# Patient Record
Sex: FEMALE | Race: BLACK OR AFRICAN AMERICAN | NOT HISPANIC OR LATINO | Employment: OTHER | RURAL
[De-identification: names, ages, dates, MRNs, and addresses within clinical notes are randomized per-mention and may not be internally consistent; named-entity substitution may affect disease eponyms.]

---

## 2016-11-28 LAB — HCV AB SER QL: NORMAL

## 2019-08-19 ENCOUNTER — HISTORICAL (OUTPATIENT)
Dept: ADMINISTRATIVE | Facility: HOSPITAL | Age: 69
End: 2019-08-19

## 2019-08-19 LAB — CRC RECOMMENDATION EXT: NORMAL

## 2019-08-20 LAB
LAB AP CLINICAL INFORMATION: NORMAL
LAB AP DIAGNOSIS - HISTORICAL: NORMAL
LAB AP GROSS PATHOLOGY - HISTORICAL: NORMAL
LAB AP SPECIMEN SUBMITTED - HISTORICAL: NORMAL

## 2021-04-12 ENCOUNTER — OFFICE VISIT (OUTPATIENT)
Dept: INTERNAL MEDICINE | Facility: CLINIC | Age: 71
End: 2021-04-12
Payer: MEDICARE

## 2021-04-12 VITALS
TEMPERATURE: 98 F | HEIGHT: 63 IN | SYSTOLIC BLOOD PRESSURE: 138 MMHG | OXYGEN SATURATION: 98 % | HEART RATE: 85 BPM | RESPIRATION RATE: 16 BRPM | DIASTOLIC BLOOD PRESSURE: 64 MMHG | WEIGHT: 215 LBS | BODY MASS INDEX: 38.09 KG/M2

## 2021-04-12 DIAGNOSIS — I10 ESSENTIAL HYPERTENSION: Primary | ICD-10-CM

## 2021-04-12 PROCEDURE — 99999 PR PBB SHADOW E&M-EST. PATIENT-LVL V: CPT | Mod: PBBFAC,,, | Performed by: INTERNAL MEDICINE

## 2021-04-12 PROCEDURE — 99214 PR OFFICE/OUTPT VISIT, EST, LEVL IV, 30-39 MIN: ICD-10-PCS | Mod: S$PBB,,, | Performed by: INTERNAL MEDICINE

## 2021-04-12 PROCEDURE — 99214 OFFICE O/P EST MOD 30 MIN: CPT | Mod: S$PBB,,, | Performed by: INTERNAL MEDICINE

## 2021-04-12 PROCEDURE — 99215 OFFICE O/P EST HI 40 MIN: CPT | Mod: PBBFAC | Performed by: INTERNAL MEDICINE

## 2021-04-12 PROCEDURE — 99999 PR PBB SHADOW E&M-EST. PATIENT-LVL V: ICD-10-PCS | Mod: PBBFAC,,, | Performed by: INTERNAL MEDICINE

## 2021-04-12 RX ORDER — CYCLOBENZAPRINE HCL 10 MG
10 TABLET ORAL 3 TIMES DAILY PRN
COMMUNITY
End: 2022-04-22 | Stop reason: SDUPTHER

## 2021-04-12 RX ORDER — LOSARTAN POTASSIUM 50 MG/1
50 TABLET ORAL DAILY
COMMUNITY
End: 2021-11-15 | Stop reason: SDUPTHER

## 2021-04-12 RX ORDER — PREDNISONE 5 MG/1
5 TABLET ORAL DAILY PRN
COMMUNITY
End: 2022-04-22

## 2021-04-12 RX ORDER — FOLIC ACID 1 MG/1
1 TABLET ORAL DAILY
Status: ON HOLD | COMMUNITY
End: 2021-12-21 | Stop reason: HOSPADM

## 2021-04-12 RX ORDER — LEVOTHYROXINE SODIUM 125 UG/1
125 TABLET ORAL
COMMUNITY
End: 2021-05-21

## 2021-04-12 RX ORDER — HYDROCHLOROTHIAZIDE 12.5 MG/1
12.5 CAPSULE ORAL DAILY
COMMUNITY
End: 2021-10-11

## 2021-04-12 RX ORDER — METFORMIN HYDROCHLORIDE 500 MG/1
500 TABLET ORAL 2 TIMES DAILY
COMMUNITY
End: 2021-08-24

## 2021-05-10 RX ORDER — AZITHROMYCIN 250 MG/1
TABLET, FILM COATED ORAL
Qty: 6 TABLET | Refills: 1 | Status: SHIPPED | OUTPATIENT
Start: 2021-05-10 | End: 2021-08-10 | Stop reason: SDUPTHER

## 2021-05-10 RX ORDER — AZITHROMYCIN 250 MG/1
250 TABLET, FILM COATED ORAL DAILY
COMMUNITY
End: 2021-05-10 | Stop reason: SDUPTHER

## 2021-07-09 ENCOUNTER — OFFICE VISIT (OUTPATIENT)
Dept: INTERNAL MEDICINE | Facility: CLINIC | Age: 71
End: 2021-07-09
Payer: MEDICARE

## 2021-07-09 VITALS
RESPIRATION RATE: 16 BRPM | BODY MASS INDEX: 37.39 KG/M2 | HEART RATE: 90 BPM | TEMPERATURE: 98 F | SYSTOLIC BLOOD PRESSURE: 134 MMHG | DIASTOLIC BLOOD PRESSURE: 58 MMHG | WEIGHT: 211 LBS | HEIGHT: 63 IN | OXYGEN SATURATION: 96 %

## 2021-07-09 DIAGNOSIS — K21.9 GERD WITHOUT ESOPHAGITIS: ICD-10-CM

## 2021-07-09 DIAGNOSIS — E78.5 HYPERLIPIDEMIA LDL GOAL <100: ICD-10-CM

## 2021-07-09 DIAGNOSIS — M06.9 RHEUMATOID ARTHRITIS, INVOLVING UNSPECIFIED SITE, UNSPECIFIED WHETHER RHEUMATOID FACTOR PRESENT: ICD-10-CM

## 2021-07-09 DIAGNOSIS — E11.9 CONTROLLED TYPE 2 DIABETES MELLITUS WITHOUT COMPLICATION, WITHOUT LONG-TERM CURRENT USE OF INSULIN: ICD-10-CM

## 2021-07-09 DIAGNOSIS — M15.9 OSTEOARTHRITIS OF MULTIPLE JOINTS, UNSPECIFIED OSTEOARTHRITIS TYPE: ICD-10-CM

## 2021-07-09 DIAGNOSIS — I10 ESSENTIAL HYPERTENSION: Primary | ICD-10-CM

## 2021-07-09 PROCEDURE — 99214 OFFICE O/P EST MOD 30 MIN: CPT | Mod: S$PBB,,, | Performed by: INTERNAL MEDICINE

## 2021-07-09 PROCEDURE — 99215 OFFICE O/P EST HI 40 MIN: CPT | Mod: PBBFAC | Performed by: INTERNAL MEDICINE

## 2021-07-09 PROCEDURE — 99214 PR OFFICE/OUTPT VISIT, EST, LEVL IV, 30-39 MIN: ICD-10-PCS | Mod: S$PBB,,, | Performed by: INTERNAL MEDICINE

## 2021-07-09 RX ORDER — POTASSIUM CHLORIDE 600 MG/1
8 CAPSULE, EXTENDED RELEASE ORAL ONCE
Qty: 90 CAPSULE | Refills: 3 | Status: SHIPPED | OUTPATIENT
Start: 2021-07-09 | End: 2021-07-09

## 2021-08-02 ENCOUNTER — TELEPHONE (OUTPATIENT)
Dept: INTERNAL MEDICINE | Facility: CLINIC | Age: 71
End: 2021-08-02

## 2021-08-02 DIAGNOSIS — E11.9 CONTROLLED TYPE 2 DIABETES MELLITUS WITHOUT COMPLICATION, WITHOUT LONG-TERM CURRENT USE OF INSULIN: ICD-10-CM

## 2021-08-02 DIAGNOSIS — E78.5 HYPERLIPIDEMIA LDL GOAL <100: ICD-10-CM

## 2021-08-02 DIAGNOSIS — J06.9 UPPER RESPIRATORY TRACT INFECTION, UNSPECIFIED TYPE: ICD-10-CM

## 2021-08-02 DIAGNOSIS — I10 ESSENTIAL HYPERTENSION: Primary | ICD-10-CM

## 2021-08-02 RX ORDER — AZITHROMYCIN 250 MG/1
TABLET, FILM COATED ORAL
Qty: 6 TABLET | Refills: 1 | Status: SHIPPED | OUTPATIENT
Start: 2021-08-02 | End: 2021-10-11

## 2021-08-04 ENCOUNTER — HOSPITAL ENCOUNTER (OUTPATIENT)
Dept: RADIOLOGY | Facility: HOSPITAL | Age: 71
Discharge: HOME | End: 2021-08-04
Attending: ORTHOPAEDIC SURGERY
Payer: MEDICARE

## 2021-08-04 DIAGNOSIS — M25.512 LEFT SHOULDER PAIN, UNSPECIFIED CHRONICITY: ICD-10-CM

## 2021-08-04 PROBLEM — M75.42 IMPINGEMENT SYNDROME OF LEFT SHOULDER: Status: ACTIVE | Noted: 2021-08-04

## 2021-08-04 PROCEDURE — 73030 X-RAY EXAM OF SHOULDER: CPT | Mod: TC,LT

## 2021-08-10 RX ORDER — AZITHROMYCIN 250 MG/1
TABLET, FILM COATED ORAL
Qty: 6 TABLET | Refills: 1 | Status: SHIPPED | OUTPATIENT
Start: 2021-08-10 | End: 2021-10-11

## 2021-08-25 DIAGNOSIS — U07.1 COVID-19: Primary | ICD-10-CM

## 2021-08-26 ENCOUNTER — INFUSION (OUTPATIENT)
Dept: INFECTIOUS DISEASES | Facility: HOSPITAL | Age: 71
End: 2021-08-26
Attending: INTERNAL MEDICINE
Payer: MEDICARE

## 2021-08-26 VITALS
HEART RATE: 83 BPM | OXYGEN SATURATION: 96 % | TEMPERATURE: 98 F | DIASTOLIC BLOOD PRESSURE: 64 MMHG | SYSTOLIC BLOOD PRESSURE: 130 MMHG

## 2021-08-26 DIAGNOSIS — U07.1 COVID-19: ICD-10-CM

## 2021-08-26 PROCEDURE — 25000003 PHARM REV CODE 250: Performed by: INTERNAL MEDICINE

## 2021-08-26 PROCEDURE — M0243 CASIRIVI AND IMDEVI INFUSION: HCPCS | Performed by: INTERNAL MEDICINE

## 2021-08-26 PROCEDURE — 63600175 PHARM REV CODE 636 W HCPCS: Performed by: INTERNAL MEDICINE

## 2021-08-26 RX ORDER — DIPHENHYDRAMINE HYDROCHLORIDE 50 MG/ML
25 INJECTION INTRAMUSCULAR; INTRAVENOUS ONCE AS NEEDED
Status: ACTIVE | OUTPATIENT
Start: 2021-08-26 | End: 2033-01-22

## 2021-08-26 RX ORDER — SODIUM CHLORIDE 0.9 % (FLUSH) 0.9 %
10 SYRINGE (ML) INJECTION
Status: ACTIVE | OUTPATIENT
Start: 2021-08-26

## 2021-08-26 RX ORDER — ACETAMINOPHEN 325 MG/1
650 TABLET ORAL ONCE AS NEEDED
Status: COMPLETED | OUTPATIENT
Start: 2021-08-26 | End: 2022-09-14

## 2021-08-26 RX ORDER — EPINEPHRINE 0.3 MG/.3ML
0.3 INJECTION SUBCUTANEOUS
Status: ACTIVE | OUTPATIENT
Start: 2021-08-26

## 2021-08-26 RX ORDER — ALBUTEROL SULFATE 90 UG/1
2 AEROSOL, METERED RESPIRATORY (INHALATION)
Status: DISCONTINUED | OUTPATIENT
Start: 2021-08-26 | End: 2021-12-21 | Stop reason: ALTCHOICE

## 2021-08-26 RX ORDER — ONDANSETRON 4 MG/1
4 TABLET, ORALLY DISINTEGRATING ORAL ONCE AS NEEDED
Status: ACTIVE | OUTPATIENT
Start: 2021-08-26 | End: 2033-01-22

## 2021-08-26 RX ADMIN — CASIRIVIMAB AND IMDEVIMAB 600 MG: 600; 600 INJECTION, SOLUTION, CONCENTRATE INTRAVENOUS at 08:08

## 2021-10-07 ENCOUNTER — INFUSION (OUTPATIENT)
Dept: INFUSION THERAPY | Facility: HOSPITAL | Age: 71
End: 2021-10-07
Attending: INTERNAL MEDICINE
Payer: MEDICARE

## 2021-10-07 VITALS
OXYGEN SATURATION: 100 % | SYSTOLIC BLOOD PRESSURE: 143 MMHG | TEMPERATURE: 98 F | DIASTOLIC BLOOD PRESSURE: 75 MMHG | HEART RATE: 69 BPM

## 2021-10-07 DIAGNOSIS — M05.79 RHEUMATOID ARTHRITIS INVOLVING MULTIPLE SITES WITH POSITIVE RHEUMATOID FACTOR: Primary | ICD-10-CM

## 2021-10-07 PROCEDURE — 25000003 PHARM REV CODE 250: Performed by: NURSE PRACTITIONER

## 2021-10-07 PROCEDURE — 96365 THER/PROPH/DIAG IV INF INIT: CPT

## 2021-10-07 PROCEDURE — 63600175 PHARM REV CODE 636 W HCPCS: Performed by: NURSE PRACTITIONER

## 2021-10-07 RX ORDER — METHYLPREDNISOLONE SOD SUCC 125 MG
62.5 VIAL (EA) INJECTION ONCE AS NEEDED
Status: COMPLETED | OUTPATIENT
Start: 2021-10-07 | End: 2021-10-07

## 2021-10-07 RX ORDER — SODIUM CHLORIDE 0.9 % (FLUSH) 0.9 %
10 SYRINGE (ML) INJECTION
Status: CANCELLED | OUTPATIENT
Start: 2021-10-07

## 2021-10-07 RX ORDER — DIPHENHYDRAMINE HCL 12.5MG/5ML
25 ELIXIR ORAL ONCE AS NEEDED
Status: CANCELLED
Start: 2021-10-07

## 2021-10-07 RX ORDER — ACETAMINOPHEN 500 MG
1000 TABLET ORAL ONCE AS NEEDED
Status: COMPLETED | OUTPATIENT
Start: 2021-10-07 | End: 2021-10-07

## 2021-10-07 RX ORDER — DIPHENHYDRAMINE HCL 12.5MG/5ML
25 ELIXIR ORAL ONCE AS NEEDED
Status: COMPLETED | OUTPATIENT
Start: 2021-10-07 | End: 2021-10-07

## 2021-10-07 RX ORDER — ACETAMINOPHEN 500 MG
1000 TABLET ORAL ONCE AS NEEDED
Status: CANCELLED
Start: 2021-10-07

## 2021-10-07 RX ADMIN — DIPHENHYDRAMINE HYDROCHLORIDE 25 MG: 12.5 SOLUTION ORAL at 11:10

## 2021-10-07 RX ADMIN — METHYLPREDNISOLONE SODIUM SUCCINATE 62.5 MG: 125 INJECTION, POWDER, FOR SOLUTION INTRAMUSCULAR; INTRAVENOUS at 11:10

## 2021-10-07 RX ADMIN — ACETAMINOPHEN 1000 MG: 500 TABLET ORAL at 11:10

## 2021-10-07 RX ADMIN — SODIUM CHLORIDE 750 MG: 9 INJECTION, SOLUTION INTRAVENOUS at 12:10

## 2021-10-08 RX ORDER — HYDROCHLOROTHIAZIDE 12.5 MG/1
12.5 TABLET ORAL DAILY
COMMUNITY
Start: 2021-08-02 | End: 2021-11-22

## 2021-10-08 RX ORDER — POTASSIUM CHLORIDE 600 MG/1
8 CAPSULE, EXTENDED RELEASE ORAL ONCE
COMMUNITY
Start: 2021-07-09 | End: 2021-11-15 | Stop reason: SDUPTHER

## 2021-10-11 ENCOUNTER — OFFICE VISIT (OUTPATIENT)
Dept: INTERNAL MEDICINE | Facility: CLINIC | Age: 71
End: 2021-10-11
Payer: MEDICARE

## 2021-10-11 VITALS
SYSTOLIC BLOOD PRESSURE: 130 MMHG | HEART RATE: 85 BPM | OXYGEN SATURATION: 98 % | HEIGHT: 63 IN | RESPIRATION RATE: 16 BRPM | WEIGHT: 217 LBS | BODY MASS INDEX: 38.45 KG/M2 | DIASTOLIC BLOOD PRESSURE: 70 MMHG | TEMPERATURE: 98 F

## 2021-10-11 DIAGNOSIS — E11.9 CONTROLLED TYPE 2 DIABETES MELLITUS WITHOUT COMPLICATION, WITHOUT LONG-TERM CURRENT USE OF INSULIN: ICD-10-CM

## 2021-10-11 DIAGNOSIS — E78.5 HYPERLIPIDEMIA LDL GOAL <100: ICD-10-CM

## 2021-10-11 DIAGNOSIS — I10 ESSENTIAL HYPERTENSION: Primary | ICD-10-CM

## 2021-10-11 DIAGNOSIS — M15.9 OSTEOARTHRITIS OF MULTIPLE JOINTS, UNSPECIFIED OSTEOARTHRITIS TYPE: ICD-10-CM

## 2021-10-11 PROCEDURE — 99214 PR OFFICE/OUTPT VISIT, EST, LEVL IV, 30-39 MIN: ICD-10-PCS | Mod: S$PBB,,, | Performed by: INTERNAL MEDICINE

## 2021-10-11 PROCEDURE — 99214 OFFICE O/P EST MOD 30 MIN: CPT | Mod: S$PBB,,, | Performed by: INTERNAL MEDICINE

## 2021-10-11 PROCEDURE — 99215 OFFICE O/P EST HI 40 MIN: CPT | Mod: PBBFAC | Performed by: INTERNAL MEDICINE

## 2021-11-04 ENCOUNTER — INFUSION (OUTPATIENT)
Dept: INFUSION THERAPY | Facility: HOSPITAL | Age: 71
End: 2021-11-04
Attending: NURSE PRACTITIONER
Payer: MEDICARE

## 2021-11-04 DIAGNOSIS — M05.79 RHEUMATOID ARTHRITIS INVOLVING MULTIPLE SITES WITH POSITIVE RHEUMATOID FACTOR: Primary | ICD-10-CM

## 2021-11-04 PROCEDURE — 63600175 PHARM REV CODE 636 W HCPCS: Mod: JA | Performed by: NURSE PRACTITIONER

## 2021-11-04 PROCEDURE — 25000003 PHARM REV CODE 250: Performed by: NURSE PRACTITIONER

## 2021-11-04 PROCEDURE — 96365 THER/PROPH/DIAG IV INF INIT: CPT

## 2021-11-04 RX ORDER — ACETAMINOPHEN 500 MG
1000 TABLET ORAL ONCE AS NEEDED
Status: COMPLETED | OUTPATIENT
Start: 2021-11-04 | End: 2021-11-04

## 2021-11-04 RX ORDER — ACETAMINOPHEN 500 MG
1000 TABLET ORAL ONCE AS NEEDED
Status: CANCELLED
Start: 2021-11-04

## 2021-11-04 RX ORDER — DIPHENHYDRAMINE HCL 12.5MG/5ML
25 ELIXIR ORAL ONCE AS NEEDED
Status: CANCELLED
Start: 2021-11-04

## 2021-11-04 RX ORDER — SODIUM CHLORIDE 0.9 % (FLUSH) 0.9 %
10 SYRINGE (ML) INJECTION
Status: CANCELLED | OUTPATIENT
Start: 2021-11-04

## 2021-11-04 RX ORDER — DIPHENHYDRAMINE HCL 12.5MG/5ML
25 ELIXIR ORAL ONCE AS NEEDED
Status: COMPLETED | OUTPATIENT
Start: 2021-11-04 | End: 2021-11-04

## 2021-11-04 RX ADMIN — SODIUM CHLORIDE 750 MG: 9 INJECTION, SOLUTION INTRAVENOUS at 09:11

## 2021-11-04 RX ADMIN — ACETAMINOPHEN 1000 MG: 500 TABLET ORAL at 09:11

## 2021-11-04 RX ADMIN — DIPHENHYDRAMINE HYDROCHLORIDE 25 MG: 12.5 SOLUTION ORAL at 09:11

## 2021-11-04 RX ADMIN — METHYLPREDNISOLONE SODIUM SUCCINATE 62.5 MG: 125 INJECTION, POWDER, FOR SOLUTION INTRAMUSCULAR; INTRAVENOUS at 09:11

## 2021-11-15 RX ORDER — POTASSIUM CHLORIDE 600 MG/1
8 CAPSULE, EXTENDED RELEASE ORAL DAILY
Qty: 90 CAPSULE | Refills: 3 | Status: ON HOLD | OUTPATIENT
Start: 2021-11-15 | End: 2021-12-21 | Stop reason: ALTCHOICE

## 2021-11-15 RX ORDER — LOSARTAN POTASSIUM 50 MG/1
50 TABLET ORAL DAILY
Qty: 90 TABLET | Refills: 3 | Status: ON HOLD | OUTPATIENT
Start: 2021-11-15 | End: 2021-12-21 | Stop reason: ALTCHOICE

## 2021-12-09 ENCOUNTER — INFUSION (OUTPATIENT)
Dept: INFUSION THERAPY | Facility: HOSPITAL | Age: 71
End: 2021-12-09
Attending: INTERNAL MEDICINE
Payer: MEDICARE

## 2021-12-09 VITALS
OXYGEN SATURATION: 98 % | HEART RATE: 61 BPM | DIASTOLIC BLOOD PRESSURE: 79 MMHG | TEMPERATURE: 98 F | SYSTOLIC BLOOD PRESSURE: 174 MMHG

## 2021-12-09 DIAGNOSIS — M05.79 RHEUMATOID ARTHRITIS INVOLVING MULTIPLE SITES WITH POSITIVE RHEUMATOID FACTOR: Primary | ICD-10-CM

## 2021-12-09 PROCEDURE — 96365 THER/PROPH/DIAG IV INF INIT: CPT

## 2021-12-09 PROCEDURE — 63600175 PHARM REV CODE 636 W HCPCS: Mod: JA | Performed by: NURSE PRACTITIONER

## 2021-12-09 PROCEDURE — 63600175 PHARM REV CODE 636 W HCPCS: Performed by: INTERNAL MEDICINE

## 2021-12-09 PROCEDURE — 25000003 PHARM REV CODE 250: Performed by: NURSE PRACTITIONER

## 2021-12-09 RX ORDER — SODIUM CHLORIDE 0.9 % (FLUSH) 0.9 %
10 SYRINGE (ML) INJECTION
Status: CANCELLED | OUTPATIENT
Start: 2021-12-09

## 2021-12-09 RX ORDER — ACETAMINOPHEN 500 MG
1000 TABLET ORAL ONCE AS NEEDED
Status: CANCELLED
Start: 2021-12-09

## 2021-12-09 RX ORDER — DIPHENHYDRAMINE HCL 12.5MG/5ML
25 ELIXIR ORAL ONCE AS NEEDED
Status: CANCELLED
Start: 2021-12-09

## 2021-12-09 RX ORDER — ACETAMINOPHEN 500 MG
1000 TABLET ORAL ONCE AS NEEDED
Status: COMPLETED | OUTPATIENT
Start: 2021-12-09 | End: 2021-12-09

## 2021-12-09 RX ORDER — SODIUM CHLORIDE 0.9 % (FLUSH) 0.9 %
10 SYRINGE (ML) INJECTION
Status: DISCONTINUED | OUTPATIENT
Start: 2021-12-09 | End: 2021-12-09 | Stop reason: HOSPADM

## 2021-12-09 RX ORDER — DIPHENHYDRAMINE HCL 12.5MG/5ML
25 ELIXIR ORAL ONCE AS NEEDED
Status: COMPLETED | OUTPATIENT
Start: 2021-12-09 | End: 2021-12-09

## 2021-12-09 RX ADMIN — ACETAMINOPHEN 1000 MG: 500 TABLET ORAL at 10:12

## 2021-12-09 RX ADMIN — METHYLPREDNISOLONE SODIUM SUCCINATE 40 MG: 40 INJECTION, POWDER, LYOPHILIZED, FOR SOLUTION INTRAMUSCULAR; INTRAVENOUS at 10:12

## 2021-12-09 RX ADMIN — DIPHENHYDRAMINE HYDROCHLORIDE 25 MG: 12.5 SOLUTION ORAL at 10:12

## 2021-12-09 RX ADMIN — SODIUM CHLORIDE 750 MG: 9 INJECTION, SOLUTION INTRAVENOUS at 10:12

## 2021-12-13 ENCOUNTER — CLINICAL SUPPORT (OUTPATIENT)
Dept: CARDIOLOGY | Facility: CLINIC | Age: 71
End: 2021-12-13
Payer: MEDICARE

## 2021-12-13 DIAGNOSIS — Z01.810 PRE-OPERATIVE CARDIOVASCULAR EXAMINATION: ICD-10-CM

## 2021-12-13 PROCEDURE — 99211 OFF/OP EST MAY X REQ PHY/QHP: CPT | Mod: PBBFAC

## 2021-12-13 PROCEDURE — 93010 EKG 12-LEAD: ICD-10-PCS | Mod: S$PBB,,, | Performed by: STUDENT IN AN ORGANIZED HEALTH CARE EDUCATION/TRAINING PROGRAM

## 2021-12-13 PROCEDURE — 93005 ELECTROCARDIOGRAM TRACING: CPT | Mod: PBBFAC | Performed by: STUDENT IN AN ORGANIZED HEALTH CARE EDUCATION/TRAINING PROGRAM

## 2021-12-13 PROCEDURE — 93010 ELECTROCARDIOGRAM REPORT: CPT | Mod: S$PBB,,, | Performed by: STUDENT IN AN ORGANIZED HEALTH CARE EDUCATION/TRAINING PROGRAM

## 2021-12-20 DIAGNOSIS — B37.31 YEAST INFECTION OF THE VAGINA: Primary | ICD-10-CM

## 2021-12-20 PROBLEM — M75.122 NONTRAUMATIC COMPLETE TEAR OF LEFT ROTATOR CUFF: Status: ACTIVE | Noted: 2021-12-20

## 2021-12-20 RX ORDER — LOSARTAN POTASSIUM AND HYDROCHLOROTHIAZIDE 12.5; 5 MG/1; MG/1
TABLET ORAL
COMMUNITY
End: 2023-02-03

## 2021-12-20 RX ORDER — CEFDINIR 300 MG/1
CAPSULE ORAL
Status: ON HOLD | COMMUNITY
Start: 2021-11-29 | End: 2021-12-21 | Stop reason: ALTCHOICE

## 2021-12-20 RX ORDER — IBUPROFEN 800 MG/1
TABLET ORAL
COMMUNITY

## 2021-12-20 RX ORDER — AZELASTINE 1 MG/ML
SPRAY, METERED NASAL
Status: ON HOLD | COMMUNITY
End: 2021-12-21 | Stop reason: ALTCHOICE

## 2021-12-20 RX ORDER — POTASSIUM CHLORIDE 600 MG/1
CAPSULE, EXTENDED RELEASE ORAL
COMMUNITY
End: 2022-04-22

## 2021-12-20 RX ORDER — FLUCONAZOLE 100 MG/1
100 TABLET ORAL DAILY
Qty: 30 TABLET | Refills: 0 | Status: SHIPPED | OUTPATIENT
Start: 2021-12-20 | End: 2022-01-19

## 2021-12-20 RX ORDER — OMEPRAZOLE 20 MG/1
CAPSULE, DELAYED RELEASE ORAL
COMMUNITY
End: 2022-03-04 | Stop reason: ALTCHOICE

## 2021-12-20 RX ORDER — LEVOTHYROXINE SODIUM 125 UG/1
TABLET ORAL
COMMUNITY
End: 2022-07-27

## 2021-12-20 RX ORDER — FLUTICASONE PROPIONATE 50 MCG
SPRAY, SUSPENSION (ML) NASAL
COMMUNITY
End: 2022-11-02 | Stop reason: SDUPTHER

## 2021-12-20 RX ORDER — LORATADINE 10 MG/1
TABLET ORAL
COMMUNITY

## 2021-12-21 ENCOUNTER — HOSPITAL ENCOUNTER (OUTPATIENT)
Facility: HOSPITAL | Age: 71
Discharge: HOME | End: 2021-12-21
Attending: ORTHOPAEDIC SURGERY | Admitting: ORTHOPAEDIC SURGERY
Payer: MEDICARE

## 2021-12-21 ENCOUNTER — ANESTHESIA (OUTPATIENT)
Dept: SURGERY | Facility: HOSPITAL | Age: 71
End: 2021-12-21
Payer: MEDICARE

## 2021-12-21 ENCOUNTER — ANESTHESIA EVENT (OUTPATIENT)
Dept: SURGERY | Facility: HOSPITAL | Age: 71
End: 2021-12-21
Payer: MEDICARE

## 2021-12-21 VITALS
RESPIRATION RATE: 27 BRPM | DIASTOLIC BLOOD PRESSURE: 80 MMHG | OXYGEN SATURATION: 93 % | TEMPERATURE: 98 F | HEIGHT: 63 IN | WEIGHT: 210 LBS | HEART RATE: 84 BPM | SYSTOLIC BLOOD PRESSURE: 140 MMHG | BODY MASS INDEX: 37.21 KG/M2

## 2021-12-21 DIAGNOSIS — M75.122 NONTRAUMATIC COMPLETE TEAR OF LEFT ROTATOR CUFF: ICD-10-CM

## 2021-12-21 LAB
GLUCOSE SERPL-MCNC: 135 MG/DL (ref 70–105)
GLUCOSE SERPL-MCNC: 177 MG/DL (ref 70–105)

## 2021-12-21 PROCEDURE — 27100168 OPTIME MED/SURG SUP & DEVICES NON-STERILE SUPPLY: Performed by: ORTHOPAEDIC SURGERY

## 2021-12-21 PROCEDURE — 37000008 HC ANESTHESIA 1ST 15 MINUTES: Performed by: ORTHOPAEDIC SURGERY

## 2021-12-21 PROCEDURE — 71000016 HC POSTOP RECOV ADDL HR: Performed by: ORTHOPAEDIC SURGERY

## 2021-12-21 PROCEDURE — 27201423 OPTIME MED/SURG SUP & DEVICES STERILE SUPPLY: Performed by: ORTHOPAEDIC SURGERY

## 2021-12-21 PROCEDURE — 25000003 PHARM REV CODE 250: Performed by: ORTHOPAEDIC SURGERY

## 2021-12-21 PROCEDURE — 63600175 PHARM REV CODE 636 W HCPCS: Performed by: ANESTHESIOLOGY

## 2021-12-21 PROCEDURE — 36000711: Performed by: ORTHOPAEDIC SURGERY

## 2021-12-21 PROCEDURE — 37000009 HC ANESTHESIA EA ADD 15 MINS: Performed by: ORTHOPAEDIC SURGERY

## 2021-12-21 PROCEDURE — 25000003 PHARM REV CODE 250: Performed by: ANESTHESIOLOGY

## 2021-12-21 PROCEDURE — D9220A PRA ANESTHESIA: ICD-10-PCS | Mod: ANES,ICN,, | Performed by: ANESTHESIOLOGY

## 2021-12-21 PROCEDURE — D9220A PRA ANESTHESIA: Mod: CRNA,,, | Performed by: NURSE ANESTHETIST, CERTIFIED REGISTERED

## 2021-12-21 PROCEDURE — 64415 NJX AA&/STRD BRCH PLXS IMG: CPT | Mod: XU,LT,, | Performed by: ANESTHESIOLOGY

## 2021-12-21 PROCEDURE — 63600175 PHARM REV CODE 636 W HCPCS: Performed by: NURSE ANESTHETIST, CERTIFIED REGISTERED

## 2021-12-21 PROCEDURE — 63600175 PHARM REV CODE 636 W HCPCS: Performed by: ORTHOPAEDIC SURGERY

## 2021-12-21 PROCEDURE — D9220A PRA ANESTHESIA: ICD-10-PCS | Mod: CRNA,,, | Performed by: NURSE ANESTHETIST, CERTIFIED REGISTERED

## 2021-12-21 PROCEDURE — C1713 ANCHOR/SCREW BN/BN,TIS/BN: HCPCS | Performed by: ORTHOPAEDIC SURGERY

## 2021-12-21 PROCEDURE — 64415 PERIPHERAL BLOCK: ICD-10-PCS | Mod: XU,LT,, | Performed by: ANESTHESIOLOGY

## 2021-12-21 PROCEDURE — 36000710: Performed by: ORTHOPAEDIC SURGERY

## 2021-12-21 PROCEDURE — 25000003 PHARM REV CODE 250: Performed by: NURSE ANESTHETIST, CERTIFIED REGISTERED

## 2021-12-21 PROCEDURE — 82962 GLUCOSE BLOOD TEST: CPT

## 2021-12-21 PROCEDURE — 97161 PT EVAL LOW COMPLEX 20 MIN: CPT

## 2021-12-21 PROCEDURE — 71000015 HC POSTOP RECOV 1ST HR: Performed by: ORTHOPAEDIC SURGERY

## 2021-12-21 PROCEDURE — D9220A PRA ANESTHESIA: Mod: ANES,ICN,, | Performed by: ANESTHESIOLOGY

## 2021-12-21 PROCEDURE — 71000033 HC RECOVERY, INTIAL HOUR: Performed by: ORTHOPAEDIC SURGERY

## 2021-12-21 DEVICE — IMPLANTABLE DEVICE: Type: IMPLANTABLE DEVICE | Site: SHOULDER | Status: FUNCTIONAL

## 2021-12-21 DEVICE — IMP SUTURE ANCHOR SWVLKC CLD 4.75X19.1MM: Type: IMPLANTABLE DEVICE | Site: SHOULDER | Status: FUNCTIONAL

## 2021-12-21 RX ORDER — ACETAMINOPHEN 500 MG
1000 TABLET ORAL EVERY 6 HOURS PRN
Status: DISCONTINUED | OUTPATIENT
Start: 2021-12-21 | End: 2021-12-21 | Stop reason: HOSPADM

## 2021-12-21 RX ORDER — LIDOCAINE HYDROCHLORIDE 20 MG/ML
INJECTION, SOLUTION EPIDURAL; INFILTRATION; INTRACAUDAL; PERINEURAL
Status: DISCONTINUED | OUTPATIENT
Start: 2021-12-21 | End: 2021-12-21

## 2021-12-21 RX ORDER — ONDANSETRON 2 MG/ML
INJECTION INTRAMUSCULAR; INTRAVENOUS
Status: DISCONTINUED | OUTPATIENT
Start: 2021-12-21 | End: 2021-12-21

## 2021-12-21 RX ORDER — EPINEPHRINE 1 MG/ML
INJECTION, SOLUTION INTRACARDIAC; INTRAMUSCULAR; INTRAVENOUS; SUBCUTANEOUS
Status: DISCONTINUED | OUTPATIENT
Start: 2021-12-21 | End: 2021-12-21 | Stop reason: HOSPADM

## 2021-12-21 RX ORDER — ONDANSETRON 4 MG/1
4 TABLET, ORALLY DISINTEGRATING ORAL EVERY 6 HOURS PRN
Qty: 20 TABLET | Refills: 0 | Status: SHIPPED | OUTPATIENT
Start: 2021-12-21

## 2021-12-21 RX ORDER — ONDANSETRON 4 MG/1
8 TABLET, ORALLY DISINTEGRATING ORAL EVERY 8 HOURS PRN
Status: DISCONTINUED | OUTPATIENT
Start: 2021-12-21 | End: 2021-12-21 | Stop reason: HOSPADM

## 2021-12-21 RX ORDER — DIAZEPAM 5 MG/1
10 TABLET ORAL
Status: COMPLETED | OUTPATIENT
Start: 2021-12-21 | End: 2021-12-21

## 2021-12-21 RX ORDER — MEPERIDINE HYDROCHLORIDE 25 MG/ML
25 INJECTION INTRAMUSCULAR; INTRAVENOUS; SUBCUTANEOUS EVERY 10 MIN PRN
Status: DISCONTINUED | OUTPATIENT
Start: 2021-12-21 | End: 2021-12-21 | Stop reason: HOSPADM

## 2021-12-21 RX ORDER — HYDROCODONE BITARTRATE AND ACETAMINOPHEN 5; 325 MG/1; MG/1
1 TABLET ORAL EVERY 4 HOURS PRN
Status: DISCONTINUED | OUTPATIENT
Start: 2021-12-21 | End: 2021-12-21 | Stop reason: HOSPADM

## 2021-12-21 RX ORDER — PHENYLEPHRINE HYDROCHLORIDE 10 MG/ML
INJECTION INTRAVENOUS
Status: DISCONTINUED | OUTPATIENT
Start: 2021-12-21 | End: 2021-12-21

## 2021-12-21 RX ORDER — HYDROCODONE BITARTRATE AND ACETAMINOPHEN 10; 325 MG/1; MG/1
1 TABLET ORAL EVERY 4 HOURS PRN
Status: DISCONTINUED | OUTPATIENT
Start: 2021-12-21 | End: 2021-12-21 | Stop reason: HOSPADM

## 2021-12-21 RX ORDER — FENTANYL CITRATE 50 UG/ML
INJECTION, SOLUTION INTRAMUSCULAR; INTRAVENOUS
Status: DISCONTINUED | OUTPATIENT
Start: 2021-12-21 | End: 2021-12-21

## 2021-12-21 RX ORDER — SODIUM CHLORIDE 9 MG/ML
INJECTION, SOLUTION INTRAVENOUS CONTINUOUS
Status: DISCONTINUED | OUTPATIENT
Start: 2021-12-21 | End: 2021-12-21 | Stop reason: HOSPADM

## 2021-12-21 RX ORDER — CEFAZOLIN SODIUM 1 G/3ML
INJECTION, POWDER, FOR SOLUTION INTRAMUSCULAR; INTRAVENOUS
Status: DISCONTINUED | OUTPATIENT
Start: 2021-12-21 | End: 2021-12-21

## 2021-12-21 RX ORDER — PROMETHAZINE HYDROCHLORIDE 25 MG/1
25 TABLET ORAL EVERY 6 HOURS PRN
Status: DISCONTINUED | OUTPATIENT
Start: 2021-12-21 | End: 2021-12-21 | Stop reason: HOSPADM

## 2021-12-21 RX ORDER — CEFAZOLIN SODIUM 2 G/50ML
2 SOLUTION INTRAVENOUS
Status: DISCONTINUED | OUTPATIENT
Start: 2021-12-21 | End: 2021-12-21 | Stop reason: HOSPADM

## 2021-12-21 RX ORDER — EPHEDRINE SULFATE 50 MG/ML
INJECTION, SOLUTION INTRAVENOUS
Status: DISCONTINUED | OUTPATIENT
Start: 2021-12-21 | End: 2021-12-21

## 2021-12-21 RX ORDER — DEXAMETHASONE SODIUM PHOSPHATE 4 MG/ML
INJECTION, SOLUTION INTRA-ARTICULAR; INTRALESIONAL; INTRAMUSCULAR; INTRAVENOUS; SOFT TISSUE
Status: DISCONTINUED | OUTPATIENT
Start: 2021-12-21 | End: 2021-12-21

## 2021-12-21 RX ORDER — LIDOCAINE HYDROCHLORIDE 40 MG/ML
SOLUTION TOPICAL
Status: DISCONTINUED | OUTPATIENT
Start: 2021-12-21 | End: 2021-12-21

## 2021-12-21 RX ORDER — MORPHINE SULFATE 10 MG/ML
4 INJECTION INTRAMUSCULAR; INTRAVENOUS; SUBCUTANEOUS EVERY 5 MIN PRN
Status: DISCONTINUED | OUTPATIENT
Start: 2021-12-21 | End: 2021-12-21 | Stop reason: HOSPADM

## 2021-12-21 RX ORDER — ROCURONIUM BROMIDE 10 MG/ML
INJECTION, SOLUTION INTRAVENOUS
Status: DISCONTINUED | OUTPATIENT
Start: 2021-12-21 | End: 2021-12-21

## 2021-12-21 RX ORDER — ONDANSETRON 2 MG/ML
4 INJECTION INTRAMUSCULAR; INTRAVENOUS DAILY PRN
Status: DISCONTINUED | OUTPATIENT
Start: 2021-12-21 | End: 2021-12-21 | Stop reason: HOSPADM

## 2021-12-21 RX ORDER — BUPIVACAINE HYDROCHLORIDE 2.5 MG/ML
INJECTION, SOLUTION EPIDURAL; INFILTRATION; INTRACAUDAL
Status: DISCONTINUED | OUTPATIENT
Start: 2021-12-21 | End: 2021-12-21 | Stop reason: HOSPADM

## 2021-12-21 RX ORDER — DIPHENHYDRAMINE HYDROCHLORIDE 50 MG/ML
25 INJECTION INTRAMUSCULAR; INTRAVENOUS EVERY 6 HOURS PRN
Status: DISCONTINUED | OUTPATIENT
Start: 2021-12-21 | End: 2021-12-21 | Stop reason: HOSPADM

## 2021-12-21 RX ORDER — HYDROCODONE BITARTRATE AND ACETAMINOPHEN 10; 325 MG/1; MG/1
1 TABLET ORAL EVERY 6 HOURS PRN
Qty: 28 TABLET | Refills: 0 | Status: SHIPPED | OUTPATIENT
Start: 2021-12-21 | End: 2022-07-27

## 2021-12-21 RX ORDER — HYDROMORPHONE HYDROCHLORIDE 2 MG/ML
0.5 INJECTION, SOLUTION INTRAMUSCULAR; INTRAVENOUS; SUBCUTANEOUS EVERY 5 MIN PRN
Status: DISCONTINUED | OUTPATIENT
Start: 2021-12-21 | End: 2021-12-21 | Stop reason: HOSPADM

## 2021-12-21 RX ORDER — PROPOFOL 10 MG/ML
VIAL (ML) INTRAVENOUS
Status: DISCONTINUED | OUTPATIENT
Start: 2021-12-21 | End: 2021-12-21

## 2021-12-21 RX ORDER — MIDAZOLAM HYDROCHLORIDE 1 MG/ML
INJECTION INTRAMUSCULAR; INTRAVENOUS
Status: DISCONTINUED | OUTPATIENT
Start: 2021-12-21 | End: 2021-12-21

## 2021-12-21 RX ADMIN — EPHEDRINE SULFATE 15 MG: 50 INJECTION INTRAVENOUS at 08:12

## 2021-12-21 RX ADMIN — LIDOCAINE HYDROCHLORIDE 80 MG: 20 INJECTION, SOLUTION INTRAVENOUS at 07:12

## 2021-12-21 RX ADMIN — PHENYLEPHRINE HYDROCHLORIDE 200 MCG: 10 INJECTION INTRAVENOUS at 08:12

## 2021-12-21 RX ADMIN — SODIUM CHLORIDE: 9 INJECTION, SOLUTION INTRAVENOUS at 07:12

## 2021-12-21 RX ADMIN — CEFAZOLIN 2 G: 1 INJECTION, POWDER, FOR SOLUTION INTRAMUSCULAR; INTRAVENOUS; PARENTERAL at 07:12

## 2021-12-21 RX ADMIN — ROPIVACAINE HYDROCHLORIDE 30 ML: 7.5 INJECTION, SOLUTION EPIDURAL; PERINEURAL at 07:12

## 2021-12-21 RX ADMIN — PROPOFOL 180 MG: 10 INJECTION, EMULSION INTRAVENOUS at 07:12

## 2021-12-21 RX ADMIN — SUGAMMADEX 200 MG: 100 INJECTION, SOLUTION INTRAVENOUS at 09:12

## 2021-12-21 RX ADMIN — ROCURONIUM BROMIDE 40 MG: 10 INJECTION, SOLUTION INTRAVENOUS at 07:12

## 2021-12-21 RX ADMIN — EPHEDRINE SULFATE 20 MG: 50 INJECTION INTRAVENOUS at 08:12

## 2021-12-21 RX ADMIN — LIDOCAINE HYDROCHLORIDE 120 MG: 40 SOLUTION TOPICAL at 07:12

## 2021-12-21 RX ADMIN — DEXAMETHASONE SODIUM PHOSPHATE 4 MG: 4 INJECTION, SOLUTION INTRA-ARTICULAR; INTRALESIONAL; INTRAMUSCULAR; INTRAVENOUS; SOFT TISSUE at 07:12

## 2021-12-21 RX ADMIN — DIAZEPAM 10 MG: 5 TABLET ORAL at 07:12

## 2021-12-21 RX ADMIN — MIDAZOLAM 2 MG: 1 INJECTION INTRAMUSCULAR; INTRAVENOUS at 07:12

## 2021-12-21 RX ADMIN — FENTANYL CITRATE 100 MCG: 50 INJECTION INTRAMUSCULAR; INTRAVENOUS at 07:12

## 2021-12-21 RX ADMIN — ONDANSETRON 4 MG: 2 INJECTION INTRAMUSCULAR; INTRAVENOUS at 07:12

## 2021-12-29 RX ORDER — ROPIVACAINE HYDROCHLORIDE 7.5 MG/ML
INJECTION, SOLUTION EPIDURAL; PERINEURAL
Status: DISCONTINUED | OUTPATIENT
Start: 2021-12-21 | End: 2021-12-29

## 2022-01-05 PROBLEM — Z98.890 STATUS POST LEFT ROTATOR CUFF REPAIR: Status: ACTIVE | Noted: 2022-01-05

## 2022-01-06 ENCOUNTER — INFUSION (OUTPATIENT)
Dept: INFUSION THERAPY | Facility: HOSPITAL | Age: 72
End: 2022-01-06
Attending: NURSE PRACTITIONER
Payer: MEDICARE

## 2022-01-06 VITALS
SYSTOLIC BLOOD PRESSURE: 142 MMHG | RESPIRATION RATE: 18 BRPM | OXYGEN SATURATION: 99 % | DIASTOLIC BLOOD PRESSURE: 71 MMHG | TEMPERATURE: 98 F | HEART RATE: 62 BPM

## 2022-01-06 DIAGNOSIS — M05.79 RHEUMATOID ARTHRITIS INVOLVING MULTIPLE SITES WITH POSITIVE RHEUMATOID FACTOR: Primary | ICD-10-CM

## 2022-01-06 PROCEDURE — 96375 TX/PRO/DX INJ NEW DRUG ADDON: CPT

## 2022-01-06 PROCEDURE — 96365 THER/PROPH/DIAG IV INF INIT: CPT

## 2022-01-06 PROCEDURE — 63600175 PHARM REV CODE 636 W HCPCS: Mod: JA | Performed by: NURSE PRACTITIONER

## 2022-01-06 PROCEDURE — 96374 THER/PROPH/DIAG INJ IV PUSH: CPT

## 2022-01-06 PROCEDURE — 25000003 PHARM REV CODE 250: Performed by: NURSE PRACTITIONER

## 2022-01-06 RX ORDER — DIPHENHYDRAMINE HCL 12.5MG/5ML
25 ELIXIR ORAL ONCE AS NEEDED
Status: COMPLETED | OUTPATIENT
Start: 2022-01-06 | End: 2022-01-06

## 2022-01-06 RX ORDER — DIPHENHYDRAMINE HCL 12.5MG/5ML
25 ELIXIR ORAL ONCE AS NEEDED
Status: CANCELLED
Start: 2022-01-06

## 2022-01-06 RX ORDER — ACETAMINOPHEN 500 MG
1000 TABLET ORAL ONCE AS NEEDED
Status: DISCONTINUED | OUTPATIENT
Start: 2022-01-06 | End: 2022-01-06 | Stop reason: HOSPADM

## 2022-01-06 RX ORDER — METHYLPREDNISOLONE SOD SUCC 125 MG
VIAL (EA) INJECTION
Status: DISCONTINUED
Start: 2022-01-06 | End: 2022-01-06 | Stop reason: HOSPADM

## 2022-01-06 RX ORDER — SODIUM CHLORIDE 0.9 % (FLUSH) 0.9 %
10 SYRINGE (ML) INJECTION
Status: CANCELLED | OUTPATIENT
Start: 2022-01-06

## 2022-01-06 RX ORDER — ACETAMINOPHEN 500 MG
1000 TABLET ORAL ONCE AS NEEDED
Status: CANCELLED
Start: 2022-01-06

## 2022-01-06 RX ORDER — METHYLPREDNISOLONE SOD SUCC 125 MG
62.5 VIAL (EA) INJECTION ONCE AS NEEDED
Status: COMPLETED | OUTPATIENT
Start: 2022-01-06 | End: 2022-01-06

## 2022-01-06 RX ADMIN — SODIUM CHLORIDE 750 MG: 9 INJECTION, SOLUTION INTRAVENOUS at 10:01

## 2022-01-06 RX ADMIN — METHYLPREDNISOLONE SODIUM SUCCINATE 62.5 MG: 125 INJECTION, POWDER, FOR SOLUTION INTRAMUSCULAR; INTRAVENOUS at 10:01

## 2022-01-06 RX ADMIN — DIPHENHYDRAMINE HYDROCHLORIDE 25 MG: 12.5 SOLUTION ORAL at 10:01

## 2022-01-06 NOTE — PROGRESS NOTES
1000: Pt here for Orencia infusion. VSS. TP realeased,  1050: ORencia infusing to R AC without diff.  1130: Infusion complete. NO SXS of reaction at present.  1145: DC'd#22g IV cath from LAC pressure bandage applied,  1200: NO sxs of adverse reaction, next appt given for Feb 9, 2022 at 1000am.

## 2022-01-12 ENCOUNTER — CLINICAL SUPPORT (OUTPATIENT)
Dept: REHABILITATION | Facility: HOSPITAL | Age: 72
End: 2022-01-12
Attending: ORTHOPAEDIC SURGERY
Payer: MEDICARE

## 2022-01-12 DIAGNOSIS — Z98.890 STATUS POST LEFT ROTATOR CUFF REPAIR: Primary | ICD-10-CM

## 2022-01-12 DIAGNOSIS — M25.512 ACUTE POSTOPERATIVE PAIN OF LEFT SHOULDER: ICD-10-CM

## 2022-01-12 DIAGNOSIS — M75.42 IMPINGEMENT SYNDROME OF LEFT SHOULDER: ICD-10-CM

## 2022-01-12 DIAGNOSIS — M25.612 DECREASED ROM OF LEFT SHOULDER: ICD-10-CM

## 2022-01-12 DIAGNOSIS — G89.18 ACUTE POSTOPERATIVE PAIN OF LEFT SHOULDER: ICD-10-CM

## 2022-01-12 PROCEDURE — 97140 MANUAL THERAPY 1/> REGIONS: CPT

## 2022-01-12 PROCEDURE — 97161 PT EVAL LOW COMPLEX 20 MIN: CPT

## 2022-01-12 NOTE — PLAN OF CARE
[facility]   Physical Therapy Initial Evaluation    Name: Meghna Dalal  Clinic Number: [id number]    Therapy Diagnosis: No diagnosis found.  Physician: Jose Antonio Pablo MD    Physician Orders: PT Eval and Treat   Medical Diagnosis from Referral: s/p left rotator cuff repair, SAD, DCE  Evaluation Date: 1/12/2022  Authorization Period Expiration: 12/31/2022  Plan of Care Expiration: 2/25/2022  Progress Note Due: 2/12/2022  Visit # / Visits authorized: 1    Time In: 1019 am  Time Out: 1107 am  Total Appointment Time (timed & untimed codes): 48 minutes    Precautions: Standard    Subjective   Date of onset: 12/21/2021 - date of surgery  History of current condition - Meghna reports: she had left shoulder surgery on 12/21/21. She says she has had a total knee replacement before but this was much worse. She is not sleeping well - has to sleep on the couch. She is using her ice several times a day. Her  helps her with dressing and most of her activiites of daily living. She is not back to driving yet.      Medical History:   Past Medical History:   Diagnosis Date    Arthritis     Diabetes mellitus, type 2     GERD (gastroesophageal reflux disease)     Hypertension     Iron deficiency anemia     Obesity     Thyroid disease        Surgical History:   Meghna Dalal  has a past surgical history that includes Thyroid surgery; Tonsillectomy and adenoidectomy; left total knee replacement; Partial hysterectomy; Shoulder arthroscopy (Left, 12/21/2021); Arthroscopic debridement of shoulder (Left, 12/21/2021); Arthroscopic removal of loose body from joint (Left, 12/21/2021); Decompression of subacromial space (Left, 12/21/2021); Distal clavicle excision (Left, 12/21/2021); and Rotator cuff repair (Left, 12/21/2021).    Medications:   Meghna has a current medication list which includes the following prescription(s): abatacept, cyclobenzaprine, fluconazole, fluticasone propionate,  hydrochlorothiazide, hydrocodone-acetaminophen, hydrocodone-acetaminophen, hydrocodone-acetaminophen, ibuprofen, levothyroxine, levothyroxine, loratadine, losartan-hydrochlorothiazide 50-12.5 mg, metformin, omeprazole, omeprazole, ondansetron, potassium chloride, and prednisone, and the following Facility-Administered Medications: acetaminophen, diphenhydramine, epinephrine, ondansetron, sodium chloride 0.9% 500 mL flush bag, and sodium chloride 0.9%.    Allergies:   Review of patient's allergies indicates:   Allergen Reactions    Codeine     Codeine phosphate      Other reaction(s): facial swelling        Imaging, MRI studies, xrays: left shoulder    Prior Therapy: none  Social History:  lives with their spouse  Occupation: retired  Prior Level of Function: independent   Current Level of Function: requires assist with activiites of daily living     Pain:  Current 8/10, worst 10/10, best 8/10   Location: left shoulder   Description: Aching, Dull and Throbbing  Aggravating Factors: changing clothes, pulling up undergarments/pants  Easing Factors: ice    Pts goals: be able to use left arm fully without pain    Objective     Incisions: healed    Comments:     Posture: rounded shoulders yes, forward head yes, increased kyphotic curve yes, decreased lordotic curve no  Clear cervical spine: Spurling's test negative    Range of motion  Motion Right  Left    Shoulder flexion WITHIN FUNCTIONAL LIMITS 10 degrees active/80 degrees passive   Shoulder extension WITHIN FUNCTIONAL LIMITS NT   Shoulder abduction WITHIN FUNCTIONAL LIMITS NT   Shoulder internal rotation WITHIN FUNCTIONAL LIMITS Ischial tuberosity/30 degrees in supine   Shoulder external rotation WITHIN FUNCTIONAL LIMITS 15 degrees in adduction and supine   Elbow flexion WITHIN FUNCTIONAL LIMITS WITHIN FUNCTIONAL LIMITS   Elbow extension WITHIN FUNCTIONAL LIMITS WITHIN FUNCTIONAL LIMITS        MANUAL MUSCLE TEST  Muscle Right  Left    Shoulder flexion MMT strength:  5/5 MMT strength: 1/5   Shoulder extension MMT strength: 5/5 MMT strength: 1/5   Shoulder abduction MMT strength: 5/5 MMT strength: 1/5   Shoulder internal rotation MMT strength: 5/5 MMT strength: 1/5   Shoulder external rotation MMT strength: 5/5 MMT strength: 1/5   Elbow flexion MMT strength: 5/5 MMT strength: 3+/5   Elbow extension MMT strength: 5/5 MMT strength: 3+/5     Functional ability:  Dressing: AMB PT LEVEL OF ASSISTANCE: min A  Driving: AMB PT LEVEL OF ASSISTANCE: not back driving yet  Overhead activity: AMB PT LEVEL OF ASSISTANCE: max A  Work/hobbies: AMB PT LEVEL OF ASSISTANCE: max A      Clinical test:  -not performed secondary to patient is postop left rotator cuff repair     Palpation:     Limitation/Restriction for FOTO Shoulder Survey    Therapist reviewed FOTO scores for Meghna Dalal on 1/12/2022.   FOTO documents entered into [employer] - see Media section.    Intake Score: 32         TREATMENT     Total Treatment time (time-based codes) separate from Evaluation: 14 minutes    Meghna received the treatments listed below:  MANUAL THERAPY TECHNIQUES including passive stretching were applied to left shoulder for 8 minutes.  THERAPEUTIC EXERCISES including pendulum hangs, counter flexion slides, and scapular retraction for 6 minutes.    Home Exercises and Patient Education Provided    Education provided:   - evaluation results, plan of care and home exercise program     Written Home Exercises Provided: yes.  Exercises were reviewed and Meghna was able to demonstrate them prior to the end of the session.  Meghna demonstrated good understanding of the education provided.     See EMR under Patient Instructions for exercises provided 1/12/2022.    Assessment   Meghna is a 71 y.o. female referred to outpatient Physical Therapy with a medical diagnosis of s/p left rotator cuff repair, subacromial decompression, and DCE. Pt presents with typical postop symptoms of decreased range of motion,  decreased strength, pain and decreased functional use of right upper extremity. She is not sleeping in her bed yet and requires assistance with her activiites of daily living. She has not resumed driving yet. Her  helps her with most everything, even donning and doffing her sling.     Pt prognosis is Good.   Pt will benefit from skilled outpatient Physical Therapy to address the deficits stated above and in the chart below, provide pt/family education, and to maximize pt's level of independence.     Plan of care discussed with patient: Yes  Pt's spiritual, cultural and educational needs considered and patient is agreeable to the plan of care and goals as stated below:     Anticipated Barriers for therapy: none    Medical Necessity is demonstrated by the following  History  Co-morbidities and personal factors that may impact the plan of care Co-morbidities:   see PMH above    Personal Factors:   no deficits     moderate   Examination  Body Structures and Functions, activity limitations and participation restrictions that may impact the plan of care Body Regions:   upper extremities    Body Systems:    gross symmetry  ROM  strength  gross coordinated movement    Participation Restrictions:   none    Activity limitations:   Learning and applying knowledge  no deficits    General Tasks and Commands  no deficits    Communication  no deficits    Mobility  lifting and carrying objects  driving (bike, car, motorcycle)    Self care  washing oneself (bathing, drying, washing hands)  toileting  dressing    Domestic Life  shopping  cooking  doing house work (cleaning house, washing dishes, laundry)    Interactions/Relationships  no deficits    Life Areas  no deficits    Community and Social Life  no deficits         moderate   Clinical Presentation stable and uncomplicated low   Decision Making/ Complexity Score:      Goals:  Short Term Goals: 3 weeks  1. Patient will be independent with home exercise  program.  2. Patient will have passive range of motion as follows - 120 degrees flexion, 30 degrees external rotation and internal rotation.  3. Patient will be independent with dressing and driving.  4. Patient will be able to sleep all night in bed.    Long Term Goals: 6 weeks  1. Patient will have active range of motion as follow - 130 degrees flexion, 60 degrees external rotation and functional internal rotation to T12 for reaching overhead, behind head and behind back for dressing, grooming and hygiene.  2. Patient will have 4+/5 strength left shoulder/upper extremity to perform household chores and work related tasks.  3. Patient will place 2# dumbbell on head height shelf without hiking or pain left shoulder.  4. Patient will return to performing all household chores independently.      Plan   Plan of care Certification: 1/12/2022 to 2/25/2022.    Outpatient Physical Therapy 3 times weekly for 3 weeks, then 2 times weekly for 3 weeks to include the following interventions: Manual Therapy, Moist Heat/ Ice, Neuromuscular Re-ed, Patient Education, Therapeutic Activities and Therapeutic Exercise.     AYE ALEXANDER, PT

## 2022-01-12 NOTE — PROGRESS NOTES
[facility]   Physical Therapy Initial Evaluation    Name: Meghna Dalal  Clinic Number: [id number]    Therapy Diagnosis: No diagnosis found.  Physician: Jose Antonio Pablo MD    Physician Orders: PT Eval and Treat   Medical Diagnosis from Referral: s/p left rotator cuff repair, SAD, DCE  Evaluation Date: 1/12/2022  Authorization Period Expiration: 12/31/2022  Plan of Care Expiration: ***  Visit # / Visits authorized: 1    Time In: ***  Time Out: ***  Total Appointment Time (timed & untimed codes): *** minutes    Precautions: Standard    Subjective   Date of onset: 12/21/2021 - date of surgery  History of current condition - Meghna reports: ***     Medical History:   Past Medical History:   Diagnosis Date    Arthritis     Diabetes mellitus, type 2     GERD (gastroesophageal reflux disease)     Hypertension     Iron deficiency anemia     Obesity     Thyroid disease        Surgical History:   Meghna Dalal  has a past surgical history that includes Thyroid surgery; Tonsillectomy and adenoidectomy; left total knee replacement; Partial hysterectomy; Shoulder arthroscopy (Left, 12/21/2021); Arthroscopic debridement of shoulder (Left, 12/21/2021); Arthroscopic removal of loose body from joint (Left, 12/21/2021); Decompression of subacromial space (Left, 12/21/2021); Distal clavicle excision (Left, 12/21/2021); and Rotator cuff repair (Left, 12/21/2021).    Medications:   Meghna has a current medication list which includes the following prescription(s): abatacept, cyclobenzaprine, fluconazole, fluticasone propionate, hydrochlorothiazide, hydrocodone-acetaminophen, hydrocodone-acetaminophen, hydrocodone-acetaminophen, ibuprofen, levothyroxine, levothyroxine, loratadine, losartan-hydrochlorothiazide 50-12.5 mg, metformin, omeprazole, omeprazole, ondansetron, potassium chloride, and prednisone, and the following Facility-Administered Medications: acetaminophen, diphenhydramine,  "epinephrine, ondansetron, sodium chloride 0.9% 500 mL flush bag, and sodium chloride 0.9%.    Allergies:   Review of patient's allergies indicates:   Allergen Reactions    Codeine     Codeine phosphate      Other reaction(s): facial swelling        Imaging, MRI studies, xrays: left shoulder    Prior Therapy: none  Social History:  {LIVES WITH:95305}  Occupation: ***  Prior Level of Function: ***  Current Level of Function: ***    Pain:  Current {0-10:20507::"0"}/10, worst {0-10:20507::"0"}/10, best {0-10:20507::"0"}/10   Location: left shoulder   Description: {Pain Description:83989}  Aggravating Factors: {Causes; Pain:70688}  Easing Factors: {Pain (activities that relieve):76089}    Pts goals: ***    Objective     Incisions:  Girth measurements: right *** left ***  Comments:     Posture: rounded shoulders {YES/NO:20267}, forward head {YES/NO:20267}, increased kyphotic curve {YES/NO:20267}, decreased lordotic curve {YES/NO:20267}  Clear cervical spine: Spurling's test {Exam:20264::"negative"}    Range of motion  Motion Right  Left    Shoulder flexion {wfl range of motion:69581} {wfl range of motion:30874}   Shoulder extension {wfl range of motion:44080} {wfl range of motion:77685}   Shoulder abduction {wfl range of motion:15796} {wfl range of motion:75563}   Shoulder internal rotation {wfl range of motion:66579} {wfl range of motion:39569}   Shoulder external rotation {wfl range of motion:85508} {wfl range of motion:40031}   Elbow flexion {wfl range of motion:69896} {wfl range of motion:28206}   Elbow extension {wfl range of motion:69221} {wfl range of motion:20665}   Wrist flexion {wfl range of motion:69674} {wfl range of motion:73842}   Wrist extension {wfl range of motion:18943} {wfl range of motion:38919}   Ulnar deviation {wfl range of motion:13884} {wfl range of motion:12444}   Radial deviation {wfl range of motion:75658} {wfl range of motion:39341}     Passive range of motion: ***    MANUAL MUSCLE " TEST  Muscle Right  Left    Shoulder flexion {MMT strength:09045:::1} {MMT strength:54766:::1}   Shoulder extension {MMT strength:57668:::1} {MMT strength:25023:::1}   Shoulder abduction {MMT strength:21536:::1} {MMT strength:00148:::1}   Shoulder internal rotation {MMT strength:21348:::1} {MMT strength:24879:::1}   Shoulder external rotation {MMT strength:08470:::1} {MMT strength:43255:::1}   Elbow flexion {MMT strength:39465:::1} {MMT strength:41388:::1}   Elbow extension {MMT strength:99443:::1} {MMT strength:69834:::1}   Wrist flexion {MMT strength:48009:::1} {MMT strength:49583:::1}   Wrist extension {MMT strength:24213:::1} {MMT strength:99725:::1}   Ulnar deviation {MMT strength:44611:::1} {MMT strength:44899:::1}   Radial deviation {MMT strength:31758:::1} {MMT strength:58271:::1}    { strength:00220} { strength:31741}     Functional ability:  Dressing: {AMB PT LEVEL OF ASSISTANCE:95299:::1}  Driving: {AMB PT LEVEL OF ASSISTANCE:23456:::1}  Overhead activity: {AMB PT LEVEL OF ASSISTANCE:41152:::1}  Work/hobbies: {AMB PT LEVEL OF ASSISTANCE:03907:::1}      Clinical test:  -not performed secondary to patient is postop left rotator cuff repair     Palpation:     Limitation/Restriction for FOTO Shoulder Survey    Therapist reviewed FOTO scores for Meghna Dalal on 1/12/2022.   FOTO documents entered into [facility] - see Media section.    Limitation Score: ***%         TREATMENT     Total Treatment time (time-based codes) separate from Evaluation: *** minutes    Meghna received the treatments listed below:  {OTW Treatment:76443}    Home Exercises and Patient Education Provided    Education provided:   - evaluation results, plan of care and home exercise program     Written Home Exercises Provided: yes.  Exercises were reviewed and Meghna was able to demonstrate them prior to the end of the session.  Meghna demonstrated good  understanding of the education provided.     See EMR under Patient  Instructions for exercises provided 1/12/2022.    Assessment   Meghna is a 71 y.o. female referred to outpatient Physical Therapy with a medical diagnosis of s/p left rotator cuff repair, subacromial decompression, and DCE. Pt presents with ***    Pt prognosis is {REHAB PROGNOSIS OHS:79178}.   Pt will benefit from skilled outpatient Physical Therapy to address the deficits stated above and in the chart below, provide pt/family education, and to maximize pt's level of independence.     Plan of care discussed with patient: Yes  Pt's spiritual, cultural and educational needs considered and patient is agreeable to the plan of care and goals as stated below:     Anticipated Barriers for therapy: none    Medical Necessity is demonstrated by the following  History  Co-morbidities and personal factors that may impact the plan of care Co-morbidities:   see PMH above    Personal Factors:   {Personal Factors:99860}     {Desc; low/moderate/high:234447}   Examination  Body Structures and Functions, activity limitations and participation restrictions that may impact the plan of care Body Regions:   {Body Regions:22328}    Body Systems:    {Body Systems:06341}    Participation Restrictions:   ***    Activity limitations:   Learning and applying knowledge  {Learning and applying knowledge:06154}    General Tasks and Commands  {Gen tasks and commands:42887}    Communication  {Communication:99565}    Mobility  {Mobility:48974}    Self care  {Self Care:47013}    Domestic Life  {Domestic Life:10891}    Interactions/Relationships  {Interactions/Relationships:71094}    Life Areas  {Life Areas:68883}    Community and Social Life  {Community/Social Life:08351}         {Desc; low/moderate/high:707471}   Clinical Presentation {Clinical Presentation :77680} {Desc; low/moderate/high:121005}   Decision Making/ Complexity Score: {Desc; low/moderate/high:076070}     Goals:  Short Term Goals: *** weeks  1. Patient will be independent with home  "exercise program.  2. Patient will have passive range of motion as follows - *** degrees flexion, *** degrees external rotation and internal rotation.  3. Patient will be independent with dressing and driving.  4. Patient will be able to sleep all night in bed.    Long Term Goals: *** weeks  1. Patient will have active range of motion as follow - *** degrees flexion, *** degrees external rotation and functional internal rotation to *** for reaching overhead, behind head and behind back for dressing, grooming and hygiene.  2. Patient will have *** strength *** shoulder/upper extremity to perform household chores and work related tasks.  3. Patient will place *** dumbbell on head height shelf without hiking or pain left shoulder.  4. Patient will return to work full duty performing ***.      Plan   Plan of care Certification: 1/12/2022 to ***.    Outpatient Physical Therapy {NUMBERS 1-5:97749} times weekly for {0-10:24090::"0"} weeks to include the following interventions: {TX PLAN:26975}.     AYE ALEXANDER PT  "

## 2022-01-17 ENCOUNTER — CLINICAL SUPPORT (OUTPATIENT)
Dept: REHABILITATION | Facility: HOSPITAL | Age: 72
End: 2022-01-17
Attending: ORTHOPAEDIC SURGERY
Payer: MEDICARE

## 2022-01-17 DIAGNOSIS — G89.18 ACUTE POSTOPERATIVE PAIN OF LEFT SHOULDER: ICD-10-CM

## 2022-01-17 DIAGNOSIS — Z98.890 STATUS POST LEFT ROTATOR CUFF REPAIR: ICD-10-CM

## 2022-01-17 DIAGNOSIS — M25.512 ACUTE POSTOPERATIVE PAIN OF LEFT SHOULDER: ICD-10-CM

## 2022-01-17 DIAGNOSIS — M25.612 DECREASED ROM OF LEFT SHOULDER: ICD-10-CM

## 2022-01-17 PROCEDURE — 97112 NEUROMUSCULAR REEDUCATION: CPT

## 2022-01-17 PROCEDURE — 97110 THERAPEUTIC EXERCISES: CPT

## 2022-01-17 PROCEDURE — 97140 MANUAL THERAPY 1/> REGIONS: CPT

## 2022-01-17 NOTE — PROGRESS NOTES
[facility]   Physical Therapy Treatment Note     [id number]    Therapy Diagnosis:   Encounter Diagnoses   Name Primary?    Acute postoperative pain of left shoulder     Decreased ROM of left shoulder     Status post left rotator cuff repair      Physician: Jose Antonio Pablo MD    Visit Date: 1/17/2022    Physician Orders: PT Eval and Treat   Medical Diagnosis from Referral: s/p left rotator cuff repair, SAD, DCE  Evaluation Date: 1/12/2022  Authorization Period Expiration: 12/31/2022  Plan of Care Expiration: 2/25/2022  Progress Note Due: 2/12/2022  Visit # / Visits authorized: 2      PTA Visit #: 0/5     Time In: 150 pm  Time Out: 229 pm  Total Billable Time: 38 minutes    SUBJECTIVE     Pt reports: her shoulder is doing good.  She was compliant with home exercise program.  Response to previous treatment: no complaints after evaluation   Functional change: none yet    Pain: 2/10  Location: left shoulder      OBJECTIVE     Objective Measures updated at progress report unless specified.     Treatment     Meghna received the treatments listed below:      therapeutic exercises to develop strength, ROM and flexibility for 16 minutes including:  Pulleys 5 minutes  Pendulums 3 minutes  Seated scapular retraction 3 second hold x 20  Counter flexion slides x 30    manual therapy techniques: Joint mobilizations, Soft tissue Mobilization and passive stretching were applied to the: left shoulder for 10 minutes, including:  Passive stretching into flexion, external rotation and internal rotation in supine    neuromuscular re-education activities to improve: Proprioception, Posture and scapular stabiity for 12 minutes. The following activities were included:  Scapular peripheral neuromuscular facilitation x 30  Sidelying external rotation (passive range of motion) 3 x 10  Sidelying scaption 3 x 10    therapeutic activities to improve functional  performance for 0  minutes, including:      direct contact modalities after being cleared for contraindications:    supervised modalities after being cleared for contradictions:     hot pack for 0 minutes to     cold pack for 0 minutes to         Patient Education and Home Exercises     Home Exercises Provided and Patient Education Provided     Education provided:   -    Written Home Exercises Provided: Patient instructed to cont prior HEP. Exercises were reviewed and Meghna was able to demonstrate them prior to the end of the session.  Meghna demonstrated good  understanding of the education provided. See EMR under Patient Instructions for exercises provided during therapy sessions    ASSESSMENT     Meghna tolerated first full treatment session without complaints. She needs constant cueing for proper scapular retraction. She was also guarded with manual therapy, especially external rotation and internal rotation. Will progress as she is able to tolerate.    Meghna Is progressing well towards her goals.   Pt prognosis is Good.     Pt will continue to benefit from skilled outpatient physical therapy to address the deficits listed in the problem list box on initial evaluation, provide pt/family education and to maximize pt's level of independence in the home and community environment.     Pt's spiritual, cultural and educational needs considered and pt agreeable to plan of care and goals.     Anticipated barriers to physical therapy: none    Goals:   Goals:  Short Term Goals: 3 weeks  1. Patient will be independent with home exercise program.  2. Patient will have passive range of motion as follows - 120 degrees flexion, 30 degrees external rotation and internal rotation.  3. Patient will be independent with dressing and driving.  4. Patient will be able to sleep all night in bed.     Long Term Goals: 6 weeks  1. Patient will have active range of motion as follow - 130 degrees flexion, 60 degrees external rotation  and functional internal rotation to T12 for reaching overhead, behind head and behind back for dressing, grooming and hygiene.  2. Patient will have 4+/5 strength left shoulder/upper extremity to perform household chores and work related tasks.  3. Patient will place 2# dumbbell on head height shelf without hiking or pain left shoulder.  4. Patient will return to performing all household chores independently.      PLAN     Plan of care Certification: 1/12/2022 to 2/25/2022.     Outpatient Physical Therapy 3 times weekly for 3 weeks, then 2 times weekly for 3 weeks to include the following interventions: Manual Therapy, Moist Heat/ Ice, Neuromuscular Re-ed, Patient Education, Therapeutic Activities and Therapeutic Exercise.       AYE ALEXANDER, PT

## 2022-01-18 ENCOUNTER — OFFICE VISIT (OUTPATIENT)
Dept: INTERNAL MEDICINE | Facility: CLINIC | Age: 72
End: 2022-01-18
Payer: MEDICARE

## 2022-01-18 ENCOUNTER — CLINICAL SUPPORT (OUTPATIENT)
Dept: REHABILITATION | Facility: HOSPITAL | Age: 72
End: 2022-01-18
Attending: ORTHOPAEDIC SURGERY
Payer: MEDICARE

## 2022-01-18 VITALS
HEART RATE: 70 BPM | DIASTOLIC BLOOD PRESSURE: 70 MMHG | SYSTOLIC BLOOD PRESSURE: 138 MMHG | TEMPERATURE: 97 F | WEIGHT: 215 LBS | HEIGHT: 63 IN | BODY MASS INDEX: 38.09 KG/M2

## 2022-01-18 DIAGNOSIS — G89.18 ACUTE POSTOPERATIVE PAIN OF LEFT SHOULDER: ICD-10-CM

## 2022-01-18 DIAGNOSIS — E78.5 HYPERLIPIDEMIA LDL GOAL <100: ICD-10-CM

## 2022-01-18 DIAGNOSIS — M25.512 ACUTE POSTOPERATIVE PAIN OF LEFT SHOULDER: ICD-10-CM

## 2022-01-18 DIAGNOSIS — M15.9 OSTEOARTHRITIS OF MULTIPLE JOINTS, UNSPECIFIED OSTEOARTHRITIS TYPE: ICD-10-CM

## 2022-01-18 DIAGNOSIS — Z98.890 STATUS POST LEFT ROTATOR CUFF REPAIR: ICD-10-CM

## 2022-01-18 DIAGNOSIS — E11.9 CONTROLLED TYPE 2 DIABETES MELLITUS WITHOUT COMPLICATION, WITHOUT LONG-TERM CURRENT USE OF INSULIN: ICD-10-CM

## 2022-01-18 DIAGNOSIS — M25.612 DECREASED ROM OF LEFT SHOULDER: ICD-10-CM

## 2022-01-18 DIAGNOSIS — I10 ESSENTIAL HYPERTENSION: Primary | ICD-10-CM

## 2022-01-18 PROCEDURE — 97112 NEUROMUSCULAR REEDUCATION: CPT | Mod: CQ

## 2022-01-18 PROCEDURE — 99214 PR OFFICE/OUTPT VISIT, EST, LEVL IV, 30-39 MIN: ICD-10-PCS | Mod: S$PBB,,, | Performed by: INTERNAL MEDICINE

## 2022-01-18 PROCEDURE — 99214 OFFICE O/P EST MOD 30 MIN: CPT | Mod: S$PBB,,, | Performed by: INTERNAL MEDICINE

## 2022-01-18 PROCEDURE — 97140 MANUAL THERAPY 1/> REGIONS: CPT | Mod: CQ

## 2022-01-18 PROCEDURE — 97110 THERAPEUTIC EXERCISES: CPT | Mod: CQ

## 2022-01-18 PROCEDURE — 99215 OFFICE O/P EST HI 40 MIN: CPT | Mod: PBBFAC | Performed by: INTERNAL MEDICINE

## 2022-01-18 NOTE — PROGRESS NOTES
Subjective:       Patient ID: Meghna Dalal is a 71 y.o. female.    Chief Complaint: Follow-up    Doing well after shoulder surg  [id number]    Follow-up  Pertinent negatives include no abdominal pain, chest pain, fatigue, rash or weakness.     Review of Systems   Constitutional: Negative for fatigue and unexpected weight change.   HENT: Negative for ear pain and goiter.    Respiratory: Negative for chest tightness and shortness of breath.    Cardiovascular: Negative for chest pain, palpitations and leg swelling.   Gastrointestinal: Negative for abdominal pain and reflux.   Integumentary:  Negative for rash and breast tenderness.   Neurological: Negative for dizziness and weakness.   Hematological: Negative for adenopathy.   Psychiatric/Behavioral: Negative for behavioral problems.   Breast: Negative for tenderness        Objective:      Physical Exam  Constitutional:       Appearance: Normal appearance.   HENT:      Head: Normocephalic and atraumatic.      Right Ear: External ear normal.      Left Ear: External ear normal.      Mouth/Throat:      Pharynx: Oropharynx is clear.   Eyes:      Extraocular Movements: Extraocular movements intact.      Pupils: Pupils are equal, round, and reactive to light.   Cardiovascular:      Rate and Rhythm: Normal rate and regular rhythm.      Pulses: Normal pulses.      Heart sounds: Normal heart sounds.   Pulmonary:      Effort: Pulmonary effort is normal.      Breath sounds: Normal breath sounds.   Abdominal:      General: Abdomen is flat. Bowel sounds are normal.      Palpations: Abdomen is soft.   Musculoskeletal:         General: Normal range of motion.      Cervical back: Normal range of motion and neck supple.   Skin:     General: Skin is warm.      Capillary Refill: Capillary refill takes less than 2 seconds.   Neurological:      General: No focal deficit present.      Mental Status: She is alert.   Psychiatric:         Mood and Affect: Mood normal.         Thought  Content: Thought content normal.         Judgment: Judgment normal.         Assessment:       1. Essential hypertension    2. Hyperlipidemia LDL goal <100    3. Controlled type 2 diabetes mellitus without complication, without long-term current use of insulin    4. Osteoarthritis of multiple joints, unspecified osteoarthritis type        Plan:         Patient Instructions   Continue current meds and f/u 3 months        Problem List Items Addressed This Visit    None     Visit Diagnoses     Essential hypertension    -  Primary    Hyperlipidemia LDL goal <100        Controlled type 2 diabetes mellitus without complication, without long-term current use of insulin        Osteoarthritis of multiple joints, unspecified osteoarthritis type

## 2022-01-18 NOTE — PROGRESS NOTES
[facility]   Physical Therapy Treatment Note     [id number]    Therapy Diagnosis:   Encounter Diagnoses   Name Primary?    Acute postoperative pain of left shoulder     Decreased ROM of left shoulder     Status post left rotator cuff repair      Physician: Jose Antonio Pablo MD    Visit Date: 1/18/2022    Physician Orders: PT Eval and Treat   Medical Diagnosis from Referral: s/p left rotator cuff repair, SAD, DCE  Evaluation Date: 1/12/2022  Authorization Period Expiration: 12/31/2022  Plan of Care Expiration: 2/25/2022  Progress Note Due: 2/12/2022  Visit # / Visits authorized: 3      PTA Visit #: 1/5     Time In: 8:30 am  Time Out: 9:09 am  Total Billable Time: 38 minutes    SUBJECTIVE     Pt reports: She had some stiffness last night after her workout yesterday. She was hurting after therapy so she did not repeat her exercises at home.  She was compliant with home exercise program.  Response to previous treatment: no complaints after evaluation   Functional change: none yet    Pain: 2/10  Location: left shoulder      OBJECTIVE     Objective Measures updated at progress report unless specified.     Treatment     Case conference with Carlee Macdonald PT, for initial PTA visit.     Meghna received the treatments listed below:      therapeutic exercises to develop strength, ROM and flexibility for 16 minutes including:  Pulleys 5 minutes  Pendulums 5 minutes  Seated scapular retraction 3 second hold x 30  Counter flexion slides x 30 (pain with these today)    manual therapy techniques: Joint mobilizations, Soft tissue Mobilization and passive stretching were applied to the: left shoulder for 11 minutes, including:  Passive stretching into flexion, external rotation and internal rotation in supine    neuromuscular re-education activities to improve: Proprioception, Posture and scapular stabiity for 12 minutes. The following activities were  included:  Scapular peripheral neuromuscular facilitation x 30  Sidelying external rotation (passive range of motion) 3 x 10  Sidelying scaption 3 x 10      Patient Education and Home Exercises     Home Exercises Provided and Patient Education Provided     Education provided:   -    Written Home Exercises Provided: Patient instructed to cont prior HEP. Exercises were reviewed and Meghna was able to demonstrate them prior to the end of the session.  Meghna demonstrated good  understanding of the education provided. See EMR under Patient Instructions for exercises provided during therapy sessions    ASSESSMENT     Meghna remains apprehensive about manual stretching. She was able to reach 80 degrees passively in flexion and about 20 in external rotation.  She had pain with counter slides even with cues to decrease upper trap involvement and reduce hike, so stopped and did 2 more minutes of pendulums instead. Will progress as she is able to tolerate.    Meghna Is progressing well towards her goals.   Pt prognosis is Good.     Pt will continue to benefit from skilled outpatient physical therapy to address the deficits listed in the problem list box on initial evaluation, provide pt/family education and to maximize pt's level of independence in the home and community environment.     Pt's spiritual, cultural and educational needs considered and pt agreeable to plan of care and goals.     Anticipated barriers to physical therapy: none    Goals:   Goals:  Short Term Goals: 3 weeks  1. Patient will be independent with home exercise program.  2. Patient will have passive range of motion as follows - 120 degrees flexion, 30 degrees external rotation and internal rotation.  3. Patient will be independent with dressing and driving.  4. Patient will be able to sleep all night in bed.     Long Term Goals: 6 weeks  1. Patient will have active range of motion as follow - 130 degrees flexion, 60 degrees external rotation and  functional internal rotation to T12 for reaching overhead, behind head and behind back for dressing, grooming and hygiene.  2. Patient will have 4+/5 strength left shoulder/upper extremity to perform household chores and work related tasks.  3. Patient will place 2# dumbbell on head height shelf without hiking or pain left shoulder.  4. Patient will return to performing all household chores independently.      PLAN     Plan of care Certification: 1/12/2022 to 2/25/2022.     Outpatient Physical Therapy 3 times weekly for 3 weeks, then 2 times weekly for 3 weeks to include the following interventions: Manual Therapy, Moist Heat/ Ice, Neuromuscular Re-ed, Patient Education, Therapeutic Activities and Therapeutic Exercise.       Sara Orona, PTA

## 2022-01-20 ENCOUNTER — CLINICAL SUPPORT (OUTPATIENT)
Dept: REHABILITATION | Facility: HOSPITAL | Age: 72
End: 2022-01-20
Attending: ORTHOPAEDIC SURGERY
Payer: MEDICARE

## 2022-01-20 DIAGNOSIS — G89.18 ACUTE POSTOPERATIVE PAIN OF LEFT SHOULDER: Primary | ICD-10-CM

## 2022-01-20 DIAGNOSIS — M25.612 DECREASED ROM OF LEFT SHOULDER: ICD-10-CM

## 2022-01-20 DIAGNOSIS — M25.512 ACUTE POSTOPERATIVE PAIN OF LEFT SHOULDER: Primary | ICD-10-CM

## 2022-01-20 DIAGNOSIS — Z98.890 STATUS POST LEFT ROTATOR CUFF REPAIR: ICD-10-CM

## 2022-01-20 PROCEDURE — 97140 MANUAL THERAPY 1/> REGIONS: CPT | Mod: CQ

## 2022-01-20 PROCEDURE — 97110 THERAPEUTIC EXERCISES: CPT | Mod: CQ

## 2022-01-20 PROCEDURE — 97112 NEUROMUSCULAR REEDUCATION: CPT | Mod: CQ

## 2022-01-20 NOTE — PROGRESS NOTES
[facility]   Physical Therapy Treatment Note     Name: Meghna CormierAlesia  [facility] Number: [id number]    Therapy Diagnosis:   Encounter Diagnoses   Name Primary?    Acute postoperative pain of left shoulder Yes    Decreased ROM of left shoulder     Status post left rotator cuff repair      Physician: Jose Antonio Pablo MD    Visit Date: 1/20/2022    Physician Orders: PT Eval and Treat   Medical Diagnosis from Referral: s/p left rotator cuff repair, SAD, DCE  Evaluation Date: 1/12/2022  Authorization Period Expiration: 12/31/2022  Plan of Care Expiration: 2/25/2022  Progress Note Due: 2/12/2022  Visit # / Visits authorized: 3      PTA Visit #: 2/ 5     Time In: 1350  Time Out: 1435  Total Billable Time: 45 minutes    SUBJECTIVE     Pt reports:Did HEP some. Can't sleep in bed yet. Arthritis pain is really bad today  She was compliant with home exercise program.  Response to previous treatment: no complaints after evaluation   Functional change: driving herself    Pain: 5/10  Location: left shoulder      OBJECTIVE     Objective Measures updated at progress report unless specified.     Treatment     Case conference with Carlee Macdonald PT, for initial PTA visit.     Meghna received the treatments listed below:      therapeutic exercises to develop strength, ROM and flexibility for 16 minutes including:  UBE reverse, passive for LUE x 5 min  Pulleys 5 minutes  Pendulums 5 minutes NOT THIS VISIT   Seated scapular retraction 3 second hold with lat depression isometrics x 20  Counter flexion slides x 30 NOT THIS VISIT  scap retract with extension isometrics x 20 with 3 sec hold  scap retract with horizontal ABDUCTION isometric x 10 with 3 sec hold      manual therapy techniques: Joint mobilizations, Soft tissue Mobilization and passive stretching were applied to the: left shoulder for 11 minutes, including:  Passive stretching into flexion, external rotation and internal rotation in  supine    neuromuscular re-education activities to improve: Proprioception, Posture and scapular stabiity for 12 minutes. The following activities were included:  Scapular peripheral neuromuscular facilitation x 30  Sidelying external rotation (passive range of motion) 3 x 10  Sidelying scaption 3 x 10      Patient Education and Home Exercises     Home Exercises Provided and Patient Education Provided     Education provided:   -    Written Home Exercises Provided: Patient instructed to cont prior HEP. Exercises were reviewed and Meghna was able to demonstrate them prior to the end of the session.  Meghna demonstrated good  understanding of the education provided. See EMR under Patient Instructions for exercises provided during therapy sessions    ASSESSMENT     Meghna needs mod to max vc and tc and demonstration for maintaining scap positioning with flexion ROM.     Meghna Is progressing well towards her goals.   Pt prognosis is Good.     Pt will continue to benefit from skilled outpatient physical therapy to address the deficits listed in the problem list box on initial evaluation, provide pt/family education and to maximize pt's level of independence in the home and community environment.     Pt's spiritual, cultural and educational needs considered and pt agreeable to plan of care and goals.     Anticipated barriers to physical therapy: none    Goals:   Goals:  Short Term Goals: 3 weeks  1. Patient will be independent with home exercise program.  2. Patient will have passive range of motion as follows - 120 degrees flexion, 30 degrees external rotation and internal rotation.  3. Patient will be independent with dressing and driving.  4. Patient will be able to sleep all night in bed.     Long Term Goals: 6 weeks  1. Patient will have active range of motion as follow - 130 degrees flexion, 60 degrees external rotation and functional internal rotation to T12 for reaching overhead, behind head and behind back  for dressing, grooming and hygiene.  2. Patient will have 4+/5 strength left shoulder/upper extremity to perform household chores and work related tasks.  3. Patient will place 2# dumbbell on head height shelf without hiking or pain left shoulder.  4. Patient will return to performing all household chores independently.      PLAN     Plan of care Certification: 1/12/2022 to 2/25/2022.     Outpatient Physical Therapy 3 times weekly for 3 weeks, then 2 times weekly for 3 weeks to include the following interventions: Manual Therapy, Moist Heat/ Ice, Neuromuscular Re-ed, Patient Education, Therapeutic Activities and Therapeutic Exercise.       Mavis Caballero, PTA

## 2022-01-24 ENCOUNTER — CLINICAL SUPPORT (OUTPATIENT)
Dept: REHABILITATION | Facility: HOSPITAL | Age: 72
End: 2022-01-24
Attending: ORTHOPAEDIC SURGERY
Payer: MEDICARE

## 2022-01-24 DIAGNOSIS — M25.512 ACUTE POSTOPERATIVE PAIN OF LEFT SHOULDER: Primary | ICD-10-CM

## 2022-01-24 DIAGNOSIS — M25.612 DECREASED ROM OF LEFT SHOULDER: ICD-10-CM

## 2022-01-24 DIAGNOSIS — G89.18 ACUTE POSTOPERATIVE PAIN OF LEFT SHOULDER: Primary | ICD-10-CM

## 2022-01-24 DIAGNOSIS — Z98.890 STATUS POST LEFT ROTATOR CUFF REPAIR: ICD-10-CM

## 2022-01-24 PROCEDURE — 97112 NEUROMUSCULAR REEDUCATION: CPT | Mod: CQ

## 2022-01-24 PROCEDURE — 97140 MANUAL THERAPY 1/> REGIONS: CPT | Mod: CQ

## 2022-01-24 PROCEDURE — 97110 THERAPEUTIC EXERCISES: CPT | Mod: CQ

## 2022-01-24 NOTE — PROGRESS NOTES
[facility]   Physical Therapy Treatment Note     Name: Meghna CormierAlesia  [facility] Number: [id number]    Therapy Diagnosis:   Encounter Diagnoses   Name Primary?    Acute postoperative pain of left shoulder Yes    Decreased ROM of left shoulder     Status post left rotator cuff repair      Physician: Jose Antonio Pablo MD    Visit Date: 1/24/2022    Physician Orders: PT Eval and Treat   Medical Diagnosis from Referral: s/p left rotator cuff repair, SAD, DCE  Evaluation Date: 1/12/2022  Authorization Period Expiration: 12/31/2022  Plan of Care Expiration: 2/25/2022  Progress Note Due: 2/12/2022  Visit # / Visits authorized: 3      PTA Visit #: 3/ 5     Time In: 1305  Time Out: 1345  Total Billable Time: 40 minutes    SUBJECTIVE     Pt reports:Did HEP some. Felt so good after last visit. Stiff all over today.  She was compliant with home exercise program.  Response to previous treatment: no complaints after evaluation   Functional change: driving herself    Pain: 5/10  Location: left shoulder      OBJECTIVE     Objective Measures updated at progress report unless specified.     Treatment          Meghna received the treatments listed below:      therapeutic exercises to develop strength, ROM and flexibility for 16 minutes including:  UBE reverse, passive for LUE x 5 min  Pulleys 5 minutes  Pendulums 5 minutes NOT THIS VISIT   Seated scapular retraction 3 second hold with lat depression isometrics x 20  Counter flexion slides x 10   scap retract with extension isometrics x 20 with 3 sec hold  scap retract with horizontal ABDUCTION isometric x 10 with 3 sec hold      manual therapy techniques: Joint mobilizations, Soft tissue Mobilization and passive stretching were applied to the: left shoulder for 11 minutes, including:  Passive stretching into flexion, external rotation and internal rotation in supine    neuromuscular re-education activities to improve: Proprioception, Posture and  scapular stabiity for 12 minutes. The following activities were included:  Scapular peripheral neuromuscular facilitation x 30  Sidelying external rotation (passive range of motion) 3 x 10  Sidelying scaption 3 x 10      Patient Education and Home Exercises     Home Exercises Provided and Patient Education Provided     Education provided:   -    Written Home Exercises Provided: Patient instructed to cont prior HEP. Exercises were reviewed and Meghna was able to demonstrate them prior to the end of the session.  Meghna demonstrated good  understanding of the education provided. See EMR under Patient Instructions for exercises provided during therapy sessions    ASSESSMENT     Meghna needs mod to max vc and tc and demonstration for maintaining scap positioning with flexion ROM.     Meghna Is progressing well towards her goals.   Pt prognosis is Good.     Pt will continue to benefit from skilled outpatient physical therapy to address the deficits listed in the problem list box on initial evaluation, provide pt/family education and to maximize pt's level of independence in the home and community environment.     Pt's spiritual, cultural and educational needs considered and pt agreeable to plan of care and goals.     Anticipated barriers to physical therapy: none    Goals:  Short Term Goals: 3 weeks  1. Patient will be independent with home exercise program.  2. Patient will have passive range of motion as follows - 120 degrees flexion, 30 degrees external rotation and internal rotation.  3. Patient will be independent with dressing and driving.  4. Patient will be able to sleep all night in bed.     Long Term Goals: 6 weeks  1. Patient will have active range of motion as follow - 130 degrees flexion, 60 degrees external rotation and functional internal rotation to T12 for reaching overhead, behind head and behind back for dressing, grooming and hygiene.  2. Patient will have 4+/5 strength left shoulder/upper  extremity to perform household chores and work related tasks.  3. Patient will place 2# dumbbell on head height shelf without hiking or pain left shoulder.  4. Patient will return to performing all household chores independently.      PLAN     Plan of care Certification: 1/12/2022 to 2/25/2022.     Outpatient Physical Therapy 3 times weekly for 3 weeks, then 2 times weekly for 3 weeks to include the following interventions: Manual Therapy, Moist Heat/ Ice, Neuromuscular Re-ed, Patient Education, Therapeutic Activities and Therapeutic Exercise.       Mavis Caballero, PTA

## 2022-01-27 ENCOUNTER — CLINICAL SUPPORT (OUTPATIENT)
Dept: REHABILITATION | Facility: HOSPITAL | Age: 72
End: 2022-01-27
Attending: ORTHOPAEDIC SURGERY
Payer: MEDICARE

## 2022-01-27 DIAGNOSIS — G89.18 ACUTE POSTOPERATIVE PAIN OF LEFT SHOULDER: ICD-10-CM

## 2022-01-27 DIAGNOSIS — M25.612 DECREASED ROM OF LEFT SHOULDER: ICD-10-CM

## 2022-01-27 DIAGNOSIS — Z98.890 STATUS POST LEFT ROTATOR CUFF REPAIR: ICD-10-CM

## 2022-01-27 DIAGNOSIS — M25.512 ACUTE POSTOPERATIVE PAIN OF LEFT SHOULDER: ICD-10-CM

## 2022-01-27 PROCEDURE — 97112 NEUROMUSCULAR REEDUCATION: CPT | Mod: CQ

## 2022-01-27 PROCEDURE — 97110 THERAPEUTIC EXERCISES: CPT | Mod: CQ

## 2022-01-27 PROCEDURE — 97140 MANUAL THERAPY 1/> REGIONS: CPT | Mod: CQ

## 2022-01-27 NOTE — PROGRESS NOTES
[facility]   Physical Therapy Treatment Note     Name: Meghna Dalal  [facility] Number: [id number]    Therapy Diagnosis:   Encounter Diagnoses   Name Primary?    Acute postoperative pain of left shoulder     Decreased ROM of left shoulder     Status post left rotator cuff repair      Physician: Jose Antonio Pablo MD    Visit Date: 1/27/2022    Physician Orders: PT Eval and Treat   Medical Diagnosis from Referral: s/p left rotator cuff repair, SAD, DCE  Evaluation Date: 1/12/2022  Authorization Period Expiration: 12/31/2022  Plan of Care Expiration: 2/25/2022  Progress Note Due: 2/12/2022  Visit # / Visits authorized: 6      PTA Visit #: 4/ 5     Time In: 915  Time Out: 953  Total Billable Time: 38 minutes    SUBJECTIVE     Pt reports: no new complaints  She was compliant with home exercise program.  Response to previous treatment: no complaints after evaluation   Functional change: driving herself    Pain: 5/10  Location: left shoulder      OBJECTIVE     Objective Measures updated at progress report unless specified.     Treatment       Meghna received the treatments listed below:      therapeutic exercises to develop strength, ROM and flexibility for 15 minutes including:  UBE reverse, passive for LUE x 5 min  Pulleys 5 minutes  Seated scapular retraction 3 second hold with lat depression isometrics x 30  Counter flexion slides x 10   scap retract with extension isometrics x 20 with 3 sec hold  scap retract with horizontal ABDUCTION isometric x 10 with 3 sec hold (not performed today)       manual therapy techniques: Joint mobilizations, Soft tissue Mobilization and passive stretching were applied to the: left shoulder for 11 minutes, including:  Passive stretching into flexion, external rotation and internal rotation in supine    neuromuscular re-education activities to improve: Proprioception, Posture and scapular stabiity for 12 minutes. The following activities were  included:  Scapular peripheral neuromuscular facilitation x 30  Sidelying external rotation (passive range of motion) 3 x 10  Sidelying scaption 3 x 10      Patient Education and Home Exercises     Home Exercises Provided and Patient Education Provided     Education provided:   -    Written Home Exercises Provided: Patient instructed to cont prior HEP. Exercises were reviewed and Meghna was able to demonstrate them prior to the end of the session.  Meghna demonstrated good  understanding of the education provided. See EMR under Patient Instructions for exercises provided during therapy sessions    ASSESSMENT     Meghna continues to need mod to max vc and tc and demonstration for maintaining scap positioning with flexion ROM. She participated in treatment but asked multiple times if she was finished yet today.    Meghna Is progressing well towards her goals.   Pt prognosis is Good.     Pt will continue to benefit from skilled outpatient physical therapy to address the deficits listed in the problem list box on initial evaluation, provide pt/family education and to maximize pt's level of independence in the home and community environment.     Pt's spiritual, cultural and educational needs considered and pt agreeable to plan of care and goals.     Anticipated barriers to physical therapy: none    Goals:  Short Term Goals: 3 weeks  1. Patient will be independent with home exercise program.  2. Patient will have passive range of motion as follows - 120 degrees flexion, 30 degrees external rotation and internal rotation.  3. Patient will be independent with dressing and driving.  4. Patient will be able to sleep all night in bed.     Long Term Goals: 6 weeks  1. Patient will have active range of motion as follow - 130 degrees flexion, 60 degrees external rotation and functional internal rotation to T12 for reaching overhead, behind head and behind back for dressing, grooming and hygiene.  2. Patient will have 4+/5  strength left shoulder/upper extremity to perform household chores and work related tasks.  3. Patient will place 2# dumbbell on head height shelf without hiking or pain left shoulder.  4. Patient will return to performing all household chores independently.      PLAN     Plan of care Certification: 1/12/2022 to 2/25/2022.     Outpatient Physical Therapy 3 times weekly for 3 weeks, then 2 times weekly for 3 weeks to include the following interventions: Manual Therapy, Moist Heat/ Ice, Neuromuscular Re-ed, Patient Education, Therapeutic Activities and Therapeutic Exercise.       Sara Orona, PTA

## 2022-01-31 ENCOUNTER — CLINICAL SUPPORT (OUTPATIENT)
Dept: REHABILITATION | Facility: HOSPITAL | Age: 72
End: 2022-01-31
Attending: ORTHOPAEDIC SURGERY
Payer: MEDICARE

## 2022-01-31 DIAGNOSIS — G89.18 ACUTE POSTOPERATIVE PAIN OF LEFT SHOULDER: ICD-10-CM

## 2022-01-31 DIAGNOSIS — Z98.890 STATUS POST LEFT ROTATOR CUFF REPAIR: ICD-10-CM

## 2022-01-31 DIAGNOSIS — M25.512 ACUTE POSTOPERATIVE PAIN OF LEFT SHOULDER: ICD-10-CM

## 2022-01-31 DIAGNOSIS — M25.612 DECREASED ROM OF LEFT SHOULDER: ICD-10-CM

## 2022-01-31 PROCEDURE — 97110 THERAPEUTIC EXERCISES: CPT

## 2022-01-31 PROCEDURE — 97112 NEUROMUSCULAR REEDUCATION: CPT

## 2022-01-31 PROCEDURE — 97140 MANUAL THERAPY 1/> REGIONS: CPT

## 2022-01-31 NOTE — PROGRESS NOTES
[facility]   Physical Therapy Treatment Note     Name: Meghna CormierAlesia  [facility] Number: [id number]    Therapy Diagnosis:   Encounter Diagnoses   Name Primary?    Acute postoperative pain of left shoulder     Decreased ROM of left shoulder     Status post left rotator cuff repair      Physician: Jose Antonio Pablo MD    Visit Date: 1/31/2022    Physician Orders: PT Eval and Treat   Medical Diagnosis from Referral: s/p left rotator cuff repair, SAD, DCE  Evaluation Date: 1/12/2022  Authorization Period Expiration: 12/31/2022  Plan of Care Expiration: 2/25/2022  Progress Note Due: 2/12/2022  Visit # / Visits authorized: 7      PTA Visit #: n/a     Time In: 908 am  Time Out: 1009 am  Total Billable Time: 46 minutes    SUBJECTIVE     Pt reports: she is doing good - no pain in shoulder this morning  She was compliant with home exercise program.  Response to previous treatment: no complaints after evaluation   Functional change: driving herself    Pain: 0/10  Location: left shoulder      OBJECTIVE     AROM flexion 45 degrees, external rotation in adduction 35 degrees, internal rotation behind back to ischial tuberosity    Treatment       Meghna received the treatments listed below:      therapeutic exercises to develop strength, ROM and flexibility for 18 minutes including:  UBE reverse, passive for LUE x 5 min  Pulleys 5 minutes  Seated scapular retraction --  Cane flexion at rail 4 x 10   scap retract with extension isometrics --  scap retract with horizontal ABDUCTION isometric--  cybex rows 1 plate x 45      manual therapy techniques: Joint mobilizations, Soft tissue Mobilization and passive stretching were applied to the: left shoulder for 16 minutes, including:  Passive stretching into flexion, external rotation and internal rotation in supine; contract-relax stretching     neuromuscular re-education activities to improve: Proprioception, Posture and scapular stabiity for 12 minutes.  The following activities were included:  Scapular peripheral neuromuscular facilitation x 30  Sidelying external rotation (passive range of motion) 3 x 10  Sidelying scaption 3 x 10  Scapular retraction/extension red 3 x 10      Patient Education and Home Exercises     Home Exercises Provided and Patient Education Provided     Education provided:   -    Written Home Exercises Provided: Patient instructed to cont prior HEP. Exercises were reviewed and Meghna was able to demonstrate them prior to the end of the session.  Meghna demonstrated good  understanding of the education provided. See EMR under Patient Instructions for exercises provided during therapy sessions    ASSESSMENT     Meghna continues to need cues to relax with manual therapy. Added rows, scapular retraction/extension, and cane flexion at rail today without difficulty. She required cueing on rows to not shrug shoulders up. She has met passive range of motion goal but has poor active range of motion into forward flexion. She continues to have a lot of upper trap substitution. She is 6 weeks postop now so we will begin to progress active range of motion as tolerated.    Meghna Is progressing well towards her goals.   Pt prognosis is Good.     Pt will continue to benefit from skilled outpatient physical therapy to address the deficits listed in the problem list box on initial evaluation, provide pt/family education and to maximize pt's level of independence in the home and community environment.     Pt's spiritual, cultural and educational needs considered and pt agreeable to plan of care and goals.     Anticipated barriers to physical therapy: none    Goals:  Short Term Goals: 3 weeks  1. Patient will be independent with home exercise program. MET  2. Patient will have passive range of motion as follows - 120 degrees flexion, 30 degrees external rotation and internal rotation. MET  3. Patient will be independent with dressing and driving.  MET  4. Patient will be able to sleep all night in bed. IMPROVING     Long Term Goals: 6 weeks  1. Patient will have active range of motion as follow - 130 degrees flexion, 60 degrees external rotation and functional internal rotation to T12 for reaching overhead, behind head and behind back for dressing, grooming and hygiene.  2. Patient will have 4+/5 strength left shoulder/upper extremity to perform household chores and work related tasks.  3. Patient will place 2# dumbbell on head height shelf without hiking or pain left shoulder.  4. Patient will return to performing all household chores independently.      PLAN     Plan of care Certification: 1/12/2022 to 2/25/2022.     Outpatient Physical Therapy 3 times weekly for 3 weeks, then 2 times weekly for 3 weeks to include the following interventions: Manual Therapy, Moist Heat/ Ice, Neuromuscular Re-ed, Patient Education, Therapeutic Activities and Therapeutic Exercise.       AYE ALEXANDER, PT

## 2022-02-02 ENCOUNTER — CLINICAL SUPPORT (OUTPATIENT)
Dept: REHABILITATION | Facility: HOSPITAL | Age: 72
End: 2022-02-02
Attending: ORTHOPAEDIC SURGERY
Payer: MEDICARE

## 2022-02-02 DIAGNOSIS — Z98.890 STATUS POST LEFT ROTATOR CUFF REPAIR: ICD-10-CM

## 2022-02-02 DIAGNOSIS — M25.612 DECREASED ROM OF LEFT SHOULDER: ICD-10-CM

## 2022-02-02 DIAGNOSIS — G89.18 ACUTE POSTOPERATIVE PAIN OF LEFT SHOULDER: ICD-10-CM

## 2022-02-02 DIAGNOSIS — M25.512 ACUTE POSTOPERATIVE PAIN OF LEFT SHOULDER: ICD-10-CM

## 2022-02-02 PROCEDURE — 97112 NEUROMUSCULAR REEDUCATION: CPT

## 2022-02-02 PROCEDURE — 97110 THERAPEUTIC EXERCISES: CPT

## 2022-02-02 PROCEDURE — 97140 MANUAL THERAPY 1/> REGIONS: CPT

## 2022-02-02 NOTE — PROGRESS NOTES
[facility]   Physical Therapy Treatment Note     Name: Meghna CormierAlesia  [facility] Number: [id number]    Therapy Diagnosis:   Encounter Diagnoses   Name Primary?    Acute postoperative pain of left shoulder     Decreased ROM of left shoulder     Status post left rotator cuff repair      Physician: Jose Antonio Pablo MD    Visit Date: 2/2/2022    Physician Orders: PT Eval and Treat   Medical Diagnosis from Referral: s/p left rotator cuff repair, SAD, DCE  Evaluation Date: 1/12/2022  Authorization Period Expiration: 12/31/2022  Plan of Care Expiration: 2/25/2022  Progress Note Due: 2/12/2022  Visit # / Visits authorized: 8      PTA Visit #: n/a     Time In: 103 pm  Time Out: 154 pm  Total Billable Time: 46 minutes    SUBJECTIVE     Pt reports: the weather has her arthritis acting up - her hands are the worst  She was compliant with home exercise program.  Response to previous treatment: no complaints after evaluation   Functional change: driving herself    Pain: 0/10  Location: left shoulder      OBJECTIVE     AROM flexion 45 degrees, external rotation in adduction 35 degrees, internal rotation behind back to ischial tuberosity    Treatment       Meghna received the treatments listed below:      therapeutic exercises to develop strength, ROM and flexibility for 16 minutes including:  UBE reverse, passive for LUE x 5 min  Pulleys 5 minutes  Seated scapular retraction --  Cane flexion at rail 4 x 10   scap retract with extension isometrics --  scap retract with horizontal ABDUCTION isometric--  cybex rows 1 plate x 30      manual therapy techniques: Joint mobilizations, Soft tissue Mobilization and passive stretching were applied to the: left shoulder for 10 minutes, including:  Passive stretching into flexion, external rotation and internal rotation in supine; contract-relax stretching     neuromuscular re-education activities to improve: Proprioception, Posture and scapular stabiity for  20 minutes. The following activities were included:  Scapular peripheral neuromuscular facilitation x 30  Sidelying external rotation (aarom) 3 x 10  Sidelying scaption 2 x 15  Scapular retraction/extension red 30  4 way shoulder isometrics - 3 second hold x 15 each      Patient Education and Home Exercises     Home Exercises Provided and Patient Education Provided     Education provided:   -    Written Home Exercises Provided: Patient instructed to cont prior HEP. Exercises were reviewed and Meghna was able to demonstrate them prior to the end of the session.  Meghna demonstrated good  understanding of the education provided. See EMR under Patient Instructions for exercises provided during therapy sessions    ASSESSMENT   115 degrees flexion in pulleys  Meghna did better relaxing with manual therapy today. She requires cueing to not shrug shoulders up with multiple excercises. Added 4 way shoulder isometrics today and patient required multiple cues. She continues to have a lot of upper trap substitution. She does not count when performing her exercises.     Meghna Is progressing well towards her goals.   Pt prognosis is Good.     Pt will continue to benefit from skilled outpatient physical therapy to address the deficits listed in the problem list box on initial evaluation, provide pt/family education and to maximize pt's level of independence in the home and community environment.     Pt's spiritual, cultural and educational needs considered and pt agreeable to plan of care and goals.     Anticipated barriers to physical therapy: none    Goals:  Short Term Goals: 3 weeks  1. Patient will be independent with home exercise program. MET  2. Patient will have passive range of motion as follows - 120 degrees flexion, 30 degrees external rotation and internal rotation. MET  3. Patient will be independent with dressing and driving. MET  4. Patient will be able to sleep all night in bed. IMPROVING     Long Term Goals:  6 weeks  1. Patient will have active range of motion as follow - 130 degrees flexion, 60 degrees external rotation and functional internal rotation to T12 for reaching overhead, behind head and behind back for dressing, grooming and hygiene.  2. Patient will have 4+/5 strength left shoulder/upper extremity to perform household chores and work related tasks.  3. Patient will place 2# dumbbell on head height shelf without hiking or pain left shoulder.  4. Patient will return to performing all household chores independently.      PLAN     Plan of care Certification: 1/12/2022 to 2/25/2022.     Outpatient Physical Therapy 3 times weekly for 3 weeks, then 2 times weekly for 3 weeks to include the following interventions: Manual Therapy, Moist Heat/ Ice, Neuromuscular Re-ed, Patient Education, Therapeutic Activities and Therapeutic Exercise.       AYE ALEXANDER, PT

## 2022-02-08 ENCOUNTER — CLINICAL SUPPORT (OUTPATIENT)
Dept: REHABILITATION | Facility: HOSPITAL | Age: 72
End: 2022-02-08
Attending: ORTHOPAEDIC SURGERY
Payer: MEDICARE

## 2022-02-08 DIAGNOSIS — M25.512 ACUTE POSTOPERATIVE PAIN OF LEFT SHOULDER: ICD-10-CM

## 2022-02-08 DIAGNOSIS — M25.612 DECREASED ROM OF LEFT SHOULDER: ICD-10-CM

## 2022-02-08 DIAGNOSIS — Z98.890 STATUS POST LEFT ROTATOR CUFF REPAIR: ICD-10-CM

## 2022-02-08 DIAGNOSIS — G89.18 ACUTE POSTOPERATIVE PAIN OF LEFT SHOULDER: ICD-10-CM

## 2022-02-08 PROCEDURE — 97110 THERAPEUTIC EXERCISES: CPT

## 2022-02-08 PROCEDURE — 97112 NEUROMUSCULAR REEDUCATION: CPT

## 2022-02-08 PROCEDURE — 97140 MANUAL THERAPY 1/> REGIONS: CPT

## 2022-02-08 RX ORDER — AZITHROMYCIN 250 MG/1
TABLET, FILM COATED ORAL
Qty: 6 TABLET | Refills: 1 | Status: SHIPPED | OUTPATIENT
Start: 2022-02-08 | End: 2022-07-27

## 2022-02-08 NOTE — PROGRESS NOTES
[facility]   Physical Therapy Treatment Note     Name: Meghna CormierAlesia  [facility] Number: [id number]    Therapy Diagnosis:   Encounter Diagnoses   Name Primary?    Acute postoperative pain of left shoulder     Decreased ROM of left shoulder     Status post left rotator cuff repair      Physician: Jose Antonio Pablo MD    Visit Date: 2/8/2022    Physician Orders: PT Eval and Treat   Medical Diagnosis from Referral: s/p left rotator cuff repair, SAD, DCE  Evaluation Date: 1/12/2022  Authorization Period Expiration: 12/31/2022  Plan of Care Expiration: 2/25/2022  Progress Note Due: 2/12/2022  Visit # / Visits authorized: 9      PTA Visit #: n/a     Time In: 948 am  Time Out: 1035 am  Total Billable Time: 44 minutes    SUBJECTIVE     Pt reports: shoulder is just stiff this morning  She was compliant with home exercise program.  Response to previous treatment: no complaints after evaluation   Functional change: driving herself    Pain: 0/10  Location: left shoulder      OBJECTIVE     AROM flexion 45 degrees, external rotation in adduction 35 degrees, internal rotation behind back to ischial tuberosity    Treatment       Meghna received the treatments listed below:      therapeutic exercises to develop strength, ROM and flexibility for 16 minutes including:  UBE reverse x 5 min  Pulleys 5 minutes  Seated scapular retraction --  Cane flexion - wall 2 x 10   cybex rows 2 plates 2 x 10      manual therapy techniques: Joint mobilizations, Soft tissue Mobilization and passive stretching were applied to the: left shoulder for 8 minutes, including:  Passive stretching into external rotation and internal rotation in supine; soft tissue mobilizaiton to left upper arm    neuromuscular re-education activities to improve: Proprioception, Posture and scapular stabiity for 20 minutes. The following activities were included:  Scapular peripheral neuromuscular facilitation x 15  Sidelying external rotation  (aarom) 3 x 10  Sidelying scaption x 15  Scapular retraction/extension green x 30  4 way shoulder isometrics - 5 second hold x 10 each      Patient Education and Home Exercises     Home Exercises Provided and Patient Education Provided     Education provided:   -    Written Home Exercises Provided: Patient instructed to cont prior HEP. Exercises were reviewed and Meghna was able to demonstrate them prior to the end of the session.  Meghna demonstrated good  understanding of the education provided. See EMR under Patient Instructions for exercises provided during therapy sessions    ASSESSMENT   130 degrees flexion in pulleys    Meghna had improved flexion range of motion in pulleys this morning but her active flexion has not really improved. She still has marked upper trap substitution that kicks in almost immediately when attempting to raise left upper extremity. She required less cueing with isometrics. She increased from red to green theraband with scapular retraction/extension and from 1 to 2 plates on Cybex rows. Will slowly begin strengthening to improve active range of motion.    Meghna Is progressing well towards her goals.   Pt prognosis is Good.     Pt will continue to benefit from skilled outpatient physical therapy to address the deficits listed in the problem list box on initial evaluation, provide pt/family education and to maximize pt's level of independence in the home and community environment.     Pt's spiritual, cultural and educational needs considered and pt agreeable to plan of care and goals.     Anticipated barriers to physical therapy: none    Goals:  Short Term Goals: 3 weeks  1. Patient will be independent with home exercise program. MET  2. Patient will have passive range of motion as follows - 120 degrees flexion, 30 degrees external rotation and internal rotation. MET  3. Patient will be independent with dressing and driving. MET  4. Patient will be able to sleep all night in bed.  IMPROVING     Long Term Goals: 6 weeks  1. Patient will have active range of motion as follow - 130 degrees flexion, 60 degrees external rotation and functional internal rotation to T12 for reaching overhead, behind head and behind back for dressing, grooming and hygiene. NOT MET - her active range of motion flexion is significantly limited with hiking almost immediately  2. Patient will have 4+/5 strength left shoulder/upper extremity to perform household chores and work related tasks. NOT MET  3. Patient will place 2# dumbbell on head height shelf without hiking or pain left shoulder. NOT MET   4. Patient will return to performing all household chores independently. NOT MET      PLAN     Plan of care Certification: 1/12/2022 to 2/25/2022.     Outpatient Physical Therapy 3 times weekly for 3 weeks, then 2 times weekly for 3 weeks to include the following interventions: Manual Therapy, Moist Heat/ Ice, Neuromuscular Re-ed, Patient Education, Therapeutic Activities and Therapeutic Exercise.       AYE ALEXANDER, PT

## 2022-02-09 ENCOUNTER — INFUSION (OUTPATIENT)
Dept: INFUSION THERAPY | Facility: HOSPITAL | Age: 72
End: 2022-02-09
Attending: NURSE PRACTITIONER
Payer: MEDICARE

## 2022-02-09 VITALS
OXYGEN SATURATION: 97 % | SYSTOLIC BLOOD PRESSURE: 134 MMHG | HEART RATE: 83 BPM | DIASTOLIC BLOOD PRESSURE: 68 MMHG | TEMPERATURE: 98 F | RESPIRATION RATE: 20 BRPM

## 2022-02-09 DIAGNOSIS — M05.79 RHEUMATOID ARTHRITIS INVOLVING MULTIPLE SITES WITH POSITIVE RHEUMATOID FACTOR: Primary | ICD-10-CM

## 2022-02-09 PROCEDURE — 96365 THER/PROPH/DIAG IV INF INIT: CPT

## 2022-02-09 PROCEDURE — 63600175 PHARM REV CODE 636 W HCPCS: Performed by: NURSE PRACTITIONER

## 2022-02-09 PROCEDURE — 96375 TX/PRO/DX INJ NEW DRUG ADDON: CPT

## 2022-02-09 PROCEDURE — 96374 THER/PROPH/DIAG INJ IV PUSH: CPT

## 2022-02-09 PROCEDURE — 25000003 PHARM REV CODE 250: Performed by: NURSE PRACTITIONER

## 2022-02-09 RX ORDER — ACETAMINOPHEN 500 MG
1000 TABLET ORAL ONCE AS NEEDED
Status: CANCELLED
Start: 2022-02-09

## 2022-02-09 RX ORDER — DIPHENHYDRAMINE HCL 12.5MG/5ML
25 ELIXIR ORAL ONCE AS NEEDED
Status: COMPLETED | OUTPATIENT
Start: 2022-02-09 | End: 2022-02-09

## 2022-02-09 RX ORDER — METHYLPREDNISOLONE SOD SUCC 125 MG
62.5 VIAL (EA) INJECTION ONCE AS NEEDED
Status: COMPLETED | OUTPATIENT
Start: 2022-02-09 | End: 2022-02-09

## 2022-02-09 RX ORDER — ACETAMINOPHEN 500 MG
1000 TABLET ORAL ONCE AS NEEDED
Status: COMPLETED | OUTPATIENT
Start: 2022-02-09 | End: 2022-02-09

## 2022-02-09 RX ORDER — DIPHENHYDRAMINE HCL 12.5MG/5ML
25 ELIXIR ORAL ONCE AS NEEDED
Status: CANCELLED
Start: 2022-02-09

## 2022-02-09 RX ORDER — SODIUM CHLORIDE 0.9 % (FLUSH) 0.9 %
10 SYRINGE (ML) INJECTION
Status: CANCELLED | OUTPATIENT
Start: 2022-02-09

## 2022-02-09 RX ADMIN — METHYLPREDNISOLONE SODIUM SUCCINATE 62.5 MG: 125 INJECTION, POWDER, FOR SOLUTION INTRAMUSCULAR; INTRAVENOUS at 09:02

## 2022-02-09 RX ADMIN — ACETAMINOPHEN 1000 MG: 500 TABLET ORAL at 09:02

## 2022-02-09 RX ADMIN — DIPHENHYDRAMINE HYDROCHLORIDE 25 MG: 12.5 SOLUTION ORAL at 09:02

## 2022-02-09 RX ADMIN — SODIUM CHLORIDE 750 MG: 9 INJECTION, SOLUTION INTRAVENOUS at 09:02

## 2022-02-09 NOTE — PROGRESS NOTES
0840: Pt here for her orencia.Requested a cup of coffee this am.  0916: IV of orencia up and infusing.Will continue to monitor for any adverse reactions.

## 2022-02-23 ENCOUNTER — CLINICAL SUPPORT (OUTPATIENT)
Dept: REHABILITATION | Facility: HOSPITAL | Age: 72
End: 2022-02-23
Attending: ORTHOPAEDIC SURGERY
Payer: MEDICARE

## 2022-02-23 DIAGNOSIS — Z98.890 STATUS POST LEFT ROTATOR CUFF REPAIR: ICD-10-CM

## 2022-02-23 DIAGNOSIS — M25.512 ACUTE POSTOPERATIVE PAIN OF LEFT SHOULDER: Primary | ICD-10-CM

## 2022-02-23 DIAGNOSIS — G89.18 ACUTE POSTOPERATIVE PAIN OF LEFT SHOULDER: Primary | ICD-10-CM

## 2022-02-23 DIAGNOSIS — M25.612 DECREASED ROM OF LEFT SHOULDER: ICD-10-CM

## 2022-02-23 PROCEDURE — 97110 THERAPEUTIC EXERCISES: CPT | Mod: CQ

## 2022-02-23 PROCEDURE — 97112 NEUROMUSCULAR REEDUCATION: CPT | Mod: CQ

## 2022-02-23 PROCEDURE — 97140 MANUAL THERAPY 1/> REGIONS: CPT | Mod: CQ

## 2022-02-23 NOTE — PROGRESS NOTES
[facility]   Physical Therapy Treatment Note     Name: Meghna CormierAlesia  [facility] Number: [id number]    Therapy Diagnosis:   Encounter Diagnoses   Name Primary?    Acute postoperative pain of left shoulder Yes    Status post left rotator cuff repair     Decreased ROM of left shoulder      Physician: Jose Antonio Pablo MD    Visit Date: 2/23/2022    Physician Orders: PT Eval and Treat   Medical Diagnosis from Referral: s/p left rotator cuff repair, SAD, DCE  Evaluation Date: 1/12/2022  Authorization Period Expiration: 12/31/2022  Plan of Care Expiration: 2/25/2022  Visit # / Visits authorized: 10      PTA Visit #: 1     Time In: 9:45 am  Time Out: 10:36 am  Total Billable Time: 49 minutes    SUBJECTIVE     Pt reports: she is stiff this morning, but not just her shoulder.  She was compliant with home exercise program.  Response to previous treatment: no complaints after evaluation   Functional change: driving herself    Pain: 0/10  Location: left shoulder      OBJECTIVE     active range of motion flexion 55 degrees, external rotation in adduction 35 degrees, internal rotation behind back to ischial tuberosity  passive range of motion 130 degrees flexion, 45 degrees external rotation, 30 degrees internal rotation     Treatment       Meghna received the treatments listed below:      therapeutic exercises to develop strength, ROM and flexibility for 15 minutes including:  UBE reverse x 5 minutes   Pulleys x 5 minutes   cybex rows 2 plates 2 x 10 (not performed today)   Supine cane flexion 10 x 10 second hold     manual therapy techniques: Joint mobilizations, Soft tissue Mobilization and passive stretching were applied to the: left shoulder for 10 minutes, including:  Passive stretching into external rotation and internal rotation in supine; soft tissue mobilizaiton to left upper arm    neuromuscular re-education activities to improve: Proprioception, Posture and scapular stabiity for 25  minutes. The following activities were included:  Scapular peripheral neuromuscular facilitation x 30  Sidelying external rotation (aarom) 3 x 10  Sidelying scaption x 30  Scapular retraction/extension blue x 30  4 way shoulder isometrics - 5 second hold x 10 each  Wall cane flexion with manual cueing to decrease upper trap firing x 20      Patient Education and Home Exercises     Home Exercises Provided and Patient Education Provided     Education provided:   -review of home exercise program     Written Home Exercises Provided: Patient instructed to cont prior HEP. Exercises were reviewed and Meghna was able to demonstrate them prior to the end of the session.  Meghna demonstrated good  understanding of the education provided. See EMR under Patient Instructions for exercises provided during therapy sessions    ASSESSMENT     Meghna continues to struggle with her scapulohumeral rhythm. She is able to relax and allow sidelying scaption motion to 120 degrees and has 130 passive flexion but is actively unable to get past 60 degrees flexion due to poor neuromuscular coordination. Will slowly begin strengthening to improve active range of motion.    Meghna Is progressing well towards her goals.   Pt prognosis is Good.     Pt will continue to benefit from skilled outpatient physical therapy to address the deficits listed in the problem list box on initial evaluation, provide pt/family education and to maximize pt's level of independence in the home and community environment.     Pt's spiritual, cultural and educational needs considered and pt agreeable to plan of care and goals.     Anticipated barriers to physical therapy: none    Goals:  Short Term Goals: 3 weeks  1. Patient will be independent with home exercise program. MET  2. Patient will have passive range of motion as follows - 120 degrees flexion, 30 degrees external rotation and internal rotation. MET  3. Patient will be independent with dressing and  driving. MET  4. Patient will be able to sleep all night in bed. IMPROVING     Long Term Goals: 6 weeks  1. Patient will have active range of motion as follow - 130 degrees flexion, 60 degrees external rotation and functional internal rotation to T12 for reaching overhead, behind head and behind back for dressing, grooming and hygiene. NOT MET - her active range of motion flexion is significantly limited with hiking almost immediately  2. Patient will have 4+/5 strength left shoulder/upper extremity to perform household chores and work related tasks. NOT MET  3. Patient will place 2# dumbbell on head height shelf without hiking or pain left shoulder. NOT MET   4. Patient will return to performing all household chores independently. NOT MET      PLAN     Plan of care Certification: 1/12/2022 to 2/25/2022.     Outpatient Physical Therapy 3 times weekly for 3 weeks, then 2 times weekly for 3 weeks to include the following interventions: Manual Therapy, Moist Heat/ Ice, Neuromuscular Re-ed, Patient Education, Therapeutic Activities and Therapeutic Exercise.       Sara Orona, PTA

## 2022-02-24 ENCOUNTER — CLINICAL SUPPORT (OUTPATIENT)
Dept: REHABILITATION | Facility: HOSPITAL | Age: 72
End: 2022-02-24
Attending: ORTHOPAEDIC SURGERY
Payer: MEDICARE

## 2022-02-24 DIAGNOSIS — M25.612 DECREASED ROM OF LEFT SHOULDER: ICD-10-CM

## 2022-02-24 DIAGNOSIS — M25.512 ACUTE POSTOPERATIVE PAIN OF LEFT SHOULDER: Primary | ICD-10-CM

## 2022-02-24 DIAGNOSIS — G89.18 ACUTE POSTOPERATIVE PAIN OF LEFT SHOULDER: Primary | ICD-10-CM

## 2022-02-24 DIAGNOSIS — Z98.890 STATUS POST LEFT ROTATOR CUFF REPAIR: ICD-10-CM

## 2022-02-24 PROCEDURE — 97110 THERAPEUTIC EXERCISES: CPT

## 2022-02-24 PROCEDURE — 97112 NEUROMUSCULAR REEDUCATION: CPT

## 2022-02-24 PROCEDURE — 97140 MANUAL THERAPY 1/> REGIONS: CPT

## 2022-02-24 NOTE — PROGRESS NOTES
[facility]   Physical Therapy Treatment Note     [id number]    Therapy Diagnosis:   No diagnosis found.  Physician: Jose Antonio Pablo MD    Visit Date: 2/24/2022    Physician Orders: PT Eval and Treat   Medical Diagnosis from Referral: s/p left rotator cuff repair, SAD, DCE  Evaluation Date: 1/12/2022  Authorization Period Expiration: 12/31/2022  Plan of Care Expiration: 3/25/2022  Visit # / Visits authorized: 11      PTA Visit #: n/a    Time In: 1000 am  Time Out: 1048 am  Total Billable Time: 44 minutes    SUBJECTIVE     Pt reports: she is stiff this morning, but not just her shoulder.  She was compliant with home exercise program.  Response to previous treatment: no complaints after evaluation   Functional change: driving herself    Pain: 0/10  Location: left shoulder      OBJECTIVE     active range of motion flexion 55 degrees, external rotation in adduction 20 degrees, internal rotation behind back to sacrum  passive range of motion 135 degrees flexion, 45 degrees external rotation, 45 degrees internal rotation     Treatment       Meghna received the treatments listed below:      therapeutic exercises to develop strength, ROM and flexibility for 20 minutes including:  UBE reverse x 5 minutes   Pulleys x 5 minutes   cybex rows 2 plates 3 x 10    Supine cane flexion 10 x 10 second hold (w/out ER)  Supine cane flexion 10 x 10 second hold (w/ER)    manual therapy techniques: Joint mobilizations, Soft tissue Mobilization and passive stretching were applied to the: left shoulder for 12 minutes, including:  Passive stretching into external rotation and internal rotation in supine; soft tissue mobilizaiton to left upper arm    neuromuscular re-education activities to improve: Proprioception, Posture and scapular stabiity for 12 minutes. The following activities were included:  Scapular peripheral neuromuscular facilitation --  Sidelying external  rotation (aarom) 3 x 10  Sidelying scaption --  Scapular retraction/extension blue x 30  4 way shoulder isometrics --  Wall cane flexion with manual cueing to decrease upper trap firing x 20  Bilateral external rotation w/ theraband - yellow 2 x 10 AAROM      Patient Education and Home Exercises     Home Exercises Provided and Patient Education Provided     Education provided:   -review of home exercise program     Written Home Exercises Provided: Patient instructed to cont prior HEP. Exercises were reviewed and Meghna was able to demonstrate them prior to the end of the session.  Meghna demonstrated good  understanding of the education provided. See EMR under Patient Instructions for exercises provided during therapy sessions    ASSESSMENT     Meghna continues to struggle with her scapulohumeral rhythm.  She said Dr. Pablo told her if her shoulder was not moving where he wanted it at her next appointment he was going to manipulate it. Patient has 135 degrees passive flexion and 45 degrees external rotation and internal rotation in supine with shoulder abducted 45-60 degrees. Her active range of motion is poor due to decreased strength in rotator cuff and poor scapular stability. She is very upper trap dominant with her upper extremity movements. Added bilateral external rotation with yellow theraband and she required min assist with this activity as she is unable to actively externally rotate left shoulder in adduction. Patient did not attend therapy between 2/8 - 2/23/22 so her 30 day progress note was not completed on 2/12. Her plan of care is being updated today.    Meghna Is progressing well towards her goals.   Pt prognosis is Good.     Pt will continue to benefit from skilled outpatient physical therapy to address the deficits listed in the problem list box on initial evaluation, provide pt/family education and to maximize pt's level of independence in the home and community environment.     Pt's  spiritual, cultural and educational needs considered and pt agreeable to plan of care and goals.     Anticipated barriers to physical therapy: none    Goals:  Short Term Goals: 3 weeks  1. Patient will be independent with home exercise program. MET  2. Patient will have passive range of motion as follows - 120 degrees flexion, 30 degrees external rotation and internal rotation. MET  3. Patient will be independent with dressing and driving. MET  4. Patient will be able to sleep all night in bed. IMPROVING     Long Term Goals: 6 weeks  1. Patient will have active range of motion as follow - 130 degrees flexion, 60 degrees external rotation and functional internal rotation to T12 for reaching overhead, behind head and behind back for dressing, grooming and hygiene. NOT MET - her active range of motion flexion is significantly limited with hiking almost immediately  2. Patient will have 4+/5 strength left shoulder/upper extremity to perform household chores and work related tasks. NOT MET  3. Patient will place 2# dumbbell on head height shelf without hiking or pain left shoulder. NOT MET   4. Patient will return to performing all household chores independently. NOT MET      PLAN     Plan of care Certification: 2/24/2022 to 3/25/2022     Outpatient Physical Therapy 2 times weekly for 4 weeks to include the following interventions: Manual Therapy, Moist Heat/ Ice, Neuromuscular Re-ed, Patient Education, Therapeutic Activities and Therapeutic Exercise.       AYE ALEXANDER, PT

## 2022-02-26 NOTE — PLAN OF CARE
"[facility]   Physical Therapy Updated Plan of Care    Name: Meghna Dalal  Clinic Number: [id number]    Therapy Diagnosis:   No diagnosis found.  Physician: Jose Antonio Pablo MD    Visit Date: 2/24/2022    Physician Orders: PT Eval and Treat   Medical Diagnosis from Referral: s/p left rotator cuff repair, SAD, DCE  Evaluation Date: 1/12/2022  Authorization Period Expiration: 12/31/2022  Plan of Care Expiration: 3/25/2022  Visit # / Visits authorized: 11    See daily treatment note under "notes" tab for today's physical therapy treatment.      Reasons for Recertification of Therapy: Meghna continues to struggle with her scapulohumeral rhythm.  She said Dr. Pablo told her if her shoulder was not moving where he wanted it at her next appointment he was going to manipulate it. Patient has 135 degrees passive flexion and 45 degrees external rotation and internal rotation in supine with shoulder abducted 45-60 degrees. Her active range of motion is poor due to decreased strength in rotator cuff and poor scapular stability. She is very upper trap dominant with her upper extremity movements. Added bilateral external rotation with yellow theraband and she required min assist with this activity as she is unable to actively externally rotate left shoulder in adduction. Patient did not attend therapy between 2/8 - 2/23/22 so her 30 day progress note was not completed on 2/12. Her plan of care is being updated today.      active range of motion flexion 55 degrees, external rotation in adduction 20 degrees, internal rotation behind back to sacrum  passive range of motion 135 degrees flexion, 45 degrees external rotation, 45 degrees internal rotation    Progress toward goals:  Short Term Goals: 3 weeks  1. Patient will be independent with home exercise program. MET  2. Patient will have passive range of motion as follows - 120 degrees flexion, 30 degrees external rotation and internal rotation. " MET  3. Patient will be independent with dressing and driving. MET  4. Patient will be able to sleep all night in bed. IMPROVING     Long Term Goals: 6 weeks  1. Patient will have active range of motion as follow - 130 degrees flexion, 60 degrees external rotation and functional internal rotation to T12 for reaching overhead, behind head and behind back for dressing, grooming and hygiene. NOT MET - her active range of motion flexion is significantly limited with hiking almost immediately  2. Patient will have 4+/5 strength left shoulder/upper extremity to perform household chores and work related tasks. NOT MET  3. Patient will place 2# dumbbell on head height shelf without hiking or pain left shoulder. NOT MET   4. Patient will return to performing all household chores independently. NOT MET        Plan     Updated Certification Period: 2/24/2022 to 3/25/2022  Recommended Treatment Plan: 2 times per week for 4 weeks: Manual Therapy, Neuromuscular Re-ed, Patient Education, Therapeutic Activities and Therapeutic Exercise  Other Recommendations:     AYE ALEXANDER, PT  2/24/2022      I CERTIFY THE NEED FOR THESE SERVICES FURNISHED UNDER THIS PLAN OF TREATMENT AND WHILE UNDER MY CARE.    Physician's comments:      Physician's Signature: ___________________________________________________

## 2022-03-01 ENCOUNTER — CLINICAL SUPPORT (OUTPATIENT)
Dept: REHABILITATION | Facility: HOSPITAL | Age: 72
End: 2022-03-01
Attending: ORTHOPAEDIC SURGERY
Payer: MEDICARE

## 2022-03-01 DIAGNOSIS — Z98.890 STATUS POST LEFT ROTATOR CUFF REPAIR: ICD-10-CM

## 2022-03-01 DIAGNOSIS — G89.18 ACUTE POSTOPERATIVE PAIN OF LEFT SHOULDER: Primary | ICD-10-CM

## 2022-03-01 DIAGNOSIS — M25.612 DECREASED ROM OF LEFT SHOULDER: ICD-10-CM

## 2022-03-01 DIAGNOSIS — M25.512 ACUTE POSTOPERATIVE PAIN OF LEFT SHOULDER: Primary | ICD-10-CM

## 2022-03-01 PROCEDURE — 97140 MANUAL THERAPY 1/> REGIONS: CPT

## 2022-03-01 PROCEDURE — 97110 THERAPEUTIC EXERCISES: CPT | Mod: KX

## 2022-03-01 PROCEDURE — 97112 NEUROMUSCULAR REEDUCATION: CPT

## 2022-03-01 NOTE — PROGRESS NOTES
[facility]   Physical Therapy Treatment Note     Name: Meghna CormierAlesia  [facility] Number: [id number]    Therapy Diagnosis:   Encounter Diagnoses   Name Primary?    Acute postoperative pain of left shoulder Yes    Status post left rotator cuff repair     Decreased ROM of left shoulder      Physician: Jose Antonio Pablo MD    Visit Date: 3/1/2022    Physician Orders: PT Eval and Treat   Medical Diagnosis from Referral: s/p left rotator cuff repair, SAD, DCE  Evaluation Date: 1/12/2022  Authorization Period Expiration: 12/31/2022  Plan of Care Expiration: 3/25/2022  Visit # / Visits authorized: 12      PTA Visit #: n/a    Time In: 1000 am  Time Out: 1054 am  Total Billable Time: 46 minutes    SUBJECTIVE     Pt reports: she feels overall she is doing better - she does not want to have a manipulation  She was compliant with home exercise program.  Response to previous treatment: no complaints after evaluation   Functional change: driving herself    Pain: 0/10  Location: left shoulder      OBJECTIVE     active range of motion flexion 55 degrees, external rotation in adduction 20 degrees, internal rotation behind back to PSIS using right upper extremity to assist  passive range of motion 135 degrees flexion, 45 degrees external rotation, 45 degrees internal rotation     Treatment       Meghna received the treatments listed below:      therapeutic exercises to develop strength, ROM and flexibility for 12 minutes including:  UBE reverse x 5 minutes   Pulleys --  cybex rows --  Supine cane flexion 10 x 10 second hold (w/out ER)  Supine cane flexion 10 x 10 second hold (w/ER)  Corner stretch - add next visit - given for home exercise program     manual therapy techniques: Joint mobilizations, Soft tissue Mobilization and passive stretching were applied to the: left shoulder for 10 minutes, including:  Passive stretching into flexion, external rotation and internal rotation in  supine    neuromuscular re-education activities to improve: Proprioception, Posture and scapular stabiity for 24 minutes. The following activities were included:  Sidelying external rotation (aarom) 2 x 15  Sidelying scaption x 20  AAROM unilateral flexion in supine 2 x 10   Scapular retraction/extension blue --  Wall cane flexion with manual cueing to decrease upper trap firing --  Bilateral external rotation w/ theraband - yellow --  Gravity eliminated flexion in right sidelying with left arm on tray table x 30  SLERRS 3 x 20 seconds  SLOCRS 3 x 20 seconds      Patient Education and Home Exercises     Home Exercises Provided and Patient Education Provided     Education provided:   -review of home exercise program     Written Home Exercises Provided: Patient instructed to cont prior HEP. Exercises were reviewed and Meghna was able to demonstrate them prior to the end of the session.  Meghna demonstrated good  understanding of the education provided. See EMR under Patient Instructions for exercises provided during therapy sessions. Had patient perform active-assisted supine flexion and gravity eliminated flexion in right sidelying with left arm sliding on a tray table. She is very upper trap dominant as a result of her poor scapular stability and rotator cuff strength. She continues to have much better passive range of motion versus active range of motion.    ASSESSMENT     Meghna continues to struggle with her scapulohumeral rhythm. She had very poor scapular stability with SLERRS AND SLOCRS that was added today.   Meghna Is progressing well towards her goals.   Pt prognosis is Good.     Pt will continue to benefit from skilled outpatient physical therapy to address the deficits listed in the problem list box on initial evaluation, provide pt/family education and to maximize pt's level of independence in the home and community environment.     Pt's spiritual, cultural and educational needs considered and pt  agreeable to plan of care and goals.     Anticipated barriers to physical therapy: none    Goals:  Short Term Goals: 3 weeks  1. Patient will be independent with home exercise program. MET  2. Patient will have passive range of motion as follows - 120 degrees flexion, 30 degrees external rotation and internal rotation. MET  3. Patient will be independent with dressing and driving. MET  4. Patient will be able to sleep all night in bed. IMPROVING     Long Term Goals: 6 weeks  1. Patient will have active range of motion as follow - 130 degrees flexion, 60 degrees external rotation and functional internal rotation to T12 for reaching overhead, behind head and behind back for dressing, grooming and hygiene. NOT MET - her active range of motion flexion is significantly limited with hiking almost immediately  2. Patient will have 4+/5 strength left shoulder/upper extremity to perform household chores and work related tasks. NOT MET  3. Patient will place 2# dumbbell on head height shelf without hiking or pain left shoulder. NOT MET   4. Patient will return to performing all household chores independently. NOT MET      PLAN     Plan of care Certification: 2/24/2022 to 3/25/2022     Outpatient Physical Therapy 2 times weekly for 4 weeks to include the following interventions: Manual Therapy, Moist Heat/ Ice, Neuromuscular Re-ed, Patient Education, Therapeutic Activities and Therapeutic Exercise.       AYE ALEXANDER, PT

## 2022-03-03 ENCOUNTER — CLINICAL SUPPORT (OUTPATIENT)
Dept: REHABILITATION | Facility: HOSPITAL | Age: 72
End: 2022-03-03
Attending: ORTHOPAEDIC SURGERY
Payer: MEDICARE

## 2022-03-03 DIAGNOSIS — G89.18 ACUTE POSTOPERATIVE PAIN OF LEFT SHOULDER: Primary | ICD-10-CM

## 2022-03-03 DIAGNOSIS — Z98.890 STATUS POST LEFT ROTATOR CUFF REPAIR: ICD-10-CM

## 2022-03-03 DIAGNOSIS — M25.612 DECREASED ROM OF LEFT SHOULDER: ICD-10-CM

## 2022-03-03 DIAGNOSIS — M25.512 ACUTE POSTOPERATIVE PAIN OF LEFT SHOULDER: Primary | ICD-10-CM

## 2022-03-03 PROCEDURE — 97112 NEUROMUSCULAR REEDUCATION: CPT | Mod: CQ

## 2022-03-03 PROCEDURE — 97110 THERAPEUTIC EXERCISES: CPT | Mod: CQ

## 2022-03-03 PROCEDURE — 97140 MANUAL THERAPY 1/> REGIONS: CPT | Mod: CQ

## 2022-03-03 NOTE — PROGRESS NOTES
[facility]   Physical Therapy Treatment Note     Name: Meghna CormierAlesia  [facility] Number: [id number]    Therapy Diagnosis:   Encounter Diagnoses   Name Primary?    Acute postoperative pain of left shoulder Yes    Status post left rotator cuff repair     Decreased ROM of left shoulder      Physician: Jose Antonio Pablo MD    Visit Date: 3/3/2022    Physician Orders: PT Eval and Treat   Medical Diagnosis from Referral: s/p left rotator cuff repair, SAD, DCE  Evaluation Date: 1/12/2022  Authorization Period Expiration: 12/31/2022  Plan of Care Expiration: 3/25/2022  Visit # / Visits authorized: 13      PTA Visit #: 1    Time In: 10:05 am  Time Out: 10:45 am  Total Billable Time: 40 minutes    SUBJECTIVE     Pt reports: she has been working hard at home to stretch her arm and regain active range of motion.  She was compliant with home exercise program.  Response to previous treatment: no complaints   Functional change: driving herself    Pain: 0/10  Location: left shoulder      OBJECTIVE     active range of motion flexion 65 degrees, external rotation in adduction 20 degrees, internal rotation behind back to PSIS using right upper extremity to assist  passive range of motion 135 degrees flexion, 55 degrees external rotation, 45 degrees internal rotation     Treatment       Meghna received the treatments listed below:      therapeutic exercises to develop strength, ROM and flexibility for 12 minutes including:  UBE reverse (unavailable)  Pulleys x 5 minutes   cybex rows --  Supine cane flexion 10 x 10 second hold (w/out ER)  Supine cane flexion 10 x 10 second hold (w/ER)  Corner stretch 3 x 30 second hold     manual therapy techniques: Joint mobilizations, Soft tissue Mobilization and passive stretching were applied to the: left shoulder for 12 minutes, including:  Passive stretching into flexion, external rotation and internal rotation in supine    neuromuscular re-education activities to  improve: Proprioception, Posture and scapular stabiity for 10 minutes. The following activities were included:  Sidelying external rotation (aarom) 2 x 15  Sidelying scaption x 20  AAROM unilateral flexion in supine 2 x 10   SLERRS 3 x 20 seconds  SLOCRS 3 x 20 seconds  Scapular retraction/extension blue --  Wall cane flexion with manual cueing to decrease upper trap firing --  Bilateral external rotation w/ theraband - yellow --  Gravity eliminated flexion in right sidelying with left arm on tray table --    Therapeutic exercises to improve daily function for 6 minutes, including  Seated cable flexion with 2 plates x 20    Patient Education and Home Exercises     Home Exercises Provided and Patient Education Provided     Education provided:   -review of home exercise program     Written Home Exercises Provided: Patient instructed to cont prior HEP. Exercises were reviewed and Meghna was able to demonstrate them prior to the end of the session.  Meghna demonstrated good  understanding of the education provided. See EMR under Patient Instructions for exercises provided during therapy sessions    ASSESSMENT     Meghna was able to walk hand up wall to approximately 100 degrees. Actively she reached about 80 degrees twice but used momentum and could not hold hand at that angle. She continues to struggle with neuromuscular coordination and active flexion due to poor scapulohumeral rhythm.    Meghna Is progressing well towards her goals.   Pt prognosis is Good.     Pt will continue to benefit from skilled outpatient physical therapy to address the deficits listed in the problem list box on initial evaluation, provide pt/family education and to maximize pt's level of independence in the home and community environment.     Pt's spiritual, cultural and educational needs considered and pt agreeable to plan of care and goals.     Anticipated barriers to physical therapy: none    Goals:  Short Term Goals: 3  weeks  1. Patient will be independent with home exercise program. MET  2. Patient will have passive range of motion as follows - 120 degrees flexion, 30 degrees external rotation and internal rotation. MET  3. Patient will be independent with dressing and driving. MET  4. Patient will be able to sleep all night in bed. IMPROVING     Long Term Goals: 6 weeks  1. Patient will have active range of motion as follow - 130 degrees flexion, 60 degrees external rotation and functional internal rotation to T12 for reaching overhead, behind head and behind back for dressing, grooming and hygiene. NOT MET - her active range of motion flexion is significantly limited with hiking almost immediately  2. Patient will have 4+/5 strength left shoulder/upper extremity to perform household chores and work related tasks. NOT MET  3. Patient will place 2# dumbbell on head height shelf without hiking or pain left shoulder. NOT MET   4. Patient will return to performing all household chores independently. NOT MET      PLAN     Plan of care Certification: 2/24/2022 to 3/25/2022     Outpatient Physical Therapy 2 times weekly for 4 weeks to include the following interventions: Manual Therapy, Moist Heat/ Ice, Neuromuscular Re-ed, Patient Education, Therapeutic Activities and Therapeutic Exercise.       Sara Orona, PTA

## 2022-03-07 ENCOUNTER — CLINICAL SUPPORT (OUTPATIENT)
Dept: REHABILITATION | Facility: HOSPITAL | Age: 72
End: 2022-03-07
Attending: ORTHOPAEDIC SURGERY
Payer: MEDICARE

## 2022-03-07 DIAGNOSIS — M25.612 DECREASED ROM OF LEFT SHOULDER: ICD-10-CM

## 2022-03-07 DIAGNOSIS — M25.512 ACUTE POSTOPERATIVE PAIN OF LEFT SHOULDER: Primary | ICD-10-CM

## 2022-03-07 DIAGNOSIS — G89.18 ACUTE POSTOPERATIVE PAIN OF LEFT SHOULDER: Primary | ICD-10-CM

## 2022-03-07 DIAGNOSIS — Z98.890 STATUS POST LEFT ROTATOR CUFF REPAIR: ICD-10-CM

## 2022-03-07 PROCEDURE — 97140 MANUAL THERAPY 1/> REGIONS: CPT | Mod: CQ

## 2022-03-07 PROCEDURE — 97110 THERAPEUTIC EXERCISES: CPT | Mod: CQ

## 2022-03-07 PROCEDURE — 97112 NEUROMUSCULAR REEDUCATION: CPT | Mod: CQ

## 2022-03-07 NOTE — PROGRESS NOTES
[facility]   Physical Therapy Treatment Note     Name: Meghna CormierAlesia  [facility] Number: [id number]    Therapy Diagnosis:   Encounter Diagnoses   Name Primary?    Acute postoperative pain of left shoulder Yes    Status post left rotator cuff repair     Decreased ROM of left shoulder      Physician: Jose Antonio Pablo MD    Visit Date: 3/7/2022    Physician Orders: PT Eval and Treat   Medical Diagnosis from Referral: s/p left rotator cuff repair, SAD, DCE  Evaluation Date: 1/12/2022  Authorization Period Expiration: 12/31/2022  Plan of Care Expiration: 3/25/2022  Visit # / Visits authorized: 14      PTA Visit #: 2    Time In: 3:12 pm  Time Out: 4:02 pm  Total Billable Time: 48 minutes    SUBJECTIVE     Pt reports: She was going to exercise this weekend but she had her grand and great-grandchildren so she didn't have time. She does not want to have a manipulation but she will do whatever it takes to get her motion back.  She was compliant with home exercise program.  Response to previous treatment: no complaints   Functional change: driving herself    Pain: 0/10  Location: left shoulder      OBJECTIVE     active range of motion flexion 65 degrees, external rotation in adduction 20 degrees, internal rotation behind back to PSIS using right upper extremity to assist  passive range of motion 125 degrees flexion, 65 degrees external rotation, 45 degrees internal rotation     Treatment       Meghna received the treatments listed below:      therapeutic exercises to develop strength, ROM and flexibility for 23 minutes including:  UBE reverse x 5 minutes   Pulleys x 5 minutes   cybex rows with 3 plates x 20  Supine cane flexion 10 x 10 second hold (w/out ER)  Supine cane flexion 10 x 10 second hold (w/ER)  Corner stretch 3 x 30 second hold     manual therapy techniques: Joint mobilizations, Soft tissue Mobilization and passive stretching were applied to the: left shoulder for 10 minutes,  including:  Passive stretching into flexion, external rotation and internal rotation in supine    neuromuscular re-education activities to improve: Proprioception, Posture and scapular stabiity for 15 minutes. The following activities were included:  Sidelying external rotation (aarom) 2 x 15  Sidelying scaption x 20  AAROM unilateral flexion in supine 2 x 10 with 1 lb   SLERRS 3 x 20 seconds  SLOCRS 3 x 20 seconds  Scapular retraction/extension blue --  Wall cane flexion with manual cueing to decrease upper trap firing --  Bilateral external rotation w/ theraband - yellow --  Gravity eliminated flexion in right sidelying with left arm on tray table --    Therapeutic exercises to improve daily function for 0 minutes, including  Seated cable flexion with 2 plates     Patient Education and Home Exercises     Home Exercises Provided and Patient Education Provided     Education provided:   -review of home exercise program     Written Home Exercises Provided: Patient instructed to cont prior HEP. Exercises were reviewed and Meghna was able to demonstrate them prior to the end of the session.  Meghna demonstrated good  understanding of the education provided. See EMR under Patient Instructions for exercises provided during therapy sessions    ASSESSMENT     Meghna continues to demonstrate decent passive range of motion with poor active range of motion. She can hold her arm at 90 degrees flexion for gentle rhythmic stabilizations but cannot coordinate actively lifting and maintaining this position against gravity. She lacks strength and neuromuscular coordination needed to gain active range of motion.    Meghna Is progressing well towards her goals.   Pt prognosis is Good.     Pt will continue to benefit from skilled outpatient physical therapy to address the deficits listed in the problem list box on initial evaluation, provide pt/family education and to maximize pt's level of independence in the home and community  environment.     Pt's spiritual, cultural and educational needs considered and pt agreeable to plan of care and goals.     Anticipated barriers to physical therapy: none    Goals:  Short Term Goals: 3 weeks  1. Patient will be independent with home exercise program. MET  2. Patient will have passive range of motion as follows - 120 degrees flexion, 30 degrees external rotation and internal rotation. MET  3. Patient will be independent with dressing and driving. MET  4. Patient will be able to sleep all night in bed. IMPROVING     Long Term Goals: 6 weeks  1. Patient will have active range of motion as follow - 130 degrees flexion, 60 degrees external rotation and functional internal rotation to T12 for reaching overhead, behind head and behind back for dressing, grooming and hygiene. NOT MET - her active range of motion flexion is significantly limited with hiking almost immediately  2. Patient will have 4+/5 strength left shoulder/upper extremity to perform household chores and work related tasks. NOT MET  3. Patient will place 2# dumbbell on head height shelf without hiking or pain left shoulder. NOT MET   4. Patient will return to performing all household chores independently. NOT MET      PLAN     Plan of care Certification: 2/24/2022 to 3/25/2022     Outpatient Physical Therapy 2 times weekly for 4 weeks to include the following interventions: Manual Therapy, Moist Heat/ Ice, Neuromuscular Re-ed, Patient Education, Therapeutic Activities and Therapeutic Exercise.       Sara Orona, PTA

## 2022-03-08 ENCOUNTER — CLINICAL SUPPORT (OUTPATIENT)
Dept: REHABILITATION | Facility: HOSPITAL | Age: 72
End: 2022-03-08
Attending: ORTHOPAEDIC SURGERY
Payer: MEDICARE

## 2022-03-08 DIAGNOSIS — G89.18 ACUTE POSTOPERATIVE PAIN OF LEFT SHOULDER: Primary | ICD-10-CM

## 2022-03-08 DIAGNOSIS — Z98.890 STATUS POST LEFT ROTATOR CUFF REPAIR: ICD-10-CM

## 2022-03-08 DIAGNOSIS — M25.512 ACUTE POSTOPERATIVE PAIN OF LEFT SHOULDER: Primary | ICD-10-CM

## 2022-03-08 DIAGNOSIS — M25.612 DECREASED ROM OF LEFT SHOULDER: ICD-10-CM

## 2022-03-08 PROCEDURE — 97110 THERAPEUTIC EXERCISES: CPT | Mod: KX

## 2022-03-08 PROCEDURE — 97140 MANUAL THERAPY 1/> REGIONS: CPT | Mod: KX

## 2022-03-08 PROCEDURE — 97530 THERAPEUTIC ACTIVITIES: CPT | Mod: KX

## 2022-03-08 NOTE — PROGRESS NOTES
[facility]   Physical Therapy Treatment Note     Name: Meghna CormierAlesia  [facility] Number: [id number]    Therapy Diagnosis:   Encounter Diagnoses   Name Primary?    Acute postoperative pain of left shoulder Yes    Status post left rotator cuff repair     Decreased ROM of left shoulder      Physician: Jose Antonio Pablo MD    Visit Date: 3/8/2022    Physician Orders: PT Eval and Treat   Medical Diagnosis from Referral: s/p left rotator cuff repair, SAD, DCE  Evaluation Date: 1/12/2022  Authorization Period Expiration: 12/31/2022  Plan of Care Expiration: 3/25/2022  Visit # / Visits authorized: 15      PTA Visit #:     Time In: 1000 am  Time Out: 1044 am  Total Billable Time: 44 minutes    SUBJECTIVE     Pt reports: this weather is not good for her arthritis  She was compliant with home exercise program.  Response to previous treatment: no complaints   Functional change: driving herself    Pain: 0/10  Location: left shoulder      OBJECTIVE     active range of motion flexion 65 degrees, external rotation in adduction 20 degrees, internal rotation behind back to PSIS using right upper extremity to assist  passive range of motion 135 degrees flexion, 65 degrees external rotation, 45 degrees internal rotation     Treatment       Meghna received the treatments listed below:      therapeutic exercises to develop strength, ROM and flexibility for 24 minutes including:  UBE reverse x 5 minutes   Pulleys x 5 minutes   cybex rows with 3 plates x 20  Supine cane flexion 2# 10 x 10 second hold (w/out ER)  Supine cane flexion 2# 10 x 10 second hold (w/ER)  Corner stretch 3 x 30 second hold     manual therapy techniques: Joint mobilizations, Soft tissue Mobilization and passive stretching were applied to the: left shoulder for 10 minutes, including:  Passive stretching into flexion, external rotation and internal rotation in supine    neuromuscular re-education activities to improve: Proprioception,  Posture and scapular stabiity for 5 minutes. The following activities were included:  Sidelying external rotation (aarom) --  Sidelying scaption --  AAROM unilateral flexion in supine --  SLERRS --  SLOCRS 3 x 20 seconds  Scapular retraction/extension blue x 30  Wall cane flexion with manual cueing to decrease upper trap firing --  Bilateral external rotation w/ theraband - yellow --  Gravity eliminated flexion in right sidelying with left arm on tray table --    Therapeutic exercises to improve daily function for 5 minutes, including  Seated cable flexion with 2 plates --  Cane flexion with back against wall and arms out in front of her x 15 to improve functional flexion of left upper extremity   Cane flexion pushing up the corner of the wall x 15 for functional flexion of left upper extremity     Patient Education and Home Exercises     Home Exercises Provided and Patient Education Provided     Education provided:   -review of home exercise program     Written Home Exercises Provided: Patient instructed to cont prior HEP. Exercises were reviewed and Meghna was able to demonstrate them prior to the end of the session.  Meghna demonstrated good  understanding of the education provided. See EMR under Patient Instructions for exercises provided during therapy sessions    ASSESSMENT     Meghna continues to have more passive range of motion versus active range of motion of left shoulder. She continues to have poor neuromuscular control of rotator cuff and scapular stabilizers. We are working on functional mobility and dynamic stability to improve active range of motion.    Meghna Is progressing well towards her goals.   Pt prognosis is Good.     Pt will continue to benefit from skilled outpatient physical therapy to address the deficits listed in the problem list box on initial evaluation, provide pt/family education and to maximize pt's level of independence in the home and community environment.     Pt's  spiritual, cultural and educational needs considered and pt agreeable to plan of care and goals.     Anticipated barriers to physical therapy: none    Goals:  Short Term Goals: 3 weeks  1. Patient will be independent with home exercise program. MET  2. Patient will have passive range of motion as follows - 120 degrees flexion, 30 degrees external rotation and internal rotation. MET  3. Patient will be independent with dressing and driving. MET  4. Patient will be able to sleep all night in bed. IMPROVING     Long Term Goals: 6 weeks  1. Patient will have active range of motion as follow - 130 degrees flexion, 60 degrees external rotation and functional internal rotation to T12 for reaching overhead, behind head and behind back for dressing, grooming and hygiene. NOT MET - her active range of motion flexion is significantly limited with hiking almost immediately  2. Patient will have 4+/5 strength left shoulder/upper extremity to perform household chores and work related tasks. NOT MET  3. Patient will place 2# dumbbell on head height shelf without hiking or pain left shoulder. NOT MET   4. Patient will return to performing all household chores independently. NOT MET      PLAN     Plan of care Certification: 2/24/2022 to 3/25/2022     Outpatient Physical Therapy 2 times weekly for 4 weeks to include the following interventions: Manual Therapy, Moist Heat/ Ice, Neuromuscular Re-ed, Patient Education, Therapeutic Activities and Therapeutic Exercise.       AYE ALEXANDER, PT

## 2022-03-10 ENCOUNTER — INFUSION (OUTPATIENT)
Dept: INFUSION THERAPY | Facility: HOSPITAL | Age: 72
End: 2022-03-10
Attending: INTERNAL MEDICINE
Payer: MEDICARE

## 2022-03-10 VITALS
HEART RATE: 62 BPM | TEMPERATURE: 98 F | RESPIRATION RATE: 18 BRPM | OXYGEN SATURATION: 98 % | SYSTOLIC BLOOD PRESSURE: 156 MMHG | DIASTOLIC BLOOD PRESSURE: 74 MMHG

## 2022-03-10 DIAGNOSIS — M05.79 RHEUMATOID ARTHRITIS INVOLVING MULTIPLE SITES WITH POSITIVE RHEUMATOID FACTOR: Primary | ICD-10-CM

## 2022-03-10 PROCEDURE — 96374 THER/PROPH/DIAG INJ IV PUSH: CPT | Mod: 59

## 2022-03-10 PROCEDURE — 96365 THER/PROPH/DIAG IV INF INIT: CPT

## 2022-03-10 PROCEDURE — 25000003 PHARM REV CODE 250: Performed by: NURSE PRACTITIONER

## 2022-03-10 PROCEDURE — 63600175 PHARM REV CODE 636 W HCPCS: Mod: JA | Performed by: NURSE PRACTITIONER

## 2022-03-10 RX ORDER — ACETAMINOPHEN 500 MG
1000 TABLET ORAL ONCE AS NEEDED
Status: COMPLETED | OUTPATIENT
Start: 2022-03-10 | End: 2022-03-10

## 2022-03-10 RX ORDER — SODIUM CHLORIDE 0.9 % (FLUSH) 0.9 %
10 SYRINGE (ML) INJECTION
Status: CANCELLED | OUTPATIENT
Start: 2022-03-10

## 2022-03-10 RX ORDER — DIPHENHYDRAMINE HCL 12.5MG/5ML
25 ELIXIR ORAL ONCE AS NEEDED
Status: COMPLETED | OUTPATIENT
Start: 2022-03-10 | End: 2022-03-10

## 2022-03-10 RX ORDER — ACETAMINOPHEN 500 MG
1000 TABLET ORAL ONCE AS NEEDED
Status: CANCELLED
Start: 2022-03-10

## 2022-03-10 RX ORDER — DIPHENHYDRAMINE HCL 12.5MG/5ML
25 ELIXIR ORAL ONCE AS NEEDED
Status: CANCELLED
Start: 2022-03-10

## 2022-03-10 RX ADMIN — DIPHENHYDRAMINE HYDROCHLORIDE 25 MG: 12.5 SOLUTION ORAL at 10:03

## 2022-03-10 RX ADMIN — ACETAMINOPHEN 1000 MG: 500 TABLET ORAL at 10:03

## 2022-03-10 RX ADMIN — METHYLPREDNISOLONE SODIUM SUCCINATE 62.5 MG: 125 INJECTION, POWDER, FOR SOLUTION INTRAMUSCULAR; INTRAVENOUS at 10:03

## 2022-03-10 RX ADMIN — SODIUM CHLORIDE 750 MG: 9 INJECTION, SOLUTION INTRAVENOUS at 11:03

## 2022-03-10 NOTE — PROGRESS NOTES
0955:Pt here for Orencia infusion. TP  Released.  1101: Orencia infusing to R AC without diff. Pt resting quietly in chair.  1125: gave next appt for Orencia infusion for April 7, 22 at 10am.  1222: Infusion complete. Will observe patient for 30 mins for any adverse reactions.  1252:Pt voices no complaints at presen. Dc'd home amb with next appt in hand.

## 2022-03-11 DIAGNOSIS — Z71.89 COMPLEX CARE COORDINATION: ICD-10-CM

## 2022-03-14 ENCOUNTER — CLINICAL SUPPORT (OUTPATIENT)
Dept: REHABILITATION | Facility: HOSPITAL | Age: 72
End: 2022-03-14
Attending: ORTHOPAEDIC SURGERY
Payer: MEDICARE

## 2022-03-14 DIAGNOSIS — Z98.890 STATUS POST LEFT ROTATOR CUFF REPAIR: ICD-10-CM

## 2022-03-14 DIAGNOSIS — G89.18 ACUTE POSTOPERATIVE PAIN OF LEFT SHOULDER: Primary | ICD-10-CM

## 2022-03-14 DIAGNOSIS — M25.612 DECREASED ROM OF LEFT SHOULDER: ICD-10-CM

## 2022-03-14 DIAGNOSIS — M25.512 ACUTE POSTOPERATIVE PAIN OF LEFT SHOULDER: Primary | ICD-10-CM

## 2022-03-14 PROCEDURE — 97110 THERAPEUTIC EXERCISES: CPT | Mod: CQ

## 2022-03-14 PROCEDURE — 97140 MANUAL THERAPY 1/> REGIONS: CPT | Mod: CQ

## 2022-03-14 PROCEDURE — 97112 NEUROMUSCULAR REEDUCATION: CPT | Mod: CQ

## 2022-03-14 NOTE — PROGRESS NOTES
"[facility]   Physical Therapy Treatment Note     Name: Meghna CormierAlesia  [facility] Number: [id number]    Therapy Diagnosis:   No diagnosis found.  Physician: Jose Antonio Pablo MD    Visit Date: 3/14/2022    Physician Orders: PT Eval and Treat   Medical Diagnosis from Referral: s/p left rotator cuff repair, SAD, DCE  Evaluation Date: 1/12/2022  Authorization Period Expiration: 12/31/2022  Plan of Care Expiration: 3/25/2022  Visit # / Visits authorized: 16    PTA Visit #: 1    Time In: 9:14 am  Time Out: 10:03 am  Total Billable Time: 45 minutes    SUBJECTIVE     Pt reports: "I can't complain." She states she has been more consistent with her home exercise program and can reach up to brush her hair with that arm now.  She was compliant with home exercise program.  Response to previous treatment: no complaints   Functional change: driving herself    Pain: 0/10  Location: left shoulder      OBJECTIVE     active range of motion flexion 70 degrees, external rotation in adduction 20 degrees, internal rotation behind back to PSIS using right upper extremity to assist  passive range of motion 135 degrees flexion, 65 degrees external rotation, 45 degrees internal rotation     Treatment       Meghna received the treatments listed below:      therapeutic exercises to develop strength, ROM and flexibility for 24 minutes including:  UBE reverse x 5 minutes   Pulleys  --  cybex rows with 3 plates x 30   Supine cane flexion 2# 10 x 10 second hold (w/out ER)  Supine cane flexion 2# 10 x 10 second hold (w/ER)  Corner stretch 3 x 30 second hold     manual therapy techniques: Joint mobilizations, Soft tissue Mobilization and passive stretching were applied to the: left shoulder for 8 minutes, including:  Passive stretching into flexion, external rotation and internal rotation in supine    neuromuscular re-education activities to improve: Proprioception, Posture and scapular stabiity for 8 minutes. The " following activities were included:  Sidelying external rotation (aarom) 3 x 10  Sidelying scaption --  AAROM unilateral flexion in supine --  SLERRS --  SLOCRS 3 x 20 seconds  Scapular retraction/extension blue x 30  Wall cane flexion with manual cueing to decrease upper trap firing --  Bilateral external rotation w/ theraband - yellow x 30  Gravity eliminated flexion in right sidelying with left arm on tray table --    Therapeutic activities to improve daily function for 5 minutes, including  Seated cable flexion with 2 plates --  Cane flexion with back against wall and arms out in front of her x 15 to improve functional flexion of left upper extremity   Cane flexion pushing up the corner of the wall x 15 for functional flexion of left upper extremity     Patient Education and Home Exercises     Home Exercises Provided and Patient Education Provided     Education provided:   -review of home exercise program     Written Home Exercises Provided: Patient instructed to cont prior HEP. Exercises were reviewed and Meghna was able to demonstrate them prior to the end of the session.  Meghna demonstrated good  understanding of the education provided. See EMR under Patient Instructions for exercises provided during therapy sessions    ASSESSMENT     Meghna continues to have more passive range of motion versus active range of motion of left shoulder. She has been able to do a little more at home, like brushing her hair with that arm, but she is hiking to do so. We are working on functional mobility and dynamic stability to improve active range of motion.    Meghna Is progressing well towards her goals.   Pt prognosis is Good.     Pt will continue to benefit from skilled outpatient physical therapy to address the deficits listed in the problem list box on initial evaluation, provide pt/family education and to maximize pt's level of independence in the home and community environment.     Pt's spiritual, cultural and  educational needs considered and pt agreeable to plan of care and goals.     Anticipated barriers to physical therapy: none    Goals:  Short Term Goals: 3 weeks  1. Patient will be independent with home exercise program. MET  2. Patient will have passive range of motion as follows - 120 degrees flexion, 30 degrees external rotation and internal rotation. MET  3. Patient will be independent with dressing and driving. MET  4. Patient will be able to sleep all night in bed. IMPROVING     Long Term Goals: 6 weeks  1. Patient will have active range of motion as follow - 130 degrees flexion, 60 degrees external rotation and functional internal rotation to T12 for reaching overhead, behind head and behind back for dressing, grooming and hygiene. NOT MET - her active range of motion flexion is significantly limited with hiking almost immediately  2. Patient will have 4+/5 strength left shoulder/upper extremity to perform household chores and work related tasks. NOT MET  3. Patient will place 2# dumbbell on head height shelf without hiking or pain left shoulder. NOT MET   4. Patient will return to performing all household chores independently. NOT MET      PLAN     Plan of care Certification: 2/24/2022 to 3/25/2022     Outpatient Physical Therapy 2 times weekly for 4 weeks to include the following interventions: Manual Therapy, Moist Heat/ Ice, Neuromuscular Re-ed, Patient Education, Therapeutic Activities and Therapeutic Exercise.       Sara Orona, PTA

## 2022-03-15 ENCOUNTER — CLINICAL SUPPORT (OUTPATIENT)
Dept: REHABILITATION | Facility: HOSPITAL | Age: 72
End: 2022-03-15
Attending: ORTHOPAEDIC SURGERY
Payer: MEDICARE

## 2022-03-15 DIAGNOSIS — M25.512 ACUTE POSTOPERATIVE PAIN OF LEFT SHOULDER: Primary | ICD-10-CM

## 2022-03-15 DIAGNOSIS — Z98.890 STATUS POST LEFT ROTATOR CUFF REPAIR: ICD-10-CM

## 2022-03-15 DIAGNOSIS — M25.612 DECREASED ROM OF LEFT SHOULDER: ICD-10-CM

## 2022-03-15 DIAGNOSIS — G89.18 ACUTE POSTOPERATIVE PAIN OF LEFT SHOULDER: Primary | ICD-10-CM

## 2022-03-15 PROCEDURE — 97112 NEUROMUSCULAR REEDUCATION: CPT | Mod: CQ

## 2022-03-15 PROCEDURE — 97110 THERAPEUTIC EXERCISES: CPT | Mod: CQ

## 2022-03-15 NOTE — PROGRESS NOTES
[facility]   Physical Therapy Treatment Note     Name: Meghna CormierAlesia  [facility] Number: [id number]    Therapy Diagnosis:   Encounter Diagnoses   Name Primary?    Acute postoperative pain of left shoulder Yes    Status post left rotator cuff repair     Decreased ROM of left shoulder      Physician: Jose Antonio Pablo MD    Visit Date: 3/15/2022    Physician Orders: PT Eval and Treat   Medical Diagnosis from Referral: s/p left rotator cuff repair, SAD, DCE  Evaluation Date: 1/12/2022  Authorization Period Expiration: 12/31/2022  Plan of Care Expiration: 3/25/2022  Visit # / Visits authorized: 17    PTA Visit #: 2    Time In: 10:01 am  Time Out: 10:55 am  Total Billable Time: 54 minutes    SUBJECTIVE     Pt reports: she was so tired and hurting after yesterday that she went home and did nothing. She has decided not to let the doctor do a manipulation because she had RA and she has already been through so much.  She was compliant with home exercise program.  Response to previous treatment: no complaints   Functional change: driving herself    Pain: 0/10  Location: left shoulder      OBJECTIVE     active range of motion flexion 70 degrees, external rotation in adduction 20 degrees, internal rotation behind back to PSIS using right upper extremity to assist  passive range of motion 135 degrees flexion, 65 degrees external rotation, 45 degrees internal rotation     Treatment       Meghna received the treatments listed below:      therapeutic exercises to develop strength, ROM and flexibility for 25 minutes including:  UBE reverse x 5 minutes   Pulleys  --  cybex rows with 3 plates x 30   Supine cane flexion 3# 10 x 10 second hold (w/out ER)  Supine cane flexion 3# 10 x 10 second hold (w/ER)  Corner stretch 3 x 30 second hold     manual therapy techniques: Joint mobilizations, Soft tissue Mobilization and passive stretching were applied to the: left shoulder for 0 minutes,  including:  Passive stretching into flexion, external rotation and internal rotation in supine    neuromuscular re-education activities to improve: Proprioception, Posture and scapular stabiity for 24 minutes. The following activities were included:  Sidelying external rotation (aarom) 3 x 10  Sidelying scaption x 20  AAROM unilateral flexion in supine --  SLERRS 3 x 20 seconds   SLOCRS 3 x 20 seconds  Scapular retraction/extension blue x 20  Bilateral external rotation w/ theraband - yellow x 30 with assistance  Gravity eliminated flexion in right sidelying with left arm on tray table --    Therapeutic activities to improve daily function for 5 minutes, including  Seated cable flexion with 2 plates --  Cane flexion with back against wall and arms out in front of her x 15 to improve functional flexion of left upper extremity   Cane flexion pushing up the corner of the wall x 15 for functional flexion of left upper extremity     Patient Education and Home Exercises     Home Exercises Provided and Patient Education Provided     Education provided:   -review of home exercise program     Written Home Exercises Provided: Patient instructed to cont prior HEP. Exercises were reviewed and Meghna was able to demonstrate them prior to the end of the session.  Meghna demonstrated good  understanding of the education provided. See EMR under Patient Instructions for exercises provided during therapy sessions    ASSESSMENT     Meghna had assistance with external rotation in sidelying and standing, to maintain external rotation only and not extend her elbow. She remains very weak with external rotation and flexion and needs cues for both. She received tactile cues to prevent hike with wall flexion slides as well as verbal cues not to hike.  It was reiterated again that she needs to do home exercise program, even on days that she has therapy, so she can progress toward goals. We are working on functional mobility and dynamic  stability to improve active range of motion.    Meghna Is progressing well towards her goals.   Pt prognosis is Good.     Pt will continue to benefit from skilled outpatient physical therapy to address the deficits listed in the problem list box on initial evaluation, provide pt/family education and to maximize pt's level of independence in the home and community environment.     Pt's spiritual, cultural and educational needs considered and pt agreeable to plan of care and goals.     Anticipated barriers to physical therapy: none    Goals:  Short Term Goals: 3 weeks  1. Patient will be independent with home exercise program. MET  2. Patient will have passive range of motion as follows - 120 degrees flexion, 30 degrees external rotation and internal rotation. MET  3. Patient will be independent with dressing and driving. MET  4. Patient will be able to sleep all night in bed. IMPROVING     Long Term Goals: 6 weeks  1. Patient will have active range of motion as follow - 130 degrees flexion, 60 degrees external rotation and functional internal rotation to T12 for reaching overhead, behind head and behind back for dressing, grooming and hygiene. NOT MET - her active range of motion flexion is significantly limited with hiking almost immediately  2. Patient will have 4+/5 strength left shoulder/upper extremity to perform household chores and work related tasks. NOT MET  3. Patient will place 2# dumbbell on head height shelf without hiking or pain left shoulder. NOT MET   4. Patient will return to performing all household chores independently. NOT MET      PLAN     Plan of care Certification: 2/24/2022 to 3/25/2022     Outpatient Physical Therapy 2 times weekly for 4 weeks to include the following interventions: Manual Therapy, Moist Heat/ Ice, Neuromuscular Re-ed, Patient Education, Therapeutic Activities and Therapeutic Exercise.       Sara Orona, PTA

## 2022-03-23 ENCOUNTER — CLINICAL SUPPORT (OUTPATIENT)
Dept: REHABILITATION | Facility: HOSPITAL | Age: 72
End: 2022-03-23
Payer: MEDICARE

## 2022-03-23 DIAGNOSIS — M25.512 ACUTE POSTOPERATIVE PAIN OF LEFT SHOULDER: Primary | ICD-10-CM

## 2022-03-23 DIAGNOSIS — Z98.890 STATUS POST LEFT ROTATOR CUFF REPAIR: ICD-10-CM

## 2022-03-23 DIAGNOSIS — M25.612 DECREASED ROM OF LEFT SHOULDER: ICD-10-CM

## 2022-03-23 DIAGNOSIS — G89.18 ACUTE POSTOPERATIVE PAIN OF LEFT SHOULDER: Primary | ICD-10-CM

## 2022-03-23 PROCEDURE — 97110 THERAPEUTIC EXERCISES: CPT | Mod: CQ

## 2022-03-23 PROCEDURE — 97530 THERAPEUTIC ACTIVITIES: CPT | Mod: CQ

## 2022-03-23 NOTE — PROGRESS NOTES
[facility]   Physical Therapy Treatment Note     Name: Meghna CormierAlesia  [facility] Number: [id number]    Therapy Diagnosis:   Encounter Diagnoses   Name Primary?    Acute postoperative pain of left shoulder Yes    Status post left rotator cuff repair     Decreased ROM of left shoulder      Physician: Jose Antonio Pablo MD    Visit Date: 3/23/2022    Physician Orders: PT Eval and Treat   Medical Diagnosis from Referral: s/p left rotator cuff repair, SAD, DCE  Evaluation Date: 1/12/2022  Authorization Period Expiration: 12/31/2022  Plan of Care Expiration: 3/25/2022  Visit # / Visits authorized: 18    PTA Visit #: 3    Time In: 10:48 am  Time Out: 11:30 am  Total Billable Time: 40 minutes    SUBJECTIVE     Pt reports: she is planning for tomorrow to be her last day. She feels she is doing well overall.   She was compliant with home exercise program.  Response to previous treatment: no complaints   Functional change: she can reach the top of her head    Pain: 0/10  Location: left shoulder      OBJECTIVE     active range of motion flexion 70 degrees, external rotation in adduction 20 degrees, internal rotation behind back to PSIS using right upper extremity to assist  passive range of motion 135 degrees flexion, 65 degrees external rotation, 45 degrees internal rotation     Treatment       Meghna received the treatments listed below:      therapeutic exercises to develop strength, ROM and flexibility for 25 minutes including:  UBE reverse x 5 minutes   cybex rows with 3 plates x 40   Supine cane flexion 3# 10 x 10 second hold (w/out ER)  Supine cane flexion 3# 10 x 10 second hold (w/ER)  Corner stretch 3 x 30 second hold     manual therapy techniques: Joint mobilizations, Soft tissue Mobilization and passive stretching were applied to the: left shoulder for 0 minutes, including:  Passive stretching into flexion, external rotation and internal rotation in supine    neuromuscular re-education  activities to improve: Proprioception, Posture and scapular stabiity for 5 minutes. The following activities were included:  Sidelying external rotation (aarom) --  Sidelying scaption --  AAROM unilateral flexion in supine --  SLERRS --   SLOCRS --  Scapular retraction/extension blue x 20  Bilateral external rotation w/ theraband - yellow x 30 with assistance  Gravity eliminated flexion in right sidelying with left arm on tray table --    Therapeutic activities to improve daily function for 10 minutes, including  Seated cable flexion with 2 plates --  Shoulder press with 1 plate x 10  Cane flexion with back against wall and arms out in front of her x 20 to improve functional flexion of left upper extremity   Cane flexion pushing up the corner of the wall x 15 for functional flexion of left upper extremity       Patient Education and Home Exercises     Home Exercises Provided and Patient Education Provided     Education provided:   -review of home exercise program     Written Home Exercises Provided: Patient instructed to cont prior HEP. Exercises were reviewed and Meghna was able to demonstrate them prior to the end of the session.  Meghna demonstrated good  understanding of the education provided. See EMR under Patient Instructions for exercises provided during therapy sessions    ASSESSMENT     Meghna was able to reach the top of her head independently today. She remains weak but is showing improvements with function, like reaching into cabinet at home. She plans to discharge from PT tomorrow.    Meghna Is progressing well towards her goals.   Pt prognosis is Good.     Pt will continue to benefit from skilled outpatient physical therapy to address the deficits listed in the problem list box on initial evaluation, provide pt/family education and to maximize pt's level of independence in the home and community environment.     Pt's spiritual, cultural and educational needs considered and pt agreeable to plan of  care and goals.     Anticipated barriers to physical therapy: none    Goals:  Short Term Goals: 3 weeks  1. Patient will be independent with home exercise program. MET  2. Patient will have passive range of motion as follows - 120 degrees flexion, 30 degrees external rotation and internal rotation. MET  3. Patient will be independent with dressing and driving. MET  4. Patient will be able to sleep all night in bed. IMPROVING     Long Term Goals: 6 weeks  1. Patient will have active range of motion as follow - 130 degrees flexion, 60 degrees external rotation and functional internal rotation to T12 for reaching overhead, behind head and behind back for dressing, grooming and hygiene. NOT MET - her active range of motion flexion is significantly limited with hiking almost immediately  2. Patient will have 4+/5 strength left shoulder/upper extremity to perform household chores and work related tasks. NOT MET  3. Patient will place 2# dumbbell on head height shelf without hiking or pain left shoulder. NOT MET   4. Patient will return to performing all household chores independently. NOT MET      PLAN     Plan of care Certification: 2/24/2022 to 3/25/2022     Outpatient Physical Therapy 2 times weekly for 4 weeks to include the following interventions: Manual Therapy, Moist Heat/ Ice, Neuromuscular Re-ed, Patient Education, Therapeutic Activities and Therapeutic Exercise.       Sara Orona, PTA

## 2022-03-24 ENCOUNTER — CLINICAL SUPPORT (OUTPATIENT)
Dept: REHABILITATION | Facility: HOSPITAL | Age: 72
End: 2022-03-24
Payer: MEDICARE

## 2022-03-24 DIAGNOSIS — M25.512 ACUTE POSTOPERATIVE PAIN OF LEFT SHOULDER: Primary | ICD-10-CM

## 2022-03-24 DIAGNOSIS — M25.612 DECREASED ROM OF LEFT SHOULDER: ICD-10-CM

## 2022-03-24 DIAGNOSIS — G89.18 ACUTE POSTOPERATIVE PAIN OF LEFT SHOULDER: Primary | ICD-10-CM

## 2022-03-24 DIAGNOSIS — Z98.890 STATUS POST LEFT ROTATOR CUFF REPAIR: ICD-10-CM

## 2022-03-24 PROCEDURE — 97110 THERAPEUTIC EXERCISES: CPT

## 2022-03-24 PROCEDURE — 97530 THERAPEUTIC ACTIVITIES: CPT

## 2022-03-24 NOTE — PROGRESS NOTES
[facility]   Physical Therapy Treatment Note     Name: Meghna CormierAlesia  [facility] Number: [id number]    Therapy Diagnosis:   Encounter Diagnoses   Name Primary?    Acute postoperative pain of left shoulder Yes    Status post left rotator cuff repair     Decreased ROM of left shoulder      Physician: Jose Antonio Pablo MD    Visit Date: 3/24/2022    Physician Orders: PT Eval and Treat   Medical Diagnosis from Referral: s/p left rotator cuff repair, SAD, DCE  Evaluation Date: 1/12/2022  Authorization Period Expiration: 12/31/2022  Plan of Care Expiration: 3/25/2022  Visit # / Visits authorized: 19    PTA Visit #: 3    Time In: 1125 am  Time Out: 1214 pm  Total Billable Time: 27 minutes    SUBJECTIVE     Pt reports: she sees Dr. Pablo tomorrow   She was compliant with home exercise program.  Response to previous treatment: no complaints   Functional change: she can reach the top of her head and into some of her cabinets at home    Pain: 0/10  Location: left shoulder      OBJECTIVE     active range of motion flexion (standing) 100 degrees today but has difficulty maintaining this, external rotation in adduction 35 degrees, internal rotation behind back to PSIS using right upper extremity to assist  active range of motion (in supine) 145 degrees flexion, 65 degrees external rotation, 45 degrees internal rotation     Treatment       Meghna received the treatments listed below:      therapeutic exercises to develop strength, ROM and flexibility for 10 minutes including:  UBE reverse x 6 minutes   Pulleys 10 x 10 second hold   cybex rows with 3 plates --  Supine cane flexion 3# --  Supine cane flexion 3# --  Corner stretch --      manual therapy techniques: Joint mobilizations, Soft tissue Mobilization and passive stretching were applied to the: left shoulder for 5 minutes, including:  Passive stretching into flexion, external rotation and internal rotation in supine    neuromuscular  re-education activities to improve: Proprioception, Posture and scapular stabiity for 0 minutes. The following activities were included:  Sidelying external rotation (aarom) --  Sidelying scaption --  AAROM unilateral flexion in supine --  SLERRS --   SLOCRS --  Scapular retraction/extension blue --  Bilateral external rotation w/ theraband - yellow -- with assistance  Gravity eliminated flexion in right sidelying with left arm on tray table --    Therapeutic activities to improve daily function for 12 minutes, including  Seated cable flexion with 2 plates --  Shoulder press with 1 plate x 10  Cane flexion with back against wall and arms out in front of her -- to improve functional flexion of left upper extremity   Cane flexion pushing up the corner of the wall x 30 for functional flexion of left upper extremity   Close  cable stretch 1.5 plates 5 second hold 2 x 10      Patient Education and Home Exercises     Home Exercises Provided and Patient Education Provided     Education provided:   -review of home exercise program     Written Home Exercises Provided: Patient instructed to cont prior HEP. Exercises were reviewed and Meghna was able to demonstrate them prior to the end of the session.  Meghna demonstrated good  understanding of the education provided. See EMR under Patient Instructions for exercises provided during therapy sessions    ASSESSMENT     Meghna is able to reach the top of her head independently which she has not been able to do until this week. She remains weak but is showing improvements with function, like reaching into cabinet at home. Today is her last scheduled appointment and she follows up with Dr. Pablo tomorrow. Feel patient would benefit from continued therapy to improve active range of motion and functional use of left upper extremity overhead. She has good passive range of motion but lacks active motion of shoulder, especially flexion. She was able to reach to 100-105 degrees  "actively but has difficulty sustaining this range. She voices that "she knows she is not where she needs to be but she is definitely better than she was". Patient has verbalized that she does not want to have a manipulation. She has met her short term goals that were set at evaluation but is still progressing towards long term goals. Although it has been slow, she has begun to make more functional gains in the last couple of weeks.    Meghna Is progressing well towards her goals.   Pt prognosis is Good.     Pt will continue to benefit from skilled outpatient physical therapy to address the deficits listed in the problem list box on initial evaluation, provide pt/family education and to maximize pt's level of independence in the home and community environment.     Pt's spiritual, cultural and educational needs considered and pt agreeable to plan of care and goals.     Anticipated barriers to physical therapy: none    Goals:  Short Term Goals: 3 weeks  1. Patient will be independent with home exercise program. MET  2. Patient will have passive range of motion as follows - 120 degrees flexion, 30 degrees external rotation and internal rotation. MET  3. Patient will be independent with dressing and driving. MET  4. Patient will be able to sleep all night in bed. MET     Long Term Goals: 6 weeks  1. Patient will have active range of motion as follow - 130 degrees flexion, 60 degrees external rotation and functional internal rotation to T12 for reaching overhead, behind head and behind back for dressing, grooming and hygiene. NOT MET - her active range of motion flexion is 100 degrees at best but has difficulty maintaining it; she has 35 degrees external rotation in adduction but 60-65 degrees external rotation in supine with shoulder in 45 degrees abduction; she can reach behind her back to her PSIS and has 40-45 degrees internal rotation in supine with shoulder abducted 45 degrees   2. Patient will have 4+/5 strength " left shoulder/upper extremity to perform household chores and work related tasks. NOT MET  3. Patient will place 2# dumbbell on head height shelf without hiking or pain left shoulder. NOT MET   4. Patient will return to performing all household chores independently. IMPROVING - she says she is now able to reach to open her upper kitchen cabinets      PLAN     Updated Certification Period: 3/24/2022 to 4/22/2022  Recommended Treatment Plan: 2 times per week for 4 weeks: Manual Therapy, Neuromuscular Re-ed, Patient Education, Therapeutic Activities and Therapeutic Exercise    AYE ALEXANDER, PT

## 2022-03-24 NOTE — PLAN OF CARE
"[facility]   Physical Therapy Updated Plan of Care     Name: Meghna Dalal  Clinic Number: [id number]    Therapy Diagnosis:   Encounter Diagnoses   Name Primary?    Acute postoperative pain of left shoulder Yes    Status post left rotator cuff repair     Decreased ROM of left shoulder      Physician: Jose Antonio Pablo MD    Visit Date: 3/24/2022    Physician Orders: PT Eval and Treat   Medical Diagnosis from Referral: s/p left rotator cuff repair, SAD, DCE  Evaluation Date: 1/12/2022  Authorization Period Expiration: 12/31/2022  Plan of Care Expiration: 3/25/2022  Visit # / Visits authorized: 19    See daily note under "notes" tab for today's physical therapy treatment.      Reasons for Recertification of Therapy: Meghna is able to reach the top of her head independently which she has not been able to do until this week. She remains weak but is showing improvements with function, like reaching into cabinet at home. Today is her last scheduled appointment and she follows up with Dr. Pablo tomorrow. Feel patient would benefit from continued therapy to improve active range of motion and functional use of left upper extremity overhead. She has good passive range of motion but lacks active motion of shoulder, especially flexion. She was able to reach to 100-105 degrees actively but has difficulty sustaining this range. She voices that "she knows she is not where she needs to be but she is definitely better than she was". Patient has verbalized that she does not want to have a manipulation. She has met her short term goals that were set at evaluation but is still progressing towards long term goals. Although it has been slow, she has begun to make more functional gains in the last couple of weeks. Progress toward goals documented below.      Goals:  Short Term Goals: 3 weeks  1. Patient will be independent with home exercise program. MET  2. Patient will have passive range of motion " as follows - 120 degrees flexion, 30 degrees external rotation and internal rotation. MET  3. Patient will be independent with dressing and driving. MET  4. Patient will be able to sleep all night in bed. MET     Long Term Goals: 6 weeks  1. Patient will have active range of motion as follow - 130 degrees flexion, 60 degrees external rotation and functional internal rotation to T12 for reaching overhead, behind head and behind back for dressing, grooming and hygiene. NOT MET - her active range of motion flexion is 100 degrees at best but has difficulty maintaining it; she has 35 degrees external rotation in adduction but 60-65 degrees external rotation in supine with shoulder in 45 degrees abduction; she can reach behind her back to her PSIS and has 40-45 degrees internal rotation in supine with shoulder abducted 45 degrees   2. Patient will have 4+/5 strength left shoulder/upper extremity to perform household chores and work related tasks. NOT MET  3. Patient will place 2# dumbbell on head height shelf without hiking or pain left shoulder. NOT MET   4. Patient will return to performing all household chores independently. IMPROVING - she says she is now able to reach to open her upper kitchen cabinets        Plan     Updated Certification Period: 3/24/2022 to 4/22/2022  Recommended Treatment Plan: 2 times per week for 4 weeks: Manual Therapy, Neuromuscular Re-ed, Patient Education, Therapeutic Activities and Therapeutic Exercise  Other Recommendations:     AYE ALEXANDER, PT  3/24/2022      I CERTIFY THE NEED FOR THESE SERVICES FURNISHED UNDER THIS PLAN OF TREATMENT AND WHILE UNDER MY CARE.    Physician's comments:      Physician's Signature: ___________________________________________________

## 2022-03-29 ENCOUNTER — CLINICAL SUPPORT (OUTPATIENT)
Dept: REHABILITATION | Facility: HOSPITAL | Age: 72
End: 2022-03-29
Payer: MEDICARE

## 2022-03-29 DIAGNOSIS — M25.612 DECREASED ROM OF LEFT SHOULDER: ICD-10-CM

## 2022-03-29 DIAGNOSIS — M25.512 ACUTE POSTOPERATIVE PAIN OF LEFT SHOULDER: Primary | ICD-10-CM

## 2022-03-29 DIAGNOSIS — Z98.890 STATUS POST LEFT ROTATOR CUFF REPAIR: ICD-10-CM

## 2022-03-29 DIAGNOSIS — G89.18 ACUTE POSTOPERATIVE PAIN OF LEFT SHOULDER: Primary | ICD-10-CM

## 2022-03-29 PROCEDURE — 97110 THERAPEUTIC EXERCISES: CPT | Mod: CQ

## 2022-03-29 PROCEDURE — 97530 THERAPEUTIC ACTIVITIES: CPT | Mod: CQ

## 2022-03-29 NOTE — PROGRESS NOTES
[facility]   Physical Therapy Treatment Note     Name: Meghna CormierAlesia  [facility] Number: [id number]    Therapy Diagnosis:   Encounter Diagnoses   Name Primary?    Acute postoperative pain of left shoulder Yes    Status post left rotator cuff repair     Decreased ROM of left shoulder      Physician: Jose Antonio Pablo MD    Visit Date: 3/29/2022    Physician Orders: PT Eval and Treat   Medical Diagnosis from Referral: s/p left rotator cuff repair, SAD, DCE  Evaluation Date: 1/12/2022  Authorization Period Expiration: 12/31/2022  Plan of Care Expiration: 4/22/2022  Visit # / Visits authorized: 20    PTA Visit #: 1    Time In: 1:00 pm  Time Out: 1:47 pm  Total Billable Time: 45 minutes    SUBJECTIVE     Pt reports: she saw Dr Pablo who believes she is doing well and wants her to continue therapy.   She was compliant with home exercise program.  Response to previous treatment: no complaints   Functional change: she can reach the top of her head and into some of her cabinets at home    Pain: 0/10  Location: left shoulder      OBJECTIVE     active range of motion flexion (standing) 100 degrees today but has difficulty maintaining this, external rotation in adduction 35 degrees, internal rotation behind back to PSIS using right upper extremity to assist  active range of motion (in supine) 145 degrees flexion, 65 degrees external rotation, 45 degrees internal rotation     Treatment       Meghna received the treatments listed below:      therapeutic exercises to develop strength, ROM and flexibility for 20 minutes including:  UBE reverse x 6 minutes   Pulleys 10 x 10 second hold   cybex rows with 3 plates x 15 each high and low  Supine cane flexion 3# --  Supine cane flexion 3# --  Corner stretch 3 x 30 second hold       manual therapy techniques: Joint mobilizations, Soft tissue Mobilization and passive stretching were applied to the: left shoulder for 0 minutes, including:  Passive  stretching into flexion, external rotation and internal rotation in supine    neuromuscular re-education activities to improve: Proprioception, Posture and scapular stabiity for 0 minutes. The following activities were included:  Sidelying external rotation (aarom) --  Sidelying scaption --  AAROM unilateral flexion in supine --  SLERRS --   SLOCRS --  Scapular retraction/extension blue --  Bilateral external rotation w/ theraband - yellow -- with assistance  Gravity eliminated flexion in right sidelying with left arm on tray table --    Therapeutic activities to improve daily function for 25 minutes, including  Seated cable flexion with 2 plates --  Shoulder press with 1 plate 4 x 5  Cane flexion with back against wall and arms out in front of her x 20 to improve functional flexion of left upper extremity   Cane flexion pushing up the corner of the wall x 20 for functional flexion of left upper extremity   Close  cable stretch 1.5 plates 5 second hold 2 x 10      Patient Education and Home Exercises     Home Exercises Provided and Patient Education Provided     Education provided:   -review of home exercise program     Written Home Exercises Provided: Patient instructed to cont prior HEP. Exercises were reviewed and Meghna was able to demonstrate them prior to the end of the session.  Meghna demonstrated good  understanding of the education provided. See EMR under Patient Instructions for exercises provided during therapy sessions    ASSESSMENT     Meghna still has difficulty reaching overhead.  She is slowly improving with functional goals but remains weak. Plan to continue therapy to address functional deficits.    Meghna Is progressing well towards her goals.   Pt prognosis is Good.     Pt will continue to benefit from skilled outpatient physical therapy to address the deficits listed in the problem list box on initial evaluation, provide pt/family education and to maximize pt's level of independence in  the home and community environment.     Pt's spiritual, cultural and educational needs considered and pt agreeable to plan of care and goals.     Anticipated barriers to physical therapy: none    Goals:  Short Term Goals: 3 weeks  1. Patient will be independent with home exercise program. MET  2. Patient will have passive range of motion as follows - 120 degrees flexion, 30 degrees external rotation and internal rotation. MET  3. Patient will be independent with dressing and driving. MET  4. Patient will be able to sleep all night in bed. MET     Long Term Goals: 6 weeks  1. Patient will have active range of motion as follow - 130 degrees flexion, 60 degrees external rotation and functional internal rotation to T12 for reaching overhead, behind head and behind back for dressing, grooming and hygiene. NOT MET - her active range of motion flexion is 100 degrees at best but has difficulty maintaining it; she has 35 degrees external rotation in adduction but 60-65 degrees external rotation in supine with shoulder in 45 degrees abduction; she can reach behind her back to her PSIS and has 40-45 degrees internal rotation in supine with shoulder abducted 45 degrees   2. Patient will have 4+/5 strength left shoulder/upper extremity to perform household chores and work related tasks. NOT MET  3. Patient will place 2# dumbbell on head height shelf without hiking or pain left shoulder. NOT MET   4. Patient will return to performing all household chores independently. IMPROVING - she says she is now able to reach to open her upper kitchen cabinets      PLAN     Updated Certification Period: 3/24/2022 to 4/22/2022  Recommended Treatment Plan: 2 times per week for 4 weeks: Manual Therapy, Neuromuscular Re-ed, Patient Education, Therapeutic Activities and Therapeutic Exercise    Sara Orona, PTA

## 2022-04-05 ENCOUNTER — CLINICAL SUPPORT (OUTPATIENT)
Dept: REHABILITATION | Facility: HOSPITAL | Age: 72
End: 2022-04-05
Attending: ORTHOPAEDIC SURGERY
Payer: MEDICARE

## 2022-04-05 DIAGNOSIS — Z98.890 STATUS POST LEFT ROTATOR CUFF REPAIR: ICD-10-CM

## 2022-04-05 DIAGNOSIS — M25.512 ACUTE POSTOPERATIVE PAIN OF LEFT SHOULDER: Primary | ICD-10-CM

## 2022-04-05 DIAGNOSIS — M25.612 DECREASED ROM OF LEFT SHOULDER: ICD-10-CM

## 2022-04-05 DIAGNOSIS — G89.18 ACUTE POSTOPERATIVE PAIN OF LEFT SHOULDER: Primary | ICD-10-CM

## 2022-04-05 PROCEDURE — 97112 NEUROMUSCULAR REEDUCATION: CPT | Mod: CQ

## 2022-04-05 PROCEDURE — 97110 THERAPEUTIC EXERCISES: CPT | Mod: CQ

## 2022-04-05 NOTE — PROGRESS NOTES
[facility]   Physical Therapy Treatment Note     Name: Meghna CormierAlesia  [facility] Number: [id number]    Therapy Diagnosis:   Encounter Diagnoses   Name Primary?    Status post left rotator cuff repair     Acute postoperative pain of left shoulder Yes    Decreased ROM of left shoulder      Physician: Jose Antonio Pablo MD    Visit Date: 4/5/2022    Physician Orders: PT Eval and Treat   Medical Diagnosis from Referral: s/p left rotator cuff repair, SAD, DCE  Evaluation Date: 1/12/2022  Authorization Period Expiration: 12/31/2022  Plan of Care Expiration: 4/22/2022  Visit # / Visits authorized: 21    PTA Visit #: 2    Time In: 11:38 am  Time Out: 12:10 pm  Total Billable Time: 32 minutes    SUBJECTIVE     Pt reports: she needs to cancel one of her appts because she will be in [employer].   She was compliant with home exercise program.  Response to previous treatment: no complaints   Functional change: she can reach the top of her head and into some of her cabinets at home    Pain: 0/10  Location: left shoulder      OBJECTIVE     active range of motion flexion (standing) 100 degrees today but has difficulty maintaining this, external rotation in adduction 35 degrees, internal rotation behind back to PSIS using right upper extremity to assist  active range of motion (in supine) 145 degrees flexion, 65 degrees external rotation, 45 degrees internal rotation     Treatment       Meghna received the treatments listed below:      therapeutic exercises to develop strength, ROM and flexibility for 15 minutes including:  UBE reverse x 5 minutes   Pulleys 10 x 10 second hold   cybex rows with 3 plates x 15 each high and low  Corner stretch 3 x 30 second hold     manual therapy techniques: Joint mobilizations, Soft tissue Mobilization and passive stretching were applied to the: left shoulder for 0 minutes, including:  Passive stretching into flexion, external rotation and internal rotation in  supine    neuromuscular re-education activities to improve: Proprioception, Posture and scapular stabiity for 8 minutes. The following activities were included:  Counter slides into internal rotation/ external rotation with cues for form x 30  AAROM unilateral flexion in supine --  Scapular retraction/extension blue x 20  Bilateral external rotation w/ theraband - yellow -- with assistance    Therapeutic activities to improve daily function for 7 minutes, including  Seated cable flexion with 2 plates --  Shoulder press with 1 plate 4 x 5  Cane flexion with back against wall and arms out in front of her to improve functional flexion of left upper extremity --  Cane flexion pushing up the corner of the wall x 20 for functional flexion of left upper extremity   Close  cable stretch 1.5 plates 5 second hold --      Patient Education and Home Exercises     Home Exercises Provided and Patient Education Provided     Education provided:   -review of home exercise program     Written Home Exercises Provided: Patient instructed to cont prior HEP. Exercises were reviewed and Meghna was able to demonstrate them prior to the end of the session.  Meghna demonstrated good  understanding of the education provided. See EMR under Patient Instructions for exercises provided during therapy sessions    ASSESSMENT     Meghna still has difficulty reaching overhead.  She is slowly improving with functional goals but remains weak in flexion and external rotation. She can do a few repetitions correctly but then starts to deviate due to weakness. Plan to continue therapy to address functional deficits.    Meghna Is progressing well towards her goals.   Pt prognosis is Good.     Pt will continue to benefit from skilled outpatient physical therapy to address the deficits listed in the problem list box on initial evaluation, provide pt/family education and to maximize pt's level of independence in the home and community environment.      Pt's spiritual, cultural and educational needs considered and pt agreeable to plan of care and goals.     Anticipated barriers to physical therapy: none    Goals:  Short Term Goals: 3 weeks  1. Patient will be independent with home exercise program. MET  2. Patient will have passive range of motion as follows - 120 degrees flexion, 30 degrees external rotation and internal rotation. MET  3. Patient will be independent with dressing and driving. MET  4. Patient will be able to sleep all night in bed. MET     Long Term Goals: 6 weeks  1. Patient will have active range of motion as follow - 130 degrees flexion, 60 degrees external rotation and functional internal rotation to T12 for reaching overhead, behind head and behind back for dressing, grooming and hygiene. NOT MET - her active range of motion flexion is 100 degrees at best but has difficulty maintaining it; she has 35 degrees external rotation in adduction but 60-65 degrees external rotation in supine with shoulder in 45 degrees abduction; she can reach behind her back to her PSIS and has 40-45 degrees internal rotation in supine with shoulder abducted 45 degrees   2. Patient will have 4+/5 strength left shoulder/upper extremity to perform household chores and work related tasks. NOT MET  3. Patient will place 2# dumbbell on head height shelf without hiking or pain left shoulder. NOT MET   4. Patient will return to performing all household chores independently. IMPROVING - she says she is now able to reach to open her upper kitchen cabinets      PLAN     Updated Certification Period: 3/24/2022 to 4/22/2022  Recommended Treatment Plan: 2 times per week for 4 weeks: Manual Therapy, Neuromuscular Re-ed, Patient Education, Therapeutic Activities and Therapeutic Exercise    Sara Orona, PTA

## 2022-04-07 ENCOUNTER — INFUSION (OUTPATIENT)
Dept: INFUSION THERAPY | Facility: HOSPITAL | Age: 72
End: 2022-04-07
Attending: NURSE PRACTITIONER
Payer: MEDICARE

## 2022-04-07 VITALS
DIASTOLIC BLOOD PRESSURE: 88 MMHG | OXYGEN SATURATION: 96 % | SYSTOLIC BLOOD PRESSURE: 128 MMHG | RESPIRATION RATE: 16 BRPM | TEMPERATURE: 99 F | HEART RATE: 70 BPM

## 2022-04-07 DIAGNOSIS — M05.79 RHEUMATOID ARTHRITIS INVOLVING MULTIPLE SITES WITH POSITIVE RHEUMATOID FACTOR: Primary | ICD-10-CM

## 2022-04-07 PROCEDURE — 25000003 PHARM REV CODE 250: Performed by: NURSE PRACTITIONER

## 2022-04-07 PROCEDURE — 96365 THER/PROPH/DIAG IV INF INIT: CPT

## 2022-04-07 PROCEDURE — 96375 TX/PRO/DX INJ NEW DRUG ADDON: CPT

## 2022-04-07 PROCEDURE — 63600175 PHARM REV CODE 636 W HCPCS: Mod: JA | Performed by: NURSE PRACTITIONER

## 2022-04-07 RX ORDER — METHYLPREDNISOLONE SOD SUCC 125 MG
62.5 VIAL (EA) INJECTION ONCE AS NEEDED
Status: COMPLETED | OUTPATIENT
Start: 2022-04-07 | End: 2022-04-07

## 2022-04-07 RX ORDER — DIPHENHYDRAMINE HCL 12.5MG/5ML
25 ELIXIR ORAL ONCE AS NEEDED
Status: CANCELLED
Start: 2022-04-07

## 2022-04-07 RX ORDER — SODIUM CHLORIDE 0.9 % (FLUSH) 0.9 %
10 SYRINGE (ML) INJECTION
Status: CANCELLED | OUTPATIENT
Start: 2022-04-07

## 2022-04-07 RX ORDER — ACETAMINOPHEN 500 MG
1000 TABLET ORAL ONCE AS NEEDED
Status: COMPLETED | OUTPATIENT
Start: 2022-04-07 | End: 2022-04-07

## 2022-04-07 RX ORDER — ACETAMINOPHEN 500 MG
1000 TABLET ORAL ONCE AS NEEDED
Status: CANCELLED
Start: 2022-04-07

## 2022-04-07 RX ORDER — DIPHENHYDRAMINE HCL 12.5MG/5ML
25 ELIXIR ORAL ONCE AS NEEDED
Status: COMPLETED | OUTPATIENT
Start: 2022-04-07 | End: 2022-04-07

## 2022-04-07 RX ADMIN — ACETAMINOPHEN 1000 MG: 500 TABLET ORAL at 10:04

## 2022-04-07 RX ADMIN — METHYLPREDNISOLONE SODIUM SUCCINATE 62.5 MG: 125 INJECTION, POWDER, LYOPHILIZED, FOR SOLUTION INTRAMUSCULAR; INTRAVENOUS at 10:04

## 2022-04-07 RX ADMIN — SODIUM CHLORIDE 750 MG: 9 INJECTION, SOLUTION INTRAVENOUS at 10:04

## 2022-04-07 RX ADMIN — DIPHENHYDRAMINE HYDROCHLORIDE 25 MG: 12.5 SOLUTION ORAL at 10:04

## 2022-04-07 NOTE — PROGRESS NOTES
1000- to infusion center for orencia.  1015-iv access obtained.  1059-orencia infusion started.  1158-orencia infusion completed.  1230- out ambulatory and next appointment set up.

## 2022-04-11 ENCOUNTER — CLINICAL SUPPORT (OUTPATIENT)
Dept: REHABILITATION | Facility: HOSPITAL | Age: 72
End: 2022-04-11
Attending: ORTHOPAEDIC SURGERY
Payer: MEDICARE

## 2022-04-11 DIAGNOSIS — G89.18 ACUTE POSTOPERATIVE PAIN OF LEFT SHOULDER: Primary | ICD-10-CM

## 2022-04-11 DIAGNOSIS — M25.512 ACUTE POSTOPERATIVE PAIN OF LEFT SHOULDER: Primary | ICD-10-CM

## 2022-04-11 DIAGNOSIS — M25.612 DECREASED ROM OF LEFT SHOULDER: ICD-10-CM

## 2022-04-11 DIAGNOSIS — Z98.890 STATUS POST LEFT ROTATOR CUFF REPAIR: ICD-10-CM

## 2022-04-11 PROCEDURE — 97530 THERAPEUTIC ACTIVITIES: CPT | Mod: CQ

## 2022-04-11 PROCEDURE — 97110 THERAPEUTIC EXERCISES: CPT | Mod: CQ

## 2022-04-11 PROCEDURE — 97112 NEUROMUSCULAR REEDUCATION: CPT | Mod: CQ

## 2022-04-11 NOTE — PROGRESS NOTES
[facility]   Physical Therapy Treatment Note     [id number]    Therapy Diagnosis:   No diagnosis found.  Physician: Jose Antonio Pablo MD    Visit Date: 4/11/2022    Physician Orders: PT Eval and Treat   Medical Diagnosis from Referral: s/p left rotator cuff repair, SAD, DCE  Evaluation Date: 1/12/2022  Authorization Period Expiration: 12/31/2022  Plan of Care Expiration: 4/22/2022  Visit # / Visits authorized: 22    PTA Visit #: 3    Time In: 1:02 pm  Time Out: 1:49 pm  Total Billable Time: 44 minutes    SUBJECTIVE     Pt reports: she will be in [employer] April 29.  She will be in [employer] right before that to help her daughter move.   She was compliant with home exercise program.  Response to previous treatment: no complaints   Functional change: she can reach the top of her head and into some of her cabinets at home    Pain: 0/10  Location: left shoulder      OBJECTIVE     active range of motion flexion (standing) 110 degrees but has difficulty maintaining this, external rotation in adduction 35 degrees, internal rotation behind back to PSIS using right upper extremity to assist  active range of motion (in supine) 145 degrees flexion, 65 degrees external rotation, 45 degrees internal rotation     Treatment       Meghna received the treatments listed below:      therapeutic exercises to develop strength, ROM and flexibility for 18 minutes including:  UBE reverse x 5 minutes   Pulleys 10 x 10 second hold   cybex rows with 3 plates x 20 each high and low  Corner stretch 3 x 30 second hold     manual therapy techniques: Joint mobilizations, Soft tissue Mobilization and passive stretching were applied to the: left shoulder for 0 minutes, including:  Passive stretching into flexion, external rotation and internal rotation in supine    neuromuscular re-education activities to improve: Proprioception, Posture and scapular stabiity for 12 minutes. The  following activities were included:  Counter slides into internal rotation/ external rotation with cues for form x 30  AAROM unilateral flexion in supine --  Scapular retraction/extension blue x 30  Bilateral external rotation w/ yellow theraband x 10    Therapeutic activities to improve daily function for 14 minutes, including  Seated cable flexion with 2 plates --  Shoulder press with 1 plate 4 x 5  Active shoulder flexion placing tennis ball on shelf x 20  Cane flexion pushing up the corner of the wall x 20 for functional flexion of left upper extremity   Close  cable stretch 1.5 plates 5 second hold --      Patient Education and Home Exercises     Home Exercises Provided and Patient Education Provided     Education provided:   -review of home exercise program     Written Home Exercises Provided: Patient instructed to cont prior HEP. Exercises were reviewed and Meghna was able to demonstrate them prior to the end of the session.  Meghna demonstrated good  understanding of the education provided. See EMR under Patient Instructions for exercises provided during therapy sessions    ASSESSMENT     Meghna was able to place a tennis ball into cabinet repeatedly without pain. She is improving with flexion exercises in clinic and is doing better with external rotation exercises as well. Plan to continue therapy to address functional deficits.    Meghna Is progressing well towards her goals.   Pt prognosis is Good.     Pt will continue to benefit from skilled outpatient physical therapy to address the deficits listed in the problem list box on initial evaluation, provide pt/family education and to maximize pt's level of independence in the home and community environment.     Pt's spiritual, cultural and educational needs considered and pt agreeable to plan of care and goals.     Anticipated barriers to physical therapy: none    Goals:  Short Term Goals: 3 weeks  1. Patient will be independent with home exercise  program. MET  2. Patient will have passive range of motion as follows - 120 degrees flexion, 30 degrees external rotation and internal rotation. MET  3. Patient will be independent with dressing and driving. MET  4. Patient will be able to sleep all night in bed. MET     Long Term Goals: 6 weeks  1. Patient will have active range of motion as follow - 130 degrees flexion, 60 degrees external rotation and functional internal rotation to T12 for reaching overhead, behind head and behind back for dressing, grooming and hygiene. NOT MET - her active range of motion flexion is 100 degrees at best but has difficulty maintaining it; she has 35 degrees external rotation in adduction but 60-65 degrees external rotation in supine with shoulder in 45 degrees abduction; she can reach behind her back to her PSIS and has 40-45 degrees internal rotation in supine with shoulder abducted 45 degrees   2. Patient will have 4+/5 strength left shoulder/upper extremity to perform household chores and work related tasks. NOT MET  3. Patient will place 2# dumbbell on head height shelf without hiking or pain left shoulder. NOT MET   4. Patient will return to performing all household chores independently. IMPROVING - she says she is now able to reach to open her upper kitchen cabinets      PLAN     Updated Certification Period: 3/24/2022 to 4/22/2022  Recommended Treatment Plan: 2 times per week for 4 weeks: Manual Therapy, Neuromuscular Re-ed, Patient Education, Therapeutic Activities and Therapeutic Exercise    Sara Orona PTA

## 2022-04-13 ENCOUNTER — CLINICAL SUPPORT (OUTPATIENT)
Dept: REHABILITATION | Facility: HOSPITAL | Age: 72
End: 2022-04-13
Attending: ORTHOPAEDIC SURGERY
Payer: MEDICARE

## 2022-04-13 DIAGNOSIS — Z98.890 STATUS POST LEFT ROTATOR CUFF REPAIR: ICD-10-CM

## 2022-04-13 DIAGNOSIS — G89.18 ACUTE POSTOPERATIVE PAIN OF LEFT SHOULDER: Primary | ICD-10-CM

## 2022-04-13 DIAGNOSIS — M25.612 DECREASED ROM OF LEFT SHOULDER: ICD-10-CM

## 2022-04-13 DIAGNOSIS — M25.512 ACUTE POSTOPERATIVE PAIN OF LEFT SHOULDER: Primary | ICD-10-CM

## 2022-04-13 PROCEDURE — 97112 NEUROMUSCULAR REEDUCATION: CPT | Mod: CQ

## 2022-04-13 PROCEDURE — 97110 THERAPEUTIC EXERCISES: CPT | Mod: CQ

## 2022-04-13 PROCEDURE — 97530 THERAPEUTIC ACTIVITIES: CPT | Mod: CQ

## 2022-04-13 NOTE — PROGRESS NOTES
[facility]   Physical Therapy Treatment Note     [id number]    Therapy Diagnosis:   Encounter Diagnoses   Name Primary?    Acute postoperative pain of left shoulder Yes    Status post left rotator cuff repair     Decreased ROM of left shoulder      Physician: Jose Antonio Pablo MD    Visit Date: 4/13/2022    Physician Orders: PT Eval and Treat   Medical Diagnosis from Referral: s/p left rotator cuff repair, SAD, DCE  Evaluation Date: 1/12/2022  Authorization Period Expiration: 12/31/2022  Plan of Care Expiration: 4/22/2022  Visit # / Visits authorized: 23    PTA Visit #: 4    Time In: 1:01 pm  Time Out: 1:51 pm  Total Billable Time: 47 minutes    SUBJECTIVE     Pt reports: she thinks sometimes her arthritis is from all the typing she did when she worked.   She was compliant with home exercise program.  Response to previous treatment: no complaints   Functional change: she can reach the top of her head and into some of her cabinets at home    Pain: 0/10  Location: left shoulder      OBJECTIVE     active range of motion flexion (standing) 110 degrees but has difficulty maintaining this, external rotation in adduction 35 degrees, internal rotation behind back to PSIS using right upper extremity to assist  active range of motion (in supine) 145 degrees flexion, 65 degrees external rotation, 45 degrees internal rotation     Treatment       Meghna received the treatments listed below:      therapeutic exercises to develop strength, ROM and flexibility for 22 minutes including:  UBE reverse x 5 minutes   Pulleys 10 x 10 second hold   cybex rows with 3 plates x 20 each high and low  Corner stretch 3 x 30 second hold     manual therapy techniques: Joint mobilizations, Soft tissue Mobilization and passive stretching were applied to the: left shoulder for 0 minutes, including:  Passive stretching into flexion, external rotation and internal rotation in  supine    neuromuscular re-education activities to improve: Proprioception, Posture and scapular stabiity for 10 minutes. The following activities were included:  Counter slides into internal rotation/ external rotation with cues for form x 30  AAROM unilateral flexion in supine --  Scapular retraction/extension blue   Bilateral external rotation w/ yellow theraband x 10    Therapeutic activities to improve daily function for 15 minutes, including  Seated cable flexion with 2 plates --  Shoulder press with 1 plate 4 x 5  Active shoulder flexion placing tennis ball on shelf x 20  Cane flexion pushing up the corner of the wall x 20 for functional flexion of left upper extremity   Close  cable stretch 1.5 plates 5 second hold --      Patient Education and Home Exercises     Home Exercises Provided and Patient Education Provided     Education provided:   -review of home exercise program     Written Home Exercises Provided: Patient instructed to cont prior HEP. Exercises were reviewed and Meghna was able to demonstrate them prior to the end of the session.  Meghna demonstrated good  understanding of the education provided. See EMR under Patient Instructions for exercises provided during therapy sessions    ASSESSMENT     Meghna is gradually improving with her strength for active range of motion. She is able to do a little more at home and in clinic. She has to move slowly but is improving with endurance. Plan to continue therapy to address functional deficits.    Meghna Is progressing well towards her goals.   Pt prognosis is Good.     Pt will continue to benefit from skilled outpatient physical therapy to address the deficits listed in the problem list box on initial evaluation, provide pt/family education and to maximize pt's level of independence in the home and community environment.     Pt's spiritual, cultural and educational needs considered and pt agreeable to plan of care and goals.     Anticipated  barriers to physical therapy: none    Goals:  Short Term Goals: 3 weeks  1. Patient will be independent with home exercise program. MET  2. Patient will have passive range of motion as follows - 120 degrees flexion, 30 degrees external rotation and internal rotation. MET  3. Patient will be independent with dressing and driving. MET  4. Patient will be able to sleep all night in bed. MET     Long Term Goals: 6 weeks  1. Patient will have active range of motion as follow - 130 degrees flexion, 60 degrees external rotation and functional internal rotation to T12 for reaching overhead, behind head and behind back for dressing, grooming and hygiene. NOT MET - her active range of motion flexion is 100 degrees at best but has difficulty maintaining it; she has 35 degrees external rotation in adduction but 60-65 degrees external rotation in supine with shoulder in 45 degrees abduction; she can reach behind her back to her PSIS and has 40-45 degrees internal rotation in supine with shoulder abducted 45 degrees   2. Patient will have 4+/5 strength left shoulder/upper extremity to perform household chores and work related tasks. NOT MET  3. Patient will place 2# dumbbell on head height shelf without hiking or pain left shoulder. NOT MET   4. Patient will return to performing all household chores independently. IMPROVING - she says she is now able to reach to open her upper kitchen cabinets      PLAN     Updated Certification Period: 3/24/2022 to 4/22/2022  Recommended Treatment Plan: 2 times per week for 4 weeks: Manual Therapy, Neuromuscular Re-ed, Patient Education, Therapeutic Activities and Therapeutic Exercise    Sara Orona PTA

## 2022-04-18 PROBLEM — M25.512 ACUTE POSTOPERATIVE PAIN OF LEFT SHOULDER: Status: RESOLVED | Noted: 2022-01-12 | Resolved: 2022-04-18

## 2022-04-18 PROBLEM — G89.18 ACUTE POSTOPERATIVE PAIN OF LEFT SHOULDER: Status: RESOLVED | Noted: 2022-01-12 | Resolved: 2022-04-18

## 2022-04-22 ENCOUNTER — OFFICE VISIT (OUTPATIENT)
Dept: INTERNAL MEDICINE | Facility: CLINIC | Age: 72
End: 2022-04-22
Payer: MEDICARE

## 2022-04-22 VITALS
HEIGHT: 63 IN | SYSTOLIC BLOOD PRESSURE: 120 MMHG | BODY MASS INDEX: 37.92 KG/M2 | WEIGHT: 214 LBS | RESPIRATION RATE: 16 BRPM | HEART RATE: 60 BPM | DIASTOLIC BLOOD PRESSURE: 80 MMHG | TEMPERATURE: 99 F

## 2022-04-22 DIAGNOSIS — I10 ESSENTIAL HYPERTENSION: Primary | ICD-10-CM

## 2022-04-22 DIAGNOSIS — E11.9 CONTROLLED TYPE 2 DIABETES MELLITUS WITHOUT COMPLICATION, WITHOUT LONG-TERM CURRENT USE OF INSULIN: ICD-10-CM

## 2022-04-22 DIAGNOSIS — E03.9 HYPOTHYROIDISM, UNSPECIFIED TYPE: ICD-10-CM

## 2022-04-22 DIAGNOSIS — M06.9 RHEUMATOID ARTHRITIS, INVOLVING UNSPECIFIED SITE, UNSPECIFIED WHETHER RHEUMATOID FACTOR PRESENT: ICD-10-CM

## 2022-04-22 DIAGNOSIS — D64.9 ANEMIA, UNSPECIFIED TYPE: ICD-10-CM

## 2022-04-22 DIAGNOSIS — M15.9 OSTEOARTHRITIS OF MULTIPLE JOINTS, UNSPECIFIED OSTEOARTHRITIS TYPE: ICD-10-CM

## 2022-04-22 DIAGNOSIS — E78.5 HYPERLIPIDEMIA LDL GOAL <100: ICD-10-CM

## 2022-04-22 DIAGNOSIS — K21.9 GERD WITHOUT ESOPHAGITIS: ICD-10-CM

## 2022-04-22 PROCEDURE — 99214 OFFICE O/P EST MOD 30 MIN: CPT | Mod: S$PBB,,, | Performed by: INTERNAL MEDICINE

## 2022-04-22 PROCEDURE — 99215 OFFICE O/P EST HI 40 MIN: CPT | Mod: PBBFAC | Performed by: INTERNAL MEDICINE

## 2022-04-22 PROCEDURE — 99214 PR OFFICE/OUTPT VISIT, EST, LEVL IV, 30-39 MIN: ICD-10-PCS | Mod: S$PBB,,, | Performed by: INTERNAL MEDICINE

## 2022-04-22 RX ORDER — CYCLOBENZAPRINE HCL 10 MG
10 TABLET ORAL 3 TIMES DAILY PRN
Qty: 90 TABLET | Refills: 11 | Status: SHIPPED | OUTPATIENT
Start: 2022-04-22

## 2022-04-22 NOTE — PROGRESS NOTES
Subjective:       Patient ID: Meghna Dalal is a 71 y.o. female.    Chief Complaint: Follow-up    Doing well going to [facility]  For bd  Soon  No complaints bs up to 140 yesterday    Follow-up  Pertinent negatives include no abdominal pain, chest pain, fatigue, rash or weakness.     Review of Systems   Constitutional: Negative for fatigue and unexpected weight change.   HENT: Negative for ear pain and goiter.    Respiratory: Negative for chest tightness and shortness of breath.    Cardiovascular: Negative for chest pain, palpitations and leg swelling.   Gastrointestinal: Negative for abdominal pain and reflux.   Integumentary:  Negative for rash and breast tenderness.   Neurological: Negative for dizziness and weakness.   Hematological: Negative for adenopathy.   Psychiatric/Behavioral: Negative for behavioral problems.   Breast: Negative for tenderness        Objective:      Physical Exam  Constitutional:       Appearance: Normal appearance.   HENT:      Head: Normocephalic and atraumatic.      Right Ear: External ear normal.      Left Ear: External ear normal.      Mouth/Throat:      Pharynx: Oropharynx is clear.   Eyes:      Extraocular Movements: Extraocular movements intact.      Pupils: Pupils are equal, round, and reactive to light.   Cardiovascular:      Rate and Rhythm: Normal rate and regular rhythm.      Pulses: Normal pulses.      Heart sounds: Normal heart sounds.   Pulmonary:      Effort: Pulmonary effort is normal.      Breath sounds: Normal breath sounds.   Abdominal:      General: Abdomen is flat. Bowel sounds are normal.      Palpations: Abdomen is soft.   Musculoskeletal:         General: Normal range of motion.      Cervical back: Normal range of motion and neck supple.   Skin:     General: Skin is warm.      Capillary Refill: Capillary refill takes less than 2 seconds.   Neurological:      General: No focal deficit present.      Mental Status: She is alert.   Psychiatric:         Mood  and Affect: Mood normal.         Thought Content: Thought content normal.         Judgment: Judgment normal.         Assessment:       1. Essential hypertension    2. Hyperlipidemia LDL goal <100    3. Osteoarthritis of multiple joints, unspecified osteoarthritis type    4. Controlled type 2 diabetes mellitus without complication, without long-term current use of insulin    5. Hypothyroidism, unspecified type    6. GERD without esophagitis    7. Rheumatoid arthritis, involving unspecified site, unspecified whether rheumatoid factor present    8. Anemia, unspecified type        Plan:         Patient Instructions   Continue current meds and f/u 3 months        Problem List Items Addressed This Visit    None     Visit Diagnoses     Essential hypertension    -  Primary    Hyperlipidemia LDL goal <100        Osteoarthritis of multiple joints, unspecified osteoarthritis type        Controlled type 2 diabetes mellitus without complication, without long-term current use of insulin        Hypothyroidism, unspecified type        GERD without esophagitis        Rheumatoid arthritis, involving unspecified site, unspecified whether rheumatoid factor present        Anemia, unspecified type

## 2022-04-27 ENCOUNTER — CLINICAL SUPPORT (OUTPATIENT)
Dept: REHABILITATION | Facility: HOSPITAL | Age: 72
End: 2022-04-27
Attending: ORTHOPAEDIC SURGERY
Payer: MEDICARE

## 2022-04-27 DIAGNOSIS — M25.612 DECREASED ROM OF LEFT SHOULDER: ICD-10-CM

## 2022-04-27 DIAGNOSIS — Z98.890 STATUS POST LEFT ROTATOR CUFF REPAIR: Primary | ICD-10-CM

## 2022-04-27 PROCEDURE — 97530 THERAPEUTIC ACTIVITIES: CPT

## 2022-04-27 PROCEDURE — 97112 NEUROMUSCULAR REEDUCATION: CPT

## 2022-04-27 PROCEDURE — 97110 THERAPEUTIC EXERCISES: CPT

## 2022-04-27 NOTE — PROGRESS NOTES
[facility]   Physical Therapy Treatment Note/Discharge Summary    Name: Meghna Dalal  Clinic Number: [id number]    Therapy Diagnosis:   Encounter Diagnoses   Name Primary?    Status post left rotator cuff repair Yes    Decreased ROM of left shoulder      Physician: Jose Antonio Pablo MD    Visit Date: 4/27/2022    Physician Orders: PT Eval and Treat   Medical Diagnosis from Referral: s/p left rotator cuff repair, SAD, DCE  Evaluation Date: 1/12/2022  Authorization Period Expiration: 12/31/2022  Plan of Care Expiration: 4/22/2022  Visit # / Visits authorized: 24    PTA Visit #:     Time In: 1126 am  Time Out: 1212 pm  Total Billable Time: 46 minutes    SUBJECTIVE     Pt reports: she is doing great and wants to make today her last visit   She was compliant with home exercise program.  Response to previous treatment: no complaints   Functional change: she can reach the top of her head and into some of her cabinets at home    Pain: 0/10  Location: left shoulder      OBJECTIVE     active range of motion flexion (standing) 110 degrees but has difficulty maintaining this -able to get 120 with min assist, external rotation in adduction 42 degrees, internal rotation behind back to PSIS   active range of motion (in supine) 145 degrees flexion, 65 degrees external rotation, 45 degrees internal rotation     Treatment       Meghna received the treatments listed below:      therapeutic exercises to develop strength, ROM and flexibility for 15 minutes including:  UBE reverse x 5 minutes   Pulleys --   cybex rows with 4 plates x 20 each high and low  Corner stretch --   Updated home exercise program and reviewed with patient    manual therapy techniques: Joint mobilizations, Soft tissue Mobilization and passive stretching were applied to the: left shoulder for 9 minutes, including:  Passive stretching into flexion, external rotation and internal rotation in supine    neuromuscular re-education  activities to improve: Proprioception, Posture and scapular stabiity for 6 minutes. The following activities were included:  Counter slides into internal rotation/ external rotation with cues for form --  AAROM unilateral flexion in supine --  Scapular retraction/extension blue x 30  Bilateral external rotation w/ yellow theraband x 20    Therapeutic activities to improve daily function for 16 minutes, including  Seated cable flexion with 2 plates --  Shoulder press with 1 plate 4 x 5  Active shoulder flexion placing 2# dumbbell on shelf x 3  Cane flexion pushing up the corner of the wall 10 x 10 second hold for functional flexion of left upper extremity   Close  cable stretch 1.5 plates 5 second hold --  Active cane flexion x 20      Patient Education and Home Exercises     Home Exercises Provided and Patient Education Provided     Education provided:   -review of home exercise program     Written Home Exercises Provided: Patient instructed to cont prior HEP. Exercises were reviewed and Meghna was able to demonstrate them prior to the end of the session.  Meghna demonstrated good  understanding of the education provided. See EMR under Patient Instructions for exercises provided during therapy sessions    ASSESSMENT     Meghna is gradually improving with her strength for active range of motion. She is able to do so much more at home and in clinic. She says she is amazed at how much better she is. She can reach the top of her head now. With max effort she was able to get a 2# dumbbell in an overhead cabinet. Strength is her main deficit at this point and she feels like she can continue working on this at home. Her home exercise program was updated and patient was given multiple therabands to continue strengthening at home. She is leaving to go to [place] on Friday so she knows she won't do much exercising while she is gone so it was decided to make today her discharge. She is very pleased with her  progress and states she will continue her exercises at home. Goal progress documented below in red.    Meghna Is progressing well towards her goals.   Pt prognosis is Good.     Pt will continue to benefit from skilled outpatient physical therapy to address the deficits listed in the problem list box on initial evaluation, provide pt/family education and to maximize pt's level of independence in the home and community environment.     Pt's spiritual, cultural and educational needs considered and pt agreeable to plan of care and goals.     Anticipated barriers to physical therapy: none    Goals:  Short Term Goals: 3 weeks  1. Patient will be independent with home exercise program. MET  2. Patient will have passive range of motion as follows - 120 degrees flexion, 30 degrees external rotation and internal rotation. MET  3. Patient will be independent with dressing and driving. MET  4. Patient will be able to sleep all night in bed. MET     Long Term Goals: 6 weeks  1. Patient will have active range of motion as follow - 130 degrees flexion, 60 degrees external rotation and functional internal rotation to T12 for reaching overhead, behind head and behind back for dressing, grooming and hygiene. Her active range of motion flexion is 110 degrees; she has 42 degrees external rotation in adduction but 65 degrees external rotation in supine with shoulder in 45 degrees abduction; she can reach behind her back to her PSIS and has 45 degrees internal rotation in supine with shoulder abducted 45 degrees - passive flexion is 145  2. Patient will have 4+/5 strength left shoulder/upper extremity to perform household chores and work related tasks. IMPROVING - will continue strengthening with home exercise program   3. Patient will place 2# dumbbell on head height shelf without hiking or pain left shoulder. Patient is able but only with hiking and max effort  4. Patient will return to performing all household chores independently.  IMPROVING - she says she is now able to reach to open her upper kitchen cabinets      PLAN     Updated Certification Period: 3/24/2022 to 4/22/2022  Recommended Treatment Plan: 2 times per week for 4 weeks: Manual Therapy, Neuromuscular Re-ed, Patient Education, Therapeutic Activities and Therapeutic Exercise    AYE ALEXANDER, PT

## 2022-04-28 PROBLEM — M25.612 DECREASED ROM OF LEFT SHOULDER: Status: RESOLVED | Noted: 2022-01-12 | Resolved: 2022-04-28

## 2022-04-28 PROBLEM — Z98.890 STATUS POST LEFT ROTATOR CUFF REPAIR: Status: RESOLVED | Noted: 2022-01-05 | Resolved: 2022-04-28

## 2022-05-13 ENCOUNTER — INFUSION (OUTPATIENT)
Dept: INFUSION THERAPY | Facility: HOSPITAL | Age: 72
End: 2022-05-13
Attending: INTERNAL MEDICINE
Payer: MEDICARE

## 2022-05-13 VITALS
RESPIRATION RATE: 16 BRPM | TEMPERATURE: 98 F | SYSTOLIC BLOOD PRESSURE: 132 MMHG | HEART RATE: 65 BPM | OXYGEN SATURATION: 97 % | DIASTOLIC BLOOD PRESSURE: 60 MMHG

## 2022-05-13 DIAGNOSIS — M05.79 RHEUMATOID ARTHRITIS INVOLVING MULTIPLE SITES WITH POSITIVE RHEUMATOID FACTOR: Primary | ICD-10-CM

## 2022-05-13 LAB
ALT SERPL W P-5'-P-CCNC: 25 U/L (ref 13–56)
HBV SURFACE AG SERPL QL IA: NORMAL

## 2022-05-13 PROCEDURE — 36415 COLL VENOUS BLD VENIPUNCTURE: CPT | Performed by: REGISTERED NURSE

## 2022-05-13 PROCEDURE — 96365 THER/PROPH/DIAG IV INF INIT: CPT

## 2022-05-13 PROCEDURE — 63600175 PHARM REV CODE 636 W HCPCS: Mod: JA | Performed by: NURSE PRACTITIONER

## 2022-05-13 PROCEDURE — 84460 ALANINE AMINO (ALT) (SGPT): CPT | Performed by: REGISTERED NURSE

## 2022-05-13 PROCEDURE — 96375 TX/PRO/DX INJ NEW DRUG ADDON: CPT

## 2022-05-13 PROCEDURE — 87340 HEPATITIS B SURFACE AG IA: CPT | Performed by: REGISTERED NURSE

## 2022-05-13 PROCEDURE — 25000003 PHARM REV CODE 250: Performed by: NURSE PRACTITIONER

## 2022-05-13 RX ORDER — DIPHENHYDRAMINE HCL 12.5MG/5ML
25 ELIXIR ORAL ONCE AS NEEDED
Status: CANCELLED
Start: 2022-05-13

## 2022-05-13 RX ORDER — SODIUM CHLORIDE 0.9 % (FLUSH) 0.9 %
10 SYRINGE (ML) INJECTION
Status: DISCONTINUED | OUTPATIENT
Start: 2022-05-13 | End: 2022-05-13 | Stop reason: HOSPADM

## 2022-05-13 RX ORDER — SODIUM CHLORIDE 0.9 % (FLUSH) 0.9 %
10 SYRINGE (ML) INJECTION
Status: CANCELLED | OUTPATIENT
Start: 2022-05-13

## 2022-05-13 RX ORDER — ACETAMINOPHEN 500 MG
1000 TABLET ORAL ONCE AS NEEDED
Status: COMPLETED | OUTPATIENT
Start: 2022-05-13 | End: 2022-05-13

## 2022-05-13 RX ORDER — DIPHENHYDRAMINE HCL 12.5MG/5ML
25 ELIXIR ORAL ONCE AS NEEDED
Status: COMPLETED | OUTPATIENT
Start: 2022-05-13 | End: 2022-05-13

## 2022-05-13 RX ORDER — ACETAMINOPHEN 500 MG
1000 TABLET ORAL ONCE AS NEEDED
Status: CANCELLED
Start: 2022-05-13

## 2022-05-13 RX ORDER — METHYLPREDNISOLONE SOD SUCC 125 MG
62.5 VIAL (EA) INJECTION ONCE AS NEEDED
Status: COMPLETED | OUTPATIENT
Start: 2022-05-13 | End: 2022-05-13

## 2022-05-13 RX ADMIN — SODIUM CHLORIDE 750 MG: 9 INJECTION, SOLUTION INTRAVENOUS at 12:05

## 2022-05-13 RX ADMIN — DIPHENHYDRAMINE HYDROCHLORIDE 25 MG: 12.5 SOLUTION ORAL at 12:05

## 2022-05-13 RX ADMIN — METHYLPREDNISOLONE SODIUM SUCCINATE 62.5 MG: 125 INJECTION, POWDER, FOR SOLUTION INTRAMUSCULAR; INTRAVENOUS at 12:05

## 2022-05-13 RX ADMIN — ACETAMINOPHEN 1000 MG: 500 TABLET ORAL at 12:05

## 2022-05-13 NOTE — PROGRESS NOTES
1145 pt arrived for infusion, pt resting in recliner, meal tray ordered  1200 pt iv started and blood drawn for iv site for Hep B blood test as prompted by the therapy plan.  1220 infusion started, meal tray arrived, pt has no complaints at this time.    1245 pt VSS, f/u jose antonio made, [facility]  1315 infusion complete.  No adverse reactions noted, pt VSS.  Will continue to monitor  1345 pt states she is feeling ok.  ambulatory at discharge.   Instructed to call ED or go if any adverse reactions arise.

## 2022-06-13 ENCOUNTER — INFUSION (OUTPATIENT)
Dept: INFUSION THERAPY | Facility: HOSPITAL | Age: 72
End: 2022-06-13
Attending: INTERNAL MEDICINE
Payer: MEDICARE

## 2022-06-13 VITALS
SYSTOLIC BLOOD PRESSURE: 146 MMHG | DIASTOLIC BLOOD PRESSURE: 56 MMHG | BODY MASS INDEX: 37.92 KG/M2 | WEIGHT: 214 LBS | TEMPERATURE: 98 F | HEIGHT: 63 IN | HEART RATE: 59 BPM | RESPIRATION RATE: 17 BRPM | OXYGEN SATURATION: 97 %

## 2022-06-13 DIAGNOSIS — M05.79 RHEUMATOID ARTHRITIS INVOLVING MULTIPLE SITES WITH POSITIVE RHEUMATOID FACTOR: Primary | ICD-10-CM

## 2022-06-13 PROCEDURE — 96365 THER/PROPH/DIAG IV INF INIT: CPT

## 2022-06-13 PROCEDURE — 25000003 PHARM REV CODE 250: Performed by: NURSE PRACTITIONER

## 2022-06-13 PROCEDURE — 63600175 PHARM REV CODE 636 W HCPCS: Performed by: NURSE PRACTITIONER

## 2022-06-13 PROCEDURE — 96375 TX/PRO/DX INJ NEW DRUG ADDON: CPT

## 2022-06-13 RX ORDER — ACETAMINOPHEN 500 MG
1000 TABLET ORAL ONCE AS NEEDED
Status: COMPLETED | OUTPATIENT
Start: 2022-06-13 | End: 2022-06-13

## 2022-06-13 RX ORDER — ACETAMINOPHEN 500 MG
1000 TABLET ORAL ONCE AS NEEDED
Status: CANCELLED
Start: 2022-06-13

## 2022-06-13 RX ORDER — DIPHENHYDRAMINE HCL 12.5MG/5ML
25 ELIXIR ORAL ONCE AS NEEDED
Status: COMPLETED | OUTPATIENT
Start: 2022-06-13 | End: 2022-06-13

## 2022-06-13 RX ORDER — SODIUM CHLORIDE 0.9 % (FLUSH) 0.9 %
10 SYRINGE (ML) INJECTION
Status: CANCELLED | OUTPATIENT
Start: 2022-06-13

## 2022-06-13 RX ORDER — DIPHENHYDRAMINE HCL 12.5MG/5ML
25 ELIXIR ORAL ONCE AS NEEDED
Status: CANCELLED
Start: 2022-06-13

## 2022-06-13 RX ADMIN — SODIUM CHLORIDE 750 MG: 9 INJECTION, SOLUTION INTRAVENOUS at 10:06

## 2022-06-13 RX ADMIN — ACETAMINOPHEN 1000 MG: 500 TABLET ORAL at 10:06

## 2022-06-13 RX ADMIN — DIPHENHYDRAMINE HYDROCHLORIDE 25 MG: 12.5 LIQUID ORAL at 10:06

## 2022-06-13 RX ADMIN — METHYLPREDNISOLONE SODIUM SUCCINATE 62.5 MG: 125 INJECTION, POWDER, FOR SOLUTION INTRAMUSCULAR; INTRAVENOUS at 10:06

## 2022-06-13 NOTE — PROGRESS NOTES
1000 pt here for infusion, pt resting in recliner, water given, TP released and pharmacy notified.  1010 VSS, pt tolerated pre meds  1030 infusion started, pt tolerating well, will continue to monitor  1135 infusion complete, pt resting in recliner with eyes closed, states she had fallen asleep, VSS, f/u appt made  1200 pt called ride to pick her up at door,  pt ambulatory at dischaged, pt notified to go the ER with any adverse reactions.

## 2022-07-13 ENCOUNTER — INFUSION (OUTPATIENT)
Dept: INFUSION THERAPY | Facility: HOSPITAL | Age: 72
End: 2022-07-13
Attending: NURSE PRACTITIONER
Payer: MEDICARE

## 2022-07-13 VITALS
TEMPERATURE: 98 F | BODY MASS INDEX: 37.92 KG/M2 | RESPIRATION RATE: 16 BRPM | HEIGHT: 63 IN | SYSTOLIC BLOOD PRESSURE: 175 MMHG | HEART RATE: 69 BPM | WEIGHT: 214 LBS | DIASTOLIC BLOOD PRESSURE: 77 MMHG | OXYGEN SATURATION: 96 %

## 2022-07-13 DIAGNOSIS — M05.79 RHEUMATOID ARTHRITIS INVOLVING MULTIPLE SITES WITH POSITIVE RHEUMATOID FACTOR: Primary | ICD-10-CM

## 2022-07-13 PROCEDURE — 96375 TX/PRO/DX INJ NEW DRUG ADDON: CPT

## 2022-07-13 PROCEDURE — 63600175 PHARM REV CODE 636 W HCPCS: Performed by: NURSE PRACTITIONER

## 2022-07-13 PROCEDURE — 96365 THER/PROPH/DIAG IV INF INIT: CPT

## 2022-07-13 PROCEDURE — 25000003 PHARM REV CODE 250: Performed by: NURSE PRACTITIONER

## 2022-07-13 RX ORDER — DIPHENHYDRAMINE HCL 12.5MG/5ML
25 ELIXIR ORAL ONCE AS NEEDED
Status: CANCELLED
Start: 2022-07-13

## 2022-07-13 RX ORDER — ACETAMINOPHEN 500 MG
1000 TABLET ORAL ONCE AS NEEDED
Status: COMPLETED | OUTPATIENT
Start: 2022-07-13 | End: 2022-07-13

## 2022-07-13 RX ORDER — METHYLPREDNISOLONE SOD SUCC 125 MG
62.5 VIAL (EA) INJECTION ONCE AS NEEDED
Status: COMPLETED | OUTPATIENT
Start: 2022-07-13 | End: 2022-07-13

## 2022-07-13 RX ORDER — DIPHENHYDRAMINE HCL 12.5MG/5ML
25 LIQUID (ML) ORAL ONCE AS NEEDED
Status: COMPLETED | OUTPATIENT
Start: 2022-07-13 | End: 2022-07-13

## 2022-07-13 RX ORDER — SODIUM CHLORIDE 0.9 % (FLUSH) 0.9 %
10 SYRINGE (ML) INJECTION
Status: CANCELLED | OUTPATIENT
Start: 2022-07-13

## 2022-07-13 RX ORDER — ACETAMINOPHEN 500 MG
1000 TABLET ORAL ONCE AS NEEDED
Status: CANCELLED
Start: 2022-07-13

## 2022-07-13 RX ADMIN — DIPHENHYDRAMINE HYDROCHLORIDE 25 MG: 12.5 LIQUID ORAL at 11:07

## 2022-07-13 RX ADMIN — SODIUM CHLORIDE 750 MG: 9 INJECTION, SOLUTION INTRAVENOUS at 11:07

## 2022-07-13 RX ADMIN — ACETAMINOPHEN 1000 MG: 500 TABLET ORAL at 10:07

## 2022-07-13 RX ADMIN — METHYLPREDNISOLONE SODIUM SUCCINATE 62.5 MG: 125 INJECTION, POWDER, FOR SOLUTION INTRAMUSCULAR; INTRAVENOUS at 10:07

## 2022-07-13 NOTE — PROGRESS NOTES
1035 - Patient arrived ambulatory in stable condition for orencia infusion.  Pharmacy notified of patient's arrival.    1130 -  Patient in recliner.  Infusion started.  Will monitor for any reactions.    1205 - No reactions noted.  VSS.      1230 - Infusion complete.  Patient sitting in recliner with no distress noted.    1305 - Patient left ambulatory in stable condition.  Tolerated infusion without difficulty. No reactions noted.

## 2022-07-27 RX ORDER — POTASSIUM CHLORIDE 600 MG/1
8 CAPSULE, EXTENDED RELEASE ORAL DAILY
COMMUNITY
Start: 2022-05-31 | End: 2022-11-02 | Stop reason: SDUPTHER

## 2022-07-27 RX ORDER — PREDNISONE 5 MG/1
TABLET ORAL
COMMUNITY
Start: 2022-06-14

## 2022-07-27 RX ORDER — LOSARTAN POTASSIUM 50 MG/1
50 TABLET ORAL DAILY
COMMUNITY
Start: 2022-05-06 | End: 2022-10-21

## 2022-07-29 ENCOUNTER — OFFICE VISIT (OUTPATIENT)
Dept: INTERNAL MEDICINE | Facility: CLINIC | Age: 72
End: 2022-07-29
Payer: MEDICARE

## 2022-07-29 VITALS
OXYGEN SATURATION: 96 % | DIASTOLIC BLOOD PRESSURE: 82 MMHG | BODY MASS INDEX: 37.92 KG/M2 | SYSTOLIC BLOOD PRESSURE: 120 MMHG | HEART RATE: 81 BPM | RESPIRATION RATE: 16 BRPM | WEIGHT: 214 LBS | TEMPERATURE: 99 F | HEIGHT: 63 IN

## 2022-07-29 DIAGNOSIS — I10 ESSENTIAL HYPERTENSION: Primary | ICD-10-CM

## 2022-07-29 DIAGNOSIS — E11.9 CONTROLLED TYPE 2 DIABETES MELLITUS WITHOUT COMPLICATION, WITHOUT LONG-TERM CURRENT USE OF INSULIN: ICD-10-CM

## 2022-07-29 DIAGNOSIS — M15.9 OSTEOARTHRITIS OF MULTIPLE JOINTS, UNSPECIFIED OSTEOARTHRITIS TYPE: ICD-10-CM

## 2022-07-29 DIAGNOSIS — E78.5 HYPERLIPIDEMIA LDL GOAL <100: ICD-10-CM

## 2022-07-29 DIAGNOSIS — K21.9 GERD WITHOUT ESOPHAGITIS: ICD-10-CM

## 2022-07-29 DIAGNOSIS — E03.9 HYPOTHYROIDISM, UNSPECIFIED TYPE: ICD-10-CM

## 2022-07-29 DIAGNOSIS — M06.9 RHEUMATOID ARTHRITIS, INVOLVING UNSPECIFIED SITE, UNSPECIFIED WHETHER RHEUMATOID FACTOR PRESENT: ICD-10-CM

## 2022-07-29 PROCEDURE — 99215 OFFICE O/P EST HI 40 MIN: CPT | Mod: PBBFAC | Performed by: INTERNAL MEDICINE

## 2022-07-29 PROCEDURE — 99214 OFFICE O/P EST MOD 30 MIN: CPT | Mod: S$PBB,,, | Performed by: INTERNAL MEDICINE

## 2022-07-29 PROCEDURE — 99214 PR OFFICE/OUTPT VISIT, EST, LEVL IV, 30-39 MIN: ICD-10-PCS | Mod: S$PBB,,, | Performed by: INTERNAL MEDICINE

## 2022-07-29 NOTE — PROGRESS NOTES
Subjective:       Patient ID: Meghna Dalal is a 72 y.o. female.    Chief Complaint: Follow-up    Had good trip [place]p [place]  And doing well  bs 136 post breakfast  Toe sore only complaint  Nurse calling her monthly    Follow-up  Pertinent negatives include no abdominal pain, chest pain, fatigue, rash or weakness.     Review of Systems   Constitutional: Negative for fatigue and unexpected weight change.   HENT: Negative for ear pain and goiter.    Respiratory: Negative for chest tightness and shortness of breath.    Cardiovascular: Negative for chest pain, palpitations and leg swelling.   Gastrointestinal: Negative for abdominal pain and reflux.   Integumentary:  Negative for rash and breast tenderness.   Neurological: Negative for dizziness and weakness.   Hematological: Negative for adenopathy.   Psychiatric/Behavioral: Negative for behavioral problems.   Breast: Negative for tenderness        Objective:      Physical Exam  Constitutional:       Appearance: Normal appearance.   HENT:      Head: Normocephalic and atraumatic.      Right Ear: External ear normal.      Left Ear: External ear normal.      Mouth/Throat:      Pharynx: Oropharynx is clear.   Eyes:      Extraocular Movements: Extraocular movements intact.      Pupils: Pupils are equal, round, and reactive to light.   Cardiovascular:      Rate and Rhythm: Normal rate and regular rhythm.      Pulses: Normal pulses.      Heart sounds: Normal heart sounds.   Pulmonary:      Effort: Pulmonary effort is normal.      Breath sounds: Normal breath sounds.   Abdominal:      General: Abdomen is flat. Bowel sounds are normal.      Palpations: Abdomen is soft.   Musculoskeletal:         General: Normal range of motion.      Cervical back: Normal range of motion and neck supple.   Skin:     General: Skin is warm.      Capillary Refill: Capillary refill takes less than 2 seconds.   Neurological:      General: No focal deficit present.      Mental Status: She is  alert.   Psychiatric:         Mood and Affect: Mood normal.         Thought Content: Thought content normal.         Judgment: Judgment normal.         Assessment:       1. Essential hypertension    2. Hyperlipidemia LDL goal <100    3. Controlled type 2 diabetes mellitus without complication, without long-term current use of insulin    4. Osteoarthritis of multiple joints, unspecified osteoarthritis type    5. GERD without esophagitis    6. Hypothyroidism, unspecified type    7. Rheumatoid arthritis, involving unspecified site, unspecified whether rheumatoid factor present        Plan:         Patient Instructions   Continue current meds and f/u 3 months        Problem List Items Addressed This Visit    None     Visit Diagnoses     Essential hypertension    -  Primary    Hyperlipidemia LDL goal <100        Controlled type 2 diabetes mellitus without complication, without long-term current use of insulin        Osteoarthritis of multiple joints, unspecified osteoarthritis type        GERD without esophagitis        Hypothyroidism, unspecified type        Rheumatoid arthritis, involving unspecified site, unspecified whether rheumatoid factor present

## 2022-07-31 ENCOUNTER — EXTERNAL CHRONIC CARE MANAGEMENT (OUTPATIENT)
Dept: INTERNAL MEDICINE | Facility: CLINIC | Age: 72
End: 2022-07-31
Payer: MEDICARE

## 2022-07-31 PROCEDURE — 99489 CPLX CHRNC CARE EA ADDL 30: CPT | Mod: S$PBB,,, | Performed by: INTERNAL MEDICINE

## 2022-07-31 PROCEDURE — 99487 CPLX CHRNC CARE 1ST 60 MIN: CPT | Mod: PBBFAC | Performed by: INTERNAL MEDICINE

## 2022-07-31 PROCEDURE — 99487 PR COMPLX CHRON CARE MGMT, 1ST HR, PER MONTH: ICD-10-PCS | Mod: S$PBB,,, | Performed by: INTERNAL MEDICINE

## 2022-07-31 PROCEDURE — 99489 PR COMPLX CHRON CARE MGMT, EA ADDTL 30 MIN, PER MONTH: ICD-10-PCS | Mod: S$PBB,,, | Performed by: INTERNAL MEDICINE

## 2022-07-31 PROCEDURE — 99489 CPLX CHRNC CARE EA ADDL 30: CPT | Mod: PBBFAC | Performed by: INTERNAL MEDICINE

## 2022-07-31 PROCEDURE — 99487 CPLX CHRNC CARE 1ST 60 MIN: CPT | Mod: S$PBB,,, | Performed by: INTERNAL MEDICINE

## 2022-08-15 ENCOUNTER — INFUSION (OUTPATIENT)
Dept: INFUSION THERAPY | Facility: HOSPITAL | Age: 72
End: 2022-08-15
Attending: NURSE PRACTITIONER
Payer: MEDICARE

## 2022-08-15 VITALS
HEIGHT: 63 IN | HEART RATE: 69 BPM | DIASTOLIC BLOOD PRESSURE: 68 MMHG | WEIGHT: 214 LBS | BODY MASS INDEX: 37.92 KG/M2 | TEMPERATURE: 98 F | RESPIRATION RATE: 16 BRPM | SYSTOLIC BLOOD PRESSURE: 147 MMHG | OXYGEN SATURATION: 96 %

## 2022-08-15 DIAGNOSIS — M05.79 RHEUMATOID ARTHRITIS INVOLVING MULTIPLE SITES WITH POSITIVE RHEUMATOID FACTOR: Primary | ICD-10-CM

## 2022-08-15 PROCEDURE — 96365 THER/PROPH/DIAG IV INF INIT: CPT

## 2022-08-15 PROCEDURE — 96375 TX/PRO/DX INJ NEW DRUG ADDON: CPT

## 2022-08-15 PROCEDURE — 25000003 PHARM REV CODE 250: Performed by: NURSE PRACTITIONER

## 2022-08-15 PROCEDURE — 63600175 PHARM REV CODE 636 W HCPCS: Mod: JA | Performed by: NURSE PRACTITIONER

## 2022-08-15 RX ORDER — DIPHENHYDRAMINE HCL 12.5MG/5ML
25 ELIXIR ORAL ONCE AS NEEDED
Status: COMPLETED | OUTPATIENT
Start: 2022-08-15 | End: 2022-08-15

## 2022-08-15 RX ORDER — ACETAMINOPHEN 500 MG
1000 TABLET ORAL ONCE AS NEEDED
Status: COMPLETED | OUTPATIENT
Start: 2022-08-15 | End: 2022-08-15

## 2022-08-15 RX ORDER — SODIUM CHLORIDE 0.9 % (FLUSH) 0.9 %
10 SYRINGE (ML) INJECTION
Status: CANCELLED | OUTPATIENT
Start: 2022-08-15

## 2022-08-15 RX ORDER — ACETAMINOPHEN 500 MG
1000 TABLET ORAL ONCE AS NEEDED
Status: CANCELLED
Start: 2022-08-15

## 2022-08-15 RX ORDER — DIPHENHYDRAMINE HCL 12.5MG/5ML
25 ELIXIR ORAL ONCE AS NEEDED
Status: CANCELLED
Start: 2022-08-15

## 2022-08-15 RX ADMIN — DIPHENHYDRAMINE HYDROCHLORIDE 25 MG: 12.5 SOLUTION ORAL at 10:08

## 2022-08-15 RX ADMIN — ACETAMINOPHEN 1000 MG: 500 TABLET ORAL at 10:08

## 2022-08-15 RX ADMIN — SODIUM CHLORIDE 750 MG: 9 INJECTION, SOLUTION INTRAVENOUS at 10:08

## 2022-08-15 RX ADMIN — METHYLPREDNISOLONE SODIUM SUCCINATE 62.5 MG: 125 INJECTION, POWDER, FOR SOLUTION INTRAMUSCULAR; INTRAVENOUS at 10:08

## 2022-08-15 NOTE — PROGRESS NOTES
1000 pt arrived for infusion, resting in recliner, TP released, VSS, pt tolerated pre meds   1030 infusion started, will cont to monitor  1130 infusion stopped, pt tolerated well, will cont to monitor  1155 f/u appt made, pt discharged ambulatory with no adverse reactions, notified to go to ER with any problems.

## 2022-08-31 ENCOUNTER — EXTERNAL CHRONIC CARE MANAGEMENT (OUTPATIENT)
Dept: INTERNAL MEDICINE | Facility: CLINIC | Age: 72
End: 2022-08-31
Payer: MEDICARE

## 2022-08-31 PROCEDURE — 99490 CHRNC CARE MGMT STAFF 1ST 20: CPT | Mod: PBBFAC | Performed by: INTERNAL MEDICINE

## 2022-08-31 PROCEDURE — 99490 CHRNC CARE MGMT STAFF 1ST 20: CPT | Mod: S$PBB,,, | Performed by: INTERNAL MEDICINE

## 2022-08-31 PROCEDURE — 99490 PR CHRONIC CARE MGMT, 1ST 20 MIN: ICD-10-PCS | Mod: S$PBB,,, | Performed by: INTERNAL MEDICINE

## 2022-09-14 ENCOUNTER — INFUSION (OUTPATIENT)
Dept: INFUSION THERAPY | Facility: HOSPITAL | Age: 72
End: 2022-09-14
Attending: INTERNAL MEDICINE
Payer: MEDICARE

## 2022-09-14 VITALS
RESPIRATION RATE: 16 BRPM | HEART RATE: 67 BPM | TEMPERATURE: 98 F | OXYGEN SATURATION: 98 % | DIASTOLIC BLOOD PRESSURE: 61 MMHG | BODY MASS INDEX: 37.92 KG/M2 | SYSTOLIC BLOOD PRESSURE: 131 MMHG | WEIGHT: 214 LBS | HEIGHT: 63 IN

## 2022-09-14 DIAGNOSIS — M05.79 RHEUMATOID ARTHRITIS INVOLVING MULTIPLE SITES WITH POSITIVE RHEUMATOID FACTOR: Primary | ICD-10-CM

## 2022-09-14 PROCEDURE — 25000003 PHARM REV CODE 250: Performed by: INTERNAL MEDICINE

## 2022-09-14 PROCEDURE — 96365 THER/PROPH/DIAG IV INF INIT: CPT

## 2022-09-14 PROCEDURE — 63600175 PHARM REV CODE 636 W HCPCS: Performed by: NURSE PRACTITIONER

## 2022-09-14 PROCEDURE — 96375 TX/PRO/DX INJ NEW DRUG ADDON: CPT

## 2022-09-14 PROCEDURE — 25000003 PHARM REV CODE 250: Performed by: NURSE PRACTITIONER

## 2022-09-14 RX ORDER — ACETAMINOPHEN 500 MG
1000 TABLET ORAL ONCE AS NEEDED
Status: DISCONTINUED | OUTPATIENT
Start: 2022-09-14 | End: 2022-09-14 | Stop reason: HOSPADM

## 2022-09-14 RX ORDER — DIPHENHYDRAMINE HCL 12.5MG/5ML
25 ELIXIR ORAL ONCE AS NEEDED
Status: DISCONTINUED | OUTPATIENT
Start: 2022-09-14 | End: 2022-09-14

## 2022-09-14 RX ORDER — DIPHENHYDRAMINE HCL 12.5MG/5ML
25 LIQUID (ML) ORAL ONCE AS NEEDED
Status: COMPLETED | OUTPATIENT
Start: 2022-09-14 | End: 2022-09-14

## 2022-09-14 RX ORDER — DIPHENHYDRAMINE HCL 12.5MG/5ML
25 ELIXIR ORAL ONCE AS NEEDED
Status: CANCELLED
Start: 2022-09-14

## 2022-09-14 RX ORDER — SODIUM CHLORIDE 0.9 % (FLUSH) 0.9 %
10 SYRINGE (ML) INJECTION
Status: CANCELLED | OUTPATIENT
Start: 2022-09-14

## 2022-09-14 RX ORDER — ACETAMINOPHEN 500 MG
1000 TABLET ORAL ONCE AS NEEDED
Status: CANCELLED
Start: 2022-09-14

## 2022-09-14 RX ADMIN — SODIUM CHLORIDE 750 MG: 9 INJECTION, SOLUTION INTRAVENOUS at 10:09

## 2022-09-14 RX ADMIN — METHYLPREDNISOLONE SODIUM SUCCINATE 62.5 MG: 125 INJECTION, POWDER, FOR SOLUTION INTRAMUSCULAR; INTRAVENOUS at 10:09

## 2022-09-14 RX ADMIN — ACETAMINOPHEN 650 MG: 325 TABLET ORAL at 10:09

## 2022-09-14 RX ADMIN — DIPHENHYDRAMINE HYDROCHLORIDE 25 MG: 12.5 LIQUID ORAL at 10:09

## 2022-09-14 NOTE — PROGRESS NOTES
0950 pt here for infusion, resting in recliner, TP released and pharmacy notified  Pre meds given and tolerated   1017 orencia infusion with filter started at 100ml/hr, will continue to monitor  1120 infusion complete and flushed, pt VSS  1150 pt tolerated well, f/u appt made, pt discharged ambulatory with no adverse reactions, notified to go to ER with any problems

## 2022-09-30 ENCOUNTER — EXTERNAL CHRONIC CARE MANAGEMENT (OUTPATIENT)
Dept: INTERNAL MEDICINE | Facility: CLINIC | Age: 72
End: 2022-09-30
Payer: MEDICARE

## 2022-09-30 PROCEDURE — 99490 PR CHRONIC CARE MGMT, 1ST 20 MIN: ICD-10-PCS | Mod: S$PBB,,, | Performed by: INTERNAL MEDICINE

## 2022-09-30 PROCEDURE — 99490 CHRNC CARE MGMT STAFF 1ST 20: CPT | Mod: S$PBB,,, | Performed by: INTERNAL MEDICINE

## 2022-09-30 PROCEDURE — 99490 CHRNC CARE MGMT STAFF 1ST 20: CPT | Mod: PBBFAC | Performed by: INTERNAL MEDICINE

## 2022-10-12 RX ORDER — DIPHENHYDRAMINE HCL 12.5MG/5ML
25 ELIXIR ORAL ONCE AS NEEDED
Status: CANCELLED
Start: 2022-10-12

## 2022-10-12 RX ORDER — ACETAMINOPHEN 500 MG
1000 TABLET ORAL ONCE AS NEEDED
Status: CANCELLED
Start: 2022-10-12

## 2022-10-12 RX ORDER — SODIUM CHLORIDE 0.9 % (FLUSH) 0.9 %
10 SYRINGE (ML) INJECTION
Status: CANCELLED | OUTPATIENT
Start: 2022-10-12

## 2022-10-14 ENCOUNTER — INFUSION (OUTPATIENT)
Dept: INFUSION THERAPY | Facility: HOSPITAL | Age: 72
End: 2022-10-14
Attending: INTERNAL MEDICINE
Payer: MEDICARE

## 2022-10-14 VITALS
OXYGEN SATURATION: 97 % | WEIGHT: 214 LBS | HEART RATE: 78 BPM | DIASTOLIC BLOOD PRESSURE: 70 MMHG | BODY MASS INDEX: 37.92 KG/M2 | SYSTOLIC BLOOD PRESSURE: 157 MMHG | RESPIRATION RATE: 16 BRPM | HEIGHT: 63 IN | TEMPERATURE: 98 F

## 2022-10-14 DIAGNOSIS — M05.79 RHEUMATOID ARTHRITIS INVOLVING MULTIPLE SITES WITH POSITIVE RHEUMATOID FACTOR: Primary | ICD-10-CM

## 2022-10-14 PROCEDURE — 96375 TX/PRO/DX INJ NEW DRUG ADDON: CPT

## 2022-10-14 PROCEDURE — 25000003 PHARM REV CODE 250: Performed by: NURSE PRACTITIONER

## 2022-10-14 PROCEDURE — 96365 THER/PROPH/DIAG IV INF INIT: CPT

## 2022-10-14 PROCEDURE — 63600175 PHARM REV CODE 636 W HCPCS: Performed by: NURSE PRACTITIONER

## 2022-10-14 RX ORDER — SODIUM CHLORIDE 0.9 % (FLUSH) 0.9 %
10 SYRINGE (ML) INJECTION
Status: CANCELLED | OUTPATIENT
Start: 2022-10-14

## 2022-10-14 RX ORDER — DIPHENHYDRAMINE HCL 12.5MG/5ML
25 ELIXIR ORAL ONCE AS NEEDED
Status: CANCELLED
Start: 2022-10-14

## 2022-10-14 RX ORDER — ACETAMINOPHEN 500 MG
1000 TABLET ORAL ONCE AS NEEDED
Status: COMPLETED | OUTPATIENT
Start: 2022-10-14 | End: 2022-10-14

## 2022-10-14 RX ORDER — ACETAMINOPHEN 500 MG
1000 TABLET ORAL ONCE AS NEEDED
Status: CANCELLED
Start: 2022-10-14

## 2022-10-14 RX ORDER — DIPHENHYDRAMINE HCL 12.5MG/5ML
25 ELIXIR ORAL ONCE AS NEEDED
Status: COMPLETED | OUTPATIENT
Start: 2022-10-14 | End: 2022-10-14

## 2022-10-14 RX ORDER — METHYLPREDNISOLONE SOD SUCC 125 MG
62.5 VIAL (EA) INJECTION ONCE AS NEEDED
Status: COMPLETED | OUTPATIENT
Start: 2022-10-14 | End: 2022-10-14

## 2022-10-14 RX ADMIN — DIPHENHYDRAMINE HYDROCHLORIDE 25 MG: 12.5 SOLUTION ORAL at 10:10

## 2022-10-14 RX ADMIN — METHYLPREDNISOLONE SODIUM SUCCINATE 62.5 MG: 125 INJECTION, POWDER, FOR SOLUTION INTRAMUSCULAR; INTRAVENOUS at 10:10

## 2022-10-14 RX ADMIN — ACETAMINOPHEN 1000 MG: 500 TABLET ORAL at 10:10

## 2022-10-14 RX ADMIN — SODIUM CHLORIDE 750 MG: 9 INJECTION, SOLUTION INTRAVENOUS at 10:10

## 2022-10-14 NOTE — PROGRESS NOTES
1005 pt here for infusion, TP released and pharmacy notified  Pre meds given and tolerated   1034 Orencia started at 100ml/hr with filter  1150 Infusion complete, pt tolerated well, will continue to monitor for adverse reactions   Follow up appt made for 4 weeks  1220 pt discharged ambulatory and instructed to go to ER with any problems.

## 2022-10-31 ENCOUNTER — EXTERNAL CHRONIC CARE MANAGEMENT (OUTPATIENT)
Dept: INTERNAL MEDICINE | Facility: CLINIC | Age: 72
End: 2022-10-31
Payer: MEDICARE

## 2022-10-31 PROCEDURE — 99490 CHRNC CARE MGMT STAFF 1ST 20: CPT | Mod: PBBFAC | Performed by: INTERNAL MEDICINE

## 2022-10-31 PROCEDURE — 99490 CHRNC CARE MGMT STAFF 1ST 20: CPT | Mod: S$PBB,,, | Performed by: INTERNAL MEDICINE

## 2022-10-31 PROCEDURE — 99439 PR CHRONIC CARE MGMT, EA ADDTL 20 MIN: ICD-10-PCS | Mod: S$PBB,,, | Performed by: INTERNAL MEDICINE

## 2022-10-31 PROCEDURE — 99490 PR CHRONIC CARE MGMT, 1ST 20 MIN: ICD-10-PCS | Mod: S$PBB,,, | Performed by: INTERNAL MEDICINE

## 2022-10-31 PROCEDURE — 99439 CHRNC CARE MGMT STAF EA ADDL: CPT | Mod: S$PBB,,, | Performed by: INTERNAL MEDICINE

## 2022-10-31 PROCEDURE — 99439 CHRNC CARE MGMT STAF EA ADDL: CPT | Mod: PBBFAC | Performed by: INTERNAL MEDICINE

## 2022-11-01 RX ORDER — AZELASTINE 1 MG/ML
SPRAY, METERED NASAL
COMMUNITY

## 2022-11-01 RX ORDER — FOLIC ACID 1 MG/1
TABLET ORAL
COMMUNITY

## 2022-11-02 ENCOUNTER — OFFICE VISIT (OUTPATIENT)
Dept: INTERNAL MEDICINE | Facility: CLINIC | Age: 72
End: 2022-11-02
Payer: MEDICARE

## 2022-11-02 VITALS
RESPIRATION RATE: 16 BRPM | WEIGHT: 214 LBS | BODY MASS INDEX: 37.92 KG/M2 | TEMPERATURE: 98 F | HEART RATE: 77 BPM | DIASTOLIC BLOOD PRESSURE: 74 MMHG | SYSTOLIC BLOOD PRESSURE: 116 MMHG | HEIGHT: 63 IN | OXYGEN SATURATION: 97 %

## 2022-11-02 DIAGNOSIS — M15.9 OSTEOARTHRITIS OF MULTIPLE JOINTS, UNSPECIFIED OSTEOARTHRITIS TYPE: ICD-10-CM

## 2022-11-02 DIAGNOSIS — E11.9 CONTROLLED TYPE 2 DIABETES MELLITUS WITHOUT COMPLICATION, WITHOUT LONG-TERM CURRENT USE OF INSULIN: ICD-10-CM

## 2022-11-02 DIAGNOSIS — I10 ESSENTIAL HYPERTENSION: Primary | ICD-10-CM

## 2022-11-02 DIAGNOSIS — J30.2 SEASONAL ALLERGIES: ICD-10-CM

## 2022-11-02 DIAGNOSIS — E78.5 HYPERLIPIDEMIA LDL GOAL <100: ICD-10-CM

## 2022-11-02 PROCEDURE — 99214 PR OFFICE/OUTPT VISIT, EST, LEVL IV, 30-39 MIN: ICD-10-PCS | Mod: S$PBB,,, | Performed by: INTERNAL MEDICINE

## 2022-11-02 PROCEDURE — 99215 OFFICE O/P EST HI 40 MIN: CPT | Mod: PBBFAC | Performed by: INTERNAL MEDICINE

## 2022-11-02 PROCEDURE — 99214 OFFICE O/P EST MOD 30 MIN: CPT | Mod: S$PBB,,, | Performed by: INTERNAL MEDICINE

## 2022-11-02 RX ORDER — POTASSIUM CHLORIDE 600 MG/1
8 CAPSULE, EXTENDED RELEASE ORAL DAILY
Qty: 90 CAPSULE | Refills: 3 | Status: SHIPPED | OUTPATIENT
Start: 2022-11-02 | End: 2023-12-20

## 2022-11-02 RX ORDER — LEVOCETIRIZINE DIHYDROCHLORIDE 5 MG/1
5 TABLET, FILM COATED ORAL NIGHTLY
COMMUNITY

## 2022-11-02 RX ORDER — CEFDINIR 300 MG/1
300 CAPSULE ORAL 2 TIMES DAILY
COMMUNITY
End: 2023-02-02

## 2022-11-02 RX ORDER — FLUTICASONE PROPIONATE 50 MCG
SPRAY, SUSPENSION (ML) NASAL
Qty: 16 G | Refills: 11 | Status: SHIPPED | OUTPATIENT
Start: 2022-11-02

## 2022-11-02 NOTE — PROGRESS NOTES
Subjective:       Patient ID: Meghna Dalal is a 72 y.o. female.    Chief Complaint: Follow-up    Bs up  only taking metformin once a day  otherwise ok  labs today    Follow-up  Pertinent negatives include no abdominal pain, chest pain, fatigue, rash or weakness.   Review of Systems   Constitutional:  Negative for fatigue and unexpected weight change.   HENT:  Negative for ear pain and goiter.    Respiratory:  Negative for chest tightness and shortness of breath.    Cardiovascular:  Negative for chest pain, palpitations and leg swelling.   Gastrointestinal:  Negative for abdominal pain and reflux.   Integumentary:  Negative for rash and breast tenderness.   Neurological:  Negative for dizziness and weakness.   Hematological:  Negative for adenopathy.   Psychiatric/Behavioral:  Negative for behavioral problems.    Breast: Negative for tenderness      Objective:      Physical Exam  Constitutional:       Appearance: Normal appearance.   HENT:      Head: Normocephalic and atraumatic.      Right Ear: External ear normal.      Left Ear: External ear normal.      Mouth/Throat:      Pharynx: Oropharynx is clear.   Eyes:      Extraocular Movements: Extraocular movements intact.      Pupils: Pupils are equal, round, and reactive to light.   Cardiovascular:      Rate and Rhythm: Normal rate and regular rhythm.      Pulses: Normal pulses.      Heart sounds: Normal heart sounds.   Pulmonary:      Effort: Pulmonary effort is normal.      Breath sounds: Normal breath sounds.   Abdominal:      General: Abdomen is flat. Bowel sounds are normal.      Palpations: Abdomen is soft.   Musculoskeletal:         General: Normal range of motion.      Cervical back: Normal range of motion and neck supple.   Skin:     General: Skin is warm.      Capillary Refill: Capillary refill takes less than 2 seconds.   Neurological:      General: No focal deficit present.      Mental Status: She is alert.   Psychiatric:         Mood and Affect:  Mood normal.         Thought Content: Thought content normal.         Judgment: Judgment normal.       Assessment:       1. Essential hypertension    2. Hyperlipidemia LDL goal <100    3. Controlled type 2 diabetes mellitus without complication, without long-term current use of insulin    4. Osteoarthritis of multiple joints, unspecified osteoarthritis type    5. Seasonal allergies          Plan:         Patient Instructions   Continue current meds and f/u 3 months      Problem List Items Addressed This Visit    None  Visit Diagnoses       Essential hypertension    -  Primary    Relevant Medications    potassium chloride (MICRO-K) 8 mEq CpSR    Other Relevant Orders    CBC Auto Differential    Comprehensive Metabolic Panel    TSH    Hyperlipidemia LDL goal <100        Relevant Orders    Lipid Panel    Controlled type 2 diabetes mellitus without complication, without long-term current use of insulin        Relevant Orders    Hemoglobin A1C    Osteoarthritis of multiple joints, unspecified osteoarthritis type        Relevant Medications    potassium chloride (MICRO-K) 8 mEq CpSR    Seasonal allergies        Relevant Medications    fluticasone propionate (FLONASE) 50 mcg/actuation nasal spray

## 2022-11-15 ENCOUNTER — INFUSION (OUTPATIENT)
Dept: INFUSION THERAPY | Facility: HOSPITAL | Age: 72
End: 2022-11-15
Attending: NURSE PRACTITIONER
Payer: MEDICARE

## 2022-11-15 VITALS
OXYGEN SATURATION: 98 % | HEART RATE: 61 BPM | DIASTOLIC BLOOD PRESSURE: 71 MMHG | WEIGHT: 214 LBS | SYSTOLIC BLOOD PRESSURE: 146 MMHG | RESPIRATION RATE: 16 BRPM | HEIGHT: 63 IN | TEMPERATURE: 99 F | BODY MASS INDEX: 37.92 KG/M2

## 2022-11-15 DIAGNOSIS — M05.79 RHEUMATOID ARTHRITIS INVOLVING MULTIPLE SITES WITH POSITIVE RHEUMATOID FACTOR: Primary | ICD-10-CM

## 2022-11-15 PROCEDURE — 96365 THER/PROPH/DIAG IV INF INIT: CPT

## 2022-11-15 PROCEDURE — 96374 THER/PROPH/DIAG INJ IV PUSH: CPT

## 2022-11-15 PROCEDURE — 63600175 PHARM REV CODE 636 W HCPCS: Performed by: NURSE PRACTITIONER

## 2022-11-15 PROCEDURE — 25000003 PHARM REV CODE 250: Performed by: NURSE PRACTITIONER

## 2022-11-15 RX ORDER — DIPHENHYDRAMINE HCL 12.5MG/5ML
25 ELIXIR ORAL ONCE AS NEEDED
Status: COMPLETED | OUTPATIENT
Start: 2022-11-15 | End: 2022-11-15

## 2022-11-15 RX ORDER — ACETAMINOPHEN 500 MG
1000 TABLET ORAL ONCE AS NEEDED
Status: COMPLETED | OUTPATIENT
Start: 2022-11-15 | End: 2022-11-15

## 2022-11-15 RX ORDER — DIPHENHYDRAMINE HCL 12.5MG/5ML
25 ELIXIR ORAL ONCE AS NEEDED
Status: CANCELLED
Start: 2022-11-15

## 2022-11-15 RX ORDER — ACETAMINOPHEN 500 MG
1000 TABLET ORAL ONCE AS NEEDED
Status: CANCELLED
Start: 2022-11-15

## 2022-11-15 RX ORDER — SODIUM CHLORIDE 0.9 % (FLUSH) 0.9 %
10 SYRINGE (ML) INJECTION
Status: CANCELLED | OUTPATIENT
Start: 2022-11-15

## 2022-11-15 RX ADMIN — DIPHENHYDRAMINE HYDROCHLORIDE 25 MG: 12.5 SOLUTION ORAL at 11:11

## 2022-11-15 RX ADMIN — SODIUM CHLORIDE 750 MG: 9 INJECTION, SOLUTION INTRAVENOUS at 11:11

## 2022-11-15 RX ADMIN — ACETAMINOPHEN 1000 MG: 500 TABLET ORAL at 11:11

## 2022-11-15 RX ADMIN — METHYLPREDNISOLONE SODIUM SUCCINATE 62.5 MG: 125 INJECTION, POWDER, FOR SOLUTION INTRAMUSCULAR; INTRAVENOUS at 11:11

## 2022-11-15 NOTE — PROGRESS NOTES
1115 pt here for infusion resting in recliner, TP released and pharmacy notified   Pre meds given and tolerated  1146 Orencia infusion started with filter to go over 1 hr  1300 Infusion complete, flushed  Follow up appointment made for 4 weeks  1330 pt tolerated well, discharged ambulatory with no adverse reactions, notified to go to ER with any problems

## 2022-11-29 ENCOUNTER — PATIENT OUTREACH (OUTPATIENT)
Dept: ADMINISTRATIVE | Facility: HOSPITAL | Age: 72
End: 2022-11-29
Payer: MEDICARE

## 2022-11-30 ENCOUNTER — EXTERNAL CHRONIC CARE MANAGEMENT (OUTPATIENT)
Dept: INTERNAL MEDICINE | Facility: CLINIC | Age: 72
End: 2022-11-30
Payer: MEDICARE

## 2022-11-30 PROCEDURE — 99490 PR CHRONIC CARE MGMT, 1ST 20 MIN: ICD-10-PCS | Mod: S$PBB,,, | Performed by: INTERNAL MEDICINE

## 2022-11-30 PROCEDURE — 99439 CHRNC CARE MGMT STAF EA ADDL: CPT | Mod: PBBFAC | Performed by: INTERNAL MEDICINE

## 2022-11-30 PROCEDURE — 99490 CHRNC CARE MGMT STAFF 1ST 20: CPT | Mod: PBBFAC | Performed by: INTERNAL MEDICINE

## 2022-11-30 PROCEDURE — 99439 PR CHRONIC CARE MGMT, EA ADDTL 20 MIN: ICD-10-PCS | Mod: S$PBB,,, | Performed by: INTERNAL MEDICINE

## 2022-11-30 PROCEDURE — 99439 CHRNC CARE MGMT STAF EA ADDL: CPT | Mod: S$PBB,,, | Performed by: INTERNAL MEDICINE

## 2022-11-30 PROCEDURE — 99490 CHRNC CARE MGMT STAFF 1ST 20: CPT | Mod: S$PBB,,, | Performed by: INTERNAL MEDICINE

## 2022-12-15 ENCOUNTER — INFUSION (OUTPATIENT)
Dept: INFUSION THERAPY | Facility: HOSPITAL | Age: 72
End: 2022-12-15
Attending: NURSE PRACTITIONER
Payer: MEDICARE

## 2022-12-15 VITALS
BODY MASS INDEX: 37.92 KG/M2 | TEMPERATURE: 98 F | RESPIRATION RATE: 17 BRPM | HEIGHT: 63 IN | HEART RATE: 70 BPM | OXYGEN SATURATION: 99 % | DIASTOLIC BLOOD PRESSURE: 88 MMHG | SYSTOLIC BLOOD PRESSURE: 157 MMHG | WEIGHT: 214 LBS

## 2022-12-15 DIAGNOSIS — M05.79 RHEUMATOID ARTHRITIS INVOLVING MULTIPLE SITES WITH POSITIVE RHEUMATOID FACTOR: Primary | ICD-10-CM

## 2022-12-15 PROCEDURE — 96375 TX/PRO/DX INJ NEW DRUG ADDON: CPT

## 2022-12-15 PROCEDURE — 63600175 PHARM REV CODE 636 W HCPCS: Performed by: NURSE PRACTITIONER

## 2022-12-15 PROCEDURE — 96365 THER/PROPH/DIAG IV INF INIT: CPT

## 2022-12-15 PROCEDURE — 96374 THER/PROPH/DIAG INJ IV PUSH: CPT

## 2022-12-15 PROCEDURE — 25000003 PHARM REV CODE 250: Performed by: NURSE PRACTITIONER

## 2022-12-15 RX ORDER — ACETAMINOPHEN 500 MG
1000 TABLET ORAL ONCE AS NEEDED
Status: CANCELLED
Start: 2022-12-15

## 2022-12-15 RX ORDER — DIPHENHYDRAMINE HCL 12.5MG/5ML
25 ELIXIR ORAL ONCE AS NEEDED
Status: CANCELLED
Start: 2022-12-15

## 2022-12-15 RX ORDER — METHYLPREDNISOLONE SOD SUCC 125 MG
62.5 VIAL (EA) INJECTION ONCE AS NEEDED
Status: COMPLETED | OUTPATIENT
Start: 2022-12-15 | End: 2022-12-15

## 2022-12-15 RX ORDER — SODIUM CHLORIDE 0.9 % (FLUSH) 0.9 %
10 SYRINGE (ML) INJECTION
Status: CANCELLED | OUTPATIENT
Start: 2022-12-15

## 2022-12-15 RX ORDER — ACETAMINOPHEN 500 MG
1000 TABLET ORAL ONCE AS NEEDED
Status: COMPLETED | OUTPATIENT
Start: 2022-12-15 | End: 2022-12-15

## 2022-12-15 RX ORDER — DIPHENHYDRAMINE HCL 12.5MG/5ML
25 ELIXIR ORAL ONCE AS NEEDED
Status: COMPLETED | OUTPATIENT
Start: 2022-12-15 | End: 2022-12-15

## 2022-12-15 RX ADMIN — ACETAMINOPHEN 1000 MG: 500 TABLET ORAL at 10:12

## 2022-12-15 RX ADMIN — DIPHENHYDRAMINE HYDROCHLORIDE 25 MG: 12.5 LIQUID ORAL at 10:12

## 2022-12-15 RX ADMIN — METHYLPREDNISOLONE SODIUM SUCCINATE 62.5 MG: 125 INJECTION, POWDER, FOR SOLUTION INTRAMUSCULAR; INTRAVENOUS at 10:12

## 2022-12-15 RX ADMIN — SODIUM CHLORIDE 750 MG: 9 INJECTION, SOLUTION INTRAVENOUS at 10:12

## 2022-12-15 NOTE — PROGRESS NOTES
1000 Pt here for Orencia infusion, resting in recliner, TP released and pharmacy notified   Pre meds given and tolerated  1030  Orencia infusion started  1145 Orencia infusion complete, tolerated well, will continue to monitor  1215 pt discharged ambulatory with no adverse reaction noted, pt notified to go to ER with any adverse reactions, AVS given along with medication information  Pt states that she has new insurance supplement, notified to go to  and give them the new card to scan in so precert can review prior to next infusion.

## 2022-12-20 DIAGNOSIS — G47.00 INSOMNIA, UNSPECIFIED TYPE: Primary | ICD-10-CM

## 2022-12-20 RX ORDER — TRAZODONE HYDROCHLORIDE 50 MG/1
50 TABLET ORAL NIGHTLY
Qty: 30 TABLET | Refills: 11 | Status: SHIPPED | OUTPATIENT
Start: 2022-12-20 | End: 2023-12-20

## 2022-12-31 ENCOUNTER — EXTERNAL CHRONIC CARE MANAGEMENT (OUTPATIENT)
Dept: INTERNAL MEDICINE | Facility: CLINIC | Age: 72
End: 2022-12-31
Payer: MEDICARE

## 2022-12-31 PROCEDURE — 99490 PR CHRONIC CARE MGMT, 1ST 20 MIN: ICD-10-PCS | Mod: S$PBB,,, | Performed by: INTERNAL MEDICINE

## 2022-12-31 PROCEDURE — 99439 CHRNC CARE MGMT STAF EA ADDL: CPT | Mod: S$PBB,,, | Performed by: INTERNAL MEDICINE

## 2022-12-31 PROCEDURE — 99490 CHRNC CARE MGMT STAFF 1ST 20: CPT | Mod: S$PBB,,, | Performed by: INTERNAL MEDICINE

## 2022-12-31 PROCEDURE — 99439 PR CHRONIC CARE MGMT, EA ADDTL 20 MIN: ICD-10-PCS | Mod: S$PBB,,, | Performed by: INTERNAL MEDICINE

## 2022-12-31 PROCEDURE — 99439 CHRNC CARE MGMT STAF EA ADDL: CPT | Mod: PBBFAC | Performed by: INTERNAL MEDICINE

## 2022-12-31 PROCEDURE — 99490 CHRNC CARE MGMT STAFF 1ST 20: CPT | Mod: PBBFAC | Performed by: INTERNAL MEDICINE

## 2023-01-20 ENCOUNTER — INFUSION (OUTPATIENT)
Dept: INFUSION THERAPY | Facility: HOSPITAL | Age: 73
End: 2023-01-20
Attending: NURSE PRACTITIONER
Payer: MEDICARE

## 2023-01-20 VITALS
SYSTOLIC BLOOD PRESSURE: 147 MMHG | TEMPERATURE: 98 F | RESPIRATION RATE: 16 BRPM | HEART RATE: 69 BPM | OXYGEN SATURATION: 95 % | DIASTOLIC BLOOD PRESSURE: 71 MMHG

## 2023-01-20 DIAGNOSIS — M05.79 RHEUMATOID ARTHRITIS INVOLVING MULTIPLE SITES WITH POSITIVE RHEUMATOID FACTOR: Primary | ICD-10-CM

## 2023-01-20 PROCEDURE — 25000003 PHARM REV CODE 250: Performed by: NURSE PRACTITIONER

## 2023-01-20 PROCEDURE — 63600175 PHARM REV CODE 636 W HCPCS: Performed by: NURSE PRACTITIONER

## 2023-01-20 PROCEDURE — 96374 THER/PROPH/DIAG INJ IV PUSH: CPT

## 2023-01-20 PROCEDURE — 96375 TX/PRO/DX INJ NEW DRUG ADDON: CPT

## 2023-01-20 PROCEDURE — 96365 THER/PROPH/DIAG IV INF INIT: CPT

## 2023-01-20 RX ORDER — ACETAMINOPHEN 500 MG
1000 TABLET ORAL ONCE AS NEEDED
Status: CANCELLED
Start: 2023-01-20

## 2023-01-20 RX ORDER — SODIUM CHLORIDE 0.9 % (FLUSH) 0.9 %
10 SYRINGE (ML) INJECTION
Status: CANCELLED | OUTPATIENT
Start: 2023-01-20

## 2023-01-20 RX ORDER — ACETAMINOPHEN 500 MG
1000 TABLET ORAL ONCE AS NEEDED
Status: COMPLETED | OUTPATIENT
Start: 2023-01-20 | End: 2023-01-20

## 2023-01-20 RX ORDER — METHYLPREDNISOLONE SOD SUCC 125 MG
62.5 VIAL (EA) INJECTION ONCE AS NEEDED
Status: COMPLETED | OUTPATIENT
Start: 2023-01-20 | End: 2023-01-20

## 2023-01-20 RX ORDER — DIPHENHYDRAMINE HCL 12.5MG/5ML
25 ELIXIR ORAL ONCE AS NEEDED
Status: CANCELLED
Start: 2023-01-20

## 2023-01-20 RX ORDER — DIPHENHYDRAMINE HCL 12.5MG/5ML
25 ELIXIR ORAL ONCE AS NEEDED
Status: COMPLETED | OUTPATIENT
Start: 2023-01-20 | End: 2023-01-20

## 2023-01-20 RX ADMIN — METHYLPREDNISOLONE SODIUM SUCCINATE 62.5 MG: 125 INJECTION, POWDER, FOR SOLUTION INTRAMUSCULAR; INTRAVENOUS at 10:01

## 2023-01-20 RX ADMIN — SODIUM CHLORIDE 750 MG: 9 INJECTION, SOLUTION INTRAVENOUS at 10:01

## 2023-01-20 RX ADMIN — DIPHENHYDRAMINE HYDROCHLORIDE 25 MG: 12.5 LIQUID ORAL at 10:01

## 2023-01-20 RX ADMIN — ACETAMINOPHEN 1000 MG: 500 TABLET ORAL at 10:01

## 2023-01-20 NOTE — PROGRESS NOTES
0950 Pt here for Orencia infusion, resting in recliner, TP released and pharmacy notified   Pre meds given and tolerated  1016 Orencia infusion started with filter applied to go in over 1 hr  Follow up appt made for 1 month  1116 Orencia infusion complete, tolerated well, will continue to monitor  1135 pt discharged ambulatory with no problems, notified to go to ER with any adverse reactions

## 2023-01-31 ENCOUNTER — EXTERNAL CHRONIC CARE MANAGEMENT (OUTPATIENT)
Dept: INTERNAL MEDICINE | Facility: CLINIC | Age: 73
End: 2023-01-31
Payer: MEDICARE

## 2023-01-31 PROCEDURE — 99489 CPLX CHRNC CARE EA ADDL 30: CPT | Mod: S$PBB,,, | Performed by: INTERNAL MEDICINE

## 2023-01-31 PROCEDURE — 99487 CPLX CHRNC CARE 1ST 60 MIN: CPT | Mod: S$PBB,,, | Performed by: INTERNAL MEDICINE

## 2023-01-31 PROCEDURE — 99487 PR COMPLX CHRON CARE MGMT, 1ST HR, PER MONTH: ICD-10-PCS | Mod: S$PBB,,, | Performed by: INTERNAL MEDICINE

## 2023-01-31 PROCEDURE — 99489 PR COMPLX CHRON CARE MGMT, EA ADDTL 30 MIN, PER MONTH: ICD-10-PCS | Mod: S$PBB,,, | Performed by: INTERNAL MEDICINE

## 2023-01-31 PROCEDURE — 99487 CPLX CHRNC CARE 1ST 60 MIN: CPT | Mod: PBBFAC | Performed by: INTERNAL MEDICINE

## 2023-01-31 PROCEDURE — 99489 CPLX CHRNC CARE EA ADDL 30: CPT | Mod: PBBFAC | Performed by: INTERNAL MEDICINE

## 2023-02-01 RX ORDER — MOLNUPIRAVIR 200 MG/1
CAPSULE ORAL
COMMUNITY
Start: 2023-01-15

## 2023-02-03 ENCOUNTER — OFFICE VISIT (OUTPATIENT)
Dept: INTERNAL MEDICINE | Facility: CLINIC | Age: 73
End: 2023-02-03
Payer: MEDICARE

## 2023-02-03 VITALS
BODY MASS INDEX: 38.62 KG/M2 | DIASTOLIC BLOOD PRESSURE: 79 MMHG | SYSTOLIC BLOOD PRESSURE: 180 MMHG | RESPIRATION RATE: 18 BRPM | TEMPERATURE: 98 F | HEIGHT: 63 IN | OXYGEN SATURATION: 99 % | HEART RATE: 86 BPM | WEIGHT: 218 LBS

## 2023-02-03 DIAGNOSIS — E66.9 OBESITY (BMI 30-39.9): ICD-10-CM

## 2023-02-03 DIAGNOSIS — M15.9 OSTEOARTHRITIS OF MULTIPLE JOINTS, UNSPECIFIED OSTEOARTHRITIS TYPE: ICD-10-CM

## 2023-02-03 DIAGNOSIS — M06.9 RHEUMATOID ARTHRITIS, INVOLVING UNSPECIFIED SITE, UNSPECIFIED WHETHER RHEUMATOID FACTOR PRESENT: ICD-10-CM

## 2023-02-03 DIAGNOSIS — E78.5 HYPERLIPIDEMIA LDL GOAL <100: ICD-10-CM

## 2023-02-03 DIAGNOSIS — E11.9 CONTROLLED TYPE 2 DIABETES MELLITUS WITHOUT COMPLICATION, WITHOUT LONG-TERM CURRENT USE OF INSULIN: ICD-10-CM

## 2023-02-03 DIAGNOSIS — K21.9 GERD WITHOUT ESOPHAGITIS: ICD-10-CM

## 2023-02-03 DIAGNOSIS — E03.9 HYPOTHYROIDISM, UNSPECIFIED TYPE: ICD-10-CM

## 2023-02-03 DIAGNOSIS — I10 ESSENTIAL HYPERTENSION: Primary | ICD-10-CM

## 2023-02-03 PROCEDURE — 3008F PR BODY MASS INDEX (BMI) DOCUMENTED: ICD-10-PCS | Mod: CPTII,,, | Performed by: INTERNAL MEDICINE

## 2023-02-03 PROCEDURE — 1159F MED LIST DOCD IN RCRD: CPT | Mod: CPTII,,, | Performed by: INTERNAL MEDICINE

## 2023-02-03 PROCEDURE — 3078F PR MOST RECENT DIASTOLIC BLOOD PRESSURE < 80 MM HG: ICD-10-PCS | Mod: CPTII,,, | Performed by: INTERNAL MEDICINE

## 2023-02-03 PROCEDURE — 1159F PR MEDICATION LIST DOCUMENTED IN MEDICAL RECORD: ICD-10-PCS | Mod: CPTII,,, | Performed by: INTERNAL MEDICINE

## 2023-02-03 PROCEDURE — 3077F SYST BP >= 140 MM HG: CPT | Mod: CPTII,,, | Performed by: INTERNAL MEDICINE

## 2023-02-03 PROCEDURE — 3288F FALL RISK ASSESSMENT DOCD: CPT | Mod: CPTII,,, | Performed by: INTERNAL MEDICINE

## 2023-02-03 PROCEDURE — 3008F BODY MASS INDEX DOCD: CPT | Mod: CPTII,,, | Performed by: INTERNAL MEDICINE

## 2023-02-03 PROCEDURE — 99214 OFFICE O/P EST MOD 30 MIN: CPT | Mod: S$PBB,,, | Performed by: INTERNAL MEDICINE

## 2023-02-03 PROCEDURE — 1101F PR PT FALLS ASSESS DOC 0-1 FALLS W/OUT INJ PAST YR: ICD-10-PCS | Mod: CPTII,,, | Performed by: INTERNAL MEDICINE

## 2023-02-03 PROCEDURE — 99215 OFFICE O/P EST HI 40 MIN: CPT | Mod: PBBFAC | Performed by: INTERNAL MEDICINE

## 2023-02-03 PROCEDURE — 1126F PR PAIN SEVERITY QUANTIFIED, NO PAIN PRESENT: ICD-10-PCS | Mod: CPTII,,, | Performed by: INTERNAL MEDICINE

## 2023-02-03 PROCEDURE — 4010F ACE/ARB THERAPY RXD/TAKEN: CPT | Mod: CPTII,,, | Performed by: INTERNAL MEDICINE

## 2023-02-03 PROCEDURE — 1160F RVW MEDS BY RX/DR IN RCRD: CPT | Mod: CPTII,,, | Performed by: INTERNAL MEDICINE

## 2023-02-03 PROCEDURE — 4010F PR ACE/ARB THEARPY RXD/TAKEN: ICD-10-PCS | Mod: CPTII,,, | Performed by: INTERNAL MEDICINE

## 2023-02-03 PROCEDURE — 99214 PR OFFICE/OUTPT VISIT, EST, LEVL IV, 30-39 MIN: ICD-10-PCS | Mod: S$PBB,,, | Performed by: INTERNAL MEDICINE

## 2023-02-03 PROCEDURE — 1126F AMNT PAIN NOTED NONE PRSNT: CPT | Mod: CPTII,,, | Performed by: INTERNAL MEDICINE

## 2023-02-03 PROCEDURE — 3288F PR FALLS RISK ASSESSMENT DOCUMENTED: ICD-10-PCS | Mod: CPTII,,, | Performed by: INTERNAL MEDICINE

## 2023-02-03 PROCEDURE — 3077F PR MOST RECENT SYSTOLIC BLOOD PRESSURE >= 140 MM HG: ICD-10-PCS | Mod: CPTII,,, | Performed by: INTERNAL MEDICINE

## 2023-02-03 PROCEDURE — 3078F DIAST BP <80 MM HG: CPT | Mod: CPTII,,, | Performed by: INTERNAL MEDICINE

## 2023-02-03 PROCEDURE — 1160F PR REVIEW ALL MEDS BY PRESCRIBER/CLIN PHARMACIST DOCUMENTED: ICD-10-PCS | Mod: CPTII,,, | Performed by: INTERNAL MEDICINE

## 2023-02-03 PROCEDURE — 1101F PT FALLS ASSESS-DOCD LE1/YR: CPT | Mod: CPTII,,, | Performed by: INTERNAL MEDICINE

## 2023-02-03 RX ORDER — LOSARTAN POTASSIUM AND HYDROCHLOROTHIAZIDE 12.5; 1 MG/1; MG/1
1 TABLET ORAL DAILY
Qty: 90 TABLET | Refills: 3 | Status: SHIPPED | OUTPATIENT
Start: 2023-02-03 | End: 2023-12-20

## 2023-02-03 RX ORDER — PROMETHAZINE HYDROCHLORIDE AND DEXTROMETHORPHAN HYDROBROMIDE 6.25; 15 MG/5ML; MG/5ML
5 SYRUP ORAL EVERY 4 HOURS PRN
Qty: 270 ML | Refills: 3 | Status: SHIPPED | OUTPATIENT
Start: 2023-02-03 | End: 2023-02-13

## 2023-02-03 NOTE — PROGRESS NOTES
Subjective:       Patient ID: Meghna Dalal is a 72 y.o. female.    Chief Complaint: Follow-up (3mo; recently got diagnosed with Covid last month and still having congestion and SOB )    Had covid again  and bp running high  had infusion for ra last week  feeling better  bs 141    Follow-up  Pertinent negatives include no abdominal pain, chest pain, fatigue, rash or weakness.   Review of Systems   Constitutional:  Negative for fatigue and unexpected weight change.   HENT:  Negative for ear pain and goiter.    Respiratory:  Negative for chest tightness and shortness of breath.    Cardiovascular:  Negative for chest pain, palpitations and leg swelling.   Gastrointestinal:  Negative for abdominal pain and reflux.   Integumentary:  Negative for rash and breast tenderness.   Neurological:  Negative for dizziness and weakness.   Hematological:  Negative for adenopathy.   Psychiatric/Behavioral:  Negative for behavioral problems.    Breast: Negative for tenderness      Objective:      Physical Exam  Constitutional:       Appearance: Normal appearance.   HENT:      Head: Normocephalic and atraumatic.      Right Ear: External ear normal.      Left Ear: External ear normal.      Mouth/Throat:      Pharynx: Oropharynx is clear.   Eyes:      Extraocular Movements: Extraocular movements intact.      Pupils: Pupils are equal, round, and reactive to light.   Cardiovascular:      Rate and Rhythm: Normal rate and regular rhythm.      Pulses: Normal pulses.      Heart sounds: Normal heart sounds.   Pulmonary:      Effort: Pulmonary effort is normal.      Breath sounds: Normal breath sounds.   Abdominal:      General: Abdomen is flat. Bowel sounds are normal.      Palpations: Abdomen is soft.   Musculoskeletal:         General: Normal range of motion.      Cervical back: Normal range of motion and neck supple.   Skin:     General: Skin is warm.      Capillary Refill: Capillary refill takes less than 2 seconds.    Neurological:      General: No focal deficit present.      Mental Status: She is alert.   Psychiatric:         Mood and Affect: Mood normal.         Thought Content: Thought content normal.         Judgment: Judgment normal.       Assessment:       1. Essential hypertension    2. Hyperlipidemia LDL goal <100    3. Controlled type 2 diabetes mellitus without complication, without long-term current use of insulin    4. Osteoarthritis of multiple joints, unspecified osteoarthritis type    5. Hypothyroidism, unspecified type    6. Rheumatoid arthritis, involving unspecified site, unspecified whether rheumatoid factor present    7. GERD without esophagitis    8. Obesity (BMI 30-39.9)          Plan:         Patient Instructions   Continue current meds and f/u 4 weeks      Problem List Items Addressed This Visit    None  Visit Diagnoses       Essential hypertension    -  Primary    Hyperlipidemia LDL goal <100        Controlled type 2 diabetes mellitus without complication, without long-term current use of insulin        Osteoarthritis of multiple joints, unspecified osteoarthritis type        Hypothyroidism, unspecified type        Rheumatoid arthritis, involving unspecified site, unspecified whether rheumatoid factor present        GERD without esophagitis        Obesity (BMI 30-39.9)

## 2023-02-20 ENCOUNTER — INFUSION (OUTPATIENT)
Dept: INFUSION THERAPY | Facility: HOSPITAL | Age: 73
End: 2023-02-20
Attending: NURSE PRACTITIONER
Payer: MEDICARE

## 2023-02-20 VITALS
OXYGEN SATURATION: 95 % | HEART RATE: 65 BPM | TEMPERATURE: 98 F | SYSTOLIC BLOOD PRESSURE: 153 MMHG | DIASTOLIC BLOOD PRESSURE: 68 MMHG | RESPIRATION RATE: 17 BRPM

## 2023-02-20 DIAGNOSIS — M05.79 RHEUMATOID ARTHRITIS INVOLVING MULTIPLE SITES WITH POSITIVE RHEUMATOID FACTOR: Primary | ICD-10-CM

## 2023-02-20 PROCEDURE — 96365 THER/PROPH/DIAG IV INF INIT: CPT

## 2023-02-20 PROCEDURE — 63600175 PHARM REV CODE 636 W HCPCS: Mod: JA,JG | Performed by: NURSE PRACTITIONER

## 2023-02-20 PROCEDURE — 96374 THER/PROPH/DIAG INJ IV PUSH: CPT | Mod: 59

## 2023-02-20 PROCEDURE — 25000003 PHARM REV CODE 250: Performed by: NURSE PRACTITIONER

## 2023-02-20 RX ORDER — SODIUM CHLORIDE 0.9 % (FLUSH) 0.9 %
10 SYRINGE (ML) INJECTION
Status: CANCELLED | OUTPATIENT
Start: 2023-02-20

## 2023-02-20 RX ORDER — DIPHENHYDRAMINE HCL 12.5MG/5ML
25 LIQUID (ML) ORAL ONCE AS NEEDED
Status: CANCELLED
Start: 2023-02-20

## 2023-02-20 RX ORDER — ACETAMINOPHEN 500 MG
1000 TABLET ORAL ONCE AS NEEDED
Status: COMPLETED | OUTPATIENT
Start: 2023-02-20 | End: 2023-02-20

## 2023-02-20 RX ORDER — DIPHENHYDRAMINE HCL 12.5MG/5ML
25 ELIXIR ORAL ONCE AS NEEDED
Status: CANCELLED
Start: 2023-02-20

## 2023-02-20 RX ORDER — DIPHENHYDRAMINE HCL 12.5MG/5ML
25 ELIXIR ORAL ONCE AS NEEDED
Status: COMPLETED | OUTPATIENT
Start: 2023-02-20 | End: 2023-02-20

## 2023-02-20 RX ORDER — ACETAMINOPHEN 500 MG
1000 TABLET ORAL ONCE AS NEEDED
Status: CANCELLED
Start: 2023-02-20

## 2023-02-20 RX ADMIN — DIPHENHYDRAMINE HYDROCHLORIDE 25 MG: 12.5 SOLUTION ORAL at 10:02

## 2023-02-20 RX ADMIN — SODIUM CHLORIDE 750 MG: 9 INJECTION, SOLUTION INTRAVENOUS at 10:02

## 2023-02-20 RX ADMIN — ACETAMINOPHEN 1000 MG: 500 TABLET ORAL at 10:02

## 2023-02-20 RX ADMIN — METHYLPREDNISOLONE SODIUM SUCCINATE 62.5 MG: 125 INJECTION, POWDER, FOR SOLUTION INTRAMUSCULAR; INTRAVENOUS at 10:02

## 2023-02-20 NOTE — PROGRESS NOTES
1000 Pt here for Orencia infusion, resting in recliner, TP released and pharmacy notified   Pre meds given and tolerated  1036 Orencia infusion started with filter applied  1146 Orencia infusion complete, tolerated well, will continue to monitor  Follow up appt made for 1 month  1200 pt discharged ambulatory with no adverse reaction noted, pt notified to go to ER with any adverse reactions, AVS given

## 2023-02-28 ENCOUNTER — EXTERNAL CHRONIC CARE MANAGEMENT (OUTPATIENT)
Dept: INTERNAL MEDICINE | Facility: CLINIC | Age: 73
End: 2023-02-28
Payer: MEDICARE

## 2023-02-28 PROCEDURE — 99439 CHRNC CARE MGMT STAF EA ADDL: CPT | Mod: PBBFAC | Performed by: INTERNAL MEDICINE

## 2023-02-28 PROCEDURE — 99439 PR CHRONIC CARE MGMT, EA ADDTL 20 MIN: ICD-10-PCS | Mod: S$PBB,,, | Performed by: INTERNAL MEDICINE

## 2023-02-28 PROCEDURE — 99490 CHRNC CARE MGMT STAFF 1ST 20: CPT | Mod: PBBFAC | Performed by: INTERNAL MEDICINE

## 2023-02-28 PROCEDURE — 99490 PR CHRONIC CARE MGMT, 1ST 20 MIN: ICD-10-PCS | Mod: S$PBB,,, | Performed by: INTERNAL MEDICINE

## 2023-02-28 PROCEDURE — 99439 CHRNC CARE MGMT STAF EA ADDL: CPT | Mod: S$PBB,,, | Performed by: INTERNAL MEDICINE

## 2023-02-28 PROCEDURE — 99490 CHRNC CARE MGMT STAFF 1ST 20: CPT | Mod: S$PBB,,, | Performed by: INTERNAL MEDICINE

## 2023-02-28 RX ORDER — NAPROXEN 250 MG/1
TABLET ORAL
COMMUNITY

## 2023-03-01 ENCOUNTER — OFFICE VISIT (OUTPATIENT)
Dept: INTERNAL MEDICINE | Facility: CLINIC | Age: 73
End: 2023-03-01
Payer: MEDICARE

## 2023-03-01 VITALS
OXYGEN SATURATION: 98 % | HEIGHT: 63 IN | HEART RATE: 67 BPM | BODY MASS INDEX: 38.27 KG/M2 | TEMPERATURE: 98 F | SYSTOLIC BLOOD PRESSURE: 120 MMHG | DIASTOLIC BLOOD PRESSURE: 80 MMHG | RESPIRATION RATE: 16 BRPM | WEIGHT: 216 LBS

## 2023-03-01 DIAGNOSIS — E78.5 HYPERLIPIDEMIA LDL GOAL <100: ICD-10-CM

## 2023-03-01 DIAGNOSIS — E03.9 HYPOTHYROIDISM, UNSPECIFIED TYPE: ICD-10-CM

## 2023-03-01 DIAGNOSIS — M15.9 OSTEOARTHRITIS OF MULTIPLE JOINTS, UNSPECIFIED OSTEOARTHRITIS TYPE: ICD-10-CM

## 2023-03-01 DIAGNOSIS — M06.9 RHEUMATOID ARTHRITIS, INVOLVING UNSPECIFIED SITE, UNSPECIFIED WHETHER RHEUMATOID FACTOR PRESENT: ICD-10-CM

## 2023-03-01 DIAGNOSIS — E11.9 CONTROLLED TYPE 2 DIABETES MELLITUS WITHOUT COMPLICATION, WITHOUT LONG-TERM CURRENT USE OF INSULIN: ICD-10-CM

## 2023-03-01 DIAGNOSIS — I10 ESSENTIAL HYPERTENSION: Primary | ICD-10-CM

## 2023-03-01 DIAGNOSIS — K21.9 GERD WITHOUT ESOPHAGITIS: ICD-10-CM

## 2023-03-01 DIAGNOSIS — E66.9 OBESITY (BMI 30-39.9): ICD-10-CM

## 2023-03-01 PROCEDURE — 1101F PR PT FALLS ASSESS DOC 0-1 FALLS W/OUT INJ PAST YR: ICD-10-PCS | Mod: CPTII,,, | Performed by: INTERNAL MEDICINE

## 2023-03-01 PROCEDURE — 1101F PT FALLS ASSESS-DOCD LE1/YR: CPT | Mod: CPTII,,, | Performed by: INTERNAL MEDICINE

## 2023-03-01 PROCEDURE — 3288F FALL RISK ASSESSMENT DOCD: CPT | Mod: CPTII,,, | Performed by: INTERNAL MEDICINE

## 2023-03-01 PROCEDURE — 3074F SYST BP LT 130 MM HG: CPT | Mod: CPTII,,, | Performed by: INTERNAL MEDICINE

## 2023-03-01 PROCEDURE — 4010F ACE/ARB THERAPY RXD/TAKEN: CPT | Mod: CPTII,,, | Performed by: INTERNAL MEDICINE

## 2023-03-01 PROCEDURE — 3288F PR FALLS RISK ASSESSMENT DOCUMENTED: ICD-10-PCS | Mod: CPTII,,, | Performed by: INTERNAL MEDICINE

## 2023-03-01 PROCEDURE — 1159F MED LIST DOCD IN RCRD: CPT | Mod: CPTII,,, | Performed by: INTERNAL MEDICINE

## 2023-03-01 PROCEDURE — 1160F PR REVIEW ALL MEDS BY PRESCRIBER/CLIN PHARMACIST DOCUMENTED: ICD-10-PCS | Mod: CPTII,,, | Performed by: INTERNAL MEDICINE

## 2023-03-01 PROCEDURE — 1159F PR MEDICATION LIST DOCUMENTED IN MEDICAL RECORD: ICD-10-PCS | Mod: CPTII,,, | Performed by: INTERNAL MEDICINE

## 2023-03-01 PROCEDURE — 99214 PR OFFICE/OUTPT VISIT, EST, LEVL IV, 30-39 MIN: ICD-10-PCS | Mod: S$PBB,,, | Performed by: INTERNAL MEDICINE

## 2023-03-01 PROCEDURE — 3079F PR MOST RECENT DIASTOLIC BLOOD PRESSURE 80-89 MM HG: ICD-10-PCS | Mod: CPTII,,, | Performed by: INTERNAL MEDICINE

## 2023-03-01 PROCEDURE — 99214 OFFICE O/P EST MOD 30 MIN: CPT | Mod: S$PBB,,, | Performed by: INTERNAL MEDICINE

## 2023-03-01 PROCEDURE — 3008F BODY MASS INDEX DOCD: CPT | Mod: CPTII,,, | Performed by: INTERNAL MEDICINE

## 2023-03-01 PROCEDURE — 3079F DIAST BP 80-89 MM HG: CPT | Mod: CPTII,,, | Performed by: INTERNAL MEDICINE

## 2023-03-01 PROCEDURE — 3074F PR MOST RECENT SYSTOLIC BLOOD PRESSURE < 130 MM HG: ICD-10-PCS | Mod: CPTII,,, | Performed by: INTERNAL MEDICINE

## 2023-03-01 PROCEDURE — 3008F PR BODY MASS INDEX (BMI) DOCUMENTED: ICD-10-PCS | Mod: CPTII,,, | Performed by: INTERNAL MEDICINE

## 2023-03-01 PROCEDURE — 99215 OFFICE O/P EST HI 40 MIN: CPT | Mod: PBBFAC | Performed by: INTERNAL MEDICINE

## 2023-03-01 PROCEDURE — 1160F RVW MEDS BY RX/DR IN RCRD: CPT | Mod: CPTII,,, | Performed by: INTERNAL MEDICINE

## 2023-03-01 PROCEDURE — 4010F PR ACE/ARB THEARPY RXD/TAKEN: ICD-10-PCS | Mod: CPTII,,, | Performed by: INTERNAL MEDICINE

## 2023-03-01 RX ORDER — PROMETHAZINE HYDROCHLORIDE AND DEXTROMETHORPHAN HYDROBROMIDE 6.25; 15 MG/5ML; MG/5ML
5 SYRUP ORAL
COMMUNITY
Start: 2023-02-03

## 2023-03-01 RX ORDER — OMEPRAZOLE 40 MG/1
40 CAPSULE, DELAYED RELEASE ORAL DAILY
Qty: 90 CAPSULE | Refills: 3 | Status: SHIPPED | OUTPATIENT
Start: 2023-03-01 | End: 2024-01-23

## 2023-03-01 NOTE — PROGRESS NOTES
Subjective:       Patient ID: Meghna Dalal is a 72 y.o. female.    Chief Complaint: Follow-up    Doing well on new bp med and not taking extra diuretic  bs up 140 today  no other problems    Follow-up  Pertinent negatives include no abdominal pain, chest pain, fatigue, rash or weakness.   Review of Systems   Constitutional:  Negative for fatigue and unexpected weight change.   HENT:  Negative for ear pain and goiter.    Respiratory:  Negative for chest tightness and shortness of breath.    Cardiovascular:  Negative for chest pain, palpitations and leg swelling.   Gastrointestinal:  Negative for abdominal pain and reflux.   Integumentary:  Negative for rash and breast tenderness.   Neurological:  Negative for dizziness and weakness.   Hematological:  Negative for adenopathy.   Psychiatric/Behavioral:  Negative for behavioral problems.    Breast: Negative for tenderness      Objective:      Physical Exam  Constitutional:       Appearance: Normal appearance.   HENT:      Head: Normocephalic and atraumatic.      Right Ear: External ear normal.      Left Ear: External ear normal.      Mouth/Throat:      Pharynx: Oropharynx is clear.   Eyes:      Extraocular Movements: Extraocular movements intact.      Pupils: Pupils are equal, round, and reactive to light.   Cardiovascular:      Rate and Rhythm: Normal rate and regular rhythm.      Pulses: Normal pulses.      Heart sounds: Normal heart sounds.   Pulmonary:      Effort: Pulmonary effort is normal.      Breath sounds: Normal breath sounds.   Abdominal:      General: Abdomen is flat. Bowel sounds are normal.      Palpations: Abdomen is soft.   Musculoskeletal:         General: Normal range of motion.      Cervical back: Normal range of motion and neck supple.   Skin:     General: Skin is warm.      Capillary Refill: Capillary refill takes less than 2 seconds.   Neurological:      General: No focal deficit present.      Mental Status: She is alert.   Psychiatric:          Mood and Affect: Mood normal.         Thought Content: Thought content normal.         Judgment: Judgment normal.       Assessment:       1. Essential hypertension    2. Hyperlipidemia LDL goal <100    3. Controlled type 2 diabetes mellitus without complication, without long-term current use of insulin    4. Osteoarthritis of multiple joints, unspecified osteoarthritis type    5. GERD without esophagitis    6. Rheumatoid arthritis, involving unspecified site, unspecified whether rheumatoid factor present    7. Hypothyroidism, unspecified type    8. Obesity (BMI 30-39.9)          Plan:         Patient Instructions   Continue current meds and f/u 3 months      Problem List Items Addressed This Visit    None  Visit Diagnoses       Essential hypertension    -  Primary    Hyperlipidemia LDL goal <100        Controlled type 2 diabetes mellitus without complication, without long-term current use of insulin        Osteoarthritis of multiple joints, unspecified osteoarthritis type        GERD without esophagitis        Rheumatoid arthritis, involving unspecified site, unspecified whether rheumatoid factor present        Hypothyroidism, unspecified type        Obesity (BMI 30-39.9)

## 2023-03-22 ENCOUNTER — INFUSION (OUTPATIENT)
Dept: INFUSION THERAPY | Facility: HOSPITAL | Age: 73
End: 2023-03-22
Attending: NURSE PRACTITIONER
Payer: MEDICARE

## 2023-03-22 VITALS
HEART RATE: 64 BPM | SYSTOLIC BLOOD PRESSURE: 163 MMHG | DIASTOLIC BLOOD PRESSURE: 77 MMHG | OXYGEN SATURATION: 97 % | TEMPERATURE: 99 F | RESPIRATION RATE: 17 BRPM

## 2023-03-22 DIAGNOSIS — M05.79 RHEUMATOID ARTHRITIS INVOLVING MULTIPLE SITES WITH POSITIVE RHEUMATOID FACTOR: Primary | ICD-10-CM

## 2023-03-22 PROCEDURE — 96365 THER/PROPH/DIAG IV INF INIT: CPT

## 2023-03-22 PROCEDURE — 63600175 PHARM REV CODE 636 W HCPCS: Mod: JZ,JA,JG | Performed by: NURSE PRACTITIONER

## 2023-03-22 PROCEDURE — 25000003 PHARM REV CODE 250: Performed by: NURSE PRACTITIONER

## 2023-03-22 RX ORDER — METHYLPREDNISOLONE SOD SUCC 125 MG
62.5 VIAL (EA) INJECTION ONCE AS NEEDED
Status: COMPLETED | OUTPATIENT
Start: 2023-03-22 | End: 2023-03-22

## 2023-03-22 RX ORDER — ACETAMINOPHEN 500 MG
1000 TABLET ORAL ONCE AS NEEDED
Status: CANCELLED
Start: 2023-03-22

## 2023-03-22 RX ORDER — DIPHENHYDRAMINE HCL 12.5MG/5ML
25 LIQUID (ML) ORAL ONCE AS NEEDED
Status: COMPLETED | OUTPATIENT
Start: 2023-03-22 | End: 2023-03-22

## 2023-03-22 RX ORDER — SODIUM CHLORIDE 0.9 % (FLUSH) 0.9 %
10 SYRINGE (ML) INJECTION
Status: CANCELLED | OUTPATIENT
Start: 2023-03-22

## 2023-03-22 RX ORDER — ACETAMINOPHEN 500 MG
1000 TABLET ORAL ONCE AS NEEDED
Status: COMPLETED | OUTPATIENT
Start: 2023-03-22 | End: 2023-03-22

## 2023-03-22 RX ORDER — DIPHENHYDRAMINE HCL 12.5MG/5ML
25 LIQUID (ML) ORAL ONCE AS NEEDED
Status: CANCELLED
Start: 2023-03-22

## 2023-03-22 RX ADMIN — METHYLPREDNISOLONE SODIUM SUCCINATE 62.5 MG: 125 INJECTION, POWDER, FOR SOLUTION INTRAMUSCULAR; INTRAVENOUS at 10:03

## 2023-03-22 RX ADMIN — ACETAMINOPHEN 1000 MG: 500 TABLET ORAL at 10:03

## 2023-03-22 RX ADMIN — DIPHENHYDRAMINE HYDROCHLORIDE 25 MG: 12.5 LIQUID ORAL at 10:03

## 2023-03-22 RX ADMIN — SODIUM CHLORIDE 750 MG: 9 INJECTION, SOLUTION INTRAVENOUS at 10:03

## 2023-03-22 NOTE — PROGRESS NOTES
1010 Pt here for Orencia infusion, resting in recliner, TP released and pharmacy notified   Pre meds given and tolerated  1044 Orencia infusion started to go in over 30 min with filter applied  1115 Orencia infusion complete, tolerated well, will continue to monitor  Follow up appt made for 4 weeks  1140 pt discharged ambulatory with no adverse reaction noted, pt notified to go to ER with any adverse reactions, AVS given

## 2023-03-24 ENCOUNTER — TELEPHONE (OUTPATIENT)
Dept: INTERNAL MEDICINE | Facility: CLINIC | Age: 73
End: 2023-03-24
Payer: MEDICARE

## 2023-03-24 DIAGNOSIS — J32.9 SINUSITIS, UNSPECIFIED CHRONICITY, UNSPECIFIED LOCATION: Primary | ICD-10-CM

## 2023-03-24 RX ORDER — AZITHROMYCIN 250 MG/1
TABLET, FILM COATED ORAL
Qty: 6 TABLET | Refills: 1 | Status: SHIPPED | OUTPATIENT
Start: 2023-03-24 | End: 2023-08-15

## 2023-03-31 ENCOUNTER — EXTERNAL CHRONIC CARE MANAGEMENT (OUTPATIENT)
Dept: INTERNAL MEDICINE | Facility: CLINIC | Age: 73
End: 2023-03-31
Payer: MEDICARE

## 2023-03-31 PROCEDURE — 99490 PR CHRONIC CARE MGMT, 1ST 20 MIN: ICD-10-PCS | Mod: S$PBB,,, | Performed by: INTERNAL MEDICINE

## 2023-03-31 PROCEDURE — 99490 CHRNC CARE MGMT STAFF 1ST 20: CPT | Mod: S$PBB,,, | Performed by: INTERNAL MEDICINE

## 2023-03-31 PROCEDURE — 99490 CHRNC CARE MGMT STAFF 1ST 20: CPT | Mod: PBBFAC | Performed by: INTERNAL MEDICINE

## 2023-04-11 ENCOUNTER — PATIENT MESSAGE (OUTPATIENT)
Dept: RESEARCH | Facility: HOSPITAL | Age: 73
End: 2023-04-11

## 2023-04-19 ENCOUNTER — INFUSION (OUTPATIENT)
Dept: INFUSION THERAPY | Facility: HOSPITAL | Age: 73
End: 2023-04-19
Attending: INTERNAL MEDICINE
Payer: MEDICARE

## 2023-04-19 VITALS
DIASTOLIC BLOOD PRESSURE: 68 MMHG | OXYGEN SATURATION: 98 % | TEMPERATURE: 98 F | RESPIRATION RATE: 18 BRPM | SYSTOLIC BLOOD PRESSURE: 138 MMHG | HEART RATE: 70 BPM

## 2023-04-19 DIAGNOSIS — M05.79 RHEUMATOID ARTHRITIS INVOLVING MULTIPLE SITES WITH POSITIVE RHEUMATOID FACTOR: Primary | ICD-10-CM

## 2023-04-19 PROCEDURE — 25000003 PHARM REV CODE 250: Performed by: NURSE PRACTITIONER

## 2023-04-19 PROCEDURE — 96365 THER/PROPH/DIAG IV INF INIT: CPT

## 2023-04-19 PROCEDURE — 96375 TX/PRO/DX INJ NEW DRUG ADDON: CPT

## 2023-04-19 PROCEDURE — 63600175 PHARM REV CODE 636 W HCPCS: Performed by: NURSE PRACTITIONER

## 2023-04-19 RX ORDER — METHYLPREDNISOLONE SOD SUCC 125 MG
62.5 VIAL (EA) INJECTION ONCE AS NEEDED
Status: COMPLETED | OUTPATIENT
Start: 2023-04-19 | End: 2023-04-19

## 2023-04-19 RX ORDER — DIPHENHYDRAMINE HCL 12.5MG/5ML
25 LIQUID (ML) ORAL ONCE AS NEEDED
Status: CANCELLED
Start: 2023-04-19

## 2023-04-19 RX ORDER — DIPHENHYDRAMINE HCL 12.5MG/5ML
25 LIQUID (ML) ORAL ONCE AS NEEDED
Status: COMPLETED | OUTPATIENT
Start: 2023-04-19 | End: 2023-04-19

## 2023-04-19 RX ORDER — SODIUM CHLORIDE 0.9 % (FLUSH) 0.9 %
10 SYRINGE (ML) INJECTION
Status: CANCELLED | OUTPATIENT
Start: 2023-04-19

## 2023-04-19 RX ORDER — ACETAMINOPHEN 500 MG
1000 TABLET ORAL ONCE AS NEEDED
Status: CANCELLED
Start: 2023-04-19

## 2023-04-19 RX ORDER — ACETAMINOPHEN 500 MG
1000 TABLET ORAL ONCE AS NEEDED
Status: COMPLETED | OUTPATIENT
Start: 2023-04-19 | End: 2023-04-19

## 2023-04-19 RX ADMIN — SODIUM CHLORIDE 750 MG: 9 INJECTION, SOLUTION INTRAVENOUS at 10:04

## 2023-04-19 RX ADMIN — ACETAMINOPHEN 1000 MG: 500 TABLET ORAL at 10:04

## 2023-04-19 RX ADMIN — METHYLPREDNISOLONE SODIUM SUCCINATE 62.5 MG: 125 INJECTION, POWDER, FOR SOLUTION INTRAMUSCULAR; INTRAVENOUS at 10:04

## 2023-04-19 RX ADMIN — DIPHENHYDRAMINE HYDROCHLORIDE 25 MG: 12.5 LIQUID ORAL at 10:04

## 2023-04-19 NOTE — PATIENT INSTRUCTIONS
Return to Emergency Room with any signs or symptoms of reaction to medication such as shortness of breath, tongue swelling, or feel as though your throat is closing up.

## 2023-04-19 NOTE — PROGRESS NOTES
0955- Pt here for orencia infusion, resting in recliner, TP released and pharmacy notified   1025- orencia infusion started  1125- orencia infusion complete, tolerated well, will continue to monitor  1145- pt discharged ambulatory with no adverse reaction noted, pt notified to go to ER with any adverse reactions, AVS given along with medication information

## 2023-04-30 ENCOUNTER — EXTERNAL CHRONIC CARE MANAGEMENT (OUTPATIENT)
Dept: INTERNAL MEDICINE | Facility: CLINIC | Age: 73
End: 2023-04-30
Payer: MEDICARE

## 2023-04-30 PROCEDURE — 99490 CHRNC CARE MGMT STAFF 1ST 20: CPT | Mod: S$PBB,,, | Performed by: INTERNAL MEDICINE

## 2023-04-30 PROCEDURE — 99490 CHRNC CARE MGMT STAFF 1ST 20: CPT | Mod: PBBFAC | Performed by: INTERNAL MEDICINE

## 2023-04-30 PROCEDURE — 99490 PR CHRONIC CARE MGMT, 1ST 20 MIN: ICD-10-PCS | Mod: S$PBB,,, | Performed by: INTERNAL MEDICINE

## 2023-05-12 ENCOUNTER — OFFICE VISIT (OUTPATIENT)
Dept: INTERNAL MEDICINE | Facility: CLINIC | Age: 73
End: 2023-05-12
Payer: MEDICARE

## 2023-05-12 VITALS
HEART RATE: 81 BPM | SYSTOLIC BLOOD PRESSURE: 148 MMHG | WEIGHT: 214 LBS | DIASTOLIC BLOOD PRESSURE: 68 MMHG | HEIGHT: 63 IN | BODY MASS INDEX: 37.92 KG/M2 | OXYGEN SATURATION: 98 % | RESPIRATION RATE: 16 BRPM

## 2023-05-12 DIAGNOSIS — E03.9 HYPOTHYROIDISM, UNSPECIFIED TYPE: ICD-10-CM

## 2023-05-12 DIAGNOSIS — I10 ESSENTIAL HYPERTENSION: Primary | ICD-10-CM

## 2023-05-12 DIAGNOSIS — M15.9 OSTEOARTHRITIS OF MULTIPLE JOINTS, UNSPECIFIED OSTEOARTHRITIS TYPE: ICD-10-CM

## 2023-05-12 DIAGNOSIS — M06.9 RHEUMATOID ARTHRITIS, INVOLVING UNSPECIFIED SITE, UNSPECIFIED WHETHER RHEUMATOID FACTOR PRESENT: ICD-10-CM

## 2023-05-12 DIAGNOSIS — E11.9 CONTROLLED TYPE 2 DIABETES MELLITUS WITHOUT COMPLICATION, WITHOUT LONG-TERM CURRENT USE OF INSULIN: ICD-10-CM

## 2023-05-12 PROCEDURE — 99213 OFFICE O/P EST LOW 20 MIN: CPT | Mod: PBBFAC | Performed by: INTERNAL MEDICINE

## 2023-05-12 PROCEDURE — 1160F RVW MEDS BY RX/DR IN RCRD: CPT | Mod: CPTII,,, | Performed by: INTERNAL MEDICINE

## 2023-05-12 PROCEDURE — 3288F FALL RISK ASSESSMENT DOCD: CPT | Mod: CPTII,,, | Performed by: INTERNAL MEDICINE

## 2023-05-12 PROCEDURE — 4010F ACE/ARB THERAPY RXD/TAKEN: CPT | Mod: CPTII,,, | Performed by: INTERNAL MEDICINE

## 2023-05-12 PROCEDURE — 4010F PR ACE/ARB THEARPY RXD/TAKEN: ICD-10-PCS | Mod: CPTII,,, | Performed by: INTERNAL MEDICINE

## 2023-05-12 PROCEDURE — 1126F PR PAIN SEVERITY QUANTIFIED, NO PAIN PRESENT: ICD-10-PCS | Mod: CPTII,,, | Performed by: INTERNAL MEDICINE

## 2023-05-12 PROCEDURE — 1159F MED LIST DOCD IN RCRD: CPT | Mod: CPTII,,, | Performed by: INTERNAL MEDICINE

## 2023-05-12 PROCEDURE — 99214 PR OFFICE/OUTPT VISIT, EST, LEVL IV, 30-39 MIN: ICD-10-PCS | Mod: S$PBB,,, | Performed by: INTERNAL MEDICINE

## 2023-05-12 PROCEDURE — 1159F PR MEDICATION LIST DOCUMENTED IN MEDICAL RECORD: ICD-10-PCS | Mod: CPTII,,, | Performed by: INTERNAL MEDICINE

## 2023-05-12 PROCEDURE — 3008F PR BODY MASS INDEX (BMI) DOCUMENTED: ICD-10-PCS | Mod: CPTII,,, | Performed by: INTERNAL MEDICINE

## 2023-05-12 PROCEDURE — 3078F DIAST BP <80 MM HG: CPT | Mod: CPTII,,, | Performed by: INTERNAL MEDICINE

## 2023-05-12 PROCEDURE — 1160F PR REVIEW ALL MEDS BY PRESCRIBER/CLIN PHARMACIST DOCUMENTED: ICD-10-PCS | Mod: CPTII,,, | Performed by: INTERNAL MEDICINE

## 2023-05-12 PROCEDURE — 3077F SYST BP >= 140 MM HG: CPT | Mod: CPTII,,, | Performed by: INTERNAL MEDICINE

## 2023-05-12 PROCEDURE — 1126F AMNT PAIN NOTED NONE PRSNT: CPT | Mod: CPTII,,, | Performed by: INTERNAL MEDICINE

## 2023-05-12 PROCEDURE — 1101F PT FALLS ASSESS-DOCD LE1/YR: CPT | Mod: CPTII,,, | Performed by: INTERNAL MEDICINE

## 2023-05-12 PROCEDURE — 3078F PR MOST RECENT DIASTOLIC BLOOD PRESSURE < 80 MM HG: ICD-10-PCS | Mod: CPTII,,, | Performed by: INTERNAL MEDICINE

## 2023-05-12 PROCEDURE — 99214 OFFICE O/P EST MOD 30 MIN: CPT | Mod: S$PBB,,, | Performed by: INTERNAL MEDICINE

## 2023-05-12 PROCEDURE — 3288F PR FALLS RISK ASSESSMENT DOCUMENTED: ICD-10-PCS | Mod: CPTII,,, | Performed by: INTERNAL MEDICINE

## 2023-05-12 PROCEDURE — 1101F PR PT FALLS ASSESS DOC 0-1 FALLS W/OUT INJ PAST YR: ICD-10-PCS | Mod: CPTII,,, | Performed by: INTERNAL MEDICINE

## 2023-05-12 PROCEDURE — 3077F PR MOST RECENT SYSTOLIC BLOOD PRESSURE >= 140 MM HG: ICD-10-PCS | Mod: CPTII,,, | Performed by: INTERNAL MEDICINE

## 2023-05-12 PROCEDURE — 3008F BODY MASS INDEX DOCD: CPT | Mod: CPTII,,, | Performed by: INTERNAL MEDICINE

## 2023-05-12 NOTE — PROGRESS NOTES
Subjective:       Patient ID: Meghna Dalal is a 73 y.o. female.    Chief Complaint: No chief complaint on file.    Doing well no complaints  Review of Systems   Constitutional:  Negative for fatigue and unexpected weight change.   HENT:  Negative for ear pain and goiter.    Respiratory:  Negative for chest tightness and shortness of breath.    Cardiovascular:  Negative for chest pain, palpitations and leg swelling.   Gastrointestinal:  Negative for abdominal pain and reflux.   Integumentary:  Negative for rash and breast tenderness.   Neurological:  Negative for dizziness and weakness.   Hematological:  Negative for adenopathy.   Psychiatric/Behavioral:  Negative for behavioral problems.    Breast: Negative for tenderness      Objective:      Physical Exam  Constitutional:       Appearance: Normal appearance.   HENT:      Head: Normocephalic and atraumatic.      Right Ear: External ear normal.      Left Ear: External ear normal.      Mouth/Throat:      Pharynx: Oropharynx is clear.   Eyes:      Extraocular Movements: Extraocular movements intact.      Pupils: Pupils are equal, round, and reactive to light.   Cardiovascular:      Rate and Rhythm: Normal rate and regular rhythm.      Pulses: Normal pulses.      Heart sounds: Normal heart sounds.   Pulmonary:      Effort: Pulmonary effort is normal.      Breath sounds: Normal breath sounds.   Abdominal:      General: Abdomen is flat. Bowel sounds are normal.      Palpations: Abdomen is soft.   Musculoskeletal:         General: Normal range of motion.      Cervical back: Normal range of motion and neck supple.   Skin:     General: Skin is warm.      Capillary Refill: Capillary refill takes less than 2 seconds.   Neurological:      General: No focal deficit present.      Mental Status: She is alert.   Psychiatric:         Mood and Affect: Mood normal.         Thought Content: Thought content normal.         Judgment: Judgment normal.       Assessment:       1.  Essential hypertension    2. Controlled type 2 diabetes mellitus without complication, without long-term current use of insulin    3. Rheumatoid arthritis, involving unspecified site, unspecified whether rheumatoid factor present    4. Hypothyroidism, unspecified type    5. Osteoarthritis of multiple joints, unspecified osteoarthritis type        Plan:         Patient Instructions   Continue current meds and f/u 3 months      Problem List Items Addressed This Visit    None  Visit Diagnoses       Essential hypertension    -  Primary    Controlled type 2 diabetes mellitus without complication, without long-term current use of insulin        Rheumatoid arthritis, involving unspecified site, unspecified whether rheumatoid factor present        Hypothyroidism, unspecified type        Osteoarthritis of multiple joints, unspecified osteoarthritis type

## 2023-05-19 ENCOUNTER — INFUSION (OUTPATIENT)
Dept: INFUSION THERAPY | Facility: HOSPITAL | Age: 73
End: 2023-05-19
Attending: NURSE PRACTITIONER
Payer: MEDICARE

## 2023-05-19 VITALS
DIASTOLIC BLOOD PRESSURE: 69 MMHG | SYSTOLIC BLOOD PRESSURE: 144 MMHG | OXYGEN SATURATION: 97 % | RESPIRATION RATE: 17 BRPM | TEMPERATURE: 98 F | HEART RATE: 69 BPM

## 2023-05-19 DIAGNOSIS — M05.79 RHEUMATOID ARTHRITIS INVOLVING MULTIPLE SITES WITH POSITIVE RHEUMATOID FACTOR: Primary | ICD-10-CM

## 2023-05-19 PROCEDURE — 96365 THER/PROPH/DIAG IV INF INIT: CPT

## 2023-05-19 PROCEDURE — 63600175 PHARM REV CODE 636 W HCPCS: Mod: JZ,JA,JG | Performed by: NURSE PRACTITIONER

## 2023-05-19 PROCEDURE — 25000003 PHARM REV CODE 250: Performed by: NURSE PRACTITIONER

## 2023-05-19 RX ORDER — SODIUM CHLORIDE 0.9 % (FLUSH) 0.9 %
10 SYRINGE (ML) INJECTION
Status: CANCELLED | OUTPATIENT
Start: 2023-05-19

## 2023-05-19 RX ORDER — ACETAMINOPHEN 500 MG
1000 TABLET ORAL ONCE AS NEEDED
Status: CANCELLED
Start: 2023-05-19

## 2023-05-19 RX ORDER — DIPHENHYDRAMINE HCL 12.5MG/5ML
25 LIQUID (ML) ORAL ONCE AS NEEDED
Status: CANCELLED
Start: 2023-05-19

## 2023-05-19 RX ORDER — ACETAMINOPHEN 500 MG
1000 TABLET ORAL ONCE AS NEEDED
Status: COMPLETED | OUTPATIENT
Start: 2023-05-19 | End: 2023-05-19

## 2023-05-19 RX ORDER — DIPHENHYDRAMINE HCL 12.5MG/5ML
25 LIQUID (ML) ORAL ONCE AS NEEDED
Status: COMPLETED | OUTPATIENT
Start: 2023-05-19 | End: 2023-05-19

## 2023-05-19 RX ORDER — METHYLPREDNISOLONE SOD SUCC 125 MG
62.5 VIAL (EA) INJECTION ONCE AS NEEDED
Status: COMPLETED | OUTPATIENT
Start: 2023-05-19 | End: 2023-05-19

## 2023-05-19 RX ADMIN — METHYLPREDNISOLONE SODIUM SUCCINATE 62.5 MG: 125 INJECTION, POWDER, FOR SOLUTION INTRAMUSCULAR; INTRAVENOUS at 09:05

## 2023-05-19 RX ADMIN — SODIUM CHLORIDE 750 MG: 9 INJECTION, SOLUTION INTRAVENOUS at 10:05

## 2023-05-19 RX ADMIN — ACETAMINOPHEN 1000 MG: 500 TABLET ORAL at 09:05

## 2023-05-19 RX ADMIN — DIPHENHYDRAMINE HYDROCHLORIDE 25 MG: 12.5 LIQUID ORAL at 09:05

## 2023-05-19 NOTE — PROGRESS NOTES
0940 Pt here for Orencia infusion, resting in recliner, TP released and pharmacy notified   Pre meds given and tolerated   1004 Orencia infusion started to go in over 1 hr with filter applied  1045 Orencia infusion complete, tolerated well, will continue to monitor  1100 pt discharged ambulatory with no adverse reaction noted, pt notified to go to ER with any adverse reactions, AVS given along with medication information  Follow up appt made for 4 weeks

## 2023-05-31 ENCOUNTER — EXTERNAL CHRONIC CARE MANAGEMENT (OUTPATIENT)
Dept: INTERNAL MEDICINE | Facility: CLINIC | Age: 73
End: 2023-05-31
Payer: MEDICARE

## 2023-05-31 PROCEDURE — 99490 CHRNC CARE MGMT STAFF 1ST 20: CPT | Mod: PBBFAC | Performed by: INTERNAL MEDICINE

## 2023-05-31 PROCEDURE — 99490 CHRNC CARE MGMT STAFF 1ST 20: CPT | Mod: S$PBB,,, | Performed by: INTERNAL MEDICINE

## 2023-05-31 PROCEDURE — 99490 PR CHRONIC CARE MGMT, 1ST 20 MIN: ICD-10-PCS | Mod: S$PBB,,, | Performed by: INTERNAL MEDICINE

## 2023-06-19 ENCOUNTER — INFUSION (OUTPATIENT)
Dept: INFUSION THERAPY | Facility: HOSPITAL | Age: 73
End: 2023-06-19
Attending: NURSE PRACTITIONER
Payer: MEDICARE

## 2023-06-19 VITALS
DIASTOLIC BLOOD PRESSURE: 59 MMHG | HEART RATE: 74 BPM | TEMPERATURE: 98 F | OXYGEN SATURATION: 95 % | SYSTOLIC BLOOD PRESSURE: 154 MMHG | RESPIRATION RATE: 17 BRPM

## 2023-06-19 DIAGNOSIS — M05.79 RHEUMATOID ARTHRITIS INVOLVING MULTIPLE SITES WITH POSITIVE RHEUMATOID FACTOR: Primary | ICD-10-CM

## 2023-06-19 PROCEDURE — 25000003 PHARM REV CODE 250: Performed by: NURSE PRACTITIONER

## 2023-06-19 PROCEDURE — 63600175 PHARM REV CODE 636 W HCPCS: Performed by: NURSE PRACTITIONER

## 2023-06-19 PROCEDURE — 96365 THER/PROPH/DIAG IV INF INIT: CPT

## 2023-06-19 RX ORDER — SODIUM CHLORIDE 0.9 % (FLUSH) 0.9 %
10 SYRINGE (ML) INJECTION
Status: CANCELLED | OUTPATIENT
Start: 2023-06-19

## 2023-06-19 RX ORDER — DIPHENHYDRAMINE HCL 12.5MG/5ML
25 LIQUID (ML) ORAL ONCE AS NEEDED
Status: CANCELLED
Start: 2023-06-19

## 2023-06-19 RX ORDER — METHYLPREDNISOLONE SOD SUCC 125 MG
62.5 VIAL (EA) INJECTION ONCE AS NEEDED
Status: COMPLETED | OUTPATIENT
Start: 2023-06-19 | End: 2023-06-19

## 2023-06-19 RX ORDER — ACETAMINOPHEN 500 MG
1000 TABLET ORAL ONCE AS NEEDED
Status: CANCELLED
Start: 2023-06-19

## 2023-06-19 RX ORDER — DIPHENHYDRAMINE HCL 12.5MG/5ML
25 LIQUID (ML) ORAL ONCE AS NEEDED
Status: COMPLETED | OUTPATIENT
Start: 2023-06-19 | End: 2023-06-19

## 2023-06-19 RX ORDER — ACETAMINOPHEN 500 MG
1000 TABLET ORAL ONCE AS NEEDED
Status: COMPLETED | OUTPATIENT
Start: 2023-06-19 | End: 2023-06-19

## 2023-06-19 RX ADMIN — SODIUM CHLORIDE 750 MG: 9 INJECTION, SOLUTION INTRAVENOUS at 09:06

## 2023-06-19 RX ADMIN — DIPHENHYDRAMINE HYDROCHLORIDE 25 MG: 12.5 LIQUID ORAL at 09:06

## 2023-06-19 RX ADMIN — METHYLPREDNISOLONE SODIUM SUCCINATE 62.5 MG: 125 INJECTION, POWDER, FOR SOLUTION INTRAMUSCULAR; INTRAVENOUS at 09:06

## 2023-06-19 RX ADMIN — ACETAMINOPHEN 1000 MG: 500 TABLET ORAL at 09:06

## 2023-06-19 NOTE — PROGRESS NOTES
0920 Pt here for Orencia infusion, resting in recliner, TP released and pharmacy notified   Pre meds given  0941 Orencia infusion started to go in over 1 hr with filter applied  1039 Orencia infusion complete, tolerated well, will continue to monitor  1100 pt discharged ambulatory with no adverse reaction noted, pt notified to go to ER with any adverse reactions, AVS given along with medication information  Follow up appt made for 4 weeks

## 2023-06-30 ENCOUNTER — EXTERNAL CHRONIC CARE MANAGEMENT (OUTPATIENT)
Dept: INTERNAL MEDICINE | Facility: CLINIC | Age: 73
End: 2023-06-30
Payer: MEDICARE

## 2023-06-30 PROCEDURE — 99439 CHRNC CARE MGMT STAF EA ADDL: CPT | Mod: S$PBB,,, | Performed by: INTERNAL MEDICINE

## 2023-06-30 PROCEDURE — 99490 PR CHRONIC CARE MGMT, 1ST 20 MIN: ICD-10-PCS | Mod: S$PBB,,, | Performed by: INTERNAL MEDICINE

## 2023-06-30 PROCEDURE — 99490 CHRNC CARE MGMT STAFF 1ST 20: CPT | Mod: S$PBB,,, | Performed by: INTERNAL MEDICINE

## 2023-06-30 PROCEDURE — 99439 PR CHRONIC CARE MGMT, EA ADDTL 20 MIN: ICD-10-PCS | Mod: S$PBB,,, | Performed by: INTERNAL MEDICINE

## 2023-06-30 PROCEDURE — 99490 CHRNC CARE MGMT STAFF 1ST 20: CPT | Mod: PBBFAC | Performed by: INTERNAL MEDICINE

## 2023-06-30 PROCEDURE — 99439 CHRNC CARE MGMT STAF EA ADDL: CPT | Mod: PBBFAC | Performed by: INTERNAL MEDICINE

## 2023-07-19 ENCOUNTER — INFUSION (OUTPATIENT)
Dept: INFUSION THERAPY | Facility: HOSPITAL | Age: 73
End: 2023-07-19
Attending: INTERNAL MEDICINE
Payer: MEDICARE

## 2023-07-19 VITALS
DIASTOLIC BLOOD PRESSURE: 76 MMHG | OXYGEN SATURATION: 98 % | SYSTOLIC BLOOD PRESSURE: 156 MMHG | HEART RATE: 63 BPM | RESPIRATION RATE: 17 BRPM

## 2023-07-19 DIAGNOSIS — M05.79 RHEUMATOID ARTHRITIS INVOLVING MULTIPLE SITES WITH POSITIVE RHEUMATOID FACTOR: Primary | ICD-10-CM

## 2023-07-19 PROCEDURE — 25000003 PHARM REV CODE 250: Performed by: NURSE PRACTITIONER

## 2023-07-19 PROCEDURE — 63600175 PHARM REV CODE 636 W HCPCS: Mod: JZ,JA,JG | Performed by: NURSE PRACTITIONER

## 2023-07-19 PROCEDURE — 96365 THER/PROPH/DIAG IV INF INIT: CPT

## 2023-07-19 RX ORDER — ACETAMINOPHEN 500 MG
1000 TABLET ORAL ONCE AS NEEDED
Status: COMPLETED | OUTPATIENT
Start: 2023-07-19 | End: 2023-07-19

## 2023-07-19 RX ORDER — ACETAMINOPHEN 500 MG
1000 TABLET ORAL ONCE AS NEEDED
Status: CANCELLED
Start: 2023-07-19

## 2023-07-19 RX ORDER — SODIUM CHLORIDE 0.9 % (FLUSH) 0.9 %
10 SYRINGE (ML) INJECTION
Status: CANCELLED | OUTPATIENT
Start: 2023-07-19

## 2023-07-19 RX ORDER — DIPHENHYDRAMINE HCL 12.5MG/5ML
25 LIQUID (ML) ORAL ONCE AS NEEDED
Status: COMPLETED | OUTPATIENT
Start: 2023-07-19 | End: 2023-07-19

## 2023-07-19 RX ORDER — DIPHENHYDRAMINE HCL 12.5MG/5ML
25 LIQUID (ML) ORAL ONCE AS NEEDED
Status: CANCELLED
Start: 2023-07-19

## 2023-07-19 RX ADMIN — DIPHENHYDRAMINE HYDROCHLORIDE 25 MG: 12.5 LIQUID ORAL at 10:07

## 2023-07-19 RX ADMIN — SODIUM CHLORIDE 750 MG: 9 INJECTION, SOLUTION INTRAVENOUS at 11:07

## 2023-07-19 RX ADMIN — ACETAMINOPHEN 1000 MG: 500 TABLET ORAL at 10:07

## 2023-07-19 NOTE — PROGRESS NOTES
1030 Pt here for Orencia infusion, resting in recliner, TP released and pharmacy notified   Pre meds given and tolerated  1100 Orencia infusion started to go in over 1 hr with filter applied  1200 Orencia infusion complete, tolerated well, will continue to monitor  1220 pt discharged ambulatory with no adverse reaction noted, pt notified to go to ER with any adverse reactions, AVS given along with medication information

## 2023-07-24 ENCOUNTER — OFFICE VISIT (OUTPATIENT)
Dept: DERMATOLOGY | Facility: CLINIC | Age: 73
End: 2023-07-24
Payer: MEDICARE

## 2023-07-24 DIAGNOSIS — L82.1 SK (SEBORRHEIC KERATOSIS): ICD-10-CM

## 2023-07-24 DIAGNOSIS — L57.8 OTHER SKIN CHANGES DUE TO CHRONIC EXPOSURE TO NONIONIZING RADIATION: Primary | ICD-10-CM

## 2023-07-24 DIAGNOSIS — D48.9 NEOPLASM OF UNCERTAIN BEHAVIOR: ICD-10-CM

## 2023-07-24 PROCEDURE — 99213 OFFICE O/P EST LOW 20 MIN: CPT | Mod: 25,,, | Performed by: DERMATOLOGY

## 2023-07-24 PROCEDURE — 1159F PR MEDICATION LIST DOCUMENTED IN MEDICAL RECORD: ICD-10-PCS | Mod: CPTII,,, | Performed by: DERMATOLOGY

## 2023-07-24 PROCEDURE — 4010F PR ACE/ARB THEARPY RXD/TAKEN: ICD-10-PCS | Mod: CPTII,,, | Performed by: DERMATOLOGY

## 2023-07-24 PROCEDURE — 88305 PATHOLOGY, DERMATOLOGY: ICD-10-PCS | Mod: 26,,, | Performed by: PATHOLOGY

## 2023-07-24 PROCEDURE — 1159F MED LIST DOCD IN RCRD: CPT | Mod: CPTII,,, | Performed by: DERMATOLOGY

## 2023-07-24 PROCEDURE — 1160F RVW MEDS BY RX/DR IN RCRD: CPT | Mod: CPTII,,, | Performed by: DERMATOLOGY

## 2023-07-24 PROCEDURE — 88305 TISSUE EXAM BY PATHOLOGIST: CPT | Mod: TC,SUR | Performed by: DERMATOLOGY

## 2023-07-24 PROCEDURE — 99213 PR OFFICE/OUTPT VISIT, EST, LEVL III, 20-29 MIN: ICD-10-PCS | Mod: 25,,, | Performed by: DERMATOLOGY

## 2023-07-24 PROCEDURE — 11104 PR PUNCH BIOPSY, SKIN, SINGLE LESION: ICD-10-PCS | Mod: ,,, | Performed by: DERMATOLOGY

## 2023-07-24 PROCEDURE — 4010F ACE/ARB THERAPY RXD/TAKEN: CPT | Mod: CPTII,,, | Performed by: DERMATOLOGY

## 2023-07-24 PROCEDURE — 1160F PR REVIEW ALL MEDS BY PRESCRIBER/CLIN PHARMACIST DOCUMENTED: ICD-10-PCS | Mod: CPTII,,, | Performed by: DERMATOLOGY

## 2023-07-24 PROCEDURE — 88305 TISSUE EXAM BY PATHOLOGIST: CPT | Mod: 26,,, | Performed by: PATHOLOGY

## 2023-07-24 PROCEDURE — 11104 PUNCH BX SKIN SINGLE LESION: CPT | Mod: ,,, | Performed by: DERMATOLOGY

## 2023-07-24 NOTE — PATIENT INSTRUCTIONS
Biopsy Site Care  Starting tomorrow you may shower and wash the area with dial antibacterial soap  Pat dry and apply vaseline and a bandaid  Perform this routine for three days  The area will be irritated, sore, slightly red, and may itch, sting, or burn  Do not apply make-up to the area until it is healed  There will be a scar  The area will take 1-2 weeks to heal  No soaking in the tub or hot tub for one week  Sunscreen Recommendations  I recommended a broad spectrum sunscreen with a SPF of 30 or higher that is water-resistant. SPF 30 sunscreens block approximately 97 percent of the sun's harmful rays.   Sunscreens should be applied at least 15 minutes prior to expected sun exposure and then every 90 minutes after that as long as sun exposure continues.   If swimming or exercising sunscreen should be reapplied every 45 minutes to an hour after getting wet or sweating.  One ounce, or the equivalent of a shot glass full of sunscreen, is adequate to protect the skin not covered by a bathing suit.   I also recommended a lip balm with a sunscreen as well.   Healthy Sun Protective Behaviors  Sun protective clothing can be used in lieu of sunscreen but must be worn the entire time you are exposed to the sun's rays.  Seek shade between 10 a.m. and 2 p.m.  Use extra caution near water, snow, or sand as they reflect sun rays  Avoid tanning beds and consider sunless self-tanning products instead  Perform monthly self skin exams

## 2023-07-24 NOTE — PROGRESS NOTES
[facility]   Shantel Monson MD    Patient Name: Meghna Dalal  Patient YOB: 1950   Date of Service: 7/24/23    CC: Lesion    HPI: Meghna Dalal is a 73 y.o. female here today for lesion, located on the right under arm.  Lesion has been present for 1 years.  Previous treatments include none.      Past Medical History:   Diagnosis Date    Arthritis     Diabetes mellitus, type 2     GERD (gastroesophageal reflux disease)     Hypertension     Iron deficiency anemia     Obesity     Personal history of colonic polyps 08/19/2019    Thyroid disease      Past Surgical History:   Procedure Laterality Date    ARTHROSCOPIC DEBRIDEMENT OF SHOULDER Left 12/21/2021    Procedure: DEBRIDEMENT, SHOULDER, ARTHROSCOPIC;  Surgeon: Jose Antonio Pablo MD;  Location: [facility] OR;  Service: Orthopedics;  Laterality: Left;  SHOULDER JOINT    ARTHROSCOPIC REMOVAL OF LOOSE BODY FROM JOINT Left 12/21/2021    Procedure: REMOVAL, LOOSE BODY, JOINT, ARTHROSCOPIC;  Surgeon: Jose Antonio Pablo MD;  Location: [facility] OR;  Service: Orthopedics;  Laterality: Left;    COLONOSCOPY W/ POLYPECTOMY  08/19/2019    DECOMPRESSION OF SUBACROMIAL SPACE Left 12/21/2021    Procedure: DECOMPRESSION, SUBACROMIAL SPACE;  Surgeon: Jose Antonio Pablo MD;  Location: [facility] OR;  Service: Orthopedics;  Laterality: Left;    DISTAL CLAVICLE EXCISION Left 12/21/2021    Procedure: EXCISION, CLAVICLE, DISTAL;  Surgeon: Jose Antonio Pablo MD;  Location: [facility] OR;  Service: Orthopedics;  Laterality: Left;    left total knee replacement      PARTIAL HYSTERECTOMY      ROTATOR CUFF REPAIR Left 12/21/2021    Procedure: REPAIR, ROTATOR CUFF;  Surgeon: Jose Antonio Pablo MD;  Location: [facility] OR;  Service: Orthopedics;  Laterality: Left;    SHOULDER ARTHROSCOPY Left 12/21/2021    Procedure: ARTHROSCOPY, SHOULDER;  Surgeon: Jose Antonio Pablo MD;  Location: [facility] OR;  Service: Orthopedics;  Laterality: Left;     SHOULDER SURGERY Left 12/21/2021    THYROID SURGERY      total thyroidectomy    TONSILLECTOMY AND ADENOIDECTOMY       Review of patient's allergies indicates:   Allergen Reactions    Codeine phosphate      Other reaction(s): facial swelling    Codeine sulfate      Other reaction(s): Unknown       Current Outpatient Medications:     abatacept (ORENCIA SUBQ), , Disp: , Rfl:     aspirin 81 mg Cap, Take by mouth., Disp: , Rfl:     azelastine (ASTELIN) 137 mcg (0.1 %) nasal spray, 1 puff in each nostril, Disp: , Rfl:     azithromycin (Z-JOSE) 250 MG tablet, 2 tabs on day one followed by one a day for four days (Patient not taking: Reported on 5/12/2023), Disp: 6 tablet, Rfl: 1    cyclobenzaprine (FLEXERIL) 10 MG tablet, Take 1 tablet (10 mg total) by mouth 3 (three) times daily as needed for Muscle spasms., Disp: 90 tablet, Rfl: 11    fluticasone propionate (FLONASE) 50 mcg/actuation nasal spray, 1 spray in each nostril Strength: 50 mcg/actuation, Disp: 16 g, Rfl: 11    folic acid (FOLVITE) 1 MG tablet, TAKE 1 TABLET BY MOUTH ONCE A DAY FOR 30 DAYS, Disp: , Rfl:     hydroCHLOROthiazide (HYDRODIURIL) 12.5 MG Tab, TAKE 1 TABLET BY MOUTH EVERY DAY (Patient not taking: Reported on 3/1/2023), Disp: 90 tablet, Rfl: 3    ibuprofen (ADVIL,MOTRIN) 800 MG tablet, 1 tablet, Disp: , Rfl:     LAGEVRIO, EUA, 200 mg capsule (EUA), TAKE 4 CAPSULES BY MOUTH TWICE A DAY FOR 5 DAYS, Disp: , Rfl:     levocetirizine (XYZAL) 5 MG tablet, Take 5 mg by mouth every evening., Disp: , Rfl:     levothyroxine (SYNTHROID) 125 MCG tablet, TAKE 1 TABLET BY MOUTH EVERY DAY, Disp: 90 tablet, Rfl: 3    loratadine (CLARITIN) 10 mg tablet, 1 tablet, Disp: , Rfl:     losartan-hydrochlorothiazide 100-12.5 mg (HYZAAR) 100-12.5 mg Tab, Take 1 tablet by mouth once daily., Disp: 90 tablet, Rfl: 3    metFORMIN (GLUCOPHAGE) 500 MG tablet, TAKE 1 TABLET BY MOUTH THREE TIMES A DAY, Disp: 270 tablet, Rfl: 3    naproxen (NAPROSYN) 250 MG tablet, , Disp: , Rfl:      omeprazole (PRILOSEC) 40 MG capsule, Take 1 capsule (40 mg total) by mouth once daily., Disp: 90 capsule, Rfl: 3    ondansetron (ZOFRAN-ODT) 4 MG TbDL, Take 1 tablet (4 mg total) by mouth every 6 (six) hours as needed. (Patient not taking: Reported on 3/1/2023), Disp: 20 tablet, Rfl: 0    potassium chloride (MICRO-K) 8 mEq CpSR, Take 1 capsule (8 mEq total) by mouth once daily., Disp: 90 capsule, Rfl: 3    predniSONE (DELTASONE) 5 MG tablet, TAKE 1-2 TABLETS BY MOUTH DAILY AS NEEDED, Disp: , Rfl:     promethazine-dextromethorphan (PROMETHAZINE-DM) 6.25-15 mg/5 mL Syrp, Take 5 mLs by mouth., Disp: , Rfl:     traZODone (DESYREL) 50 MG tablet, Take 1 tablet (50 mg total) by mouth every evening. (Patient not taking: Reported on 3/1/2023), Disp: 30 tablet, Rfl: 11    Current Facility-Administered Medications:     diphenhydrAMINE injection 25 mg, 25 mg, Intravenous, Once PRN, Kwame Wyatt MD    EPINEPHrine (EPIPEN) 0.3 mg/0.3 mL pen injection 0.3 mg, 0.3 mg, Intramuscular, PRN, Kwame Wyatt MD    ondansetron disintegrating tablet 4 mg, 4 mg, Oral, Once PRN, Kwame Wyatt MD    sodium chloride 0.9% 500 mL flush bag, , Intravenous, PRN, Kwame Wyatt MD    sodium chloride 0.9% flush 10 mL, 10 mL, Intravenous, PRN, Kwame Wyatt MD    ROS: A focused review of systems was obtained and negative.     Exam: A focused skin exam was performed. All areas examined were normal except as mentioned in the assessment and plan below.  General Appearance of the patient is well developed and well nourished.  Orientation: alert and oriented x 3.  Mood and affect: pleasant.    Assessment:   The primary encounter diagnosis was Other skin changes due to chronic exposure to nonionizing radiation. Diagnoses of Neoplasm of uncertain behavior and SK (seborrheic keratosis) were also pertinent to this visit.    Plan:      Neoplasm of Uncertain Behavior (D48.5)  -cyst located on the right axilla  Ddx includes: cyst     Plan:  Counseling.  I counseled the patient regarding the following:  Instructions: Neoplasms of Uncertain Behavior can be observed, biopsied or surgically removed depending on the  level of clinical suspicion.  Instructions: Neoplasms of Uncertain Behavior can be observed, biopsied or surgically removed depending on the  level of clinical suspicion.  Contact Office if: patient develops any new lesions that fail to heal, ulcerate or bleed.    Plan: Biopsy by Punch Method  Location (A): right axilla.   Accession Number: N/A  Written consent was obtained and risks were reviewed including but not limited to scarring, infection, bleeding, scabbing, incomplete removal, nerve damage and allergy to anesthesia. The area was prepped with Chloraprep. Local anesthesia was obtained with approximately 0.5cc of 1% lidocaine with epinephrine. A 6mm punch biopsy (sent for H &E ) was performed on the above listed location. Epidermal closure was achieved with 4-0 Ethilon. Following the biopsy Petrolatum and a bandage were applied. Patient will be notified of biopsy results. However, patient instructed to call the office if not contacted within 2 weeks. Suture removal in 10 days.    Other Skin Changes Due to Chronic Exposure of Nonionizing Radiation (L57.8)    Plan: Monitoring.     Plan: Sunscreen Recommendations.  I recommended a broad spectrum sunscreen with a SPF of 30 or higher. I explained that SPF 30 sunscreens block approximately 97 percent of the  sun's harmful rays. Sunscreens should be applied at least 15 minutes prior to expected sun exposure and then every 2 hours after that as long as  sun exposure continues. If swimming or exercising sunscreen should be reapplied every 45 minutes to an hour after getting wet or sweating. One  ounce, or the equivalent of a shot glass full of sunscreen, is adequate to protect the skin not covered by a bathing suit. I also recommended a lip  balm with a sunscreen as well. Sun protective clothing  can be used in lieu of sunscreen but must be worn the entire time you are exposed to the  sun's rays.    Seborrheic Keratosis (L82.1)  - Stuck-on, warty, greasy brown papule with pseudo-horn cysts scattered on the trunk and extremities    Plan: Counseling.  I counseled the patient regarding the following:  Skin Care: Seborrheic Keratoses are benign. No treatment is necessary.  Expectations: Seborrheic Keratoses are benign warty growths. Patients get more of them as they age    Plan: Reassurance      Follow up in about 10 days (around 8/3/2023) for SR Edwina Monson MD

## 2023-07-26 LAB
ESTROGEN SERPL-MCNC: NORMAL PG/ML
INSULIN SERPL-ACNC: NORMAL U[IU]/ML
LAB AP GROSS DESCRIPTION: NORMAL
LAB AP LABORATORY NOTES: NORMAL
LAB AP SPEC A DDX: NORMAL
LAB AP SPEC A MORPHOLOGY: NORMAL
LAB AP SPEC A PROCEDURE: NORMAL
T3RU NFR SERPL: NORMAL %

## 2023-07-31 ENCOUNTER — EXTERNAL CHRONIC CARE MANAGEMENT (OUTPATIENT)
Dept: INTERNAL MEDICINE | Facility: CLINIC | Age: 73
End: 2023-07-31
Payer: MEDICARE

## 2023-07-31 PROCEDURE — 99490 CHRNC CARE MGMT STAFF 1ST 20: CPT | Mod: PBBFAC | Performed by: INTERNAL MEDICINE

## 2023-07-31 PROCEDURE — 99490 PR CHRONIC CARE MGMT, 1ST 20 MIN: ICD-10-PCS | Mod: S$PBB,,, | Performed by: INTERNAL MEDICINE

## 2023-07-31 PROCEDURE — 99490 CHRNC CARE MGMT STAFF 1ST 20: CPT | Mod: S$PBB,,, | Performed by: INTERNAL MEDICINE

## 2023-08-03 ENCOUNTER — CLINICAL SUPPORT (OUTPATIENT)
Dept: DERMATOLOGY | Facility: CLINIC | Age: 73
End: 2023-08-03
Payer: MEDICARE

## 2023-08-03 DIAGNOSIS — Z48.02 ENCOUNTER FOR REMOVAL OF SUTURES: Primary | ICD-10-CM

## 2023-08-03 NOTE — PROGRESS NOTES
[facility]   Shantel Monson MD    Patient Name: Meghna Dalal  Patient YOB: 1950   Date of Service: 8/3/23    CC: Suture removal    HPI: Meghna Dalal is a 73 y.o. female here today for suture removal on the right axilla.  The area is healing well.  Patient denies fever, chills, puss, or redness to the area.    Past Medical History:   Diagnosis Date    Arthritis     Diabetes mellitus, type 2     GERD (gastroesophageal reflux disease)     Hypertension     Iron deficiency anemia     Obesity     Personal history of colonic polyps 08/19/2019    Thyroid disease      Past Surgical History:   Procedure Laterality Date    ARTHROSCOPIC DEBRIDEMENT OF SHOULDER Left 12/21/2021    Procedure: DEBRIDEMENT, SHOULDER, ARTHROSCOPIC;  Surgeon: Jose Antonio Pablo MD;  Location: [facility];  Service: Orthopedics;  Laterality: Left;  SHOULDER JOINT    ARTHROSCOPIC REMOVAL OF LOOSE BODY FROM JOINT Left 12/21/2021    Procedure: REMOVAL, LOOSE BODY, JOINT, ARTHROSCOPIC;  Surgeon: Jose Antonio Pablo MD;  Location: [facility];  Service: Orthopedics;  Laterality: Left;    COLONOSCOPY W/ POLYPECTOMY  08/19/2019    DECOMPRESSION OF SUBACROMIAL SPACE Left 12/21/2021    Procedure: DECOMPRESSION, SUBACROMIAL SPACE;  Surgeon: Jose Antonio Pablo MD;  Location: [facility] OR;  Service: Orthopedics;  Laterality: Left;    DISTAL CLAVICLE EXCISION Left 12/21/2021    Procedure: EXCISION, CLAVICLE, DISTAL;  Surgeon: Jose Antonio Pablo MD;  Location: [facility] OR;  Service: Orthopedics;  Laterality: Left;    left total knee replacement      PARTIAL HYSTERECTOMY      ROTATOR CUFF REPAIR Left 12/21/2021    Procedure: REPAIR, ROTATOR CUFF;  Surgeon: Jose Antonio Pablo MD;  Location: [facility] OR;  Service: Orthopedics;  Laterality: Left;    SHOULDER ARTHROSCOPY Left 12/21/2021    Procedure: ARTHROSCOPY, SHOULDER;  Surgeon: Jose Antonio Pablo MD;  Location: [facility];  Service: Orthopedics;   Laterality: Left;    SHOULDER SURGERY Left 12/21/2021    THYROID SURGERY      total thyroidectomy    TONSILLECTOMY AND ADENOIDECTOMY       Review of patient's allergies indicates:   Allergen Reactions    Codeine phosphate      Other reaction(s): facial swelling    Codeine sulfate      Other reaction(s): Unknown       Current Outpatient Medications:     abatacept (ORENCIA SUBQ), , Disp: , Rfl:     aspirin 81 mg Cap, Take by mouth., Disp: , Rfl:     azelastine (ASTELIN) 137 mcg (0.1 %) nasal spray, 1 puff in each nostril, Disp: , Rfl:     azithromycin (Z-JOSE) 250 MG tablet, 2 tabs on day one followed by one a day for four days (Patient not taking: Reported on 5/12/2023), Disp: 6 tablet, Rfl: 1    cyclobenzaprine (FLEXERIL) 10 MG tablet, Take 1 tablet (10 mg total) by mouth 3 (three) times daily as needed for Muscle spasms., Disp: 90 tablet, Rfl: 11    diclofenac (VOLTAREN) 50 MG EC tablet, Take 50 mg by mouth., Disp: , Rfl:     fluticasone propionate (FLONASE) 50 mcg/actuation nasal spray, 1 spray in each nostril Strength: 50 mcg/actuation, Disp: 16 g, Rfl: 11    folic acid (FOLVITE) 1 MG tablet, TAKE 1 TABLET BY MOUTH ONCE A DAY FOR 30 DAYS, Disp: , Rfl:     hydroCHLOROthiazide (HYDRODIURIL) 12.5 MG Tab, TAKE 1 TABLET BY MOUTH EVERY DAY (Patient not taking: Reported on 3/1/2023), Disp: 90 tablet, Rfl: 3    ibuprofen (ADVIL,MOTRIN) 800 MG tablet, 1 tablet, Disp: , Rfl:     LAGEVRIO, EUA, 200 mg capsule (EUA), TAKE 4 CAPSULES BY MOUTH TWICE A DAY FOR 5 DAYS, Disp: , Rfl:     levocetirizine (XYZAL) 5 MG tablet, Take 5 mg by mouth every evening., Disp: , Rfl:     levothyroxine (SYNTHROID) 125 MCG tablet, TAKE 1 TABLET BY MOUTH EVERY DAY, Disp: 90 tablet, Rfl: 3    loratadine (CLARITIN) 10 mg tablet, 1 tablet, Disp: , Rfl:     losartan-hydrochlorothiazide 100-12.5 mg (HYZAAR) 100-12.5 mg Tab, Take 1 tablet by mouth once daily., Disp: 90 tablet, Rfl: 3    metFORMIN (GLUCOPHAGE) 500 MG tablet, TAKE 1 TABLET BY MOUTH THREE  TIMES A DAY, Disp: 270 tablet, Rfl: 3    naproxen (NAPROSYN) 250 MG tablet, , Disp: , Rfl:     omeprazole (PRILOSEC) 40 MG capsule, Take 1 capsule (40 mg total) by mouth once daily., Disp: 90 capsule, Rfl: 3    ondansetron (ZOFRAN-ODT) 4 MG TbDL, Take 1 tablet (4 mg total) by mouth every 6 (six) hours as needed. (Patient not taking: Reported on 3/1/2023), Disp: 20 tablet, Rfl: 0    potassium chloride (MICRO-K) 8 mEq CpSR, Take 1 capsule (8 mEq total) by mouth once daily., Disp: 90 capsule, Rfl: 3    predniSONE (DELTASONE) 5 MG tablet, TAKE 1-2 TABLETS BY MOUTH DAILY AS NEEDED, Disp: , Rfl:     promethazine-dextromethorphan (PROMETHAZINE-DM) 6.25-15 mg/5 mL Syrp, Take 5 mLs by mouth., Disp: , Rfl:     traZODone (DESYREL) 50 MG tablet, Take 1 tablet (50 mg total) by mouth every evening. (Patient not taking: Reported on 3/1/2023), Disp: 30 tablet, Rfl: 11    Current Facility-Administered Medications:     diphenhydrAMINE injection 25 mg, 25 mg, Intravenous, Once PRN, Kwame Wyatt MD    EPINEPHrine (EPIPEN) 0.3 mg/0.3 mL pen injection 0.3 mg, 0.3 mg, Intramuscular, PRN, Kwame Wyatt MD    ondansetron disintegrating tablet 4 mg, 4 mg, Oral, Once PRN, Kwame Wyatt MD    sodium chloride 0.9% 500 mL flush bag, , Intravenous, PRN, Kwame Wyatt MD    sodium chloride 0.9% flush 10 mL, 10 mL, Intravenous, PRN, Kwame Wyatt MD      Exam: A focused skin exam was performed. All areas examined were normal except as mentioned in the assessment and plan below.  General Appearance of the patient is well developed and well nourished.  Orientation: alert and oriented x 3.  Mood and affect: pleasant.    Assessment:   There were no encounter diagnoses.    Plan:   Suture Removal (Global Period)  Body Locations: Right axilla  I reviewed the pathology results with the patient in detail.  The examination of the site was clean, dry and intact. Sutures were removed.  Vaseline applied.    I counseled the patient regarding the  following:  Expectations: Sutured wounds tend to have 50% of the strength of normal skin at the time of suture removal. Try avoiding rigorous exercise or lifting greater than 10 pounds.  Contact office if: you develop pain, redness, tenderness or pus at the surgical site.    A culture was not obtained from the area           Follow up if symptoms worsen or fail to improve.    Shantel Monson MD

## 2023-08-16 ENCOUNTER — OFFICE VISIT (OUTPATIENT)
Dept: INTERNAL MEDICINE | Facility: CLINIC | Age: 73
End: 2023-08-16
Payer: MEDICARE

## 2023-08-16 VITALS
SYSTOLIC BLOOD PRESSURE: 122 MMHG | WEIGHT: 207 LBS | HEIGHT: 63 IN | BODY MASS INDEX: 36.68 KG/M2 | OXYGEN SATURATION: 98 % | HEART RATE: 69 BPM | DIASTOLIC BLOOD PRESSURE: 84 MMHG | RESPIRATION RATE: 16 BRPM | TEMPERATURE: 99 F

## 2023-08-16 DIAGNOSIS — D84.821 DRUG-INDUCED IMMUNODEFICIENCY: ICD-10-CM

## 2023-08-16 DIAGNOSIS — E66.01 SEVERE OBESITY (BMI 35.0-39.9) WITH COMORBIDITY: ICD-10-CM

## 2023-08-16 DIAGNOSIS — Z79.899 DRUG-INDUCED IMMUNODEFICIENCY: ICD-10-CM

## 2023-08-16 DIAGNOSIS — I10 ESSENTIAL HYPERTENSION: Primary | ICD-10-CM

## 2023-08-16 DIAGNOSIS — K21.9 GERD WITHOUT ESOPHAGITIS: ICD-10-CM

## 2023-08-16 DIAGNOSIS — M05.79 RHEUMATOID ARTHRITIS INVOLVING MULTIPLE SITES WITH POSITIVE RHEUMATOID FACTOR: ICD-10-CM

## 2023-08-16 DIAGNOSIS — E11.9 CONTROLLED TYPE 2 DIABETES MELLITUS WITHOUT COMPLICATION, WITHOUT LONG-TERM CURRENT USE OF INSULIN: ICD-10-CM

## 2023-08-16 PROCEDURE — 1101F PR PT FALLS ASSESS DOC 0-1 FALLS W/OUT INJ PAST YR: ICD-10-PCS | Mod: CPTII,,, | Performed by: INTERNAL MEDICINE

## 2023-08-16 PROCEDURE — 3288F PR FALLS RISK ASSESSMENT DOCUMENTED: ICD-10-PCS | Mod: CPTII,,, | Performed by: INTERNAL MEDICINE

## 2023-08-16 PROCEDURE — 3008F BODY MASS INDEX DOCD: CPT | Mod: CPTII,,, | Performed by: INTERNAL MEDICINE

## 2023-08-16 PROCEDURE — 4010F PR ACE/ARB THEARPY RXD/TAKEN: ICD-10-PCS | Mod: CPTII,,, | Performed by: INTERNAL MEDICINE

## 2023-08-16 PROCEDURE — 99215 OFFICE O/P EST HI 40 MIN: CPT | Mod: PBBFAC | Performed by: INTERNAL MEDICINE

## 2023-08-16 PROCEDURE — 99214 PR OFFICE/OUTPT VISIT, EST, LEVL IV, 30-39 MIN: ICD-10-PCS | Mod: S$PBB,,, | Performed by: INTERNAL MEDICINE

## 2023-08-16 PROCEDURE — 4010F ACE/ARB THERAPY RXD/TAKEN: CPT | Mod: CPTII,,, | Performed by: INTERNAL MEDICINE

## 2023-08-16 PROCEDURE — 3079F PR MOST RECENT DIASTOLIC BLOOD PRESSURE 80-89 MM HG: ICD-10-PCS | Mod: CPTII,,, | Performed by: INTERNAL MEDICINE

## 2023-08-16 PROCEDURE — 1160F RVW MEDS BY RX/DR IN RCRD: CPT | Mod: CPTII,,, | Performed by: INTERNAL MEDICINE

## 2023-08-16 PROCEDURE — 1160F PR REVIEW ALL MEDS BY PRESCRIBER/CLIN PHARMACIST DOCUMENTED: ICD-10-PCS | Mod: CPTII,,, | Performed by: INTERNAL MEDICINE

## 2023-08-16 PROCEDURE — 1101F PT FALLS ASSESS-DOCD LE1/YR: CPT | Mod: CPTII,,, | Performed by: INTERNAL MEDICINE

## 2023-08-16 PROCEDURE — 1159F PR MEDICATION LIST DOCUMENTED IN MEDICAL RECORD: ICD-10-PCS | Mod: CPTII,,, | Performed by: INTERNAL MEDICINE

## 2023-08-16 PROCEDURE — 3074F PR MOST RECENT SYSTOLIC BLOOD PRESSURE < 130 MM HG: ICD-10-PCS | Mod: CPTII,,, | Performed by: INTERNAL MEDICINE

## 2023-08-16 PROCEDURE — 1159F MED LIST DOCD IN RCRD: CPT | Mod: CPTII,,, | Performed by: INTERNAL MEDICINE

## 2023-08-16 PROCEDURE — 3008F PR BODY MASS INDEX (BMI) DOCUMENTED: ICD-10-PCS | Mod: CPTII,,, | Performed by: INTERNAL MEDICINE

## 2023-08-16 PROCEDURE — 3079F DIAST BP 80-89 MM HG: CPT | Mod: CPTII,,, | Performed by: INTERNAL MEDICINE

## 2023-08-16 PROCEDURE — 99214 OFFICE O/P EST MOD 30 MIN: CPT | Mod: S$PBB,,, | Performed by: INTERNAL MEDICINE

## 2023-08-16 PROCEDURE — 3288F FALL RISK ASSESSMENT DOCD: CPT | Mod: CPTII,,, | Performed by: INTERNAL MEDICINE

## 2023-08-16 PROCEDURE — 3074F SYST BP LT 130 MM HG: CPT | Mod: CPTII,,, | Performed by: INTERNAL MEDICINE

## 2023-08-16 NOTE — PROGRESS NOTES
Subjective:       Patient ID: Meghna Dalal is a 73 y.o. female.    Chief Complaint: Follow-up    Bs up 136 reciently  otherwise doing well    Follow-up  Pertinent negatives include no abdominal pain, chest pain, fatigue, rash or weakness.   Review of Systems   Constitutional:  Negative for fatigue and unexpected weight change.   HENT:  Negative for ear pain and goiter.    Respiratory:  Negative for chest tightness and shortness of breath.    Cardiovascular:  Negative for chest pain, palpitations and leg swelling.   Gastrointestinal:  Negative for abdominal pain and reflux.   Integumentary:  Negative for rash and breast tenderness.   Neurological:  Negative for dizziness and weakness.   Hematological:  Negative for adenopathy.   Psychiatric/Behavioral:  Negative for behavioral problems.    Breast: Negative for tenderness      Objective:      Physical Exam  Constitutional:       Appearance: Normal appearance.   HENT:      Head: Normocephalic and atraumatic.      Right Ear: External ear normal.      Left Ear: External ear normal.      Mouth/Throat:      Pharynx: Oropharynx is clear.   Eyes:      Extraocular Movements: Extraocular movements intact.      Pupils: Pupils are equal, round, and reactive to light.   Cardiovascular:      Rate and Rhythm: Normal rate and regular rhythm.      Pulses: Normal pulses.      Heart sounds: Normal heart sounds.   Pulmonary:      Effort: Pulmonary effort is normal.      Breath sounds: Normal breath sounds.   Abdominal:      General: Abdomen is flat. Bowel sounds are normal.      Palpations: Abdomen is soft.   Musculoskeletal:         General: Normal range of motion.      Cervical back: Normal range of motion and neck supple.   Skin:     General: Skin is warm.      Capillary Refill: Capillary refill takes less than 2 seconds.   Neurological:      General: No focal deficit present.      Mental Status: She is alert.   Psychiatric:         Mood and Affect: Mood normal.          Thought Content: Thought content normal.         Judgment: Judgment normal.       Assessment:       1. Essential hypertension    2. GERD without esophagitis    3. Rheumatoid arthritis involving multiple sites with positive rheumatoid factor    4. Controlled type 2 diabetes mellitus without complication, without long-term current use of insulin    5. Severe obesity (BMI 35.0-39.9) with comorbidity    6. Drug-induced immunodeficiency        Plan:         Patient Instructions   Continue current meds and f/u 3 months      Problem List Items Addressed This Visit          Immunology/Multi System    Rheumatoid arthritis involving multiple sites with positive rheumatoid factor    Drug-induced immunodeficiency       Endocrine    Severe obesity (BMI 35.0-39.9) with comorbidity     Other Visit Diagnoses       Essential hypertension    -  Primary    GERD without esophagitis        Controlled type 2 diabetes mellitus without complication, without long-term current use of insulin

## 2023-08-25 ENCOUNTER — INFUSION (OUTPATIENT)
Dept: INFUSION THERAPY | Facility: HOSPITAL | Age: 73
End: 2023-08-25
Attending: NURSE PRACTITIONER
Payer: MEDICARE

## 2023-08-25 VITALS
HEART RATE: 65 BPM | OXYGEN SATURATION: 98 % | DIASTOLIC BLOOD PRESSURE: 65 MMHG | SYSTOLIC BLOOD PRESSURE: 151 MMHG | RESPIRATION RATE: 17 BRPM

## 2023-08-25 DIAGNOSIS — M05.79 RHEUMATOID ARTHRITIS INVOLVING MULTIPLE SITES WITH POSITIVE RHEUMATOID FACTOR: Primary | ICD-10-CM

## 2023-08-25 PROCEDURE — 96365 THER/PROPH/DIAG IV INF INIT: CPT

## 2023-08-25 PROCEDURE — 25000003 PHARM REV CODE 250: Performed by: NURSE PRACTITIONER

## 2023-08-25 PROCEDURE — 63600175 PHARM REV CODE 636 W HCPCS: Performed by: NURSE PRACTITIONER

## 2023-08-25 RX ORDER — ACETAMINOPHEN 500 MG
1000 TABLET ORAL ONCE AS NEEDED
Status: COMPLETED | OUTPATIENT
Start: 2023-08-25 | End: 2023-08-25

## 2023-08-25 RX ORDER — ACETAMINOPHEN 500 MG
1000 TABLET ORAL ONCE AS NEEDED
Status: CANCELLED
Start: 2023-08-25

## 2023-08-25 RX ORDER — METHYLPREDNISOLONE SOD SUCC 125 MG
62.5 VIAL (EA) INJECTION ONCE AS NEEDED
Status: COMPLETED | OUTPATIENT
Start: 2023-08-25 | End: 2023-08-25

## 2023-08-25 RX ORDER — DIPHENHYDRAMINE HCL 12.5MG/5ML
25 LIQUID (ML) ORAL ONCE AS NEEDED
Status: COMPLETED | OUTPATIENT
Start: 2023-08-25 | End: 2023-08-25

## 2023-08-25 RX ORDER — DIPHENHYDRAMINE HCL 12.5MG/5ML
25 LIQUID (ML) ORAL ONCE AS NEEDED
Status: CANCELLED
Start: 2023-08-25

## 2023-08-25 RX ORDER — SODIUM CHLORIDE 0.9 % (FLUSH) 0.9 %
10 SYRINGE (ML) INJECTION
Status: CANCELLED | OUTPATIENT
Start: 2023-08-25

## 2023-08-25 RX ADMIN — SODIUM CHLORIDE 750 MG: 9 INJECTION, SOLUTION INTRAVENOUS at 09:08

## 2023-08-25 RX ADMIN — DIPHENHYDRAMINE HYDROCHLORIDE 25 MG: 12.5 LIQUID ORAL at 09:08

## 2023-08-25 RX ADMIN — METHYLPREDNISOLONE SODIUM SUCCINATE 62.5 MG: 125 INJECTION, POWDER, FOR SOLUTION INTRAMUSCULAR; INTRAVENOUS at 09:08

## 2023-08-25 RX ADMIN — ACETAMINOPHEN 1000 MG: 500 TABLET ORAL at 09:08

## 2023-08-25 NOTE — PROGRESS NOTES
0930 Pt here for Orencia infusion, resting in recliner, TP released and pharmacy notified   0951 Orencia infusion started to go in over 1 hr with filter   1049 Orencia infusion complete, tolerated well, will continue to monitor  1105 pt discharged ambulatory with no adverse reaction noted, pt notified to go to ER with any adverse reactions, AVS given along with medication information  Follow up appt made for 4 weeks

## 2023-08-31 ENCOUNTER — EXTERNAL CHRONIC CARE MANAGEMENT (OUTPATIENT)
Dept: INTERNAL MEDICINE | Facility: CLINIC | Age: 73
End: 2023-08-31
Payer: MEDICARE

## 2023-08-31 PROCEDURE — 99489 CPLX CHRNC CARE EA ADDL 30: CPT | Mod: S$PBB,,, | Performed by: INTERNAL MEDICINE

## 2023-08-31 PROCEDURE — 99489 CPLX CHRNC CARE EA ADDL 30: CPT | Mod: PBBFAC | Performed by: INTERNAL MEDICINE

## 2023-08-31 PROCEDURE — 99487 CPLX CHRNC CARE 1ST 60 MIN: CPT | Mod: PBBFAC | Performed by: INTERNAL MEDICINE

## 2023-08-31 PROCEDURE — 99487 PR COMPLX CHRON CARE MGMT, 1ST HR, PER MONTH: ICD-10-PCS | Mod: S$PBB,,, | Performed by: INTERNAL MEDICINE

## 2023-08-31 PROCEDURE — 99489 PR COMPLX CHRON CARE MGMT, EA ADDTL 30 MIN, PER MONTH: ICD-10-PCS | Mod: S$PBB,,, | Performed by: INTERNAL MEDICINE

## 2023-08-31 PROCEDURE — 99487 CPLX CHRNC CARE 1ST 60 MIN: CPT | Mod: S$PBB,,, | Performed by: INTERNAL MEDICINE

## 2023-09-13 ENCOUNTER — TELEPHONE (OUTPATIENT)
Dept: INTERNAL MEDICINE | Facility: CLINIC | Age: 73
End: 2023-09-13
Payer: MEDICARE

## 2023-09-13 RX ORDER — AZITHROMYCIN 250 MG/1
TABLET, FILM COATED ORAL
Qty: 6 TABLET | Refills: 1 | Status: SHIPPED | OUTPATIENT
Start: 2023-09-13

## 2023-09-26 ENCOUNTER — INFUSION (OUTPATIENT)
Dept: INFUSION THERAPY | Facility: HOSPITAL | Age: 73
End: 2023-09-26
Attending: NURSE PRACTITIONER
Payer: MEDICARE

## 2023-09-26 VITALS
OXYGEN SATURATION: 96 % | DIASTOLIC BLOOD PRESSURE: 72 MMHG | TEMPERATURE: 98 F | WEIGHT: 207 LBS | BODY MASS INDEX: 36.67 KG/M2 | SYSTOLIC BLOOD PRESSURE: 142 MMHG | RESPIRATION RATE: 17 BRPM | HEART RATE: 75 BPM

## 2023-09-26 DIAGNOSIS — M05.79 RHEUMATOID ARTHRITIS INVOLVING MULTIPLE SITES WITH POSITIVE RHEUMATOID FACTOR: Primary | ICD-10-CM

## 2023-09-26 PROCEDURE — 63600175 PHARM REV CODE 636 W HCPCS: Mod: JZ,JA,JG | Performed by: NURSE PRACTITIONER

## 2023-09-26 PROCEDURE — 96365 THER/PROPH/DIAG IV INF INIT: CPT

## 2023-09-26 PROCEDURE — 25000003 PHARM REV CODE 250: Performed by: NURSE PRACTITIONER

## 2023-09-26 RX ORDER — ACETAMINOPHEN 500 MG
1000 TABLET ORAL ONCE AS NEEDED
Status: CANCELLED
Start: 2023-09-26

## 2023-09-26 RX ORDER — ACETAMINOPHEN 500 MG
1000 TABLET ORAL ONCE AS NEEDED
Status: COMPLETED | OUTPATIENT
Start: 2023-09-26 | End: 2023-09-26

## 2023-09-26 RX ORDER — DIPHENHYDRAMINE HCL 12.5MG/5ML
25 LIQUID (ML) ORAL ONCE AS NEEDED
Status: COMPLETED | OUTPATIENT
Start: 2023-09-26 | End: 2023-09-26

## 2023-09-26 RX ORDER — SODIUM CHLORIDE 0.9 % (FLUSH) 0.9 %
10 SYRINGE (ML) INJECTION
Status: CANCELLED | OUTPATIENT
Start: 2023-09-26

## 2023-09-26 RX ORDER — DIPHENHYDRAMINE HCL 12.5MG/5ML
25 LIQUID (ML) ORAL ONCE AS NEEDED
Status: CANCELLED
Start: 2023-09-26

## 2023-09-26 RX ADMIN — ACETAMINOPHEN 1000 MG: 500 TABLET ORAL at 10:09

## 2023-09-26 RX ADMIN — SODIUM CHLORIDE 750 MG: 9 INJECTION, SOLUTION INTRAVENOUS at 10:09

## 2023-09-26 RX ADMIN — DIPHENHYDRAMINE HYDROCHLORIDE 25 MG: 12.5 LIQUID ORAL at 10:09

## 2023-09-26 NOTE — PROGRESS NOTES
1025 Pt here for Orencia infusion, resting in recliner, TP released and pharmacy notified    Pre meds given and tolerated  1039 Orencia infusion started to go over 1 hr with filter applied  1140 Orencia infusion complete, tolerated well, will continue to monitor  1200 pt discharged ambulatory with no adverse reaction noted, pt notified to go to ER with any adverse reactions, AVS given along with medication information  Follow up appt made 4 weeks

## 2023-09-30 ENCOUNTER — EXTERNAL CHRONIC CARE MANAGEMENT (OUTPATIENT)
Dept: INTERNAL MEDICINE | Facility: CLINIC | Age: 73
End: 2023-09-30
Payer: MEDICARE

## 2023-09-30 PROCEDURE — 99490 PR CHRONIC CARE MGMT, 1ST 20 MIN: ICD-10-PCS | Mod: S$PBB,,, | Performed by: INTERNAL MEDICINE

## 2023-09-30 PROCEDURE — 99439 CHRNC CARE MGMT STAF EA ADDL: CPT | Mod: S$PBB,,, | Performed by: INTERNAL MEDICINE

## 2023-09-30 PROCEDURE — 99439 PR CHRONIC CARE MGMT, EA ADDTL 20 MIN: ICD-10-PCS | Mod: S$PBB,,, | Performed by: INTERNAL MEDICINE

## 2023-09-30 PROCEDURE — 99439 CHRNC CARE MGMT STAF EA ADDL: CPT | Mod: PBBFAC | Performed by: INTERNAL MEDICINE

## 2023-09-30 PROCEDURE — 99490 CHRNC CARE MGMT STAFF 1ST 20: CPT | Mod: S$PBB,,, | Performed by: INTERNAL MEDICINE

## 2023-09-30 PROCEDURE — 99490 CHRNC CARE MGMT STAFF 1ST 20: CPT | Mod: PBBFAC | Performed by: INTERNAL MEDICINE

## 2023-10-26 ENCOUNTER — INFUSION (OUTPATIENT)
Dept: INFUSION THERAPY | Facility: HOSPITAL | Age: 73
End: 2023-10-26
Attending: NURSE PRACTITIONER
Payer: MEDICARE

## 2023-10-26 VITALS
BODY MASS INDEX: 36.67 KG/M2 | WEIGHT: 207 LBS | DIASTOLIC BLOOD PRESSURE: 63 MMHG | SYSTOLIC BLOOD PRESSURE: 146 MMHG | OXYGEN SATURATION: 97 % | HEART RATE: 62 BPM | RESPIRATION RATE: 17 BRPM

## 2023-10-26 DIAGNOSIS — M05.79 RHEUMATOID ARTHRITIS INVOLVING MULTIPLE SITES WITH POSITIVE RHEUMATOID FACTOR: Primary | ICD-10-CM

## 2023-10-26 PROCEDURE — 96365 THER/PROPH/DIAG IV INF INIT: CPT

## 2023-10-26 PROCEDURE — 63600175 PHARM REV CODE 636 W HCPCS: Performed by: NURSE PRACTITIONER

## 2023-10-26 PROCEDURE — 25000003 PHARM REV CODE 250: Performed by: NURSE PRACTITIONER

## 2023-10-26 PROCEDURE — 96375 TX/PRO/DX INJ NEW DRUG ADDON: CPT

## 2023-10-26 RX ORDER — DIPHENHYDRAMINE HCL 12.5MG/5ML
25 LIQUID (ML) ORAL ONCE AS NEEDED
Status: COMPLETED | OUTPATIENT
Start: 2023-10-26 | End: 2023-10-26

## 2023-10-26 RX ORDER — ACETAMINOPHEN 500 MG
1000 TABLET ORAL ONCE AS NEEDED
Status: COMPLETED | OUTPATIENT
Start: 2023-10-26 | End: 2023-10-26

## 2023-10-26 RX ORDER — SODIUM CHLORIDE 0.9 % (FLUSH) 0.9 %
10 SYRINGE (ML) INJECTION
Status: DISCONTINUED | OUTPATIENT
Start: 2023-10-26 | End: 2023-10-26 | Stop reason: HOSPADM

## 2023-10-26 RX ORDER — METHYLPREDNISOLONE SOD SUCC 125 MG
62.5 VIAL (EA) INJECTION ONCE AS NEEDED
Status: COMPLETED | OUTPATIENT
Start: 2023-10-26 | End: 2023-10-26

## 2023-10-26 RX ORDER — DIPHENHYDRAMINE HCL 12.5MG/5ML
25 LIQUID (ML) ORAL ONCE AS NEEDED
Status: CANCELLED
Start: 2023-10-26

## 2023-10-26 RX ADMIN — DIPHENHYDRAMINE HYDROCHLORIDE 25 MG: 2.5 LIQUID ORAL at 09:10

## 2023-10-26 RX ADMIN — METHYLPREDNISOLONE SODIUM SUCCINATE 62.5 MG: 125 INJECTION, POWDER, FOR SOLUTION INTRAMUSCULAR; INTRAVENOUS at 10:10

## 2023-10-26 RX ADMIN — SODIUM CHLORIDE 750 MG: 9 INJECTION, SOLUTION INTRAVENOUS at 10:10

## 2023-10-26 RX ADMIN — ACETAMINOPHEN 1000 MG: 500 TABLET ORAL at 09:10

## 2023-10-26 NOTE — PROGRESS NOTES
0948 Patient arrived for orencia infusion today ambulatory to [facility]. Therapy plan released and pharmacy notified.   1007 infusion started  1102 infusion finished  1102Int flushed   1115Patient discharged , given AVS and told to watch for signs and symptoms of adverse reactions , if had any to go to the ER. Patient given upcoming appointment

## 2023-10-27 RX ORDER — HYDROCHLOROTHIAZIDE 12.5 MG/1
TABLET ORAL
Qty: 90 TABLET | Refills: 3 | Status: SHIPPED | OUTPATIENT
Start: 2023-10-27

## 2023-10-30 RX ORDER — METFORMIN HYDROCHLORIDE 500 MG/1
TABLET ORAL
Qty: 270 TABLET | Refills: 3 | Status: SHIPPED | OUTPATIENT
Start: 2023-10-30

## 2023-10-31 ENCOUNTER — EXTERNAL CHRONIC CARE MANAGEMENT (OUTPATIENT)
Dept: INTERNAL MEDICINE | Facility: CLINIC | Age: 73
End: 2023-10-31
Payer: MEDICARE

## 2023-10-31 PROCEDURE — 99439 PR CHRONIC CARE MGMT, EA ADDTL 20 MIN: ICD-10-PCS | Mod: S$PBB,,, | Performed by: INTERNAL MEDICINE

## 2023-10-31 PROCEDURE — 99490 PR CHRONIC CARE MGMT, 1ST 20 MIN: ICD-10-PCS | Mod: S$PBB,,, | Performed by: INTERNAL MEDICINE

## 2023-10-31 PROCEDURE — 99490 CHRNC CARE MGMT STAFF 1ST 20: CPT | Mod: PBBFAC | Performed by: INTERNAL MEDICINE

## 2023-10-31 PROCEDURE — 99439 CHRNC CARE MGMT STAF EA ADDL: CPT | Mod: S$PBB,,, | Performed by: INTERNAL MEDICINE

## 2023-10-31 PROCEDURE — 99490 CHRNC CARE MGMT STAFF 1ST 20: CPT | Mod: S$PBB,,, | Performed by: INTERNAL MEDICINE

## 2023-10-31 PROCEDURE — 99439 CHRNC CARE MGMT STAF EA ADDL: CPT | Mod: PBBFAC | Performed by: INTERNAL MEDICINE

## 2023-11-20 ENCOUNTER — OFFICE VISIT (OUTPATIENT)
Dept: INTERNAL MEDICINE | Facility: CLINIC | Age: 73
End: 2023-11-20
Payer: MEDICARE

## 2023-11-20 VITALS
BODY MASS INDEX: 36.86 KG/M2 | OXYGEN SATURATION: 98 % | HEIGHT: 63 IN | HEART RATE: 74 BPM | SYSTOLIC BLOOD PRESSURE: 156 MMHG | RESPIRATION RATE: 16 BRPM | DIASTOLIC BLOOD PRESSURE: 72 MMHG | WEIGHT: 208 LBS

## 2023-11-20 DIAGNOSIS — E11.9 CONTROLLED TYPE 2 DIABETES MELLITUS WITHOUT COMPLICATION, WITHOUT LONG-TERM CURRENT USE OF INSULIN: ICD-10-CM

## 2023-11-20 DIAGNOSIS — M05.79 RHEUMATOID ARTHRITIS INVOLVING MULTIPLE SITES WITH POSITIVE RHEUMATOID FACTOR: ICD-10-CM

## 2023-11-20 DIAGNOSIS — E78.5 HYPERLIPIDEMIA LDL GOAL <100: ICD-10-CM

## 2023-11-20 DIAGNOSIS — I10 ESSENTIAL HYPERTENSION: Primary | ICD-10-CM

## 2023-11-20 DIAGNOSIS — K21.9 GERD WITHOUT ESOPHAGITIS: ICD-10-CM

## 2023-11-20 DIAGNOSIS — E66.01 SEVERE OBESITY (BMI 35.0-39.9) WITH COMORBIDITY: ICD-10-CM

## 2023-11-20 DIAGNOSIS — E03.9 HYPOTHYROIDISM, UNSPECIFIED TYPE: ICD-10-CM

## 2023-11-20 DIAGNOSIS — J30.2 SEASONAL ALLERGIES: ICD-10-CM

## 2023-11-20 PROCEDURE — 3008F BODY MASS INDEX DOCD: CPT | Mod: CPTII,,, | Performed by: INTERNAL MEDICINE

## 2023-11-20 PROCEDURE — 3078F PR MOST RECENT DIASTOLIC BLOOD PRESSURE < 80 MM HG: ICD-10-PCS | Mod: CPTII,,, | Performed by: INTERNAL MEDICINE

## 2023-11-20 PROCEDURE — 99214 PR OFFICE/OUTPT VISIT, EST, LEVL IV, 30-39 MIN: ICD-10-PCS | Mod: S$PBB,,, | Performed by: INTERNAL MEDICINE

## 2023-11-20 PROCEDURE — 3008F PR BODY MASS INDEX (BMI) DOCUMENTED: ICD-10-PCS | Mod: CPTII,,, | Performed by: INTERNAL MEDICINE

## 2023-11-20 PROCEDURE — 1159F PR MEDICATION LIST DOCUMENTED IN MEDICAL RECORD: ICD-10-PCS | Mod: CPTII,,, | Performed by: INTERNAL MEDICINE

## 2023-11-20 PROCEDURE — 4010F ACE/ARB THERAPY RXD/TAKEN: CPT | Mod: CPTII,,, | Performed by: INTERNAL MEDICINE

## 2023-11-20 PROCEDURE — 99213 OFFICE O/P EST LOW 20 MIN: CPT | Mod: PBBFAC | Performed by: INTERNAL MEDICINE

## 2023-11-20 PROCEDURE — 1101F PR PT FALLS ASSESS DOC 0-1 FALLS W/OUT INJ PAST YR: ICD-10-PCS | Mod: CPTII,,, | Performed by: INTERNAL MEDICINE

## 2023-11-20 PROCEDURE — 3077F PR MOST RECENT SYSTOLIC BLOOD PRESSURE >= 140 MM HG: ICD-10-PCS | Mod: CPTII,,, | Performed by: INTERNAL MEDICINE

## 2023-11-20 PROCEDURE — 3288F PR FALLS RISK ASSESSMENT DOCUMENTED: ICD-10-PCS | Mod: CPTII,,, | Performed by: INTERNAL MEDICINE

## 2023-11-20 PROCEDURE — 1159F MED LIST DOCD IN RCRD: CPT | Mod: CPTII,,, | Performed by: INTERNAL MEDICINE

## 2023-11-20 PROCEDURE — 3288F FALL RISK ASSESSMENT DOCD: CPT | Mod: CPTII,,, | Performed by: INTERNAL MEDICINE

## 2023-11-20 PROCEDURE — 1160F PR REVIEW ALL MEDS BY PRESCRIBER/CLIN PHARMACIST DOCUMENTED: ICD-10-PCS | Mod: CPTII,,, | Performed by: INTERNAL MEDICINE

## 2023-11-20 PROCEDURE — 4010F PR ACE/ARB THEARPY RXD/TAKEN: ICD-10-PCS | Mod: CPTII,,, | Performed by: INTERNAL MEDICINE

## 2023-11-20 PROCEDURE — 1101F PT FALLS ASSESS-DOCD LE1/YR: CPT | Mod: CPTII,,, | Performed by: INTERNAL MEDICINE

## 2023-11-20 PROCEDURE — 1126F AMNT PAIN NOTED NONE PRSNT: CPT | Mod: CPTII,,, | Performed by: INTERNAL MEDICINE

## 2023-11-20 PROCEDURE — 1126F PR PAIN SEVERITY QUANTIFIED, NO PAIN PRESENT: ICD-10-PCS | Mod: CPTII,,, | Performed by: INTERNAL MEDICINE

## 2023-11-20 PROCEDURE — 3078F DIAST BP <80 MM HG: CPT | Mod: CPTII,,, | Performed by: INTERNAL MEDICINE

## 2023-11-20 PROCEDURE — 99214 OFFICE O/P EST MOD 30 MIN: CPT | Mod: S$PBB,,, | Performed by: INTERNAL MEDICINE

## 2023-11-20 PROCEDURE — 1160F RVW MEDS BY RX/DR IN RCRD: CPT | Mod: CPTII,,, | Performed by: INTERNAL MEDICINE

## 2023-11-20 PROCEDURE — 3077F SYST BP >= 140 MM HG: CPT | Mod: CPTII,,, | Performed by: INTERNAL MEDICINE

## 2023-11-20 NOTE — PROGRESS NOTES
Subjective:       Patient ID: Meghna Dalal is a 73 y.o. female.    Chief Complaint: Follow-up    Doing well bp high due to children and running around  no complaints  bs meter out      Follow-up  Pertinent negatives include no abdominal pain, chest pain, fatigue, rash or weakness.   Review of Systems   Constitutional:  Negative for fatigue and unexpected weight change.   HENT:  Negative for ear pain and goiter.    Respiratory:  Negative for chest tightness and shortness of breath.    Cardiovascular:  Negative for chest pain, palpitations and leg swelling.   Gastrointestinal:  Negative for abdominal pain and reflux.   Integumentary:  Negative for rash and breast tenderness.   Neurological:  Negative for dizziness and weakness.   Hematological:  Negative for adenopathy.   Psychiatric/Behavioral:  Negative for behavioral problems.    Breast: Negative for tenderness      Objective:      Physical Exam  Constitutional:       Appearance: Normal appearance.   HENT:      Head: Normocephalic and atraumatic.      Right Ear: External ear normal.      Left Ear: External ear normal.      Mouth/Throat:      Pharynx: Oropharynx is clear.   Eyes:      Extraocular Movements: Extraocular movements intact.      Pupils: Pupils are equal, round, and reactive to light.   Cardiovascular:      Rate and Rhythm: Normal rate and regular rhythm.      Pulses: Normal pulses.      Heart sounds: Normal heart sounds.   Pulmonary:      Effort: Pulmonary effort is normal.      Breath sounds: Normal breath sounds.   Abdominal:      General: Abdomen is flat. Bowel sounds are normal.      Palpations: Abdomen is soft.   Musculoskeletal:         General: Normal range of motion.      Cervical back: Normal range of motion and neck supple.   Skin:     General: Skin is warm.      Capillary Refill: Capillary refill takes less than 2 seconds.   Neurological:      General: No focal deficit present.      Mental Status: She is alert.   Psychiatric:          Mood and Affect: Mood normal.         Thought Content: Thought content normal.         Judgment: Judgment normal.       Assessment:       1. Essential hypertension    2. GERD without esophagitis    3. Rheumatoid arthritis involving multiple sites with positive rheumatoid factor    4. Controlled type 2 diabetes mellitus without complication, without long-term current use of insulin    5. Severe obesity (BMI 35.0-39.9) with comorbidity    6. Hypothyroidism, unspecified type    7. Hyperlipidemia LDL goal <100    8. Seasonal allergies        Plan:         Patient Instructions   Continue current meds and f/u 3 months      Problem List Items Addressed This Visit          Immunology/Multi System    Rheumatoid arthritis involving multiple sites with positive rheumatoid factor       Endocrine    Severe obesity (BMI 35.0-39.9) with comorbidity     Other Visit Diagnoses       Essential hypertension    -  Primary    GERD without esophagitis        Controlled type 2 diabetes mellitus without complication, without long-term current use of insulin        Hypothyroidism, unspecified type        Hyperlipidemia LDL goal <100        Seasonal allergies

## 2023-11-27 ENCOUNTER — INFUSION (OUTPATIENT)
Dept: INFUSION THERAPY | Facility: HOSPITAL | Age: 73
End: 2023-11-27
Attending: INTERNAL MEDICINE
Payer: MEDICARE

## 2023-11-27 VITALS
DIASTOLIC BLOOD PRESSURE: 75 MMHG | OXYGEN SATURATION: 98 % | HEART RATE: 65 BPM | RESPIRATION RATE: 17 BRPM | TEMPERATURE: 98 F | BODY MASS INDEX: 36.85 KG/M2 | WEIGHT: 208 LBS | SYSTOLIC BLOOD PRESSURE: 161 MMHG

## 2023-11-27 DIAGNOSIS — M05.79 RHEUMATOID ARTHRITIS INVOLVING MULTIPLE SITES WITH POSITIVE RHEUMATOID FACTOR: Primary | ICD-10-CM

## 2023-11-27 PROCEDURE — 63600175 PHARM REV CODE 636 W HCPCS: Mod: JZ,JA,JG | Performed by: NURSE PRACTITIONER

## 2023-11-27 PROCEDURE — 96375 TX/PRO/DX INJ NEW DRUG ADDON: CPT

## 2023-11-27 PROCEDURE — 25000003 PHARM REV CODE 250: Performed by: NURSE PRACTITIONER

## 2023-11-27 PROCEDURE — 96365 THER/PROPH/DIAG IV INF INIT: CPT

## 2023-11-27 RX ORDER — SODIUM CHLORIDE 0.9 % (FLUSH) 0.9 %
10 SYRINGE (ML) INJECTION
Status: DISCONTINUED | OUTPATIENT
Start: 2023-11-27 | End: 2023-11-27 | Stop reason: HOSPADM

## 2023-11-27 RX ORDER — ACETAMINOPHEN 500 MG
1000 TABLET ORAL ONCE AS NEEDED
Status: COMPLETED | OUTPATIENT
Start: 2023-11-27 | End: 2023-11-27

## 2023-11-27 RX ORDER — DIPHENHYDRAMINE HCL 12.5MG/5ML
25 LIQUID (ML) ORAL ONCE AS NEEDED
Status: COMPLETED | OUTPATIENT
Start: 2023-11-27 | End: 2023-11-27

## 2023-11-27 RX ORDER — METHYLPREDNISOLONE SOD SUCC 125 MG
62.5 VIAL (EA) INJECTION ONCE AS NEEDED
Status: COMPLETED | OUTPATIENT
Start: 2023-11-27 | End: 2023-11-27

## 2023-11-27 RX ORDER — DIPHENHYDRAMINE HCL 12.5MG/5ML
25 LIQUID (ML) ORAL ONCE AS NEEDED
Status: CANCELLED
Start: 2023-11-27

## 2023-11-27 RX ADMIN — ACETAMINOPHEN 1000 MG: 500 TABLET ORAL at 10:11

## 2023-11-27 RX ADMIN — SODIUM CHLORIDE 750 MG: 9 INJECTION, SOLUTION INTRAVENOUS at 10:11

## 2023-11-27 RX ADMIN — METHYLPREDNISOLONE SODIUM SUCCINATE 62.5 MG: 125 INJECTION, POWDER, FOR SOLUTION INTRAMUSCULAR; INTRAVENOUS at 10:11

## 2023-11-27 RX ADMIN — DIPHENHYDRAMINE HYDROCHLORIDE 25 MG: 2.5 LIQUID ORAL at 10:11

## 2023-11-27 NOTE — PROGRESS NOTES
0945 Pt here for Orencia infusion, resting in recliner, TP released and pharmacy notified    Pre meds given and tolerated  1027 Orencia infusion started to infuse over 1 hr with filter applied  1130 Orencia infusion complete, tolerated well, will continue to monitor  1145 pt discharged ambulatory with no adverse reaction noted, pt notified to go to ER with any adverse reactions, AVS given along with medication information  Follow up appt made for 1 month

## 2023-11-30 ENCOUNTER — EXTERNAL CHRONIC CARE MANAGEMENT (OUTPATIENT)
Dept: INTERNAL MEDICINE | Facility: CLINIC | Age: 73
End: 2023-11-30
Payer: MEDICARE

## 2023-11-30 PROCEDURE — 99490 PR CHRONIC CARE MGMT, 1ST 20 MIN: ICD-10-PCS | Mod: S$PBB,,, | Performed by: INTERNAL MEDICINE

## 2023-11-30 PROCEDURE — 99490 CHRNC CARE MGMT STAFF 1ST 20: CPT | Mod: PBBFAC | Performed by: INTERNAL MEDICINE

## 2023-11-30 PROCEDURE — 99490 CHRNC CARE MGMT STAFF 1ST 20: CPT | Mod: S$PBB,,, | Performed by: INTERNAL MEDICINE

## 2023-12-20 DIAGNOSIS — I10 ESSENTIAL HYPERTENSION: ICD-10-CM

## 2023-12-20 DIAGNOSIS — M15.9 OSTEOARTHRITIS OF MULTIPLE JOINTS, UNSPECIFIED OSTEOARTHRITIS TYPE: ICD-10-CM

## 2023-12-20 RX ORDER — LOSARTAN POTASSIUM AND HYDROCHLOROTHIAZIDE 12.5; 1 MG/1; MG/1
1 TABLET ORAL
Qty: 90 TABLET | Refills: 1 | Status: SHIPPED | OUTPATIENT
Start: 2023-12-20

## 2023-12-20 RX ORDER — POTASSIUM CHLORIDE 600 MG/1
8 CAPSULE, EXTENDED RELEASE ORAL
Qty: 90 CAPSULE | Refills: 3 | Status: SHIPPED | OUTPATIENT
Start: 2023-12-20

## 2023-12-28 ENCOUNTER — INFUSION (OUTPATIENT)
Dept: INFUSION THERAPY | Facility: HOSPITAL | Age: 73
End: 2023-12-28
Attending: INTERNAL MEDICINE
Payer: MEDICARE

## 2023-12-28 VITALS
HEART RATE: 64 BPM | SYSTOLIC BLOOD PRESSURE: 153 MMHG | RESPIRATION RATE: 17 BRPM | OXYGEN SATURATION: 98 % | TEMPERATURE: 97 F | DIASTOLIC BLOOD PRESSURE: 70 MMHG

## 2023-12-28 DIAGNOSIS — M05.79 RHEUMATOID ARTHRITIS INVOLVING MULTIPLE SITES WITH POSITIVE RHEUMATOID FACTOR: Primary | ICD-10-CM

## 2023-12-28 PROCEDURE — 25000003 PHARM REV CODE 250: Performed by: NURSE PRACTITIONER

## 2023-12-28 PROCEDURE — 63600175 PHARM REV CODE 636 W HCPCS: Performed by: INTERNAL MEDICINE

## 2023-12-28 PROCEDURE — 25000003 PHARM REV CODE 250: Performed by: INTERNAL MEDICINE

## 2023-12-28 PROCEDURE — 96375 TX/PRO/DX INJ NEW DRUG ADDON: CPT

## 2023-12-28 PROCEDURE — 96365 THER/PROPH/DIAG IV INF INIT: CPT

## 2023-12-28 RX ORDER — DIPHENHYDRAMINE HCL 12.5MG/5ML
25 LIQUID (ML) ORAL ONCE AS NEEDED
Status: COMPLETED | OUTPATIENT
Start: 2023-12-28 | End: 2023-12-28

## 2023-12-28 RX ORDER — SODIUM CHLORIDE 0.9 % (FLUSH) 0.9 %
10 SYRINGE (ML) INJECTION
Status: DISCONTINUED | OUTPATIENT
Start: 2023-12-28 | End: 2023-12-28 | Stop reason: HOSPADM

## 2023-12-28 RX ORDER — DIPHENHYDRAMINE HCL 12.5MG/5ML
25 LIQUID (ML) ORAL ONCE AS NEEDED
Status: CANCELLED
Start: 2023-12-28

## 2023-12-28 RX ORDER — ACETAMINOPHEN 500 MG
1000 TABLET ORAL ONCE AS NEEDED
Status: COMPLETED | OUTPATIENT
Start: 2023-12-28 | End: 2023-12-28

## 2023-12-28 RX ADMIN — SODIUM CHLORIDE 750 MG: 9 INJECTION, SOLUTION INTRAVENOUS at 10:12

## 2023-12-28 RX ADMIN — ACETAMINOPHEN 1000 MG: 500 TABLET ORAL at 10:12

## 2023-12-28 RX ADMIN — METHYLPREDNISOLONE SODIUM SUCCINATE 62.5 MG: 125 INJECTION, POWDER, FOR SOLUTION INTRAMUSCULAR; INTRAVENOUS at 10:12

## 2023-12-28 RX ADMIN — DIPHENHYDRAMINE HYDROCHLORIDE 25 MG: 2.5 LIQUID ORAL at 10:12

## 2023-12-28 NOTE — PROGRESS NOTES
0950 Pt here for Orencia infusion, resting in recliner, TP released and pharmacy notified    Premeds given and tolerated  1016 Orencia infusion started to infuse over 1 hr  1115 Orencia infusion complete, tolerated well, will continue to monitor  1135 pt discharged ambulatory with no adverse reaction noted, pt notified to go to ER with any adverse reactions, AVS given along with medication information  Follow up appt made 1 month

## 2023-12-31 ENCOUNTER — EXTERNAL CHRONIC CARE MANAGEMENT (OUTPATIENT)
Dept: INTERNAL MEDICINE | Facility: CLINIC | Age: 73
End: 2023-12-31
Payer: MEDICARE

## 2023-12-31 PROCEDURE — 99490 CHRNC CARE MGMT STAFF 1ST 20: CPT | Mod: S$PBB,,, | Performed by: INTERNAL MEDICINE

## 2023-12-31 PROCEDURE — 99490 CHRNC CARE MGMT STAFF 1ST 20: CPT | Mod: PBBFAC | Performed by: INTERNAL MEDICINE

## 2024-01-23 RX ORDER — OMEPRAZOLE 40 MG/1
40 CAPSULE, DELAYED RELEASE ORAL
Qty: 90 CAPSULE | Refills: 3 | Status: SHIPPED | OUTPATIENT
Start: 2024-01-23

## 2024-01-29 ENCOUNTER — INFUSION (OUTPATIENT)
Dept: INFUSION THERAPY | Facility: HOSPITAL | Age: 74
End: 2024-01-29
Attending: NURSE PRACTITIONER
Payer: MEDICARE

## 2024-01-29 VITALS
HEART RATE: 75 BPM | DIASTOLIC BLOOD PRESSURE: 80 MMHG | SYSTOLIC BLOOD PRESSURE: 170 MMHG | RESPIRATION RATE: 17 BRPM | OXYGEN SATURATION: 99 %

## 2024-01-29 DIAGNOSIS — M05.79 RHEUMATOID ARTHRITIS INVOLVING MULTIPLE SITES WITH POSITIVE RHEUMATOID FACTOR: Primary | ICD-10-CM

## 2024-01-29 PROCEDURE — 25000003 PHARM REV CODE 250: Performed by: NURSE PRACTITIONER

## 2024-01-29 PROCEDURE — 96375 TX/PRO/DX INJ NEW DRUG ADDON: CPT

## 2024-01-29 PROCEDURE — 63600175 PHARM REV CODE 636 W HCPCS: Performed by: NURSE PRACTITIONER

## 2024-01-29 PROCEDURE — 96365 THER/PROPH/DIAG IV INF INIT: CPT

## 2024-01-29 RX ORDER — DIPHENHYDRAMINE HYDROCHLORIDE 12.5 MG/5ML
25 LIQUID ORAL ONCE AS NEEDED
Status: COMPLETED | OUTPATIENT
Start: 2024-01-29 | End: 2024-01-29

## 2024-01-29 RX ORDER — DIPHENHYDRAMINE HYDROCHLORIDE 12.5 MG/5ML
25 LIQUID ORAL ONCE AS NEEDED
Status: CANCELLED
Start: 2024-01-29

## 2024-01-29 RX ORDER — ACETAMINOPHEN 500 MG
1000 TABLET ORAL ONCE AS NEEDED
Status: COMPLETED | OUTPATIENT
Start: 2024-01-29 | End: 2024-01-29

## 2024-01-29 RX ORDER — SODIUM CHLORIDE 0.9 % (FLUSH) 0.9 %
10 SYRINGE (ML) INJECTION
Status: DISCONTINUED | OUTPATIENT
Start: 2024-01-29 | End: 2024-01-29 | Stop reason: HOSPADM

## 2024-01-29 RX ADMIN — SODIUM CHLORIDE 750 MG: 9 INJECTION, SOLUTION INTRAVENOUS at 10:01

## 2024-01-29 RX ADMIN — DIPHENHYDRAMINE HYDROCHLORIDE 25 MG: 2.5 LIQUID ORAL at 10:01

## 2024-01-29 RX ADMIN — ACETAMINOPHEN 1000 MG: 500 TABLET ORAL at 10:01

## 2024-01-29 RX ADMIN — METHYLPREDNISOLONE SODIUM SUCCINATE 62.5 MG: 125 INJECTION, POWDER, FOR SOLUTION INTRAMUSCULAR; INTRAVENOUS at 10:01

## 2024-01-29 NOTE — PROGRESS NOTES
1000 Pt here for Orencia infusion, resting in recliner, TP released and pharmacy notified    Pre meds given and tolerated  1032 Orencia infusion started to infuse over 1 hr with filter applied  1130 Orencia infusion complete, tolerated well, will continue to monitor  1150 pt discharged ambulatory with no adverse reaction noted, pt notified to go to ER with any adverse reactions, AVS given along with medication information  Follow up appt made 4 weeks

## 2024-01-31 ENCOUNTER — EXTERNAL CHRONIC CARE MANAGEMENT (OUTPATIENT)
Dept: INTERNAL MEDICINE | Facility: CLINIC | Age: 74
End: 2024-01-31
Payer: MEDICARE

## 2024-01-31 PROCEDURE — 99439 CHRNC CARE MGMT STAF EA ADDL: CPT | Mod: S$PBB,,, | Performed by: INTERNAL MEDICINE

## 2024-01-31 PROCEDURE — 99490 CHRNC CARE MGMT STAFF 1ST 20: CPT | Mod: PBBFAC | Performed by: INTERNAL MEDICINE

## 2024-01-31 PROCEDURE — 99490 CHRNC CARE MGMT STAFF 1ST 20: CPT | Mod: S$PBB,,, | Performed by: INTERNAL MEDICINE

## 2024-01-31 PROCEDURE — 99439 CHRNC CARE MGMT STAF EA ADDL: CPT | Mod: PBBFAC | Performed by: INTERNAL MEDICINE

## 2024-02-19 NOTE — PATIENT INSTRUCTIONS
Continue current meds and f/u 3 months   Patient is scheduled for surgery on Wednesday for tympanoplasty.  She was seen at PCP office for viral illness 2/13.  She was not tested for influenza or covid.  She was having sob, tightness in chest, cough and congestion.  She has been using inhalers and is not having tightness in chest, still some sob w exertion, productive cough and blowing nose.    Need to r/s surgery? Wait another day? Please advise.

## 2024-02-20 ENCOUNTER — OFFICE VISIT (OUTPATIENT)
Dept: INTERNAL MEDICINE | Facility: CLINIC | Age: 74
End: 2024-02-20
Payer: MEDICARE

## 2024-02-20 VITALS
WEIGHT: 207.5 LBS | BODY MASS INDEX: 36.77 KG/M2 | SYSTOLIC BLOOD PRESSURE: 120 MMHG | RESPIRATION RATE: 16 BRPM | HEIGHT: 63 IN | HEART RATE: 71 BPM | DIASTOLIC BLOOD PRESSURE: 80 MMHG | OXYGEN SATURATION: 98 % | TEMPERATURE: 98 F

## 2024-02-20 DIAGNOSIS — K21.9 GERD WITHOUT ESOPHAGITIS: ICD-10-CM

## 2024-02-20 DIAGNOSIS — D64.9 ANEMIA, UNSPECIFIED TYPE: ICD-10-CM

## 2024-02-20 DIAGNOSIS — I10 ESSENTIAL HYPERTENSION: Primary | ICD-10-CM

## 2024-02-20 DIAGNOSIS — E03.9 HYPOTHYROIDISM, UNSPECIFIED TYPE: ICD-10-CM

## 2024-02-20 DIAGNOSIS — M05.79 RHEUMATOID ARTHRITIS INVOLVING MULTIPLE SITES WITH POSITIVE RHEUMATOID FACTOR: ICD-10-CM

## 2024-02-20 DIAGNOSIS — E11.9 CONTROLLED TYPE 2 DIABETES MELLITUS WITHOUT COMPLICATION, WITHOUT LONG-TERM CURRENT USE OF INSULIN: ICD-10-CM

## 2024-02-20 DIAGNOSIS — E78.5 HYPERLIPIDEMIA LDL GOAL <100: ICD-10-CM

## 2024-02-20 DIAGNOSIS — J30.2 SEASONAL ALLERGIES: ICD-10-CM

## 2024-02-20 PROCEDURE — 3288F FALL RISK ASSESSMENT DOCD: CPT | Mod: CPTII,,, | Performed by: INTERNAL MEDICINE

## 2024-02-20 PROCEDURE — 3074F SYST BP LT 130 MM HG: CPT | Mod: CPTII,,, | Performed by: INTERNAL MEDICINE

## 2024-02-20 PROCEDURE — 3079F DIAST BP 80-89 MM HG: CPT | Mod: CPTII,,, | Performed by: INTERNAL MEDICINE

## 2024-02-20 PROCEDURE — 1101F PT FALLS ASSESS-DOCD LE1/YR: CPT | Mod: CPTII,,, | Performed by: INTERNAL MEDICINE

## 2024-02-20 PROCEDURE — 99213 OFFICE O/P EST LOW 20 MIN: CPT | Mod: PBBFAC | Performed by: INTERNAL MEDICINE

## 2024-02-20 PROCEDURE — 1160F RVW MEDS BY RX/DR IN RCRD: CPT | Mod: CPTII,,, | Performed by: INTERNAL MEDICINE

## 2024-02-20 PROCEDURE — 1159F MED LIST DOCD IN RCRD: CPT | Mod: CPTII,,, | Performed by: INTERNAL MEDICINE

## 2024-02-20 PROCEDURE — 3008F BODY MASS INDEX DOCD: CPT | Mod: CPTII,,, | Performed by: INTERNAL MEDICINE

## 2024-02-20 PROCEDURE — 99214 OFFICE O/P EST MOD 30 MIN: CPT | Mod: S$PBB,,, | Performed by: INTERNAL MEDICINE

## 2024-02-20 NOTE — PROGRESS NOTES
Subjective:       Patient ID: Meghna Dalal is a 73 y.o. female.    Chief Complaint: Follow-up    Reads on internet and worries about self  reviewed labs and reassured pt she does not have ascites or hepatitis  bs 144 reciently    Follow-up  Pertinent negatives include no abdominal pain, chest pain, fatigue, rash or weakness.   Review of Systems   Constitutional:  Negative for fatigue and unexpected weight change.   HENT:  Negative for ear pain and goiter.    Respiratory:  Negative for chest tightness and shortness of breath.    Cardiovascular:  Negative for chest pain, palpitations and leg swelling.   Gastrointestinal:  Negative for abdominal pain and reflux.   Integumentary:  Negative for rash and breast tenderness.   Neurological:  Negative for dizziness and weakness.   Hematological:  Negative for adenopathy.   Psychiatric/Behavioral:  Negative for behavioral problems.    Breast: Negative for tenderness      Objective:      Physical Exam  Constitutional:       Appearance: Normal appearance.   HENT:      Head: Normocephalic and atraumatic.      Right Ear: External ear normal.      Left Ear: External ear normal.      Mouth/Throat:      Pharynx: Oropharynx is clear.   Eyes:      Extraocular Movements: Extraocular movements intact.      Pupils: Pupils are equal, round, and reactive to light.   Cardiovascular:      Rate and Rhythm: Normal rate and regular rhythm.      Pulses: Normal pulses.      Heart sounds: Normal heart sounds.   Pulmonary:      Effort: Pulmonary effort is normal.      Breath sounds: Normal breath sounds.   Abdominal:      General: Abdomen is flat. Bowel sounds are normal.      Palpations: Abdomen is soft.   Musculoskeletal:         General: Normal range of motion.      Cervical back: Normal range of motion and neck supple.   Skin:     General: Skin is warm.      Capillary Refill: Capillary refill takes less than 2 seconds.   Neurological:      General: No focal deficit present.       Mental Status: She is alert.   Psychiatric:         Mood and Affect: Mood normal.         Thought Content: Thought content normal.         Judgment: Judgment normal.       Assessment:       1. Essential hypertension    2. GERD without esophagitis    3. Rheumatoid arthritis involving multiple sites with positive rheumatoid factor    4. Controlled type 2 diabetes mellitus without complication, without long-term current use of insulin    5. Hypothyroidism, unspecified type    6. Hyperlipidemia LDL goal <100    7. Seasonal allergies    8. Anemia, unspecified type        Plan:         Patient Instructions   Continue current meds and f/u 3 months      Problem List Items Addressed This Visit          Immunology/Multi System    Rheumatoid arthritis involving multiple sites with positive rheumatoid factor     Other Visit Diagnoses       Essential hypertension    -  Primary    Relevant Orders    CBC Auto Differential    Comprehensive Metabolic Panel    GERD without esophagitis        Controlled type 2 diabetes mellitus without complication, without long-term current use of insulin        Relevant Orders    Hemoglobin A1C    Hypothyroidism, unspecified type        Relevant Orders    TSH    Hyperlipidemia LDL goal <100        Relevant Orders    Lipid Panel    Seasonal allergies        Anemia, unspecified type

## 2024-02-27 ENCOUNTER — INFUSION (OUTPATIENT)
Dept: INFUSION THERAPY | Facility: HOSPITAL | Age: 74
End: 2024-02-27
Attending: NURSE PRACTITIONER
Payer: MEDICARE

## 2024-02-27 VITALS
SYSTOLIC BLOOD PRESSURE: 158 MMHG | WEIGHT: 208 LBS | HEIGHT: 63 IN | RESPIRATION RATE: 18 BRPM | OXYGEN SATURATION: 97 % | BODY MASS INDEX: 36.86 KG/M2 | TEMPERATURE: 98 F | HEART RATE: 74 BPM | DIASTOLIC BLOOD PRESSURE: 73 MMHG

## 2024-02-27 DIAGNOSIS — M05.79 RHEUMATOID ARTHRITIS INVOLVING MULTIPLE SITES WITH POSITIVE RHEUMATOID FACTOR: Primary | ICD-10-CM

## 2024-02-27 PROCEDURE — 25000003 PHARM REV CODE 250: Performed by: NURSE PRACTITIONER

## 2024-02-27 PROCEDURE — 96375 TX/PRO/DX INJ NEW DRUG ADDON: CPT

## 2024-02-27 PROCEDURE — 96365 THER/PROPH/DIAG IV INF INIT: CPT

## 2024-02-27 PROCEDURE — 63600175 PHARM REV CODE 636 W HCPCS: Mod: JZ,JA,JG | Performed by: NURSE PRACTITIONER

## 2024-02-27 RX ORDER — ACETAMINOPHEN 500 MG
1000 TABLET ORAL ONCE AS NEEDED
Status: COMPLETED | OUTPATIENT
Start: 2024-02-27 | End: 2024-02-27

## 2024-02-27 RX ORDER — SODIUM CHLORIDE 0.9 % (FLUSH) 0.9 %
10 SYRINGE (ML) INJECTION
Status: DISCONTINUED | OUTPATIENT
Start: 2024-02-27 | End: 2024-02-27 | Stop reason: HOSPADM

## 2024-02-27 RX ORDER — DIPHENHYDRAMINE HYDROCHLORIDE 12.5 MG/5ML
25 LIQUID ORAL ONCE AS NEEDED
Status: COMPLETED | OUTPATIENT
Start: 2024-02-27 | End: 2024-02-27

## 2024-02-27 RX ORDER — METHYLPREDNISOLONE SOD SUCC 125 MG
62.5 VIAL (EA) INJECTION ONCE
Status: COMPLETED | OUTPATIENT
Start: 2024-02-27 | End: 2024-02-27

## 2024-02-27 RX ADMIN — SODIUM CHLORIDE 750 MG: 9 INJECTION, SOLUTION INTRAVENOUS at 10:02

## 2024-02-27 RX ADMIN — DIPHENHYDRAMINE HYDROCHLORIDE 25 MG: 2.5 LIQUID ORAL at 10:02

## 2024-02-27 RX ADMIN — ACETAMINOPHEN 1000 MG: 500 TABLET ORAL at 10:02

## 2024-02-27 RX ADMIN — METHYLPREDNISOLONE SODIUM SUCCINATE 62.5 MG: 125 INJECTION, POWDER, FOR SOLUTION INTRAMUSCULAR; INTRAVENOUS at 10:02

## 2024-02-27 NOTE — PROGRESS NOTES
1010 Pt here for Orencia infusion, resting in recliner, TP released and pharmacy notified    Pre meds given and tolerated (Tylenol 1000mg PO, benadryl 25mg PO, and solumedrol 62.5 IV)  1031 Orencia infusion started to infuse over 1 hr with filter applied  1130 Orencia infusion complete, tolerated well, will continue to monitor  1145 pt discharged ambulatory with no adverse reaction noted, pt notified to go to ER with any adverse reactions, AVS given along with medication information  Follow up appt made 4 weeks

## 2024-02-29 ENCOUNTER — EXTERNAL CHRONIC CARE MANAGEMENT (OUTPATIENT)
Dept: INTERNAL MEDICINE | Facility: CLINIC | Age: 74
End: 2024-02-29
Payer: MEDICARE

## 2024-02-29 PROCEDURE — 99439 CHRNC CARE MGMT STAF EA ADDL: CPT | Mod: PBBFAC | Performed by: INTERNAL MEDICINE

## 2024-02-29 PROCEDURE — 99490 CHRNC CARE MGMT STAFF 1ST 20: CPT | Mod: PBBFAC | Performed by: INTERNAL MEDICINE

## 2024-02-29 PROCEDURE — 99490 CHRNC CARE MGMT STAFF 1ST 20: CPT | Mod: S$PBB,,, | Performed by: INTERNAL MEDICINE

## 2024-02-29 PROCEDURE — 99439 CHRNC CARE MGMT STAF EA ADDL: CPT | Mod: S$PBB,,, | Performed by: INTERNAL MEDICINE

## 2024-03-12 NOTE — PATIENT INSTRUCTIONS
Continue current meds and f/u 3 months   Nerve block    Date/Time: 3/12/2024 1:15 PM    Performed by: ROSANNA Davila  Authorized by: ROSANNA Davila    Patient location:  [facility]  [place] Protocol:  Consent: The procedure was performed in an emergent situation. Verbal consent obtained. Written consent obtained.  Risks and benefits: risks, benefits and alternatives were discussed  Consent given by: patient  Patient understanding: patient states understanding of the procedure being performed  Patient consent: the patient's understanding of the procedure matches consent given  Procedure consent: procedure consent matches procedure scheduled  Relevant documents: relevant documents present and verified  Patient identity confirmed: verbally with patient    Indications:     Indications:  Pain relief  Location:     Body area:  Head    Head nerve:  Greater occipital and lesser occipital    Nerve type:  Peripheral    Laterality:  Bilateral  Pre-procedure details:     Skin preparation:  Alcohol  Skin anesthesia (see MAR for exact dosages):     Skin anesthesia method:  None  Procedure details (see MAR for exact dosages):     Block needle gauge:  30 G    Anesthetic injected:  Bupivacaine 0.25% w/o epi and lidocaine 1% w/o epi    Steroid injected:  Triamcinolone    Additive injected:  None    Injection procedure:  Anatomic landmarks identified, anatomic landmarks palpated, introduced needle, incremental injection and negative aspiration for blood  Post-procedure details:     Dressing:  None    Outcome:  Pain improved    Patient tolerance of procedure:  Tolerated well, no immediate complications  Comments:      Mixture of 2 ml 1% lidocaine w/o epi, 2 ml of 0.25% bupivacaine w/o epi and 1 ml of Kenalog 40 mg/1ml were administered in the greater and lesser occipital nerves bilaterally in 6 injection sites total with 3 on each side (4 of 1 ml each, 2 of 0.5 ml each). Total of 5 ml used, 0 ml wasted.     Pain pre-procedure was rated 6/10.  Pain  immediately post procedure was rated 2/10.  She tolerated the procedure well with no difficulty.  She left the office ambulatory accompanied by her spouse.

## 2024-03-31 ENCOUNTER — EXTERNAL CHRONIC CARE MANAGEMENT (OUTPATIENT)
Dept: INTERNAL MEDICINE | Facility: CLINIC | Age: 74
End: 2024-03-31
Payer: MEDICARE

## 2024-03-31 PROCEDURE — 99490 CHRNC CARE MGMT STAFF 1ST 20: CPT | Mod: PBBFAC | Performed by: INTERNAL MEDICINE

## 2024-03-31 PROCEDURE — 99490 CHRNC CARE MGMT STAFF 1ST 20: CPT | Mod: S$PBB,,, | Performed by: INTERNAL MEDICINE

## 2024-04-02 RX ORDER — LEVOTHYROXINE SODIUM 125 UG/1
TABLET ORAL
Qty: 90 TABLET | Refills: 3 | Status: SHIPPED | OUTPATIENT
Start: 2024-04-02

## 2024-04-03 ENCOUNTER — INFUSION (OUTPATIENT)
Dept: INFUSION THERAPY | Facility: HOSPITAL | Age: 74
End: 2024-04-03
Attending: INTERNAL MEDICINE
Payer: MEDICARE

## 2024-04-03 VITALS
SYSTOLIC BLOOD PRESSURE: 165 MMHG | WEIGHT: 208 LBS | RESPIRATION RATE: 18 BRPM | HEART RATE: 65 BPM | OXYGEN SATURATION: 97 % | DIASTOLIC BLOOD PRESSURE: 72 MMHG | BODY MASS INDEX: 36.85 KG/M2

## 2024-04-03 DIAGNOSIS — M05.79 RHEUMATOID ARTHRITIS INVOLVING MULTIPLE SITES WITH POSITIVE RHEUMATOID FACTOR: Primary | ICD-10-CM

## 2024-04-03 PROCEDURE — 25000003 PHARM REV CODE 250: Performed by: NURSE PRACTITIONER

## 2024-04-03 PROCEDURE — 96365 THER/PROPH/DIAG IV INF INIT: CPT

## 2024-04-03 PROCEDURE — 63600175 PHARM REV CODE 636 W HCPCS: Mod: JZ,JA,JG | Performed by: NURSE PRACTITIONER

## 2024-04-03 RX ORDER — DIPHENHYDRAMINE HYDROCHLORIDE 12.5 MG/5ML
25 LIQUID ORAL ONCE AS NEEDED
Status: CANCELLED
Start: 2024-05-06

## 2024-04-03 RX ORDER — SODIUM CHLORIDE 0.9 % (FLUSH) 0.9 %
10 SYRINGE (ML) INJECTION
Status: CANCELLED | OUTPATIENT
Start: 2024-05-06

## 2024-04-03 RX ORDER — SODIUM CHLORIDE 0.9 % (FLUSH) 0.9 %
10 SYRINGE (ML) INJECTION
Status: DISCONTINUED | OUTPATIENT
Start: 2024-04-03 | End: 2024-04-03 | Stop reason: HOSPADM

## 2024-04-03 RX ORDER — ACETAMINOPHEN 500 MG
1000 TABLET ORAL ONCE AS NEEDED
Status: CANCELLED
Start: 2024-05-06

## 2024-04-03 RX ORDER — METHYLPREDNISOLONE SOD SUCC 125 MG
62.5 VIAL (EA) INJECTION ONCE AS NEEDED
Status: COMPLETED | OUTPATIENT
Start: 2024-04-03 | End: 2024-04-03

## 2024-04-03 RX ORDER — ACETAMINOPHEN 500 MG
1000 TABLET ORAL ONCE AS NEEDED
Status: COMPLETED | OUTPATIENT
Start: 2024-04-03 | End: 2024-04-03

## 2024-04-03 RX ORDER — DIPHENHYDRAMINE HYDROCHLORIDE 12.5 MG/5ML
25 LIQUID ORAL ONCE AS NEEDED
Status: COMPLETED | OUTPATIENT
Start: 2024-04-03 | End: 2024-04-03

## 2024-04-03 RX ADMIN — ACETAMINOPHEN 1000 MG: 500 TABLET ORAL at 10:04

## 2024-04-03 RX ADMIN — SODIUM CHLORIDE 750 MG: 9 INJECTION, SOLUTION INTRAVENOUS at 10:04

## 2024-04-03 RX ADMIN — METHYLPREDNISOLONE SODIUM SUCCINATE 62.5 MG: 125 INJECTION, POWDER, FOR SOLUTION INTRAMUSCULAR; INTRAVENOUS at 10:04

## 2024-04-03 RX ADMIN — DIPHENHYDRAMINE HYDROCHLORIDE 25 MG: 2.5 LIQUID ORAL at 10:04

## 2024-04-03 NOTE — PROGRESS NOTES
Patient arrived for orencia infusion today ambulatory to [facility]. Therapy plan released and pharmacy notified.   INT released and premedication given   Benadryl 25mg PO liquid  Solu-medrol 62.5mg IV  Tylenol 1,000mg PO   1021 infusion started  1121 infusion finished  1121 Int flushed   1130 Patient discharged , given AVS and told to watch for signs and symptoms of adverse reactions , if had any to go to the ER. Patient given upcoming appointment

## 2024-04-30 ENCOUNTER — EXTERNAL CHRONIC CARE MANAGEMENT (OUTPATIENT)
Dept: INTERNAL MEDICINE | Facility: CLINIC | Age: 74
End: 2024-04-30
Payer: MEDICARE

## 2024-04-30 PROCEDURE — 99439 CHRNC CARE MGMT STAF EA ADDL: CPT | Mod: PBBFAC | Performed by: INTERNAL MEDICINE

## 2024-04-30 PROCEDURE — 99490 CHRNC CARE MGMT STAFF 1ST 20: CPT | Mod: S$PBB,,, | Performed by: INTERNAL MEDICINE

## 2024-04-30 PROCEDURE — 99490 CHRNC CARE MGMT STAFF 1ST 20: CPT | Mod: PBBFAC | Performed by: INTERNAL MEDICINE

## 2024-04-30 PROCEDURE — 99439 CHRNC CARE MGMT STAF EA ADDL: CPT | Mod: S$PBB,,, | Performed by: INTERNAL MEDICINE

## 2024-05-02 ENCOUNTER — INFUSION (OUTPATIENT)
Dept: INFUSION THERAPY | Facility: HOSPITAL | Age: 74
End: 2024-05-02
Attending: INTERNAL MEDICINE
Payer: MEDICARE

## 2024-05-02 VITALS
SYSTOLIC BLOOD PRESSURE: 144 MMHG | HEART RATE: 67 BPM | OXYGEN SATURATION: 98 % | BODY MASS INDEX: 36.85 KG/M2 | DIASTOLIC BLOOD PRESSURE: 67 MMHG | RESPIRATION RATE: 17 BRPM | WEIGHT: 208 LBS

## 2024-05-02 DIAGNOSIS — M05.79 RHEUMATOID ARTHRITIS INVOLVING MULTIPLE SITES WITH POSITIVE RHEUMATOID FACTOR: Primary | ICD-10-CM

## 2024-05-02 PROCEDURE — 63600175 PHARM REV CODE 636 W HCPCS: Mod: JZ,JA,JG | Performed by: NURSE PRACTITIONER

## 2024-05-02 PROCEDURE — 25000003 PHARM REV CODE 250: Performed by: NURSE PRACTITIONER

## 2024-05-02 PROCEDURE — 96365 THER/PROPH/DIAG IV INF INIT: CPT

## 2024-05-02 RX ORDER — METHYLPREDNISOLONE SOD SUCC 125 MG
62.5 VIAL (EA) INJECTION ONCE AS NEEDED
Status: COMPLETED | OUTPATIENT
Start: 2024-05-02 | End: 2024-05-02

## 2024-05-02 RX ORDER — ACETAMINOPHEN 500 MG
1000 TABLET ORAL ONCE AS NEEDED
Status: CANCELLED
Start: 2024-05-06

## 2024-05-02 RX ORDER — DIPHENHYDRAMINE HYDROCHLORIDE 12.5 MG/5ML
25 LIQUID ORAL ONCE AS NEEDED
Status: CANCELLED
Start: 2024-05-06

## 2024-05-02 RX ORDER — SODIUM CHLORIDE 0.9 % (FLUSH) 0.9 %
10 SYRINGE (ML) INJECTION
Status: CANCELLED | OUTPATIENT
Start: 2024-05-06

## 2024-05-02 RX ORDER — DIPHENHYDRAMINE HYDROCHLORIDE 12.5 MG/5ML
25 LIQUID ORAL ONCE AS NEEDED
Status: COMPLETED | OUTPATIENT
Start: 2024-05-02 | End: 2024-05-02

## 2024-05-02 RX ORDER — ACETAMINOPHEN 500 MG
1000 TABLET ORAL ONCE AS NEEDED
Status: COMPLETED | OUTPATIENT
Start: 2024-05-02 | End: 2024-05-02

## 2024-05-02 RX ADMIN — DIPHENHYDRAMINE HYDROCHLORIDE 25 MG: 2.5 LIQUID ORAL at 09:05

## 2024-05-02 RX ADMIN — ACETAMINOPHEN 1000 MG: 500 TABLET ORAL at 10:05

## 2024-05-02 RX ADMIN — METHYLPREDNISOLONE SODIUM SUCCINATE 62.5 MG: 125 INJECTION, POWDER, FOR SOLUTION INTRAMUSCULAR; INTRAVENOUS at 10:05

## 2024-05-02 RX ADMIN — SODIUM CHLORIDE 750 MG: 9 INJECTION, SOLUTION INTRAVENOUS at 10:05

## 2024-05-02 NOTE — PROGRESS NOTES
Patient arrived for Orencia infusion today ambulatory to [facility].  Therapy plan released and pharmacy notified.   Patients int started to left AC  Premedication:  Benadryl 25mg Liquid PO  Tylenol 1,000mg PO   Solumedrol 62.5mg IV  1008 infusion started  1114infusion finished  1114 Int flushed   1124Patient discharged , given AVS and told to watch for signs and symptoms of adverse reactions , if had any to go to the ER. Patient given upcoming appointment

## 2024-05-29 ENCOUNTER — OFFICE VISIT (OUTPATIENT)
Dept: INTERNAL MEDICINE | Facility: CLINIC | Age: 74
End: 2024-05-29
Payer: MEDICARE

## 2024-05-29 VITALS
RESPIRATION RATE: 16 BRPM | TEMPERATURE: 99 F | OXYGEN SATURATION: 98 % | WEIGHT: 206.81 LBS | HEIGHT: 63 IN | DIASTOLIC BLOOD PRESSURE: 70 MMHG | BODY MASS INDEX: 36.64 KG/M2 | SYSTOLIC BLOOD PRESSURE: 122 MMHG | HEART RATE: 65 BPM

## 2024-05-29 DIAGNOSIS — K21.9 GERD WITHOUT ESOPHAGITIS: ICD-10-CM

## 2024-05-29 DIAGNOSIS — J30.2 SEASONAL ALLERGIES: ICD-10-CM

## 2024-05-29 DIAGNOSIS — E03.9 HYPOTHYROIDISM, UNSPECIFIED TYPE: ICD-10-CM

## 2024-05-29 DIAGNOSIS — M05.79 RHEUMATOID ARTHRITIS INVOLVING MULTIPLE SITES WITH POSITIVE RHEUMATOID FACTOR: ICD-10-CM

## 2024-05-29 DIAGNOSIS — E11.9 CONTROLLED TYPE 2 DIABETES MELLITUS WITHOUT COMPLICATION, WITHOUT LONG-TERM CURRENT USE OF INSULIN: ICD-10-CM

## 2024-05-29 DIAGNOSIS — I10 ESSENTIAL HYPERTENSION: Primary | ICD-10-CM

## 2024-05-29 DIAGNOSIS — E78.5 HYPERLIPIDEMIA LDL GOAL <100: ICD-10-CM

## 2024-05-29 DIAGNOSIS — J06.9 UPPER RESPIRATORY TRACT INFECTION, UNSPECIFIED TYPE: ICD-10-CM

## 2024-05-29 DIAGNOSIS — E66.01 SEVERE OBESITY (BMI 35.0-39.9) WITH COMORBIDITY: ICD-10-CM

## 2024-05-29 PROCEDURE — 1101F PT FALLS ASSESS-DOCD LE1/YR: CPT | Mod: CPTII,,, | Performed by: INTERNAL MEDICINE

## 2024-05-29 PROCEDURE — 99214 OFFICE O/P EST MOD 30 MIN: CPT | Mod: S$PBB,,, | Performed by: INTERNAL MEDICINE

## 2024-05-29 PROCEDURE — 3008F BODY MASS INDEX DOCD: CPT | Mod: CPTII,,, | Performed by: INTERNAL MEDICINE

## 2024-05-29 PROCEDURE — 1160F RVW MEDS BY RX/DR IN RCRD: CPT | Mod: CPTII,,, | Performed by: INTERNAL MEDICINE

## 2024-05-29 PROCEDURE — 99214 OFFICE O/P EST MOD 30 MIN: CPT | Mod: PBBFAC | Performed by: INTERNAL MEDICINE

## 2024-05-29 PROCEDURE — 3074F SYST BP LT 130 MM HG: CPT | Mod: CPTII,,, | Performed by: INTERNAL MEDICINE

## 2024-05-29 PROCEDURE — 3078F DIAST BP <80 MM HG: CPT | Mod: CPTII,,, | Performed by: INTERNAL MEDICINE

## 2024-05-29 PROCEDURE — 1159F MED LIST DOCD IN RCRD: CPT | Mod: CPTII,,, | Performed by: INTERNAL MEDICINE

## 2024-05-29 PROCEDURE — 1126F AMNT PAIN NOTED NONE PRSNT: CPT | Mod: CPTII,,, | Performed by: INTERNAL MEDICINE

## 2024-05-29 PROCEDURE — 3288F FALL RISK ASSESSMENT DOCD: CPT | Mod: CPTII,,, | Performed by: INTERNAL MEDICINE

## 2024-05-29 PROCEDURE — 3046F HEMOGLOBIN A1C LEVEL >9.0%: CPT | Mod: CPTII,,, | Performed by: INTERNAL MEDICINE

## 2024-05-29 RX ORDER — AZITHROMYCIN 250 MG/1
TABLET, FILM COATED ORAL
Qty: 6 TABLET | Refills: 1 | Status: SHIPPED | OUTPATIENT
Start: 2024-05-29

## 2024-05-29 NOTE — PROGRESS NOTES
Subjective:       Patient ID: Meghna Dalal is a 74 y.o. female.    Chief Complaint: Follow-up (Patient is here for 3 month follow up. Patient c/o feet swelling. She also states that she has been dealing with sinus issues)    Recient uri  otherwise no problems    Follow-up  Pertinent negatives include no abdominal pain, chest pain, fatigue, rash or weakness.   Review of Systems   Constitutional:  Negative for fatigue and unexpected weight change.   HENT:  Negative for ear pain and goiter.    Respiratory:  Negative for chest tightness and shortness of breath.    Cardiovascular:  Negative for chest pain, palpitations and leg swelling.   Gastrointestinal:  Negative for abdominal pain and reflux.   Integumentary:  Negative for rash and breast tenderness.   Neurological:  Negative for dizziness and weakness.   Hematological:  Negative for adenopathy.   Psychiatric/Behavioral:  Negative for behavioral problems.    Breast: Negative for tenderness      Objective:      Physical Exam  Constitutional:       Appearance: Normal appearance.   HENT:      Head: Normocephalic and atraumatic.      Right Ear: External ear normal.      Left Ear: External ear normal.      Mouth/Throat:      Pharynx: Oropharynx is clear.   Eyes:      Extraocular Movements: Extraocular movements intact.      Pupils: Pupils are equal, round, and reactive to light.   Cardiovascular:      Rate and Rhythm: Normal rate and regular rhythm.      Pulses: Normal pulses.      Heart sounds: Normal heart sounds.   Pulmonary:      Effort: Pulmonary effort is normal.      Breath sounds: Normal breath sounds.   Abdominal:      General: Abdomen is flat. Bowel sounds are normal.      Palpations: Abdomen is soft.   Musculoskeletal:         General: Normal range of motion.      Cervical back: Normal range of motion and neck supple.   Skin:     General: Skin is warm.      Capillary Refill: Capillary refill takes less than 2 seconds.   Neurological:      General: No  focal deficit present.      Mental Status: She is alert.   Psychiatric:         Mood and Affect: Mood normal.         Thought Content: Thought content normal.         Judgment: Judgment normal.       Assessment:       1. Essential hypertension    2. GERD without esophagitis    3. Rheumatoid arthritis involving multiple sites with positive rheumatoid factor    4. Controlled type 2 diabetes mellitus without complication, without long-term current use of insulin    5. Hypothyroidism, unspecified type    6. Hyperlipidemia LDL goal <100    7. Seasonal allergies    8. Severe obesity (BMI 35.0-39.9) with comorbidity    9. Upper respiratory tract infection, unspecified type        Plan:         Patient Instructions   Continue current meds and f/u 3 months      Problem List Items Addressed This Visit          Immunology/Multi System    Rheumatoid arthritis involving multiple sites with positive rheumatoid factor       Endocrine    Severe obesity (BMI 35.0-39.9) with comorbidity     Other Visit Diagnoses       Essential hypertension    -  Primary    GERD without esophagitis        Controlled type 2 diabetes mellitus without complication, without long-term current use of insulin        Hypothyroidism, unspecified type        Hyperlipidemia LDL goal <100        Seasonal allergies        Upper respiratory tract infection, unspecified type        Relevant Medications    azithromycin (Z-JOSE) 250 MG tablet

## 2024-05-31 ENCOUNTER — EXTERNAL CHRONIC CARE MANAGEMENT (OUTPATIENT)
Dept: INTERNAL MEDICINE | Facility: CLINIC | Age: 74
End: 2024-05-31
Payer: MEDICARE

## 2024-05-31 PROCEDURE — 99490 CHRNC CARE MGMT STAFF 1ST 20: CPT | Mod: PBBFAC | Performed by: INTERNAL MEDICINE

## 2024-05-31 PROCEDURE — 99490 CHRNC CARE MGMT STAFF 1ST 20: CPT | Mod: S$PBB,,, | Performed by: INTERNAL MEDICINE

## 2024-06-03 ENCOUNTER — INFUSION (OUTPATIENT)
Dept: INFUSION THERAPY | Facility: HOSPITAL | Age: 74
End: 2024-06-03
Attending: INTERNAL MEDICINE
Payer: MEDICARE

## 2024-06-03 VITALS
DIASTOLIC BLOOD PRESSURE: 68 MMHG | BODY MASS INDEX: 36.49 KG/M2 | HEART RATE: 65 BPM | WEIGHT: 206 LBS | OXYGEN SATURATION: 99 % | SYSTOLIC BLOOD PRESSURE: 166 MMHG | RESPIRATION RATE: 17 BRPM

## 2024-06-03 DIAGNOSIS — M05.79 RHEUMATOID ARTHRITIS INVOLVING MULTIPLE SITES WITH POSITIVE RHEUMATOID FACTOR: Primary | ICD-10-CM

## 2024-06-03 PROCEDURE — 96365 THER/PROPH/DIAG IV INF INIT: CPT

## 2024-06-03 PROCEDURE — 25000003 PHARM REV CODE 250: Performed by: NURSE PRACTITIONER

## 2024-06-03 PROCEDURE — 96375 TX/PRO/DX INJ NEW DRUG ADDON: CPT

## 2024-06-03 PROCEDURE — 63600175 PHARM REV CODE 636 W HCPCS: Performed by: NURSE PRACTITIONER

## 2024-06-03 RX ORDER — DIPHENHYDRAMINE HYDROCHLORIDE 12.5 MG/5ML
25 LIQUID ORAL ONCE AS NEEDED
Start: 2024-07-01

## 2024-06-03 RX ORDER — DIPHENHYDRAMINE HYDROCHLORIDE 12.5 MG/5ML
25 LIQUID ORAL ONCE AS NEEDED
Status: COMPLETED | OUTPATIENT
Start: 2024-06-03 | End: 2024-06-03

## 2024-06-03 RX ORDER — SODIUM CHLORIDE 0.9 % (FLUSH) 0.9 %
10 SYRINGE (ML) INJECTION
OUTPATIENT
Start: 2024-07-01

## 2024-06-03 RX ORDER — ACETAMINOPHEN 500 MG
1000 TABLET ORAL ONCE AS NEEDED
Status: COMPLETED | OUTPATIENT
Start: 2024-06-03 | End: 2024-06-03

## 2024-06-03 RX ORDER — ACETAMINOPHEN 500 MG
1000 TABLET ORAL ONCE AS NEEDED
Start: 2024-07-01

## 2024-06-03 RX ADMIN — SODIUM CHLORIDE 750 MG: 9 INJECTION, SOLUTION INTRAVENOUS at 10:06

## 2024-06-03 RX ADMIN — METHYLPREDNISOLONE SODIUM SUCCINATE 62.5 MG: 125 INJECTION, POWDER, FOR SOLUTION INTRAMUSCULAR; INTRAVENOUS at 10:06

## 2024-06-03 RX ADMIN — ACETAMINOPHEN 1000 MG: 500 TABLET ORAL at 10:06

## 2024-06-03 RX ADMIN — DIPHENHYDRAMINE HYDROCHLORIDE 25 MG: 12.5 SOLUTION ORAL at 10:06

## 2024-06-03 NOTE — PROGRESS NOTES
1000 Pt here for Orencia infusion, resting in recliner, TP released and pharmacy notified    Pre meds given and tolerated  Benadryl 25mg Liquid PO  Tylenol 1,000mg PO   Solumedrol 62.5mg IV  1020 Orencia infusion started to  infuse over 1 hr with filter applied  1116 Orencia infusion complete, tolerated well, will continue to monitor  1130 pt discharged ambulatory with no adverse reaction noted, pt notified to go to ER with any adverse reactions, AVS given along with medication information  Follow up appt made 4 weeks

## 2024-06-28 DIAGNOSIS — M25.511 ACUTE PAIN OF RIGHT SHOULDER: Primary | ICD-10-CM

## 2024-06-30 ENCOUNTER — EXTERNAL CHRONIC CARE MANAGEMENT (OUTPATIENT)
Dept: INTERNAL MEDICINE | Facility: CLINIC | Age: 74
End: 2024-06-30
Payer: MEDICARE

## 2024-06-30 PROCEDURE — 99490 CHRNC CARE MGMT STAFF 1ST 20: CPT | Mod: S$PBB,,, | Performed by: INTERNAL MEDICINE

## 2024-06-30 PROCEDURE — 99439 CHRNC CARE MGMT STAF EA ADDL: CPT | Mod: S$PBB,,, | Performed by: INTERNAL MEDICINE

## 2024-06-30 PROCEDURE — 99490 CHRNC CARE MGMT STAFF 1ST 20: CPT | Mod: PBBFAC | Performed by: INTERNAL MEDICINE

## 2024-06-30 PROCEDURE — 99439 CHRNC CARE MGMT STAF EA ADDL: CPT | Mod: PBBFAC | Performed by: INTERNAL MEDICINE

## 2024-07-08 ENCOUNTER — INFUSION (OUTPATIENT)
Dept: INFUSION THERAPY | Facility: HOSPITAL | Age: 74
End: 2024-07-08
Attending: INTERNAL MEDICINE
Payer: MEDICARE

## 2024-07-08 VITALS
HEART RATE: 61 BPM | RESPIRATION RATE: 17 BRPM | OXYGEN SATURATION: 99 % | SYSTOLIC BLOOD PRESSURE: 172 MMHG | DIASTOLIC BLOOD PRESSURE: 81 MMHG

## 2024-07-08 DIAGNOSIS — M05.79 RHEUMATOID ARTHRITIS INVOLVING MULTIPLE SITES WITH POSITIVE RHEUMATOID FACTOR: Primary | ICD-10-CM

## 2024-07-08 PROCEDURE — 63600175 PHARM REV CODE 636 W HCPCS: Performed by: NURSE PRACTITIONER

## 2024-07-08 PROCEDURE — 96375 TX/PRO/DX INJ NEW DRUG ADDON: CPT

## 2024-07-08 PROCEDURE — 25000003 PHARM REV CODE 250: Performed by: NURSE PRACTITIONER

## 2024-07-08 PROCEDURE — 96365 THER/PROPH/DIAG IV INF INIT: CPT

## 2024-07-08 RX ORDER — DIPHENHYDRAMINE HYDROCHLORIDE 12.5 MG/5ML
25 LIQUID ORAL ONCE AS NEEDED
Start: 2024-08-05

## 2024-07-08 RX ORDER — ACETAMINOPHEN 500 MG
1000 TABLET ORAL ONCE AS NEEDED
Status: COMPLETED | OUTPATIENT
Start: 2024-07-08 | End: 2024-07-08

## 2024-07-08 RX ORDER — ACETAMINOPHEN 500 MG
1000 TABLET ORAL ONCE AS NEEDED
Start: 2024-08-05

## 2024-07-08 RX ORDER — SODIUM CHLORIDE 0.9 % (FLUSH) 0.9 %
10 SYRINGE (ML) INJECTION
OUTPATIENT
Start: 2024-08-05

## 2024-07-08 RX ORDER — DIPHENHYDRAMINE HYDROCHLORIDE 12.5 MG/5ML
25 LIQUID ORAL ONCE AS NEEDED
Status: COMPLETED | OUTPATIENT
Start: 2024-07-08 | End: 2024-07-08

## 2024-07-08 RX ADMIN — SODIUM CHLORIDE 750 MG: 9 INJECTION, SOLUTION INTRAVENOUS at 10:07

## 2024-07-08 RX ADMIN — DIPHENHYDRAMINE HYDROCHLORIDE 25 MG: 12.5 SOLUTION ORAL at 10:07

## 2024-07-08 RX ADMIN — METHYLPREDNISOLONE SODIUM SUCCINATE 62.5 MG: 125 INJECTION, POWDER, FOR SOLUTION INTRAMUSCULAR; INTRAVENOUS at 10:07

## 2024-07-08 RX ADMIN — ACETAMINOPHEN 1000 MG: 500 TABLET ORAL at 10:07

## 2024-07-08 NOTE — PROGRESS NOTES
1005 Pt here for Orencia infusion, resting in recliner, TP released and pharmacy notified  1030 Orencia infusion started to infuse over 1 hr with filter  1131 Orencia infusion complete, tolerated well, will continue to monitor  1145 pt discharged ambulatory with no adverse reaction noted, pt notified to go to ER with any adverse reactions, AVS given along with medication information  Follow up appt made 4 weeks

## 2024-07-09 ENCOUNTER — TELEPHONE (OUTPATIENT)
Dept: ORTHOPEDICS | Facility: CLINIC | Age: 74
End: 2024-07-09
Payer: MEDICARE

## 2024-07-09 NOTE — TELEPHONE ENCOUNTER
Called patient and let her know that we had received the x-ray from her doctor in [place].  She stated she had an appt with Juanita Honeycutt tomorrow.

## 2024-07-09 NOTE — TELEPHONE ENCOUNTER
----- Message from Jessica Lloyd sent at 7/9/2024  8:13 AM CDT -----  Pt asking if xray of shoulder was recvd from her Rheum in [place]. [phone]  Who Called: Meghna Casa-Deeton    Caller is requesting assistance/information from provider's office.      Preferred Method of Contact: Phone Call  Patient's Preferred Phone Number on File: [phone]   Best Call Back Number, if different:  Additional Information:

## 2024-07-10 ENCOUNTER — HOSPITAL ENCOUNTER (OUTPATIENT)
Dept: RADIOLOGY | Facility: HOSPITAL | Age: 74
Discharge: HOME | End: 2024-07-10
Payer: MEDICARE

## 2024-07-10 ENCOUNTER — OFFICE VISIT (OUTPATIENT)
Dept: ORTHOPEDICS | Facility: CLINIC | Age: 74
End: 2024-07-10
Payer: MEDICARE

## 2024-07-10 VITALS — WEIGHT: 206 LBS | HEIGHT: 63 IN | BODY MASS INDEX: 36.5 KG/M2

## 2024-07-10 DIAGNOSIS — M12.811 ROTATOR CUFF ARTHROPATHY, RIGHT: Primary | ICD-10-CM

## 2024-07-10 DIAGNOSIS — M25.511 ACUTE PAIN OF RIGHT SHOULDER: Primary | ICD-10-CM

## 2024-07-10 DIAGNOSIS — G89.29 CHRONIC RIGHT SHOULDER PAIN: ICD-10-CM

## 2024-07-10 DIAGNOSIS — M25.511 CHRONIC RIGHT SHOULDER PAIN: ICD-10-CM

## 2024-07-10 DIAGNOSIS — M25.511 ACUTE PAIN OF RIGHT SHOULDER: ICD-10-CM

## 2024-07-10 PROCEDURE — 99214 OFFICE O/P EST MOD 30 MIN: CPT | Mod: PBBFAC,25

## 2024-07-10 PROCEDURE — 73030 X-RAY EXAM OF SHOULDER: CPT | Mod: 26,RT,, | Performed by: RADIOLOGY

## 2024-07-10 PROCEDURE — 73030 X-RAY EXAM OF SHOULDER: CPT | Mod: TC,RT

## 2024-07-10 PROCEDURE — 99999 PR PBB SHADOW E&M-EST. PATIENT-LVL IV: CPT | Mod: PBBFAC,,,

## 2024-07-10 RX ORDER — DIAZEPAM 5 MG/1
5 TABLET ORAL ONCE
Qty: 1 TABLET | Refills: 0 | Status: SHIPPED | OUTPATIENT
Start: 2024-07-10 | End: 2024-07-10

## 2024-07-10 RX ORDER — NAPROXEN 250 MG/1
250 TABLET ORAL 2 TIMES DAILY WITH MEALS
Qty: 28 TABLET | Refills: 0 | Status: SHIPPED | OUTPATIENT
Start: 2024-07-10 | End: 2024-07-24

## 2024-07-10 NOTE — PROGRESS NOTES
CC:   Chief Complaint   Patient presents with    Right Shoulder - Pain          Meghna Dalal is a 74 y.o. female seen today for follow up of Pain of the Right Shoulder  Patient with significant history of rheumatoid and osteoarthritis treated by Dr. Adhikari presents today with 3 week history of worsening right shoulder pain.  Patient denies any fall or injury the onset of her symptoms.  Patient reports that she has no longer able to use her right arm due to pain.  She is unable to sleep at night due to pain.  She is unable to reach her right arm above her head.  X-ray completed at Rheumatology office with high-riding humeral head indicative of possible rotator cuff arthropathy, she was referred to Orthopedics for further evaluation.  No other complaints today.      PAST MEDICAL HISTORY:   Past Medical History:   Diagnosis Date    Arthritis     Diabetes mellitus, type 2     GERD (gastroesophageal reflux disease)     Hypertension     Iron deficiency anemia     Obesity     Personal history of colonic polyps 08/19/2019    Thyroid disease         PAST SURGICAL HISTORY:   Past Surgical History:   Procedure Laterality Date    ARTHROSCOPIC DEBRIDEMENT OF SHOULDER Left 12/21/2021    Procedure: DEBRIDEMENT, SHOULDER, ARTHROSCOPIC;  Surgeon: Jose Antonio Pablo MD;  Location: [facility];  Service: Orthopedics;  Laterality: Left;  SHOULDER JOINT    ARTHROSCOPIC REMOVAL OF LOOSE BODY FROM JOINT Left 12/21/2021    Procedure: REMOVAL, LOOSE BODY, JOINT, ARTHROSCOPIC;  Surgeon: Jose Antonio Pablo MD;  Location: [facility];  Service: Orthopedics;  Laterality: Left;    COLONOSCOPY W/ POLYPECTOMY  08/19/2019    DECOMPRESSION OF SUBACROMIAL SPACE Left 12/21/2021    Procedure: DECOMPRESSION, SUBACROMIAL SPACE;  Surgeon: Jose Antonio Pablo MD;  Location: [facility];  Service: Orthopedics;  Laterality: Left;    DISTAL CLAVICLE EXCISION Left 12/21/2021    Procedure: EXCISION, CLAVICLE, DISTAL;   Surgeon: Jose Antonio Pablo MD;  Location: [facility] OR;  Service: Orthopedics;  Laterality: Left;    left total knee replacement      PARTIAL HYSTERECTOMY      ROTATOR CUFF REPAIR Left 12/21/2021    Procedure: REPAIR, ROTATOR CUFF;  Surgeon: Jose Antonio Pablo MD;  Location: [facility] OR;  Service: Orthopedics;  Laterality: Left;    SHOULDER ARTHROSCOPY Left 12/21/2021    Procedure: ARTHROSCOPY, SHOULDER;  Surgeon: Jose Antonio Pablo MD;  Location: [facility] OR;  Service: Orthopedics;  Laterality: Left;    SHOULDER SURGERY Left 12/21/2021    THYROID SURGERY      total thyroidectomy    TONSILLECTOMY AND ADENOIDECTOMY          ALLERGIES:   Review of patient's allergies indicates:   Allergen Reactions    Codeine phosphate      Other reaction(s): facial swelling    Codeine sulfate      Other reaction(s): Unknown        MEDICATIONS :    Current Outpatient Medications:     abatacept (ORENCIA SUBQ), , Disp: , Rfl:     aspirin 81 mg Cap, Take by mouth., Disp: , Rfl:     azelastine (ASTELIN) 137 mcg (0.1 %) nasal spray, 1 puff in each nostril, Disp: , Rfl:     azithromycin (Z-JOSE) 250 MG tablet, 2 tabs on day one followed by one a day for four days (Patient not taking: Reported on 11/20/2023), Disp: 6 tablet, Rfl: 1    azithromycin (Z-JOSE) 250 MG tablet, 2 tabs on day one followed by one a day for four days, Disp: 6 tablet, Rfl: 1    cyclobenzaprine (FLEXERIL) 10 MG tablet, Take 1 tablet (10 mg total) by mouth 3 (three) times daily as needed for Muscle spasms., Disp: 90 tablet, Rfl: 11    diazePAM (VALIUM) 5 MG tablet, Take 1 tablet (5 mg total) by mouth once. Take 1 tablet by mouth 30 minutes prior to MRI exam for 1 dose, Disp: 1 tablet, Rfl: 0    fluticasone propionate (FLONASE) 50 mcg/actuation nasal spray, 1 spray in each nostril Strength: 50 mcg/actuation (Patient not taking: Reported on 2/20/2024), Disp: 16 g, Rfl: 11    folic acid (FOLVITE) 1 MG tablet, TAKE 1 TABLET BY MOUTH ONCE A DAY FOR 30 DAYS (Patient  not taking: Reported on 5/29/2024), Disp: , Rfl:     hydroCHLOROthiazide (HYDRODIURIL) 12.5 MG Tab, TAKE 1 TABLET BY MOUTH EVERY DAY (Patient not taking: Reported on 5/29/2024), Disp: 90 tablet, Rfl: 3    LAGEVRIO, EUA, 200 mg capsule (EUA), TAKE 4 CAPSULES BY MOUTH TWICE A DAY FOR 5 DAYS (Patient not taking: Reported on 5/29/2024), Disp: , Rfl:     levocetirizine (XYZAL) 5 MG tablet, Take 5 mg by mouth every evening. (Patient not taking: Reported on 5/29/2024), Disp: , Rfl:     levothyroxine (SYNTHROID) 125 MCG tablet, TAKE 1 TABLET BY MOUTH EVERY DAY, Disp: 90 tablet, Rfl: 3    loratadine (CLARITIN) 10 mg tablet, 1 tablet, Disp: , Rfl:     losartan-hydrochlorothiazide 100-12.5 mg (HYZAAR) 100-12.5 mg Tab, TAKE 1 TABLET BY MOUTH EVERY DAY, Disp: 90 tablet, Rfl: 1    metFORMIN (GLUCOPHAGE) 500 MG tablet, TAKE 1 TABLET BY MOUTH THREE TIMES A DAY, Disp: 270 tablet, Rfl: 3    naproxen (NAPROSYN) 250 MG tablet, Take 1 tablet (250 mg total) by mouth 2 (two) times daily with meals. for 14 days, Disp: 28 tablet, Rfl: 0    omeprazole (PRILOSEC) 40 MG capsule, TAKE 1 CAPSULE BY MOUTH ONCE  DAILY, Disp: 90 capsule, Rfl: 3    ondansetron (ZOFRAN-ODT) 4 MG TbDL, Take 1 tablet (4 mg total) by mouth every 6 (six) hours as needed. (Patient not taking: Reported on 11/20/2023), Disp: 20 tablet, Rfl: 0    potassium chloride (MICRO-K) 8 mEq CpSR, TAKE 1 CAPSULE BY MOUTH ONCE DAILY., Disp: 90 capsule, Rfl: 3    predniSONE (DELTASONE) 5 MG tablet, TAKE 1-2 TABLETS BY MOUTH DAILY AS NEEDED, Disp: , Rfl:     promethazine-dextromethorphan (PROMETHAZINE-DM) 6.25-15 mg/5 mL Syrp, Take 5 mLs by mouth., Disp: , Rfl:     traZODone (DESYREL) 50 MG tablet, Take 1 tablet (50 mg total) by mouth every evening. (Patient not taking: Reported on 3/1/2023), Disp: 30 tablet, Rfl: 11    Current Facility-Administered Medications:     diphenhydrAMINE injection 25 mg, 25 mg, Intravenous, Once PRN, Kwame Wyatt MD    EPINEPHrine (EPIPEN) 0.3 mg/0.3 mL pen  injection 0.3 mg, 0.3 mg, Intramuscular, PRN, Kwame Wyatt MD    ondansetron disintegrating tablet 4 mg, 4 mg, Oral, Once PRN, Kwame Wyatt MD    sodium chloride 0.9% 500 mL flush bag, , Intravenous, PRN, Kwame Wyatt MD    sodium chloride 0.9% flush 10 mL, 10 mL, Intravenous, PRN, Kwame Wyatt MD     SOCIAL HISTORY:   Social History     Socioeconomic History    Marital status:    Tobacco Use    Smoking status: Never    Smokeless tobacco: Never   Substance and Sexual Activity    Alcohol use: Never    Drug use: Never        FAMILY HISTORY:   Family History   Problem Relation Name Age of Onset    Alzheimer's disease Mother      Heart failure Father      Heart failure Brother            PHYSICAL EXAM:      There were no vitals filed for this visit.  Body mass index is 36.49 kg/m².    GENERAL: Well-developed, well-nourished female . The patient is alert, oriented and cooperative.    EXTREMITIES:  Right shoulder with tenderness to palpation along anterior joint line and over AC joint, very limited range of motion with passive forward flexion 90° but not without severe pain, abduction less than 90°, pain with internal and external rotation of the shoulder, positive Simon Harish testing, positive Neer testing, positive empty can testing, neurovascularly intact      RADIOGRAPHIC FINDINGS:   X-ray Shoulder 2 or More Views Right    Result Date: 7/10/2024  EXAMINATION: XR SHOULDER COMPLETE 2 OR MORE VIEWS RIGHT CLINICAL HISTORY: Pain in right shoulder COMPARISON: None TECHNIQUE: Frontal and scapular Y-views of the right shoulder. FINDINGS: Moderate glenohumeral degenerative change.  Mild acromioclavicular degenerative change.  No acute fracture.     As above. Point of Service: [facility] Electronically signed by: Agustín Canchola Date:    07/10/2024 Time:    09:55    Patient Active Problem List    Diagnosis Date Noted    Severe obesity (BMI 35.0-39.9) with comorbidity 08/16/2023     Drug-induced immunodeficiency 08/16/2023    Nontraumatic complete tear of left rotator cuff 12/20/2021    Rheumatoid arthritis involving multiple sites with positive rheumatoid factor 10/07/2021    Impingement syndrome of left shoulder 08/04/2021     IMPRESSION AND PLAN:  Rotator cuff arthropathy right shoulder.  Personally reviewed today and discussed them with Dr. Pablo showing high-riding humeral head suggestive of rotator cuff arthropathy.  Patient was significant pain on exam with range of motion of the right shoulder.  Recommending MRI for further evaluation.  We will prescribe naproxen b.i.d. with meals as needed for pain.  Patient also reports anxiety with previous MRI, we will prescribe 1 dose of Valium to take 30 minutes prior to MRI examination.  Follow up with Dr. Pablo after MRI to discuss results and further treatment options.        No follow-ups on file.       Juanita Honeycutt PA-C      (Subject to voice recognition error, transcription service not allowed)

## 2024-07-11 ENCOUNTER — HOSPITAL ENCOUNTER (OUTPATIENT)
Dept: RADIOLOGY | Facility: HOSPITAL | Age: 74
Discharge: HOME | End: 2024-07-11
Payer: MEDICARE

## 2024-07-11 DIAGNOSIS — G89.29 CHRONIC RIGHT SHOULDER PAIN: ICD-10-CM

## 2024-07-11 DIAGNOSIS — M25.511 CHRONIC RIGHT SHOULDER PAIN: ICD-10-CM

## 2024-07-11 PROCEDURE — 73221 MRI JOINT UPR EXTREM W/O DYE: CPT | Mod: 26,RT,, | Performed by: RADIOLOGY

## 2024-07-11 PROCEDURE — 73221 MRI JOINT UPR EXTREM W/O DYE: CPT | Mod: TC,RT

## 2024-07-22 RX ORDER — LOSARTAN POTASSIUM AND HYDROCHLOROTHIAZIDE 12.5; 1 MG/1; MG/1
1 TABLET ORAL
Qty: 90 TABLET | Refills: 3 | Status: SHIPPED | OUTPATIENT
Start: 2024-07-22

## 2024-07-24 ENCOUNTER — OFFICE VISIT (OUTPATIENT)
Dept: ORTHOPEDICS | Facility: CLINIC | Age: 74
End: 2024-07-24
Payer: MEDICARE

## 2024-07-24 DIAGNOSIS — Z01.812 PRE-OPERATIVE LABORATORY EXAMINATION: ICD-10-CM

## 2024-07-24 DIAGNOSIS — M12.811 ROTATOR CUFF TEAR ARTHROPATHY, RIGHT: ICD-10-CM

## 2024-07-24 DIAGNOSIS — Z01.810 PRE-OPERATIVE CARDIOVASCULAR EXAMINATION: ICD-10-CM

## 2024-07-24 DIAGNOSIS — M25.511 ACUTE PAIN OF RIGHT SHOULDER: Primary | ICD-10-CM

## 2024-07-24 DIAGNOSIS — Z01.811 PRE-OPERATIVE RESPIRATORY EXAMINATION: ICD-10-CM

## 2024-07-24 DIAGNOSIS — M75.101 ROTATOR CUFF TEAR ARTHROPATHY, RIGHT: ICD-10-CM

## 2024-07-24 PROCEDURE — 99213 OFFICE O/P EST LOW 20 MIN: CPT | Mod: PBBFAC | Performed by: ORTHOPAEDIC SURGERY

## 2024-07-24 PROCEDURE — 99999 PR PBB SHADOW E&M-EST. PATIENT-LVL III: CPT | Mod: PBBFAC,,, | Performed by: ORTHOPAEDIC SURGERY

## 2024-07-24 NOTE — PATIENT INSTRUCTIONS
Your surgery is scheduled at [facility] in [place] for 2024    Pre-Op Testin2024 @ 9:00a.m.    ___x____ Lab (Clinic Lab 1st Floor)  ___x____ Chest X-ray ([facility] 1st Floor)  ___x____ EKG (Clinic 2nd Floor)  ___x____ Total Joint Patients Only--Cardiac/Medical Clearance appointment with Dr. Kwame Wyatt on 2024 at 10:00a.m.    *Our office will contact you the day before surgery to give you  the arrival time.    *Do NOT eat or drink anything after midnight the night before your surgery.    *Bring all medications in their original bottles.    *Bring anything you may need for an overnight stay.     *Bathe with Hibiclens the night or morning before surgery.    *The morning of your surgery ONLY take blood pressure medications, heart medications, medications for acid reflux, and thyroid medications (the morning dose only).  *Take these medications with a sip of water.    *Do not take Insulin or Diabetic Medications the night before or the morning of your surgery, unless directed otherwise.    *Be sure that you have stopped blood thinners at the appropriate time, as instructed.  (_____ days prior to surgery)    *Bring your C-Pap machine if you have one.    *All jewelry and piercings MUST be removed prior to surgery.    *False eye lashes must be removed prior to surgery.    *Any questions regarding co-pays or deductibles with insurance, please contact the [facility] at #[phone].    *For Financial Assistance you may call #[phone] or #[phone].    Thank you for choosing Dr. Jose Antonio Pablo for your Orthopedic needs.  We look forward to caring for you. If you have any questions, please contact our office at [phone].

## 2024-07-24 NOTE — PROGRESS NOTES
Patient is here for right shoulder pain she is having weakness in her right shoulder she has some restricted motion.  X-rays show she has rotator cuff tear arthropathy.  Her MRI shows a large rotator cuff tear with retraction with the biceps tendon tear she was elevation of the humeral head.  Patient has rotator cuff tear arthropathy of the right shoulder.  Actively she has less than 80° of forward flexion less than 70° of abduction she only externally rotates proximally 20° internally rotates to her hip she has some crepitus on motion x-rays show elevation of the humeral head MRI shows large rotator cuff tear with retraction in his well as biceps tendon tear.  We discussed treatment options.  We discussed risks and benefits of reverse total shoulder arthroplasty.  We are going to set her up for reverse total shoulder arthroplasty in the near future.  Neurovascularly she is intact distally.  She has weakness on attempted forward flexion abduction.

## 2024-07-31 ENCOUNTER — EXTERNAL CHRONIC CARE MANAGEMENT (OUTPATIENT)
Dept: INTERNAL MEDICINE | Facility: CLINIC | Age: 74
End: 2024-07-31
Payer: MEDICARE

## 2024-07-31 PROCEDURE — 99439 CHRNC CARE MGMT STAF EA ADDL: CPT | Mod: PBBFAC | Performed by: INTERNAL MEDICINE

## 2024-07-31 PROCEDURE — 99490 CHRNC CARE MGMT STAFF 1ST 20: CPT | Mod: PBBFAC | Performed by: INTERNAL MEDICINE

## 2024-07-31 PROCEDURE — 99490 CHRNC CARE MGMT STAFF 1ST 20: CPT | Mod: S$PBB,,, | Performed by: INTERNAL MEDICINE

## 2024-07-31 PROCEDURE — 99439 CHRNC CARE MGMT STAF EA ADDL: CPT | Mod: S$PBB,,, | Performed by: INTERNAL MEDICINE

## 2024-08-08 ENCOUNTER — TELEPHONE (OUTPATIENT)
Dept: ORTHOPEDICS | Facility: CLINIC | Age: 74
End: 2024-08-08
Payer: MEDICARE

## 2024-08-08 ENCOUNTER — INFUSION (OUTPATIENT)
Dept: INFUSION THERAPY | Facility: HOSPITAL | Age: 74
End: 2024-08-08
Attending: NURSE PRACTITIONER
Payer: MEDICARE

## 2024-08-08 VITALS
HEART RATE: 65 BPM | RESPIRATION RATE: 17 BRPM | OXYGEN SATURATION: 100 % | SYSTOLIC BLOOD PRESSURE: 157 MMHG | DIASTOLIC BLOOD PRESSURE: 79 MMHG

## 2024-08-08 DIAGNOSIS — M05.79 RHEUMATOID ARTHRITIS INVOLVING MULTIPLE SITES WITH POSITIVE RHEUMATOID FACTOR: Primary | ICD-10-CM

## 2024-08-08 PROCEDURE — 63600175 PHARM REV CODE 636 W HCPCS: Performed by: NURSE PRACTITIONER

## 2024-08-08 PROCEDURE — 25000003 PHARM REV CODE 250: Performed by: NURSE PRACTITIONER

## 2024-08-08 PROCEDURE — 63600175 PHARM REV CODE 636 W HCPCS: Mod: JZ,JA,JG | Performed by: NURSE PRACTITIONER

## 2024-08-08 RX ORDER — DIPHENHYDRAMINE HYDROCHLORIDE 12.5 MG/5ML
25 LIQUID ORAL ONCE AS NEEDED
Start: 2024-09-02

## 2024-08-08 RX ORDER — SODIUM CHLORIDE 0.9 % (FLUSH) 0.9 %
10 SYRINGE (ML) INJECTION
OUTPATIENT
Start: 2024-09-02

## 2024-08-08 RX ORDER — DIPHENHYDRAMINE HYDROCHLORIDE 12.5 MG/5ML
25 LIQUID ORAL ONCE AS NEEDED
Status: COMPLETED | OUTPATIENT
Start: 2024-08-08 | End: 2024-08-08

## 2024-08-08 RX ORDER — ACETAMINOPHEN 500 MG
1000 TABLET ORAL ONCE AS NEEDED
Start: 2024-09-02

## 2024-08-08 RX ORDER — ACETAMINOPHEN 500 MG
1000 TABLET ORAL ONCE AS NEEDED
Status: COMPLETED | OUTPATIENT
Start: 2024-08-08 | End: 2024-08-08

## 2024-08-08 RX ADMIN — DIPHENHYDRAMINE HYDROCHLORIDE 25 MG: 12.5 SOLUTION ORAL at 10:08

## 2024-08-08 RX ADMIN — METHYLPREDNISOLONE SODIUM SUCCINATE 62.5 MG: 125 INJECTION, POWDER, FOR SOLUTION INTRAMUSCULAR; INTRAVENOUS at 10:08

## 2024-08-08 RX ADMIN — ACETAMINOPHEN 1000 MG: 500 TABLET ORAL at 10:08

## 2024-08-08 RX ADMIN — SODIUM CHLORIDE 750 MG: 9 INJECTION, SOLUTION INTRAVENOUS at 10:08

## 2024-08-14 ENCOUNTER — OFFICE VISIT (OUTPATIENT)
Dept: INTERNAL MEDICINE | Facility: CLINIC | Age: 74
End: 2024-08-14
Payer: MEDICARE

## 2024-08-14 ENCOUNTER — CLINICAL SUPPORT (OUTPATIENT)
Dept: CARDIOLOGY | Facility: CLINIC | Age: 74
End: 2024-08-14
Payer: MEDICARE

## 2024-08-14 ENCOUNTER — HOSPITAL ENCOUNTER (OUTPATIENT)
Dept: RADIOLOGY | Facility: HOSPITAL | Age: 74
Discharge: HOME | End: 2024-08-14
Attending: ORTHOPAEDIC SURGERY
Payer: MEDICARE

## 2024-08-14 VITALS
DIASTOLIC BLOOD PRESSURE: 64 MMHG | WEIGHT: 207 LBS | SYSTOLIC BLOOD PRESSURE: 150 MMHG | HEART RATE: 90 BPM | OXYGEN SATURATION: 99 % | HEIGHT: 63 IN | BODY MASS INDEX: 36.68 KG/M2 | RESPIRATION RATE: 18 BRPM

## 2024-08-14 DIAGNOSIS — Z01.818 PRE-OP EXAM: Primary | ICD-10-CM

## 2024-08-14 DIAGNOSIS — J30.2 SEASONAL ALLERGIES: ICD-10-CM

## 2024-08-14 DIAGNOSIS — D64.9 ANEMIA, UNSPECIFIED TYPE: ICD-10-CM

## 2024-08-14 DIAGNOSIS — Z01.811 PRE-OPERATIVE RESPIRATORY EXAMINATION: ICD-10-CM

## 2024-08-14 DIAGNOSIS — E11.9 CONTROLLED TYPE 2 DIABETES MELLITUS WITHOUT COMPLICATION, WITHOUT LONG-TERM CURRENT USE OF INSULIN: ICD-10-CM

## 2024-08-14 DIAGNOSIS — E03.9 HYPOTHYROIDISM, UNSPECIFIED TYPE: ICD-10-CM

## 2024-08-14 DIAGNOSIS — Z79.899 DRUG-INDUCED IMMUNODEFICIENCY: ICD-10-CM

## 2024-08-14 DIAGNOSIS — D84.821 DRUG-INDUCED IMMUNODEFICIENCY: ICD-10-CM

## 2024-08-14 DIAGNOSIS — E66.01 SEVERE OBESITY (BMI 35.0-39.9) WITH COMORBIDITY: ICD-10-CM

## 2024-08-14 DIAGNOSIS — Z01.810 PRE-OPERATIVE CARDIOVASCULAR EXAMINATION: ICD-10-CM

## 2024-08-14 DIAGNOSIS — E78.5 HYPERLIPIDEMIA LDL GOAL <100: ICD-10-CM

## 2024-08-14 DIAGNOSIS — M05.79 RHEUMATOID ARTHRITIS INVOLVING MULTIPLE SITES WITH POSITIVE RHEUMATOID FACTOR: ICD-10-CM

## 2024-08-14 DIAGNOSIS — I10 ESSENTIAL HYPERTENSION: ICD-10-CM

## 2024-08-14 DIAGNOSIS — K21.9 GERD WITHOUT ESOPHAGITIS: ICD-10-CM

## 2024-08-14 PROCEDURE — 1159F MED LIST DOCD IN RCRD: CPT | Mod: CPTII,,, | Performed by: INTERNAL MEDICINE

## 2024-08-14 PROCEDURE — 99215 OFFICE O/P EST HI 40 MIN: CPT | Mod: PBBFAC,25 | Performed by: INTERNAL MEDICINE

## 2024-08-14 PROCEDURE — 3078F DIAST BP <80 MM HG: CPT | Mod: CPTII,,, | Performed by: INTERNAL MEDICINE

## 2024-08-14 PROCEDURE — 3077F SYST BP >= 140 MM HG: CPT | Mod: CPTII,,, | Performed by: INTERNAL MEDICINE

## 2024-08-14 PROCEDURE — 3046F HEMOGLOBIN A1C LEVEL >9.0%: CPT | Mod: CPTII,,, | Performed by: INTERNAL MEDICINE

## 2024-08-14 PROCEDURE — 3008F BODY MASS INDEX DOCD: CPT | Mod: CPTII,,, | Performed by: INTERNAL MEDICINE

## 2024-08-14 PROCEDURE — 99214 OFFICE O/P EST MOD 30 MIN: CPT | Mod: S$PBB,,, | Performed by: INTERNAL MEDICINE

## 2024-08-14 PROCEDURE — 3288F FALL RISK ASSESSMENT DOCD: CPT | Mod: CPTII,,, | Performed by: INTERNAL MEDICINE

## 2024-08-14 PROCEDURE — 99212 OFFICE O/P EST SF 10 MIN: CPT | Mod: PBBFAC,25

## 2024-08-14 PROCEDURE — 99999 PR PBB SHADOW E&M-EST. PATIENT-LVL V: CPT | Mod: PBBFAC,,, | Performed by: INTERNAL MEDICINE

## 2024-08-14 PROCEDURE — 71046 X-RAY EXAM CHEST 2 VIEWS: CPT | Mod: TC

## 2024-08-14 PROCEDURE — 1101F PT FALLS ASSESS-DOCD LE1/YR: CPT | Mod: CPTII,,, | Performed by: INTERNAL MEDICINE

## 2024-08-14 PROCEDURE — 99999 PR PBB SHADOW E&M-EST. PATIENT-LVL II: CPT | Mod: PBBFAC,,,

## 2024-08-14 PROCEDURE — 71046 X-RAY EXAM CHEST 2 VIEWS: CPT | Mod: 26,,, | Performed by: RADIOLOGY

## 2024-08-14 RX ORDER — IBUPROFEN 200 MG
200 TABLET ORAL EVERY 8 HOURS PRN
COMMUNITY

## 2024-08-14 NOTE — PROGRESS NOTES
Subjective:       Patient ID: Meghna Dalal is a 74 y.o. female.    Chief Complaint: Follow-up (Pre surg eval)    Pt is medically cleared for shoulder surg planned for aug 27  bs 140  meds as perlist    Follow-up  Pertinent negatives include no abdominal pain, chest pain, fatigue, rash or weakness.   Review of Systems   Constitutional:  Negative for fatigue and unexpected weight change.   HENT:  Negative for ear pain and goiter.    Respiratory:  Negative for chest tightness and shortness of breath.    Cardiovascular:  Negative for chest pain, palpitations and leg swelling.   Gastrointestinal:  Negative for abdominal pain and reflux.   Integumentary:  Negative for rash and breast tenderness.   Neurological:  Negative for dizziness and weakness.   Hematological:  Negative for adenopathy.   Psychiatric/Behavioral:  Negative for behavioral problems.    Breast: Negative for tenderness      Objective:      Physical Exam  Constitutional:       Appearance: Normal appearance.   HENT:      Head: Normocephalic and atraumatic.      Right Ear: External ear normal.      Left Ear: External ear normal.      Mouth/Throat:      Pharynx: Oropharynx is clear.   Eyes:      Extraocular Movements: Extraocular movements intact.      Pupils: Pupils are equal, round, and reactive to light.   Cardiovascular:      Rate and Rhythm: Normal rate and regular rhythm.      Pulses: Normal pulses.      Heart sounds: Normal heart sounds.   Pulmonary:      Effort: Pulmonary effort is normal.      Breath sounds: Normal breath sounds.   Abdominal:      General: Abdomen is flat. Bowel sounds are normal.      Palpations: Abdomen is soft.   Musculoskeletal:         General: Normal range of motion.      Cervical back: Normal range of motion and neck supple.   Skin:     General: Skin is warm.      Capillary Refill: Capillary refill takes less than 2 seconds.   Neurological:      General: No focal deficit present.      Mental Status: She is alert.    Psychiatric:         Mood and Affect: Mood normal.         Thought Content: Thought content normal.         Judgment: Judgment normal.       Assessment:       1. Pre-op exam    2. Essential hypertension    3. GERD without esophagitis    4. Rheumatoid arthritis involving multiple sites with positive rheumatoid factor    5. Controlled type 2 diabetes mellitus without complication, without long-term current use of insulin    6. Hypothyroidism, unspecified type    7. Hyperlipidemia LDL goal <100    8. Seasonal allergies    9. Severe obesity (BMI 35.0-39.9) with comorbidity    10. Anemia, unspecified type    11. Drug-induced immunodeficiency        Plan:         Patient Instructions   Continue current meds and f/u prn      Problem List Items Addressed This Visit          Immunology/Multi System    Rheumatoid arthritis involving multiple sites with positive rheumatoid factor    Drug-induced immunodeficiency       Endocrine    Severe obesity (BMI 35.0-39.9) with comorbidity     Other Visit Diagnoses       Pre-op exam    -  Primary    Essential hypertension        GERD without esophagitis        Controlled type 2 diabetes mellitus without complication, without long-term current use of insulin        Hypothyroidism, unspecified type        Hyperlipidemia LDL goal <100        Seasonal allergies        Anemia, unspecified type

## 2024-08-19 ENCOUNTER — TELEPHONE (OUTPATIENT)
Dept: ORTHOPEDICS | Facility: CLINIC | Age: 74
End: 2024-08-19
Payer: MEDICARE

## 2024-08-19 NOTE — TELEPHONE ENCOUNTER
----- Message from Jessica LEIDY Luqueon sent at 8/19/2024 12:09 PM CDT -----  Pt has surg sched for next week. She has been exposed to covid but does not have symptoms. Just wanted to let you know. Call her at  [phone]  Who Called: Meghna Casa-Deeton    Caller is requesting assistance/information from provider's office.    Symptoms (please be specific):    How long has patient had these symptoms:    List of preferred pharmacies on file (remove unneeded): [unfilled]  If different, enter pharmacy into here including location and phone number:         Patient's Preferred Phone Number on File: [phone]   Best Call Back Number, if different:  Additional Information:

## 2024-08-19 NOTE — TELEPHONE ENCOUNTER
Attempted to call patient.  No answer and no voiced mail.  Dr. Pablo stated that if patient doesn't have any symptoms and no fever she should be fine for surgery next week.

## 2024-08-26 ENCOUNTER — ANESTHESIA EVENT (OUTPATIENT)
Dept: SURGERY | Facility: HOSPITAL | Age: 74
End: 2024-08-26
Payer: MEDICARE

## 2024-08-26 NOTE — SUBJECTIVE & OBJECTIVE
Past Medical History:   Diagnosis Date    Arthritis     Diabetes mellitus, type 2     GERD (gastroesophageal reflux disease)     Hypertension     Iron deficiency anemia     Obesity     Personal history of colonic polyps 08/19/2019    Thyroid disease        Past Surgical History:   Procedure Laterality Date    ARTHROSCOPIC DEBRIDEMENT OF SHOULDER Left 12/21/2021    Procedure: DEBRIDEMENT, SHOULDER, ARTHROSCOPIC;  Surgeon: Jose Antonio Pablo MD;  Location: [facility];  Service: Orthopedics;  Laterality: Left;  SHOULDER JOINT    ARTHROSCOPIC REMOVAL OF LOOSE BODY FROM JOINT Left 12/21/2021    Procedure: REMOVAL, LOOSE BODY, JOINT, ARTHROSCOPIC;  Surgeon: Jose Antonio Pablo MD;  Location: [facility];  Service: Orthopedics;  Laterality: Left;    COLONOSCOPY W/ POLYPECTOMY  08/19/2019    DECOMPRESSION OF SUBACROMIAL SPACE Left 12/21/2021    Procedure: DECOMPRESSION, SUBACROMIAL SPACE;  Surgeon: Jose Antonio Pablo MD;  Location: [facility];  Service: Orthopedics;  Laterality: Left;    DISTAL CLAVICLE EXCISION Left 12/21/2021    Procedure: EXCISION, CLAVICLE, DISTAL;  Surgeon: Jose Antonio Pablo MD;  Location: [facility];  Service: Orthopedics;  Laterality: Left;    left total knee replacement      PARTIAL HYSTERECTOMY      ROTATOR CUFF REPAIR Left 12/21/2021    Procedure: REPAIR, ROTATOR CUFF;  Surgeon: Jose Antonio Pablo MD;  Location: [facility];  Service: Orthopedics;  Laterality: Left;    SHOULDER ARTHROSCOPY Left 12/21/2021    Procedure: ARTHROSCOPY, SHOULDER;  Surgeon: Jose Antonio Pablo MD;  Location: [facility];  Service: Orthopedics;  Laterality: Left;    SHOULDER SURGERY Left 12/21/2021    THYROID SURGERY      total thyroidectomy    TONSILLECTOMY AND ADENOIDECTOMY         Review of patient's allergies indicates:   Allergen Reactions    Codeine phosphate      Other reaction(s): facial swelling    Codeine sulfate      Other reaction(s): Unknown       Current Facility-Administered  Medications   Medication    diphenhydrAMINE injection 25 mg    EPINEPHrine (EPIPEN) 0.3 mg/0.3 mL pen injection 0.3 mg    ondansetron disintegrating tablet 4 mg    sodium chloride 0.9% 500 mL flush bag    sodium chloride 0.9% flush 10 mL     Current Outpatient Medications   Medication Sig    abatacept (ORENCIA SUBQ)     aspirin 81 mg Cap Take by mouth.    azelastine (ASTELIN) 137 mcg (0.1 %) nasal spray 1 puff in each nostril (Patient not taking: Reported on 8/14/2024)    cyclobenzaprine (FLEXERIL) 10 MG tablet Take 1 tablet (10 mg total) by mouth 3 (three) times daily as needed for Muscle spasms.    diazePAM (VALIUM) 5 MG tablet Take 1 tablet (5 mg total) by mouth once. Take 1 tablet by mouth 30 minutes prior to MRI exam for 1 dose (Patient not taking: Reported on 8/14/2024)    fluticasone propionate (FLONASE) 50 mcg/actuation nasal spray 1 spray in each nostril  Strength: 50 mcg/actuation    folic acid (FOLVITE) 1 MG tablet TAKE 1 TABLET BY MOUTH ONCE A DAY FOR 30 DAYS (Patient not taking: Reported on 5/29/2024)    hydroCHLOROthiazide (HYDRODIURIL) 12.5 MG Tab TAKE 1 TABLET BY MOUTH EVERY DAY (Patient not taking: Reported on 5/29/2024)    ibuprofen (ADVIL,MOTRIN) 200 MG tablet Take 200 mg by mouth every 8 (eight) hours as needed for Pain.    LAGEVRIO, EUA, 200 mg capsule (EUA) TAKE 4 CAPSULES BY MOUTH TWICE A DAY FOR 5 DAYS (Patient not taking: Reported on 5/29/2024)    levocetirizine (XYZAL) 5 MG tablet Take 5 mg by mouth every evening. (Patient not taking: Reported on 5/29/2024)    levothyroxine (SYNTHROID) 125 MCG tablet TAKE 1 TABLET BY MOUTH EVERY DAY    loratadine (CLARITIN) 10 mg tablet 1 tablet (Patient not taking: Reported on 8/14/2024)    losartan-hydrochlorothiazide 100-12.5 mg (HYZAAR) 100-12.5 mg Tab TAKE 1 TABLET BY MOUTH EVERY DAY    metFORMIN (GLUCOPHAGE) 500 MG tablet TAKE 1 TABLET BY MOUTH THREE TIMES A DAY    omeprazole (PRILOSEC) 40 MG capsule TAKE 1 CAPSULE BY MOUTH ONCE  DAILY    potassium  chloride (MICRO-K) 8 mEq CpSR TAKE 1 CAPSULE BY MOUTH ONCE DAILY.    predniSONE (DELTASONE) 5 MG tablet TAKE 1-2 TABLETS BY MOUTH DAILY AS NEEDED    promethazine-dextromethorphan (PROMETHAZINE-DM) 6.25-15 mg/5 mL Syrp Take 5 mLs by mouth. (Patient not taking: Reported on 8/14/2024)    traZODone (DESYREL) 50 MG tablet Take 1 tablet (50 mg total) by mouth every evening. (Patient not taking: Reported on 3/1/2023)     Family History       Problem Relation (Age of Onset)    Alzheimer's disease Mother    Heart failure Father, Brother          Tobacco Use    Smoking status: Never    Smokeless tobacco: Never   Substance and Sexual Activity    Alcohol use: Never    Drug use: Never    Sexual activity: Not on file     Review of Systems   Constitutional: Negative for decreased appetite.   HENT:  Negative for congestion and ear discharge.    Eyes:  Negative for blurred vision.   Cardiovascular:  Negative for chest pain and syncope.   Respiratory:  Negative for cough and wheezing.    Endocrine: Negative for cold intolerance and polyuria.   Hematologic/Lymphatic: Negative for adenopathy and bleeding problem.   Skin:  Negative for color change, nail changes and suspicious lesions.   Musculoskeletal:  Positive for joint pain. Negative for muscle cramps and myalgias.   Gastrointestinal:  Negative for bloating and abdominal pain.   Genitourinary:  Negative for frequency and hematuria.   Neurological:  Negative for brief paralysis, sensory change and weakness.   Psychiatric/Behavioral:  Negative for altered mental status.    Allergic/Immunologic: Negative for hives.     Objective:     Vital Signs (Most Recent):    Vital Signs (24h Range):              There is no height or weight on file to calculate BMI.    No intake or output data in the 24 hours ending 08/26/24 1748             Right Shoulder Exam     Tenderness   The patient is tender to palpation of the acromioclavicular joint and supraspinatus.    Other   Sensation:  normal    Vascular Exam     Right Pulses      Radial:                    2+         Significant Labs: All pertinent labs within the past 24 hours have been reviewed.    Significant Imaging: I have reviewed all pertinent imaging results/findings.

## 2024-08-26 NOTE — ANESTHESIA PREPROCEDURE EVALUATION
08/26/2024  Meghna Dalal is a 74 y.o., female.      Pre-op Assessment    I have reviewed the Patient Summary Reports.     I have reviewed the Nursing Notes. I have reviewed the NPO Status.   I have reviewed the Medications.     Review of Systems  Anesthesia Hx:  No problems with previous Anesthesia             Denies Family Hx of Anesthesia complications.    Denies Personal Hx of Anesthesia complications.                    Social:  Non-Smoker, No Alcohol Use       Cardiovascular:  Exercise tolerance: good   Hypertension              ECG has been reviewed.                          Hepatic/GI:     GERD             Musculoskeletal:     Acute pain of right shoulder [M25.511]       Rotator cuff tear arthropathy, right [M75.101, M12.811]               Endocrine:  Diabetes, type 2 Hypothyroidism  Daily prednisone use - 5mg       Obesity / BMI > 30    Past Medical History:   Diagnosis Date    Arthritis     Diabetes mellitus, type 2     GERD (gastroesophageal reflux disease)     Hypertension     Iron deficiency anemia     Obesity     Personal history of colonic polyps 08/19/2019    Thyroid disease      Past Surgical History:   Procedure Laterality Date    ARTHROSCOPIC DEBRIDEMENT OF SHOULDER Left 12/21/2021    Procedure: DEBRIDEMENT, SHOULDER, ARTHROSCOPIC;  Surgeon: Jose Antonio Pablo MD;  Location: [facility];  Service: Orthopedics;  Laterality: Left;  SHOULDER JOINT    ARTHROSCOPIC REMOVAL OF LOOSE BODY FROM JOINT Left 12/21/2021    Procedure: REMOVAL, LOOSE BODY, JOINT, ARTHROSCOPIC;  Surgeon: Jose Antonio Pablo MD;  Location: [facility];  Service: Orthopedics;  Laterality: Left;    COLONOSCOPY W/ POLYPECTOMY  08/19/2019    DECOMPRESSION OF SUBACROMIAL SPACE Left 12/21/2021    Procedure: DECOMPRESSION, SUBACROMIAL SPACE;  Surgeon: Jose Antonio Pablo MD;  Location: [facility];  Service:  Orthopedics;  Laterality: Left;    DISTAL CLAVICLE EXCISION Left 12/21/2021    Procedure: EXCISION, CLAVICLE, DISTAL;  Surgeon: Jose Antonio Pablo MD;  Location: [facility];  Service: Orthopedics;  Laterality: Left;    left total knee replacement      PARTIAL HYSTERECTOMY      ROTATOR CUFF REPAIR Left 12/21/2021    Procedure: REPAIR, ROTATOR CUFF;  Surgeon: Jose Antonio Pablo MD;  Location: [facility];  Service: Orthopedics;  Laterality: Left;    SHOULDER ARTHROSCOPY Left 12/21/2021    Procedure: ARTHROSCOPY, SHOULDER;  Surgeon: Jose Antonio Pablo MD;  Location: [facility];  Service: Orthopedics;  Laterality: Left;    SHOULDER SURGERY Left 12/21/2021    THYROID SURGERY      total thyroidectomy    TONSILLECTOMY AND ADENOIDECTOMY           Physical Exam  General: Well nourished, Cooperative and Alert    Airway:  Mallampati: II   Mouth Opening: Normal  TM Distance: Normal  Tongue: Normal  Neck ROM: Normal ROM    Dental:  Intact    Chest/Lungs:  Clear to auscultation, Normal Respiratory Rate    Heart:  Rate: Normal  Rhythm: Regular Rhythm        Chemistry        Component Value Date/Time     08/14/2024 0907    K 4.0 08/14/2024 0907     08/14/2024 0907    CO2 25 08/14/2024 0907    BUN 13 08/14/2024 0907    CREATININE 1.08 (H) 08/14/2024 0907     (H) 08/14/2024 0907        Component Value Date/Time    CALCIUM 9.0 08/14/2024 0907    ALKPHOS 60 08/14/2024 0907    AST 10 (L) 08/14/2024 0907    ALT 26 08/14/2024 0907    BILITOT 0.4 08/14/2024 0907    ESTGFRAFRICA 73 12/13/2021 1011        Lab Results   Component Value Date    WBC 10.89 08/14/2024    HGB 11.6 (L) 08/14/2024    HCT 37.2 (L) 08/14/2024     (H) 08/14/2024     Results for orders placed or performed in visit on 12/13/21   EKG 12-lead    Collection Time: 12/13/21 10:46 AM    Narrative    Test Reason : Z01.810,    Vent. Rate : 078 BPM     Atrial Rate : 078 BPM     P-R Int : 122 ms          QRS Dur : 086 ms      QT Int : 388 ms        P-R-T Axes : 064 066 077 degrees     QTc Int : 442 ms    Normal sinus rhythm  Normal ECG  No previous ECGs available  Confirmed by Chantell SINGLETARY, Colin MCNEAL ([id number]) on 12/13/2021 2:43:32 PM    Referred By: CLIFTON SAVAGE           Confirmed By:Colin Abdul MD         Anesthesia Plan  Type of Anesthesia, risks & benefits discussed:    Anesthesia Type: Gen ETT, Regional  Intra-op Monitoring Plan: Standard ASA Monitors  Post Op Pain Control Plan: multimodal analgesia  Induction:  IV  Airway Plan: Direct, Post-Induction  Informed Consent: Informed consent signed with the Patient and all parties understand the risks and agree with anesthesia plan.  All questions answered.   ASA Score: 3  Day of Surgery Review of History & Physical: H&P Update referred to the surgeon/provider.I have interviewed and examined the patient. I have reviewed the patient's H&P dated: There are no significant changes.     Ready For Surgery From Anesthesia Perspective.     .

## 2024-08-26 NOTE — H&P
[facility] Orthopedic Periop Services  Orthopedics  H&P    Patient Name: Meghna Dalal  MRN: [id number]  Admission Date: (Not on file)  Primary Care Provider: Kwame Wyatt MD    Patient information was obtained from patient and past medical records.     Subjective:     Principal Problem:<principal problem not specified>    Chief Complaint: No chief complaint on file.       HPI: 74-year-old female with a right shoulder rotator cuff tear arthropathy needing reverse total shoulder arthroplasty on the right  Right upper extremity she moves her fingers has sensation to touch has palpable pulses she has less than 80° of abduction or forward flexion.  Less than 20° of external rotation there is crepitus on motion  X-rays show elevation of the humeral head with a degenerative changes right shoulder.  MRI shows rotator cuff tear and biceps tendon tear right shoulder  Rotator cuff tear arthropathy right shoulder  Reverse total shoulder arthroplasty on the right    Past Medical History:   Diagnosis Date    Arthritis     Diabetes mellitus, type 2     GERD (gastroesophageal reflux disease)     Hypertension     Iron deficiency anemia     Obesity     Personal history of colonic polyps 08/19/2019    Thyroid disease        Past Surgical History:   Procedure Laterality Date    ARTHROSCOPIC DEBRIDEMENT OF SHOULDER Left 12/21/2021    Procedure: DEBRIDEMENT, SHOULDER, ARTHROSCOPIC;  Surgeon: Jose Antonio Pablo MD;  Location: [facility];  Service: Orthopedics;  Laterality: Left;  SHOULDER JOINT    ARTHROSCOPIC REMOVAL OF LOOSE BODY FROM JOINT Left 12/21/2021    Procedure: REMOVAL, LOOSE BODY, JOINT, ARTHROSCOPIC;  Surgeon: Jose Antonio Pablo MD;  Location: [facility];  Service: Orthopedics;  Laterality: Left;    COLONOSCOPY W/ POLYPECTOMY  08/19/2019    DECOMPRESSION OF SUBACROMIAL SPACE Left 12/21/2021    Procedure: DECOMPRESSION, SUBACROMIAL SPACE;  Surgeon: Jose Antonio Pablo MD;  Location: [facility];  Service: Orthopedics;  Laterality: Left;    DISTAL CLAVICLE EXCISION Left 12/21/2021    Procedure: EXCISION, CLAVICLE, DISTAL;  Surgeon: Jose Antonio Pablo MD;  Location: [facility] OR;  Service: Orthopedics;  Laterality: Left;    left total knee replacement      PARTIAL HYSTERECTOMY      ROTATOR CUFF REPAIR Left 12/21/2021    Procedure: REPAIR, ROTATOR CUFF;  Surgeon: Jose Antonio Pablo MD;  Location: [facility] OR;  Service: Orthopedics;  Laterality: Left;    SHOULDER ARTHROSCOPY Left 12/21/2021    Procedure: ARTHROSCOPY, SHOULDER;  Surgeon: Jose Antonio Pablo MD;  Location: [facility] OR;  Service: Orthopedics;  Laterality: Left;    SHOULDER SURGERY Left 12/21/2021    THYROID SURGERY      total thyroidectomy    TONSILLECTOMY AND ADENOIDECTOMY         Review of patient's allergies indicates:   Allergen Reactions    Codeine phosphate      Other reaction(s): facial swelling    Codeine sulfate      Other reaction(s): Unknown       Current Facility-Administered Medications   Medication    diphenhydrAMINE injection 25 mg    EPINEPHrine (EPIPEN) 0.3 mg/0.3 mL pen injection 0.3 mg    ondansetron disintegrating tablet 4 mg    sodium chloride 0.9% 500 mL flush bag    sodium chloride 0.9% flush 10 mL     Current Outpatient Medications   Medication Sig    abatacept (ORENCIA SUBQ)     aspirin 81 mg Cap Take by mouth.    azelastine (ASTELIN) 137 mcg (0.1 %) nasal spray 1 puff in each nostril (Patient not taking: Reported on 8/14/2024)    cyclobenzaprine (FLEXERIL) 10 MG tablet Take 1 tablet (10 mg total) by mouth 3 (three) times daily as needed for Muscle spasms.    diazePAM (VALIUM) 5 MG tablet Take 1 tablet (5 mg total) by mouth once. Take 1 tablet by mouth 30 minutes prior to MRI exam for 1 dose (Patient not taking: Reported on 8/14/2024)    fluticasone propionate (FLONASE) 50 mcg/actuation nasal spray 1 spray in each nostril  Strength: 50 mcg/actuation    folic acid (FOLVITE) 1 MG tablet TAKE 1 TABLET BY MOUTH  ONCE A DAY FOR 30 DAYS (Patient not taking: Reported on 5/29/2024)    hydroCHLOROthiazide (HYDRODIURIL) 12.5 MG Tab TAKE 1 TABLET BY MOUTH EVERY DAY (Patient not taking: Reported on 5/29/2024)    ibuprofen (ADVIL,MOTRIN) 200 MG tablet Take 200 mg by mouth every 8 (eight) hours as needed for Pain.    LAGEVRIO, EUA, 200 mg capsule (EUA) TAKE 4 CAPSULES BY MOUTH TWICE A DAY FOR 5 DAYS (Patient not taking: Reported on 5/29/2024)    levocetirizine (XYZAL) 5 MG tablet Take 5 mg by mouth every evening. (Patient not taking: Reported on 5/29/2024)    levothyroxine (SYNTHROID) 125 MCG tablet TAKE 1 TABLET BY MOUTH EVERY DAY    loratadine (CLARITIN) 10 mg tablet 1 tablet (Patient not taking: Reported on 8/14/2024)    losartan-hydrochlorothiazide 100-12.5 mg (HYZAAR) 100-12.5 mg Tab TAKE 1 TABLET BY MOUTH EVERY DAY    metFORMIN (GLUCOPHAGE) 500 MG tablet TAKE 1 TABLET BY MOUTH THREE TIMES A DAY    omeprazole (PRILOSEC) 40 MG capsule TAKE 1 CAPSULE BY MOUTH ONCE  DAILY    potassium chloride (MICRO-K) 8 mEq CpSR TAKE 1 CAPSULE BY MOUTH ONCE DAILY.    predniSONE (DELTASONE) 5 MG tablet TAKE 1-2 TABLETS BY MOUTH DAILY AS NEEDED    promethazine-dextromethorphan (PROMETHAZINE-DM) 6.25-15 mg/5 mL Syrp Take 5 mLs by mouth. (Patient not taking: Reported on 8/14/2024)    traZODone (DESYREL) 50 MG tablet Take 1 tablet (50 mg total) by mouth every evening. (Patient not taking: Reported on 3/1/2023)     Family History       Problem Relation (Age of Onset)    Alzheimer's disease Mother    Heart failure Father, Brother          Tobacco Use    Smoking status: Never    Smokeless tobacco: Never   Substance and Sexual Activity    Alcohol use: Never    Drug use: Never    Sexual activity: Not on file     Review of Systems   Constitutional: Negative for decreased appetite.   HENT:  Negative for congestion and ear discharge.    Eyes:  Negative for blurred vision.   Cardiovascular:  Negative for chest pain and syncope.   Respiratory:  Negative for  cough and wheezing.    Endocrine: Negative for cold intolerance and polyuria.   Hematologic/Lymphatic: Negative for adenopathy and bleeding problem.   Skin:  Negative for color change, nail changes and suspicious lesions.   Musculoskeletal:  Positive for joint pain. Negative for muscle cramps and myalgias.   Gastrointestinal:  Negative for bloating and abdominal pain.   Genitourinary:  Negative for frequency and hematuria.   Neurological:  Negative for brief paralysis, sensory change and weakness.   Psychiatric/Behavioral:  Negative for altered mental status.    Allergic/Immunologic: Negative for hives.     Objective:     Vital Signs (Most Recent):    Vital Signs (24h Range):              There is no height or weight on file to calculate BMI.    No intake or output data in the 24 hours ending 08/26/24 1748             Right Shoulder Exam     Tenderness   The patient is tender to palpation of the acromioclavicular joint and supraspinatus.    Other   Sensation: normal    Vascular Exam     Right Pulses      Radial:                    2+         Significant Labs: All pertinent labs within the past 24 hours have been reviewed.    Significant Imaging: I have reviewed all pertinent imaging results/findings.  Assessment/Plan:     No notes have been filed under this hospital service.  Service: Orthopedic Surgery      Jose Antonio Pablo MD  Orthopedics  [facility] - Orthopedic Periop Services

## 2024-08-26 NOTE — HPI
74-year-old female with a right shoulder rotator cuff tear arthropathy needing reverse total shoulder arthroplasty on the right  Right upper extremity she moves her fingers has sensation to touch has palpable pulses she has less than 80° of abduction or forward flexion.  Less than 20° of external rotation there is crepitus on motion  X-rays show elevation of the humeral head with a degenerative changes right shoulder.  MRI shows rotator cuff tear and biceps tendon tear right shoulder  Rotator cuff tear arthropathy right shoulder  Reverse total shoulder arthroplasty on the right

## 2024-08-27 ENCOUNTER — ANESTHESIA (OUTPATIENT)
Dept: SURGERY | Facility: HOSPITAL | Age: 74
End: 2024-08-27
Payer: MEDICARE

## 2024-08-27 ENCOUNTER — HOSPITAL ENCOUNTER (OUTPATIENT)
Facility: HOSPITAL | Age: 74
Discharge: HOME | End: 2024-08-28
Attending: ORTHOPAEDIC SURGERY | Admitting: ORTHOPAEDIC SURGERY
Payer: MEDICARE

## 2024-08-27 DIAGNOSIS — G89.18 ACUTE POST-OPERATIVE PAIN: Primary | ICD-10-CM

## 2024-08-27 DIAGNOSIS — M12.811 ROTATOR CUFF TEAR ARTHROPATHY, RIGHT: ICD-10-CM

## 2024-08-27 DIAGNOSIS — M75.101 ROTATOR CUFF TEAR ARTHROPATHY, RIGHT: ICD-10-CM

## 2024-08-27 DIAGNOSIS — E11.65 UNCONTROLLED TYPE 2 DIABETES MELLITUS WITH HYPERGLYCEMIA: ICD-10-CM

## 2024-08-27 PROBLEM — I10 ESSENTIAL HYPERTENSION: Status: ACTIVE | Noted: 2024-08-27

## 2024-08-27 PROBLEM — E03.9 ACQUIRED HYPOTHYROIDISM: Status: ACTIVE | Noted: 2024-08-27

## 2024-08-27 LAB
ANION GAP SERPL CALCULATED.3IONS-SCNC: 14 MMOL/L (ref 7–16)
BASOPHILS # BLD AUTO: 0.04 K/UL (ref 0–0.2)
BASOPHILS NFR BLD AUTO: 0.5 % (ref 0–1)
BUN SERPL-MCNC: 11 MG/DL (ref 7–18)
BUN/CREAT SERPL: 11 (ref 6–20)
CALCIUM SERPL-MCNC: 8.4 MG/DL (ref 8.5–10.1)
CHLORIDE SERPL-SCNC: 107 MMOL/L (ref 98–107)
CO2 SERPL-SCNC: 24 MMOL/L (ref 21–32)
CREAT SERPL-MCNC: 1 MG/DL (ref 0.55–1.02)
DIFFERENTIAL METHOD BLD: ABNORMAL
EGFR (NO RACE VARIABLE) (RUSH/TITUS): 60 ML/MIN/1.73M2
EOSINOPHIL # BLD AUTO: 0.16 K/UL (ref 0–0.5)
EOSINOPHIL NFR BLD AUTO: 2 % (ref 1–4)
ERYTHROCYTE [DISTWIDTH] IN BLOOD BY AUTOMATED COUNT: 14.4 % (ref 11.5–14.5)
GLUCOSE SERPL-MCNC: 172 MG/DL (ref 70–105)
GLUCOSE SERPL-MCNC: 237 MG/DL (ref 74–106)
GLUCOSE SERPL-MCNC: 263 MG/DL (ref 70–105)
GLUCOSE SERPL-MCNC: 396 MG/DL (ref 70–105)
HCT VFR BLD AUTO: 33.5 % (ref 38–47)
HGB BLD-MCNC: 10.5 G/DL (ref 12–16)
IMM GRANULOCYTES # BLD AUTO: 0.07 K/UL (ref 0–0.04)
IMM GRANULOCYTES NFR BLD: 0.9 % (ref 0–0.4)
INDIRECT COOMBS: NORMAL
LYMPHOCYTES # BLD AUTO: 2.06 K/UL (ref 1–4.8)
LYMPHOCYTES NFR BLD AUTO: 25.7 % (ref 27–41)
MCH RBC QN AUTO: 25.4 PG (ref 27–31)
MCHC RBC AUTO-ENTMCNC: 31.3 G/DL (ref 32–36)
MCV RBC AUTO: 81.1 FL (ref 80–96)
MONOCYTES # BLD AUTO: 0.35 K/UL (ref 0–0.8)
MONOCYTES NFR BLD AUTO: 4.4 % (ref 2–6)
MPC BLD CALC-MCNC: 8.9 FL (ref 9.4–12.4)
NEUTROPHILS # BLD AUTO: 5.34 K/UL (ref 1.8–7.7)
NEUTROPHILS NFR BLD AUTO: 66.5 % (ref 53–65)
NRBC # BLD AUTO: 0 X10E3/UL
NRBC, AUTO (.00): 0 %
PLATELET # BLD AUTO: 304 K/UL (ref 150–400)
POTASSIUM SERPL-SCNC: 3.9 MMOL/L (ref 3.5–5.1)
RBC # BLD AUTO: 4.13 M/UL (ref 4.2–5.4)
RH BLD: NORMAL
SODIUM SERPL-SCNC: 141 MMOL/L (ref 136–145)
SPECIMEN OUTDATE: NORMAL
WBC # BLD AUTO: 8.02 K/UL (ref 4.5–11)

## 2024-08-27 PROCEDURE — 27000655: Performed by: NURSE ANESTHETIST, CERTIFIED REGISTERED

## 2024-08-27 PROCEDURE — C1776 JOINT DEVICE (IMPLANTABLE): HCPCS | Performed by: ORTHOPAEDIC SURGERY

## 2024-08-27 PROCEDURE — 63600175 PHARM REV CODE 636 W HCPCS: Performed by: INTERNAL MEDICINE

## 2024-08-27 PROCEDURE — 63600175 PHARM REV CODE 636 W HCPCS: Performed by: ANESTHESIOLOGY

## 2024-08-27 PROCEDURE — 94761 N-INVAS EAR/PLS OXIMETRY MLT: CPT

## 2024-08-27 PROCEDURE — 36000710: Performed by: ORTHOPAEDIC SURGERY

## 2024-08-27 PROCEDURE — 63600175 PHARM REV CODE 636 W HCPCS: Performed by: ORTHOPAEDIC SURGERY

## 2024-08-27 PROCEDURE — 86850 RBC ANTIBODY SCREEN: CPT | Performed by: ORTHOPAEDIC SURGERY

## 2024-08-27 PROCEDURE — 27000689 HC BLADE LARYNGOSCOPE ANY SIZE: Performed by: NURSE ANESTHETIST, CERTIFIED REGISTERED

## 2024-08-27 PROCEDURE — 27000510 HC BLANKET BAIR HUGGER ANY SIZE: Performed by: NURSE ANESTHETIST, CERTIFIED REGISTERED

## 2024-08-27 PROCEDURE — 27201423 OPTIME MED/SURG SUP & DEVICES STERILE SUPPLY: Performed by: ORTHOPAEDIC SURGERY

## 2024-08-27 PROCEDURE — 27000758 HC SPIROMETER

## 2024-08-27 PROCEDURE — 63600175 PHARM REV CODE 636 W HCPCS: Performed by: NURSE ANESTHETIST, CERTIFIED REGISTERED

## 2024-08-27 PROCEDURE — 80048 BASIC METABOLIC PNL TOTAL CA: CPT | Performed by: ORTHOPAEDIC SURGERY

## 2024-08-27 PROCEDURE — 82962 GLUCOSE BLOOD TEST: CPT

## 2024-08-27 PROCEDURE — 27000165 HC TUBE, ETT CUFFED: Performed by: NURSE ANESTHETIST, CERTIFIED REGISTERED

## 2024-08-27 PROCEDURE — 36000711: Performed by: ORTHOPAEDIC SURGERY

## 2024-08-27 PROCEDURE — 71000033 HC RECOVERY, INTIAL HOUR: Performed by: ORTHOPAEDIC SURGERY

## 2024-08-27 PROCEDURE — 99900035 HC TECH TIME PER 15 MIN (STAT)

## 2024-08-27 PROCEDURE — 23472 RECONSTRUCT SHOULDER JOINT: CPT | Mod: RT,,, | Performed by: ORTHOPAEDIC SURGERY

## 2024-08-27 PROCEDURE — 37000009 HC ANESTHESIA EA ADD 15 MINS: Performed by: ORTHOPAEDIC SURGERY

## 2024-08-27 PROCEDURE — C1713 ANCHOR/SCREW BN/BN,TIS/BN: HCPCS | Performed by: ORTHOPAEDIC SURGERY

## 2024-08-27 PROCEDURE — 27000221 HC OXYGEN, UP TO 24 HOURS

## 2024-08-27 PROCEDURE — 64415 NJX AA&/STRD BRCH PLXS IMG: CPT | Performed by: ANESTHESIOLOGY

## 2024-08-27 PROCEDURE — 36415 COLL VENOUS BLD VENIPUNCTURE: CPT | Mod: 91 | Performed by: ORTHOPAEDIC SURGERY

## 2024-08-27 PROCEDURE — 86900 BLOOD TYPING SEROLOGIC ABO: CPT | Performed by: ORTHOPAEDIC SURGERY

## 2024-08-27 PROCEDURE — 25000003 PHARM REV CODE 250: Performed by: ORTHOPAEDIC SURGERY

## 2024-08-27 PROCEDURE — C1769 GUIDE WIRE: HCPCS | Performed by: ORTHOPAEDIC SURGERY

## 2024-08-27 PROCEDURE — 71000016 HC POSTOP RECOV ADDL HR: Performed by: ORTHOPAEDIC SURGERY

## 2024-08-27 PROCEDURE — 85025 COMPLETE CBC W/AUTO DIFF WBC: CPT | Performed by: ORTHOPAEDIC SURGERY

## 2024-08-27 PROCEDURE — 27000716 HC OXISENSOR PROBE, ANY SIZE: Performed by: NURSE ANESTHETIST, CERTIFIED REGISTERED

## 2024-08-27 PROCEDURE — 25000003 PHARM REV CODE 250: Performed by: NURSE ANESTHETIST, CERTIFIED REGISTERED

## 2024-08-27 PROCEDURE — 71000015 HC POSTOP RECOV 1ST HR: Performed by: ORTHOPAEDIC SURGERY

## 2024-08-27 PROCEDURE — 37000008 HC ANESTHESIA 1ST 15 MINUTES: Performed by: ORTHOPAEDIC SURGERY

## 2024-08-27 PROCEDURE — 99900031 HC PATIENT EDUCATION (STAT)

## 2024-08-27 PROCEDURE — 36415 COLL VENOUS BLD VENIPUNCTURE: CPT | Performed by: ORTHOPAEDIC SURGERY

## 2024-08-27 PROCEDURE — 99214 OFFICE O/P EST MOD 30 MIN: CPT | Mod: ,,, | Performed by: INTERNAL MEDICINE

## 2024-08-27 DEVICE — GLENOID BASEPLATE
Type: IMPLANTABLE DEVICE | Site: SHOULDER | Status: FUNCTIONAL
Brand: REUNION

## 2024-08-27 DEVICE — X3 HUMERAL INSERT
Type: IMPLANTABLE DEVICE | Site: SHOULDER | Status: FUNCTIONAL
Brand: REUNION

## 2024-08-27 DEVICE — HUMERAL CUP
Type: IMPLANTABLE DEVICE | Site: SHOULDER | Status: FUNCTIONAL
Brand: REUNION

## 2024-08-27 DEVICE — 4.5MM PERIPHERAL SCREW
Type: IMPLANTABLE DEVICE | Site: SHOULDER | Status: FUNCTIONAL
Brand: REUNION

## 2024-08-27 DEVICE — 6.5MM CENTER SCREW
Type: IMPLANTABLE DEVICE | Site: SHOULDER | Status: FUNCTIONAL
Brand: REUNION

## 2024-08-27 DEVICE — GLENOSPHERE , CONCENTRIC
Type: IMPLANTABLE DEVICE | Site: SHOULDER | Status: FUNCTIONAL
Brand: REUNION

## 2024-08-27 RX ORDER — ASPIRIN 325 MG
325 TABLET ORAL DAILY
Status: DISCONTINUED | OUTPATIENT
Start: 2024-08-27 | End: 2024-08-28 | Stop reason: HOSPADM

## 2024-08-27 RX ORDER — IBUPROFEN 200 MG
24 TABLET ORAL
Status: DISCONTINUED | OUTPATIENT
Start: 2024-08-27 | End: 2024-08-28 | Stop reason: HOSPADM

## 2024-08-27 RX ORDER — HYDROCODONE BITARTRATE AND ACETAMINOPHEN 5; 325 MG/1; MG/1
1 TABLET ORAL EVERY 4 HOURS PRN
Status: DISCONTINUED | OUTPATIENT
Start: 2024-08-27 | End: 2024-08-28 | Stop reason: HOSPADM

## 2024-08-27 RX ORDER — PROPOFOL 10 MG/ML
VIAL (ML) INTRAVENOUS
Status: DISCONTINUED | OUTPATIENT
Start: 2024-08-27 | End: 2024-08-27

## 2024-08-27 RX ORDER — TRANEXAMIC ACID 100 MG/ML
INJECTION, SOLUTION INTRAVENOUS
Status: DISCONTINUED | OUTPATIENT
Start: 2024-08-27 | End: 2024-08-27

## 2024-08-27 RX ORDER — LEVOTHYROXINE SODIUM 125 UG/1
125 TABLET ORAL DAILY
Status: DISCONTINUED | OUTPATIENT
Start: 2024-08-28 | End: 2024-08-28 | Stop reason: HOSPADM

## 2024-08-27 RX ORDER — BISACODYL 10 MG/1
10 SUPPOSITORY RECTAL DAILY PRN
Status: DISCONTINUED | OUTPATIENT
Start: 2024-08-27 | End: 2024-08-28 | Stop reason: HOSPADM

## 2024-08-27 RX ORDER — INSULIN ASPART 100 [IU]/ML
0-5 INJECTION, SOLUTION INTRAVENOUS; SUBCUTANEOUS
Status: DISCONTINUED | OUTPATIENT
Start: 2024-08-27 | End: 2024-08-28 | Stop reason: HOSPADM

## 2024-08-27 RX ORDER — DIPHENHYDRAMINE HYDROCHLORIDE 50 MG/ML
25 INJECTION INTRAMUSCULAR; INTRAVENOUS EVERY 6 HOURS PRN
Status: DISCONTINUED | OUTPATIENT
Start: 2024-08-27 | End: 2024-08-27 | Stop reason: HOSPADM

## 2024-08-27 RX ORDER — LOSARTAN POTASSIUM 100 MG/1
100 TABLET ORAL DAILY
Status: DISCONTINUED | OUTPATIENT
Start: 2024-08-28 | End: 2024-08-28 | Stop reason: HOSPADM

## 2024-08-27 RX ORDER — IPRATROPIUM BROMIDE AND ALBUTEROL SULFATE 2.5; .5 MG/3ML; MG/3ML
3 SOLUTION RESPIRATORY (INHALATION) ONCE AS NEEDED
Status: DISCONTINUED | OUTPATIENT
Start: 2024-08-27 | End: 2024-08-27 | Stop reason: HOSPADM

## 2024-08-27 RX ORDER — LOSARTAN POTASSIUM AND HYDROCHLOROTHIAZIDE 12.5; 5 MG/1; MG/1
2 TABLET ORAL DAILY
Status: DISCONTINUED | OUTPATIENT
Start: 2024-08-28 | End: 2024-08-27

## 2024-08-27 RX ORDER — ONDANSETRON HYDROCHLORIDE 2 MG/ML
4 INJECTION, SOLUTION INTRAVENOUS EVERY 8 HOURS PRN
Status: DISCONTINUED | OUTPATIENT
Start: 2024-08-27 | End: 2024-08-28 | Stop reason: HOSPADM

## 2024-08-27 RX ORDER — ROCURONIUM BROMIDE 10 MG/ML
INJECTION, SOLUTION INTRAVENOUS
Status: DISCONTINUED | OUTPATIENT
Start: 2024-08-27 | End: 2024-08-27

## 2024-08-27 RX ORDER — FENTANYL CITRATE 50 UG/ML
INJECTION, SOLUTION INTRAMUSCULAR; INTRAVENOUS
Status: DISCONTINUED | OUTPATIENT
Start: 2024-08-27 | End: 2024-08-27

## 2024-08-27 RX ORDER — HYDROMORPHONE HYDROCHLORIDE 2 MG/ML
0.5 INJECTION, SOLUTION INTRAMUSCULAR; INTRAVENOUS; SUBCUTANEOUS EVERY 5 MIN PRN
Status: DISCONTINUED | OUTPATIENT
Start: 2024-08-27 | End: 2024-08-27 | Stop reason: HOSPADM

## 2024-08-27 RX ORDER — PHENYLEPHRINE HYDROCHLORIDE 10 MG/ML
INJECTION INTRAVENOUS
Status: DISCONTINUED | OUTPATIENT
Start: 2024-08-27 | End: 2024-08-27

## 2024-08-27 RX ORDER — DOCUSATE SODIUM 100 MG/1
100 CAPSULE, LIQUID FILLED ORAL EVERY 12 HOURS
Status: DISCONTINUED | OUTPATIENT
Start: 2024-08-27 | End: 2024-08-28 | Stop reason: HOSPADM

## 2024-08-27 RX ORDER — ROPIVACAINE HYDROCHLORIDE 7.5 MG/ML
INJECTION, SOLUTION EPIDURAL; PERINEURAL
Status: COMPLETED | OUTPATIENT
Start: 2024-08-27 | End: 2024-08-27

## 2024-08-27 RX ORDER — LIDOCAINE HYDROCHLORIDE 20 MG/ML
INJECTION, SOLUTION EPIDURAL; INFILTRATION; INTRACAUDAL; PERINEURAL
Status: DISCONTINUED | OUTPATIENT
Start: 2024-08-27 | End: 2024-08-27

## 2024-08-27 RX ORDER — SODIUM CHLORIDE 9 MG/ML
INJECTION, SOLUTION INTRAVENOUS CONTINUOUS
Status: DISCONTINUED | OUTPATIENT
Start: 2024-08-27 | End: 2024-08-28 | Stop reason: HOSPADM

## 2024-08-27 RX ORDER — MIDAZOLAM HYDROCHLORIDE 1 MG/ML
INJECTION INTRAMUSCULAR; INTRAVENOUS
Status: DISCONTINUED | OUTPATIENT
Start: 2024-08-27 | End: 2024-08-27

## 2024-08-27 RX ORDER — DEXAMETHASONE SODIUM PHOSPHATE 4 MG/ML
INJECTION, SOLUTION INTRA-ARTICULAR; INTRALESIONAL; INTRAMUSCULAR; INTRAVENOUS; SOFT TISSUE
Status: DISCONTINUED | OUTPATIENT
Start: 2024-08-27 | End: 2024-08-27

## 2024-08-27 RX ORDER — MEPERIDINE HYDROCHLORIDE 25 MG/ML
25 INJECTION INTRAMUSCULAR; INTRAVENOUS; SUBCUTANEOUS ONCE AS NEEDED
Status: DISCONTINUED | OUTPATIENT
Start: 2024-08-27 | End: 2024-08-27 | Stop reason: HOSPADM

## 2024-08-27 RX ORDER — MORPHINE SULFATE 10 MG/ML
4 INJECTION INTRAMUSCULAR; INTRAVENOUS; SUBCUTANEOUS EVERY 4 HOURS PRN
Status: DISCONTINUED | OUTPATIENT
Start: 2024-08-27 | End: 2024-08-28 | Stop reason: HOSPADM

## 2024-08-27 RX ORDER — GLUCAGON 1 MG
1 KIT INJECTION
Status: DISCONTINUED | OUTPATIENT
Start: 2024-08-27 | End: 2024-08-27

## 2024-08-27 RX ORDER — IBUPROFEN 200 MG
16 TABLET ORAL
Status: DISCONTINUED | OUTPATIENT
Start: 2024-08-27 | End: 2024-08-28 | Stop reason: HOSPADM

## 2024-08-27 RX ORDER — GLUCAGON 1 MG
1 KIT INJECTION
Status: DISCONTINUED | OUTPATIENT
Start: 2024-08-27 | End: 2024-08-27 | Stop reason: HOSPADM

## 2024-08-27 RX ORDER — TRAZODONE HYDROCHLORIDE 50 MG/1
50 TABLET ORAL NIGHTLY PRN
Status: DISCONTINUED | OUTPATIENT
Start: 2024-08-27 | End: 2024-08-28 | Stop reason: HOSPADM

## 2024-08-27 RX ORDER — CYCLOBENZAPRINE HCL 10 MG
10 TABLET ORAL 3 TIMES DAILY PRN
Status: DISCONTINUED | OUTPATIENT
Start: 2024-08-27 | End: 2024-08-28 | Stop reason: HOSPADM

## 2024-08-27 RX ORDER — CEFAZOLIN SODIUM 1 G/3ML
INJECTION, POWDER, FOR SOLUTION INTRAMUSCULAR; INTRAVENOUS
Status: DISCONTINUED | OUTPATIENT
Start: 2024-08-27 | End: 2024-08-27

## 2024-08-27 RX ORDER — SODIUM CHLORIDE 9 MG/ML
INJECTION, SOLUTION INTRAVENOUS CONTINUOUS
Status: DISCONTINUED | OUTPATIENT
Start: 2024-08-27 | End: 2024-08-27

## 2024-08-27 RX ORDER — HYDROCHLOROTHIAZIDE 25 MG/1
25 TABLET ORAL DAILY
Status: DISCONTINUED | OUTPATIENT
Start: 2024-08-28 | End: 2024-08-28 | Stop reason: HOSPADM

## 2024-08-27 RX ORDER — ONDANSETRON HYDROCHLORIDE 2 MG/ML
INJECTION, SOLUTION INTRAVENOUS
Status: DISCONTINUED | OUTPATIENT
Start: 2024-08-27 | End: 2024-08-27

## 2024-08-27 RX ORDER — PANTOPRAZOLE SODIUM 40 MG/1
40 TABLET, DELAYED RELEASE ORAL DAILY
Status: DISCONTINUED | OUTPATIENT
Start: 2024-08-28 | End: 2024-08-28 | Stop reason: HOSPADM

## 2024-08-27 RX ORDER — ONDANSETRON HYDROCHLORIDE 2 MG/ML
4 INJECTION, SOLUTION INTRAVENOUS DAILY PRN
Status: DISCONTINUED | OUTPATIENT
Start: 2024-08-27 | End: 2024-08-27 | Stop reason: HOSPADM

## 2024-08-27 RX ADMIN — DEXAMETHASONE SODIUM PHOSPHATE 8 MG: 4 INJECTION, SOLUTION INTRA-ARTICULAR; INTRALESIONAL; INTRAMUSCULAR; INTRAVENOUS; SOFT TISSUE at 08:08

## 2024-08-27 RX ADMIN — INSULIN ASPART 3 UNITS: 100 INJECTION, SOLUTION INTRAVENOUS; SUBCUTANEOUS at 09:08

## 2024-08-27 RX ADMIN — PHENYLEPHRINE HYDROCHLORIDE 100 MCG: 10 INJECTION INTRAVENOUS at 09:08

## 2024-08-27 RX ADMIN — PHENYLEPHRINE HYDROCHLORIDE 100 MCG: 10 INJECTION INTRAVENOUS at 10:08

## 2024-08-27 RX ADMIN — Medication 30 MG: at 08:08

## 2024-08-27 RX ADMIN — ROPIVACAINE HYDROCHLORIDE 15 ML: 7.5 INJECTION, SOLUTION EPIDURAL; PERINEURAL at 08:08

## 2024-08-27 RX ADMIN — CEFAZOLIN 2 G: 2 INJECTION, POWDER, FOR SOLUTION INTRAMUSCULAR; INTRAVENOUS at 12:08

## 2024-08-27 RX ADMIN — SODIUM CHLORIDE: 9 INJECTION, SOLUTION INTRAVENOUS at 06:08

## 2024-08-27 RX ADMIN — HYDROCODONE BITARTRATE AND ACETAMINOPHEN 1 TABLET: 5; 325 TABLET ORAL at 09:08

## 2024-08-27 RX ADMIN — FENTANYL CITRATE 100 MCG: 50 INJECTION, SOLUTION INTRAMUSCULAR; INTRAVENOUS at 11:08

## 2024-08-27 RX ADMIN — DOCUSATE SODIUM 100 MG: 100 CAPSULE, LIQUID FILLED ORAL at 09:08

## 2024-08-27 RX ADMIN — CEFAZOLIN 2 G: 2 INJECTION, POWDER, FOR SOLUTION INTRAMUSCULAR; INTRAVENOUS at 06:08

## 2024-08-27 RX ADMIN — CEFAZOLIN 2 G: 1 INJECTION, POWDER, FOR SOLUTION INTRAMUSCULAR; INTRAVENOUS; PARENTERAL at 09:08

## 2024-08-27 RX ADMIN — ASPIRIN 325 MG ORAL TABLET 325 MG: 325 PILL ORAL at 01:08

## 2024-08-27 RX ADMIN — LIDOCAINE HYDROCHLORIDE 60 MG: 20 INJECTION, SOLUTION INTRAVENOUS at 08:08

## 2024-08-27 RX ADMIN — ONDANSETRON 4 MG: 2 INJECTION INTRAMUSCULAR; INTRAVENOUS at 12:08

## 2024-08-27 RX ADMIN — PHENYLEPHRINE HYDROCHLORIDE 100 MCG: 10 INJECTION INTRAVENOUS at 08:08

## 2024-08-27 RX ADMIN — TRANEXAMIC ACID 1000 MG: 100 INJECTION, SOLUTION INTRAVENOUS at 08:08

## 2024-08-27 RX ADMIN — INSULIN ASPART 5 UNITS: 100 INJECTION, SOLUTION INTRAVENOUS; SUBCUTANEOUS at 06:08

## 2024-08-27 RX ADMIN — SUGAMMADEX 200 MG: 100 INJECTION, SOLUTION INTRAVENOUS at 10:08

## 2024-08-27 RX ADMIN — TRANEXAMIC ACID 1000 MG: 100 INJECTION, SOLUTION INTRAVENOUS at 10:08

## 2024-08-27 RX ADMIN — ONDANSETRON 4 MG: 2 INJECTION INTRAMUSCULAR; INTRAVENOUS at 08:08

## 2024-08-27 RX ADMIN — ROCURONIUM BROMIDE 10 MG: 10 INJECTION, SOLUTION INTRAVENOUS at 09:08

## 2024-08-27 RX ADMIN — VANCOMYCIN HYDROCHLORIDE 1500 MG: 1 INJECTION, POWDER, LYOPHILIZED, FOR SOLUTION INTRAVENOUS at 08:08

## 2024-08-27 RX ADMIN — MIDAZOLAM HYDROCHLORIDE 2 MG: 1 INJECTION, SOLUTION INTRAMUSCULAR; INTRAVENOUS at 08:08

## 2024-08-27 RX ADMIN — ROCURONIUM BROMIDE 40 MG: 10 INJECTION, SOLUTION INTRAVENOUS at 08:08

## 2024-08-27 RX ADMIN — DEXTROSE MONOHYDRATE 1000 MG: 50 INJECTION, SOLUTION INTRAVENOUS at 09:08

## 2024-08-27 RX ADMIN — Medication 150 MG: at 08:08

## 2024-08-27 RX ADMIN — FENTANYL CITRATE 100 MCG: 50 INJECTION, SOLUTION INTRAMUSCULAR; INTRAVENOUS at 08:08

## 2024-08-27 RX ADMIN — PHENYLEPHRINE HYDROCHLORIDE 200 MCG: 10 INJECTION INTRAVENOUS at 10:08

## 2024-08-27 NOTE — ASSESSMENT & PLAN NOTE
"Patient's FSGs are uncontrolled due to hyperglycemia on current medication regimen.  Last A1c reviewed-   Lab Results   Component Value Date    HGBA1C 10.0 (H) 02/20/2024     Most recent fingerstick glucose reviewed- No results for input(s): "POCTGLUCOSE" in the last 24 hours.  Current correctional scale  Low  Maintain anti-hyperglycemic dose as follows-   Antihyperglycemics (From admission, onward)      Start     Stop Route Frequency Ordered    08/27/24 1547  insulin aspart U-100 injection 0-5 Units         -- SubQ Before meals & nightly PRN 08/27/24 1449          Hold Oral hypoglycemics while patient is in the hospital.  Check HBA1C.\  May require addition of basal insulin here in the hospital depending on BG trend.   "

## 2024-08-27 NOTE — ASSESSMENT & PLAN NOTE
Body mass index is 36.67 kg/m². Morbid obesity complicates all aspects of disease management from diagnostic modalities to treatment. Weight loss encouraged and health benefits explained to patient.

## 2024-08-27 NOTE — ASSESSMENT & PLAN NOTE
Chronic, controlled. Latest blood pressure and vitals reviewed-     Temp:  [97.8 °F (36.6 °C)-98.1 °F (36.7 °C)]   Pulse:  [66-92]   Resp:  [16-30]   BP: (142-170)/(65-82)   SpO2:  [91 %-99 %] .   Home meds for hypertension were reviewed and noted below.   Hypertension Medications               hydroCHLOROthiazide (HYDRODIURIL) 12.5 MG Tab TAKE 1 TABLET BY MOUTH EVERY DAY    losartan-hydrochlorothiazide 100-12.5 mg (HYZAAR) 100-12.5 mg Tab TAKE 1 TABLET BY MOUTH EVERY DAY            While in the hospital, will manage blood pressure as follows; Continue home antihypertensive regimen    Will utilize p.r.n. blood pressure medication only if patient's blood pressure greater than 180/110 and she develops symptoms such as worsening chest pain or shortness of breath.

## 2024-08-27 NOTE — ANESTHESIA PROCEDURE NOTES
Peripheral Block    Patient location during procedure: pre-op   Block not for primary anesthetic.  Reason for block: at surgeon's request and post-op pain management   Post-op Pain Location: right shoulder   Start time: 8/27/2024 8:30 AM  Timeout: 8/27/2024 8:27 AM   End time: 8/27/2024 8:35 AM    Staffing  Authorizing Provider: Junior Yoon DO  Performing Provider: Junior Yoon DO    Staffing  Performed by: Junior Yoon DO  Authorized by: Junior Yoon DO    Preanesthetic Checklist  Completed: patient identified, IV checked, site marked, risks and benefits discussed, surgical consent, monitors and equipment checked, pre-op evaluation and timeout performed  Peripheral Block  Patient position: sitting  Prep: ChloraPrep  Patient monitoring: heart rate, cardiac monitor, continuous pulse ox, continuous capnometry and frequent blood pressure checks  Block type: interscalene  Laterality: right  Injection technique: single shot  Needle  Needle type: Stimuplex   Needle gauge: 22 G  Needle length: 2 in  Needle localization: anatomical landmarks and ultrasound guidance   -ultrasound image captured on disc.  Assessment  Injection assessment: negative aspiration, negative parasthesia and local visualized surrounding nerve  Paresthesia pain: none  Heart rate change: no  Slow fractionated injection: yes  Pain Tolerance: comfortable throughout block  Medications:    Medications: ROPIvacaine (NAROPIN) injection 0.75% - Perineural   15 mL - 8/27/2024 8:35:00 AM    Additional Notes  VSS.  [facility] RN monitoring vitals throughout procedure.  Patient tolerated procedure well.

## 2024-08-27 NOTE — ASSESSMENT & PLAN NOTE
S/p right shoulder reverse arthroplasty by Dr. Pablo on 8/27.   PT/OT consulted.   Pain control.

## 2024-08-27 NOTE — LETTER
August 28, 2024         [address] [place] [id number]  Phone: [phone]  Fax: [phone]       Patient: Meghna Dalal   YOB: 1950  Date of Visit: 08/27/2024-08/28/2024    To Whom It May Concern:    Nanci Cormier  was at [facility] on 08/28/2024. The patient may return to work/school on 08/29/2024 {With/no:[id number]} restrictions. If you have any questions or concerns, or if I can be of further assistance, please do not hesitate to contact me.    Sincerely,    Jessica Mtz RN      Wt Readings from Last 3 Encounters:   09/08/21 116 kg (256 lb 6 oz)   09/04/21 115 kg (253 lb 6 4 oz)   08/25/21 119 kg (262 lb 4 8 oz)       Congestive heart failure  Patient appears to be euvolemic at this time  He will continue with current diuretic therapy  He will follow up with cardiologist as ordered

## 2024-08-27 NOTE — HPI
Ms. Dalal is a 74 Y O female with PMH of HTN, DM type II, RA who presented for elective right shoulder arthroplasty with Dr. Pablo. She denies chest pain, shortness of breath, nausea, vomiting. She reports compliance with her medications. She checks her glucose frequently which stays between 150-170.

## 2024-08-27 NOTE — INTERVAL H&P NOTE
The patient has been examined and the H&P has been reviewed:    I concur with the findings and no changes have occurred since H&P was written.    Surgery risks, benefits and alternative options discussed and understood by patient/family.          Active Hospital Problems    Diagnosis  POA    *Rotator cuff tear arthropathy, right [M75.101, M12.811]  Yes      Resolved Hospital Problems   No resolved problems to display.

## 2024-08-27 NOTE — ANESTHESIA POSTPROCEDURE EVALUATION
Anesthesia Post Evaluation    Patient: Meghna Dalal    Procedure(s) Performed: Procedure(s) (LRB):  ARTHROPLASTY, SHOULDER, TOTAL, REVERSE (Right)    Final Anesthesia Type: general      Patient location during evaluation: PACU  Patient participation: Yes- Able to Participate  Level of consciousness: awake and alert and oriented  Post-procedure vital signs: reviewed and stable  Pain management: adequate  Airway patency: patent  ALBINO mitigation strategies: Multimodal analgesia and Use of major conduction anesthesia (spinal/epidural) or peripheral nerve block  PONV status at discharge: No PONV  Anesthetic complications: no      Cardiovascular status: hemodynamically stable  Respiratory status: unassisted and spontaneous ventilation  Hydration status: euvolemic  Follow-up not needed.              Vitals Value Taken Time   /74 08/27/24 1125   Temp 36.6 °C (97.8 °F) 08/27/24 1121   Pulse 66 08/27/24 1127   Resp 18 08/27/24 1121   SpO2 94 % 08/27/24 1127   Vitals shown include unfiled device data.      No case tracking events are documented in the log.      Pain/Dakota Score: No data recorded

## 2024-08-27 NOTE — TRANSFER OF CARE
"Anesthesia Transfer of Care Note    Patient: Meghna Dalal    Procedure(s) Performed: Procedure(s) (LRB):  ARTHROPLASTY, SHOULDER, TOTAL, REVERSE (Right)    Patient location: PACU    Anesthesia Type: general    Transport from OR: Transported from OR on 6-10 L/min O2 by face mask with adequate spontaneous ventilation    Post pain: adequate analgesia    Post assessment: no apparent anesthetic complications    Post vital signs: stable    Level of consciousness: sedated    Nausea/Vomiting: no nausea/vomiting    Complications: none    Transfer of care protocol was followed    Last vitals: Visit Vitals  BP (!) 168/78   Pulse 71   Temp 36.6 °C (97.8 °F)   Resp 18   Ht 5' 3" (1.6 m)   Wt 93.9 kg (207 lb)   SpO2 97%   Breastfeeding No   BMI 36.67 kg/m²     "

## 2024-08-27 NOTE — CONSULTS
[facility]  [facility] Medicine  Consult Note    Patient Name: Meghna Dalal  MRN: [id number]  Admission Date: 8/27/2024  Hospital Length of Stay: 0 days  Attending Physician: Jose Antonio Pablo MD   Primary Care Provider: Kwame Wyatt MD           Patient information was obtained from patient, past medical records, and ER records.     Inpatient consult to Hospitalist  Consult performed by: Juan Lowery MD  Consult ordered by: Jose Antonio Pablo MD        Subjective:     Principal Problem: Rotator cuff tear arthropathy, right    Chief Complaint: No chief complaint on file.       HPI: Ms. Dalal is a 74 Y O female with PMH of HTN, DM type II, RA who presented for elective right shoulder arthroplasty with Dr. Pablo. She denies chest pain, shortness of breath, nausea, vomiting. She reports compliance with her medications. She checks her glucose frequently which stays between 150-170.      Past Medical History:   Diagnosis Date    Arthritis     Diabetes mellitus, type 2     GERD (gastroesophageal reflux disease)     Hypertension     Iron deficiency anemia     Obesity     Personal history of colonic polyps 08/19/2019    Thyroid disease        Past Surgical History:   Procedure Laterality Date    ARTHROSCOPIC DEBRIDEMENT OF SHOULDER Left 12/21/2021    Procedure: DEBRIDEMENT, SHOULDER, ARTHROSCOPIC;  Surgeon: Jose Antonio Pablo MD;  Location: [facility];  Service: Orthopedics;  Laterality: Left;  SHOULDER JOINT    ARTHROSCOPIC REMOVAL OF LOOSE BODY FROM JOINT Left 12/21/2021    Procedure: REMOVAL, LOOSE BODY, JOINT, ARTHROSCOPIC;  Surgeon: Jose Antonio Pablo MD;  Location: [facility];  Service: Orthopedics;  Laterality: Left;    COLONOSCOPY W/ POLYPECTOMY  08/19/2019    DECOMPRESSION OF SUBACROMIAL SPACE Left 12/21/2021    Procedure: DECOMPRESSION, SUBACROMIAL SPACE;  Surgeon: Jose Antonio Pablo MD;  Location: [facility];  Service: Orthopedics;   pt does not meet sepsis criteria per Dr. Ballard Laterality: Left;    DISTAL CLAVICLE EXCISION Left 12/21/2021    Procedure: EXCISION, CLAVICLE, DISTAL;  Surgeon: Jose Antonio Pablo MD;  Location: [facility] OR;  Service: Orthopedics;  Laterality: Left;    left total knee replacement      PARTIAL HYSTERECTOMY      ROTATOR CUFF REPAIR Left 12/21/2021    Procedure: REPAIR, ROTATOR CUFF;  Surgeon: Jose Antonio Pablo MD;  Location: [facility] OR;  Service: Orthopedics;  Laterality: Left;    SHOULDER ARTHROSCOPY Left 12/21/2021    Procedure: ARTHROSCOPY, SHOULDER;  Surgeon: Jose Antonio Pablo MD;  Location: [facility] OR;  Service: Orthopedics;  Laterality: Left;    SHOULDER SURGERY Left 12/21/2021    THYROID SURGERY      total thyroidectomy    TONSILLECTOMY AND ADENOIDECTOMY         Review of patient's allergies indicates:   Allergen Reactions    Codeine phosphate      Other reaction(s): facial swelling    Codeine sulfate      Other reaction(s): Unknown       No current facility-administered medications on file prior to encounter.     Current Outpatient Medications on File Prior to Encounter   Medication Sig    abatacept (ORENCIA SUBQ)     cyclobenzaprine (FLEXERIL) 10 MG tablet Take 1 tablet (10 mg total) by mouth 3 (three) times daily as needed for Muscle spasms.    fluticasone propionate (FLONASE) 50 mcg/actuation nasal spray 1 spray in each nostril  Strength: 50 mcg/actuation    levothyroxine (SYNTHROID) 125 MCG tablet TAKE 1 TABLET BY MOUTH EVERY DAY    losartan-hydrochlorothiazide 100-12.5 mg (HYZAAR) 100-12.5 mg Tab TAKE 1 TABLET BY MOUTH EVERY DAY    metFORMIN (GLUCOPHAGE) 500 MG tablet TAKE 1 TABLET BY MOUTH THREE TIMES A DAY    omeprazole (PRILOSEC) 40 MG capsule TAKE 1 CAPSULE BY MOUTH ONCE  DAILY    potassium chloride (MICRO-K) 8 mEq CpSR TAKE 1 CAPSULE BY MOUTH ONCE DAILY.    predniSONE (DELTASONE) 5 MG tablet TAKE 1-2 TABLETS BY MOUTH DAILY AS NEEDED    aspirin 81 mg Cap Take by mouth.    azelastine (ASTELIN) 137 mcg (0.1 %) nasal spray 1 puff in  each nostril (Patient not taking: Reported on 8/14/2024)    diazePAM (VALIUM) 5 MG tablet Take 1 tablet (5 mg total) by mouth once. Take 1 tablet by mouth 30 minutes prior to MRI exam for 1 dose (Patient not taking: Reported on 8/14/2024)    folic acid (FOLVITE) 1 MG tablet TAKE 1 TABLET BY MOUTH ONCE A DAY FOR 30 DAYS (Patient not taking: Reported on 5/29/2024)    hydroCHLOROthiazide (HYDRODIURIL) 12.5 MG Tab TAKE 1 TABLET BY MOUTH EVERY DAY (Patient not taking: Reported on 5/29/2024)    LAGEVRIO, EUA, 200 mg capsule (EUA) TAKE 4 CAPSULES BY MOUTH TWICE A DAY FOR 5 DAYS (Patient not taking: Reported on 5/29/2024)    levocetirizine (XYZAL) 5 MG tablet Take 5 mg by mouth every evening. (Patient not taking: Reported on 5/29/2024)    loratadine (CLARITIN) 10 mg tablet 1 tablet (Patient not taking: Reported on 8/14/2024)    promethazine-dextromethorphan (PROMETHAZINE-DM) 6.25-15 mg/5 mL Syrp Take 5 mLs by mouth. (Patient not taking: Reported on 8/14/2024)    traZODone (DESYREL) 50 MG tablet Take 1 tablet (50 mg total) by mouth every evening. (Patient not taking: Reported on 3/1/2023)    [DISCONTINUED] losartan (COZAAR) 50 MG tablet TAKE 1 TABLET BY MOUTH EVERY DAY     Family History       Problem Relation (Age of Onset)    Alzheimer's disease Mother    Heart failure Father, Brother          Tobacco Use    Smoking status: Never    Smokeless tobacco: Never   Substance and Sexual Activity    Alcohol use: Never    Drug use: Never    Sexual activity: Not on file     Review of Systems   All other systems reviewed and are negative.    Objective:     Vital Signs (Most Recent):  Temp: 98 °F (36.7 °C) (08/27/24 1358)  Pulse: 87 (08/27/24 1445)  Resp: 18 (08/27/24 1445)  BP: (!) 161/82 (08/27/24 1445)  SpO2: 96 % (08/27/24 1445) Vital Signs (24h Range):  Temp:  [97.8 °F (36.6 °C)-98.1 °F (36.7 °C)] 98 °F (36.7 °C)  Pulse:  [66-92] 87  Resp:  [16-30] 18  SpO2:  [91 %-99 %] 96 %  BP: (142-170)/(65-82) 161/82     Weight: 93.9 kg (207  lb)  Body mass index is 36.67 kg/m².     Physical Exam  Vitals and nursing note reviewed.   Constitutional:       Appearance: Normal appearance.   HENT:      Head: Normocephalic and atraumatic.      Mouth/Throat:      Mouth: Mucous membranes are moist.   Eyes:      Extraocular Movements: Extraocular movements intact.      Pupils: Pupils are equal, round, and reactive to light.   Cardiovascular:      Rate and Rhythm: Normal rate and regular rhythm.   Pulmonary:      Effort: Pulmonary effort is normal.      Breath sounds: Normal breath sounds.   Abdominal:      General: Bowel sounds are normal.      Palpations: Abdomen is soft.   Musculoskeletal:      Cervical back: Normal range of motion and neck supple.      Comments: Right arm in sling.    Neurological:      General: No focal deficit present.      Mental Status: She is alert and oriented to person, place, and time. Mental status is at baseline.   Psychiatric:         Mood and Affect: Mood normal.         Behavior: Behavior normal.          Significant Labs: All pertinent labs within the past 24 hours have been reviewed.    Significant Imaging: I have reviewed all pertinent imaging results/findings within the past 24 hours.  Assessment/Plan:     * Rotator cuff tear arthropathy, right  S/p right shoulder reverse arthroplasty by Dr. Pablo on 8/27.   PT/OT consulted.   Pain control.       Acute post-operative pain  Norco/Morphine ordered per primary team.     Acquired hypothyroidism  Resume home Synthroid.       Essential hypertension  Chronic, controlled. Latest blood pressure and vitals reviewed-     Temp:  [97.8 °F (36.6 °C)-98.1 °F (36.7 °C)]   Pulse:  [66-92]   Resp:  [16-30]   BP: (142-170)/(65-82)   SpO2:  [91 %-99 %] .   Home meds for hypertension were reviewed and noted below.   Hypertension Medications               hydroCHLOROthiazide (HYDRODIURIL) 12.5 MG Tab TAKE 1 TABLET BY MOUTH EVERY DAY    losartan-hydrochlorothiazide 100-12.5 mg (HYZAAR) 100-12.5 mg  "Tab TAKE 1 TABLET BY MOUTH EVERY DAY            While in the hospital, will manage blood pressure as follows; Continue home antihypertensive regimen    Will utilize p.r.n. blood pressure medication only if patient's blood pressure greater than 180/110 and she develops symptoms such as worsening chest pain or shortness of breath.    Uncontrolled type 2 diabetes mellitus with hyperglycemia  Patient's FSGs are uncontrolled due to hyperglycemia on current medication regimen.  Last A1c reviewed-   Lab Results   Component Value Date    HGBA1C 10.0 (H) 02/20/2024     Most recent fingerstick glucose reviewed- No results for input(s): "POCTGLUCOSE" in the last 24 hours.  Current correctional scale  Low  Maintain anti-hyperglycemic dose as follows-   Antihyperglycemics (From admission, onward)      Start     Stop Route Frequency Ordered    08/27/24 1547  insulin aspart U-100 injection 0-5 Units         -- SubQ Before meals & nightly PRN 08/27/24 1449          Hold Oral hypoglycemics while patient is in the hospital.  Check HBA1C.\  May require addition of basal insulin here in the hospital depending on BG trend.     Severe obesity (BMI 35.0-39.9) with comorbidity  Body mass index is 36.67 kg/m². Morbid obesity complicates all aspects of disease management from diagnostic modalities to treatment. Weight loss encouraged and health benefits explained to patient.         Rheumatoid arthritis involving multiple sites with positive rheumatoid factor  Stable.         VTE Risk Mitigation (From admission, onward)           Ordered     IP VTE HIGH RISK PATIENT  Once         08/27/24 1226     Place PASCUAL hose  Until discontinued        Comments: PASCUAL to non-operative leg.    08/27/24 1226                        Thank you for your consult. I will follow-up with patient. Please contact us if you have any additional questions.    NATY JENSEN MD  Department of Hospital Medicine   [facility] - Orthopedic Periop Services      "

## 2024-08-27 NOTE — ANESTHESIA PROCEDURE NOTES
Intubation    Date/Time: 8/27/2024 8:34 AM    Performed by: Neda Church CRNA  Authorized by: Junior Yoon DO    Intubation:     Induction:  Intravenous    Intubated:  Postinduction    Mask Ventilation:  Easy mask    Attempts:  1    Attempted By:  CRNA    Method of Intubation:  Direct    Blade:  Sandrita 4    Laryngeal View Grade: Grade IIA - cords partially seen      Difficult Airway Encountered?: No      Complications:  None    Airway Device:  Oral endotracheal tube    Airway Device Size:  7.0    Style/Cuff Inflation:  Cuffed    Inflation Amount (mL):  7    Tube secured:  21    Placement Verified By:  Capnometry    Complicating Factors:  None    Findings Post-Intubation:  BS equal bilateral and atraumatic/condition of teeth unchanged

## 2024-08-27 NOTE — HOSPITAL COURSE
8/27- Seen in consultation, reviewed PMH and home medications.   8/28- HBA1C noted to be > 10, recommend discussion with PCP about adjusting diabetes meds. Patient in agreement.

## 2024-08-27 NOTE — SUBJECTIVE & OBJECTIVE
Past Medical History:   Diagnosis Date    Arthritis     Diabetes mellitus, type 2     GERD (gastroesophageal reflux disease)     Hypertension     Iron deficiency anemia     Obesity     Personal history of colonic polyps 08/19/2019    Thyroid disease        Past Surgical History:   Procedure Laterality Date    ARTHROSCOPIC DEBRIDEMENT OF SHOULDER Left 12/21/2021    Procedure: DEBRIDEMENT, SHOULDER, ARTHROSCOPIC;  Surgeon: Jose Antonio Pablo MD;  Location: [facility] OR;  Service: Orthopedics;  Laterality: Left;  SHOULDER JOINT    ARTHROSCOPIC REMOVAL OF LOOSE BODY FROM JOINT Left 12/21/2021    Procedure: REMOVAL, LOOSE BODY, JOINT, ARTHROSCOPIC;  Surgeon: Jose Antonio Pablo MD;  Location: [facility] OR;  Service: Orthopedics;  Laterality: Left;    COLONOSCOPY W/ POLYPECTOMY  08/19/2019    DECOMPRESSION OF SUBACROMIAL SPACE Left 12/21/2021    Procedure: DECOMPRESSION, SUBACROMIAL SPACE;  Surgeon: Jose Antonio Pablo MD;  Location: [facility] OR;  Service: Orthopedics;  Laterality: Left;    DISTAL CLAVICLE EXCISION Left 12/21/2021    Procedure: EXCISION, CLAVICLE, DISTAL;  Surgeon: Jose Antonio Pablo MD;  Location: [facility] OR;  Service: Orthopedics;  Laterality: Left;    left total knee replacement      PARTIAL HYSTERECTOMY      ROTATOR CUFF REPAIR Left 12/21/2021    Procedure: REPAIR, ROTATOR CUFF;  Surgeon: Jose Antonio Pablo MD;  Location: [facility] OR;  Service: Orthopedics;  Laterality: Left;    SHOULDER ARTHROSCOPY Left 12/21/2021    Procedure: ARTHROSCOPY, SHOULDER;  Surgeon: Jose Antonio Pablo MD;  Location: [facility] OR;  Service: Orthopedics;  Laterality: Left;    SHOULDER SURGERY Left 12/21/2021    THYROID SURGERY      total thyroidectomy    TONSILLECTOMY AND ADENOIDECTOMY         Review of patient's allergies indicates:   Allergen Reactions    Codeine phosphate      Other reaction(s): facial swelling    Codeine sulfate      Other reaction(s): Unknown       No current facility-administered  medications on file prior to encounter.     Current Outpatient Medications on File Prior to Encounter   Medication Sig    abatacept (ORENCIA SUBQ)     cyclobenzaprine (FLEXERIL) 10 MG tablet Take 1 tablet (10 mg total) by mouth 3 (three) times daily as needed for Muscle spasms.    fluticasone propionate (FLONASE) 50 mcg/actuation nasal spray 1 spray in each nostril  Strength: 50 mcg/actuation    levothyroxine (SYNTHROID) 125 MCG tablet TAKE 1 TABLET BY MOUTH EVERY DAY    losartan-hydrochlorothiazide 100-12.5 mg (HYZAAR) 100-12.5 mg Tab TAKE 1 TABLET BY MOUTH EVERY DAY    metFORMIN (GLUCOPHAGE) 500 MG tablet TAKE 1 TABLET BY MOUTH THREE TIMES A DAY    omeprazole (PRILOSEC) 40 MG capsule TAKE 1 CAPSULE BY MOUTH ONCE  DAILY    potassium chloride (MICRO-K) 8 mEq CpSR TAKE 1 CAPSULE BY MOUTH ONCE DAILY.    predniSONE (DELTASONE) 5 MG tablet TAKE 1-2 TABLETS BY MOUTH DAILY AS NEEDED    aspirin 81 mg Cap Take by mouth.    azelastine (ASTELIN) 137 mcg (0.1 %) nasal spray 1 puff in each nostril (Patient not taking: Reported on 8/14/2024)    diazePAM (VALIUM) 5 MG tablet Take 1 tablet (5 mg total) by mouth once. Take 1 tablet by mouth 30 minutes prior to MRI exam for 1 dose (Patient not taking: Reported on 8/14/2024)    folic acid (FOLVITE) 1 MG tablet TAKE 1 TABLET BY MOUTH ONCE A DAY FOR 30 DAYS (Patient not taking: Reported on 5/29/2024)    hydroCHLOROthiazide (HYDRODIURIL) 12.5 MG Tab TAKE 1 TABLET BY MOUTH EVERY DAY (Patient not taking: Reported on 5/29/2024)    LAGEVRIO, EUA, 200 mg capsule (EUA) TAKE 4 CAPSULES BY MOUTH TWICE A DAY FOR 5 DAYS (Patient not taking: Reported on 5/29/2024)    levocetirizine (XYZAL) 5 MG tablet Take 5 mg by mouth every evening. (Patient not taking: Reported on 5/29/2024)    loratadine (CLARITIN) 10 mg tablet 1 tablet (Patient not taking: Reported on 8/14/2024)    promethazine-dextromethorphan (PROMETHAZINE-DM) 6.25-15 mg/5 mL Syrp Take 5 mLs by mouth. (Patient not taking: Reported on  8/14/2024)    traZODone (DESYREL) 50 MG tablet Take 1 tablet (50 mg total) by mouth every evening. (Patient not taking: Reported on 3/1/2023)    [DISCONTINUED] losartan (COZAAR) 50 MG tablet TAKE 1 TABLET BY MOUTH EVERY DAY     Family History       Problem Relation (Age of Onset)    Alzheimer's disease Mother    Heart failure Father, Brother          Tobacco Use    Smoking status: Never    Smokeless tobacco: Never   Substance and Sexual Activity    Alcohol use: Never    Drug use: Never    Sexual activity: Not on file     Review of Systems   All other systems reviewed and are negative.    Objective:     Vital Signs (Most Recent):  Temp: 98 °F (36.7 °C) (08/27/24 1358)  Pulse: 87 (08/27/24 1445)  Resp: 18 (08/27/24 1445)  BP: (!) 161/82 (08/27/24 1445)  SpO2: 96 % (08/27/24 1445) Vital Signs (24h Range):  Temp:  [97.8 °F (36.6 °C)-98.1 °F (36.7 °C)] 98 °F (36.7 °C)  Pulse:  [66-92] 87  Resp:  [16-30] 18  SpO2:  [91 %-99 %] 96 %  BP: (142-170)/(65-82) 161/82     Weight: 93.9 kg (207 lb)  Body mass index is 36.67 kg/m².     Physical Exam  Vitals and nursing note reviewed.   Constitutional:       Appearance: Normal appearance.   HENT:      Head: Normocephalic and atraumatic.      Mouth/Throat:      Mouth: Mucous membranes are moist.   Eyes:      Extraocular Movements: Extraocular movements intact.      Pupils: Pupils are equal, round, and reactive to light.   Cardiovascular:      Rate and Rhythm: Normal rate and regular rhythm.   Pulmonary:      Effort: Pulmonary effort is normal.      Breath sounds: Normal breath sounds.   Abdominal:      General: Bowel sounds are normal.      Palpations: Abdomen is soft.   Musculoskeletal:      Cervical back: Normal range of motion and neck supple.      Comments: Right arm in sling.    Neurological:      General: No focal deficit present.      Mental Status: She is alert and oriented to person, place, and time. Mental status is at baseline.   Psychiatric:         Mood and Affect: Mood  normal.         Behavior: Behavior normal.          Significant Labs: All pertinent labs within the past 24 hours have been reviewed.    Significant Imaging: I have reviewed all pertinent imaging results/findings within the past 24 hours.

## 2024-08-27 NOTE — PLAN OF CARE
[facility] - Orthopedic Periop Services  Initial Discharge Assessment       Primary Care Provider: Kwame Wyatt MD    Admission Diagnosis: Acute pain of right shoulder [M25.511]  Rotator cuff tear arthropathy, right [M75.101, M12.811]    Admission Date: 8/27/2024  Expected Discharge Date:     Transition of Care Barriers: None    Payor: [facility] MCARE / Plan: [employer] MEDICARE ADVANTAGE / Product Type: Medicare Advantage /     Extended Emergency Contact Information  Primary Emergency Contact: SHONDA HUBBARD  Mobile Phone: [phone]  Relation: Spouse  Preferred language: English   needed? No  Secondary Emergency Contact: SHERI ARMSTRONG  Mobile Phone: [phone]  Relation: Daughter  Preferred language: English   needed? No    Discharge Plan A: Home with family  Discharge Plan B: Home with family      [id number] - [place], [place] - [address] [facility] [address]  Phone: [phone] Fax: [phone]    [facility] - [address]  [address] [address]  Phone: [phone] Fax: [phone]      Initial Assessment (most recent)       Adult Discharge Assessment - 08/27/24 1137          Discharge Assessment    Assessment Type Discharge Planning Assessment     Source of Information family     People in Home spouse     Do you have help at home or someone to help you manage your care at home? Yes     Who are your caregiver(s) and their phone number(s)? Shonda Ward- Spouse [phone]     Prior to hospitilization cognitive status: Unable to Assess     Current cognitive status: Unable to Assess   Pt in sx    Walking or Climbing Stairs Difficulty no     Dressing/Bathing Difficulty no     Home Accessibility stairs to enter home     Number of Stairs, Main Entrance five     Equipment Currently Used at Home none   Has a rw if needed    Readmission within 30  days? No     Patient currently being followed by outpatient case management? No     Do you currently have service(s) that help you manage your care at home? No     Do you take prescription medications? Yes     Do you have prescription coverage? Yes     Coverage [facility] Managed Medicare     Do you have any problems affording any of your prescribed medications? No     Is the patient taking medications as prescribed? yes     Who is going to help you get home at discharge? Spouse     How do you get to doctors appointments? car, drives self     Are you on dialysis? No     Do you take coumadin? No     Discharge Plan A Home with family     Discharge Plan B Home with family     DME Needed Upon Discharge  none     Discharge Plan discussed with: Spouse/sig other     Name(s) and Number(s) Shonda Juddedinon- Spouse     Transition of Care Barriers None        Physical Activity    On average, how many days per week do you engage in moderate to strenuous exercise (like a brisk walk)? 0 days     On average, how many minutes do you engage in exercise at this level? 0 min        Financial Resource Strain    How hard is it for you to pay for the very basics like food, housing, medical care, and heating? Not hard at all        Housing Stability    In the last 12 months, was there a time when you were not able to pay the mortgage or rent on time? No     At any time in the past 12 months, were you homeless or living in a shelter (including now)? No        Transportation Needs    Has the lack of transportation kept you from medical appointments, meetings, work or from getting things needed for daily living? No        Food Insecurity    Within the past 12 months, you worried that your food would run out before you got the money to buy more. Never true     Within the past 12 months, the food you bought just didn't last and you didn't have money to get more. Never true        Stress    Do you feel stress - tense, restless, nervous, or anxious, or  unable to sleep at night because your mind is troubled all the time - these days? Not at all        Social Isolation    How often do you feel lonely or isolated from those around you?  Never        Alcohol Use    Q1: How often do you have a drink containing alcohol? Never     Q2: How many drinks containing alcohol do you have on a typical day when you are drinking? Patient does not drink     Q3: How often do you have six or more drinks on one occasion? Never        Utilities    In the past 12 months has the electric, gas, oil, or water company threatened to shut off services in your home? No        Health Literacy    How often do you need to have someone help you when you read instructions, pamphlets, or other written material from your doctor or pharmacy? Never                   Pt in sx at time of initial dcp assessment. SW spoke with pts spouse, Shonda in room. Pt lives at home with spouse, not current with hh and uses no dme nut has a rw if needed. The plan is for pt to dc back home when medically ready for dc. I.M. not obtained due to pt being OP. SDOH questions completed. SW will cont to follow for dc needs.

## 2024-08-27 NOTE — OP NOTE
[facility] - Orthopedic Periop Services  General Surgery  Operative Note    SUMMARY     Date of Procedure: 8/27/2024     Procedure: Procedure(s) (LRB):  ARTHROPLASTY, SHOULDER, TOTAL, REVERSE (Right)       Surgeons and Role:     * Jose Antonio Pablo MD - Primary    Assisting Surgeon: None    Pre-Operative Diagnosis: Acute pain of right shoulder [M25.511]  Rotator cuff tear arthropathy, right [M75.101, M12.811]    Post-Operative Diagnosis: Post-Op Diagnosis Codes:     * Acute pain of right shoulder [M25.511]     * Rotator cuff tear arthropathy, right [M75.101, M12.811]    Anesthesia: General    Operative Findings (including complications, if any):  After having risks and benefits of procedure explained at length the patient stating that she understands risks benefits written informed consent was obtained.  Patient was taken operating room placed in supine position on operative table.  Anesthesia induced per Anesthesia.  Then positioned beach chair position.  Right upper extremity prepped and draped sterile fashion.  Deltopectoral approach used to expose the humeral head making a 10-12 cm incision over the anterior aspect of the shoulder joint 15. Blade used to open the skin hemostasis maintained Bovie electrocautery the deltopectoral interval was identified and finger dissection down to the anterior capsule of the humerus.  The previous biceps tendon tear was noted.  Biceps tendon had retracted out of the groove.  The supraspinatus and infraspinatus tendons were completely torn and retracted.  At this time the anterior humeral circumflex vessels were identified and cauterized with Bovie electrocautery.  The subscapularis and capsule were taken down off the anterior aspect of the humerus externally rotating the humerus in the allowing exposure of the humeral head.  The neurovascular structures were protected and palpated.  At this time sutures were placed in the capsule and subscapularis tendon for right later  reattachment using 2. FiberWire.  Humeral head was exposed and the cutting guide for the reunion TSA reverse shoulder was pinned of the humerus in the humeral head resection performed with the correct retroversion.  Once this was done the guide was removed the canal was identified and hand reamed to a size 8 at this time a size 8 broach was placed and a cover plate placed after calcar planing.  The humeral head was then retracted posteriorly exposing the glenoid.  Any remnant of the glenoid labrum was removed and at this time the guide pin was placed into the inferior central portion of the glenoid using the guide for the reunion TSA set from [employer].  Once the pin was placed in it was measured noted to be a 32 mm.  At this time hemispherical Reamer used to ream the glenoid prepare it for the 28 mm base plate.  Once this was done the pin was removed the glenoid was tapped and using a central 32 mm screw and the 28 mm base plate the base plate was secured of the glenoid with a central screw followed by 4 peripheral screws.  32 mm standard glenosphere was impacted and locked into position on the base plate.  Once this is done trial reduction performed a 6 mm humeral polyethylene.  This is noted to be stable.  Shoulder was dislocated and the size 8 with a +6 humeral polyethylene was impacted together then impacted into the humeral shaft shoulder reduced noted to be stable.  At this time wound irrigated out copious amounts normal saline 2. FiberWire used to repair of the subscapularis tendon the capsule to the lesser tuberosity.  Deltopectoral interval loosely reapproximated with Vicryl subcuticular 2-0 Vicryl followed by skin staples on skin.  Sterile occlusive dressing placed.  Sling placed.  Patient was awakened taken recovery room in good condition.  All counts were correct.  No complications.    Description of Technical Procedures:     Significant Surgical Tasks Conducted by the Assistant(s), if Applicable:      Estimated Blood Loss (EBL): 25 mL           Implants:   Implant Name Type Inv. Item Serial No.  Lot No. LRB No. Used Action   PIN 3.2MM 4 DOTTIE SQUARE - [id number]  PIN 3.2MM 4 [facility] ([facility])  Right 1 Implanted and Explanted   WIRE FIXATION REUN NIT PILT - [id number]  WIRE FIXATION REUN NIT PILT  NICK [employer]AKIS[employer]. [id number] Right 1 Implanted and Explanted   BASEPLATE REUNION KATY 22M18YD - [id number]  BASEPLATE REUNION KATY [id number]  [employer]ICK [employer]AKIS[employer]. [id number] Right 1 Implanted   SCREW BONE TSA 6.5X32MM - [id number]  SCREW BONE TSA 6.5X32MM  [employer]. [id number] Right 1 Implanted   COMPONENT GLENOSPHERE 32X2MM - [id number]  COMPONENT GLENOSPHERE 32X2MM  [employer]. [id number] Right 1 Implanted   SCREW BONE TSA 4.5X32MM - [id number]  SCREW BONE TSA 4.5X32MM  NICK [employer]AKISHA. [id number] Right 1 Implanted   SCREW BONE TSA 4.5X32MM - [id number]  SCREW BONE TSA 4.5X32MM  [employer]. [id number] Right 1 Implanted   SCREW PERIPH REUNION 4.5X16MM - [id number]  SCREW PERIPH REUNION 4.5X16MM  NICK [employer]AKIS[employer]. [id number] Right 1 Implanted   SCREW PERIPH REUNION 4.5X16MM - [id number]  SCREW PERIPH REUNION 4.5X16MM  NICK [employer]AKIS[employer]. [id number] Right 1 Implanted   NICK REUNION TSA MODULAR HUMERAL STEM SIZE 8 118 MM LENGTH     [id number] Right 1 Implanted   CUP HUMERAL REUN RSA 32X4MM - [id number]  CUP HUMERAL REUN RSA 32X4MM  NICK [employer]AKIS[employer]. [id number] Right 1 Implanted   INSERT X3 HUM STD 32X4MM SZ E - [id number]  INSERT X3 HUM STD 32X4MM SZ E  NICK [employer]AKISHA. [id number] Right 1 Implanted       Specimens:   Specimen (24h ago, onward)      None                    Condition: Good    Disposition: PACU - hemodynamically stable.    Attestation: I was present and scrubbed for the entire procedure.

## 2024-08-28 VITALS
HEIGHT: 63 IN | BODY MASS INDEX: 36.68 KG/M2 | SYSTOLIC BLOOD PRESSURE: 146 MMHG | TEMPERATURE: 99 F | RESPIRATION RATE: 18 BRPM | DIASTOLIC BLOOD PRESSURE: 97 MMHG | HEART RATE: 83 BPM | WEIGHT: 207 LBS | OXYGEN SATURATION: 97 %

## 2024-08-28 LAB
ALBUMIN SERPL BCP-MCNC: 2.6 G/DL (ref 3.5–5)
ALBUMIN/GLOB SERPL: 0.7 {RATIO}
ALP SERPL-CCNC: 49 U/L (ref 55–142)
ALT SERPL W P-5'-P-CCNC: 23 U/L (ref 13–56)
ANION GAP SERPL CALCULATED.3IONS-SCNC: 11 MMOL/L (ref 7–16)
AST SERPL W P-5'-P-CCNC: 16 U/L (ref 15–37)
BASOPHILS # BLD AUTO: 0.03 K/UL (ref 0–0.2)
BASOPHILS NFR BLD AUTO: 0.3 % (ref 0–1)
BILIRUB SERPL-MCNC: 0.5 MG/DL (ref ?–1.2)
BUN SERPL-MCNC: 17 MG/DL (ref 7–18)
BUN/CREAT SERPL: 15 (ref 6–20)
CALCIUM SERPL-MCNC: 7.9 MG/DL (ref 8.5–10.1)
CHLORIDE SERPL-SCNC: 105 MMOL/L (ref 98–107)
CO2 SERPL-SCNC: 24 MMOL/L (ref 21–32)
CREAT SERPL-MCNC: 1.14 MG/DL (ref 0.55–1.02)
DIFFERENTIAL METHOD BLD: ABNORMAL
EGFR (NO RACE VARIABLE) (RUSH/TITUS): 51 ML/MIN/1.73M2
EOSINOPHIL # BLD AUTO: 0 K/UL (ref 0–0.5)
EOSINOPHIL NFR BLD AUTO: 0 % (ref 1–4)
ERYTHROCYTE [DISTWIDTH] IN BLOOD BY AUTOMATED COUNT: 14.2 % (ref 11.5–14.5)
EST. AVERAGE GLUCOSE BLD GHB EST-MCNC: 258 MG/DL
GLOBULIN SER-MCNC: 3.6 G/DL (ref 2–4)
GLUCOSE SERPL-MCNC: 288 MG/DL (ref 74–106)
GLUCOSE SERPL-MCNC: 300 MG/DL (ref 70–105)
GLUCOSE SERPL-MCNC: 321 MG/DL (ref 70–105)
HBA1C MFR BLD HPLC: 10.6 % (ref 4.5–6.6)
HCT VFR BLD AUTO: 30.9 % (ref 38–47)
HGB BLD-MCNC: 9.7 G/DL (ref 12–16)
IMM GRANULOCYTES # BLD AUTO: 0.07 K/UL (ref 0–0.04)
IMM GRANULOCYTES NFR BLD: 0.6 % (ref 0–0.4)
LYMPHOCYTES # BLD AUTO: 2.07 K/UL (ref 1–4.8)
LYMPHOCYTES NFR BLD AUTO: 17.4 % (ref 27–41)
MCH RBC QN AUTO: 25 PG (ref 27–31)
MCHC RBC AUTO-ENTMCNC: 31.4 G/DL (ref 32–36)
MCV RBC AUTO: 79.6 FL (ref 80–96)
MONOCYTES # BLD AUTO: 1.33 K/UL (ref 0–0.8)
MONOCYTES NFR BLD AUTO: 11.2 % (ref 2–6)
MPC BLD CALC-MCNC: 9.1 FL (ref 9.4–12.4)
NEUTROPHILS # BLD AUTO: 8.42 K/UL (ref 1.8–7.7)
NEUTROPHILS NFR BLD AUTO: 70.5 % (ref 53–65)
NRBC # BLD AUTO: 0 X10E3/UL
NRBC, AUTO (.00): 0 %
PLATELET # BLD AUTO: 316 K/UL (ref 150–400)
POTASSIUM SERPL-SCNC: 3.4 MMOL/L (ref 3.5–5.1)
PROT SERPL-MCNC: 6.2 G/DL (ref 6.4–8.2)
RBC # BLD AUTO: 3.88 M/UL (ref 4.2–5.4)
SODIUM SERPL-SCNC: 137 MMOL/L (ref 136–145)
WBC # BLD AUTO: 11.92 K/UL (ref 4.5–11)

## 2024-08-28 PROCEDURE — 25000003 PHARM REV CODE 250: Performed by: INTERNAL MEDICINE

## 2024-08-28 PROCEDURE — 82962 GLUCOSE BLOOD TEST: CPT | Mod: 91

## 2024-08-28 PROCEDURE — 85025 COMPLETE CBC W/AUTO DIFF WBC: CPT | Performed by: ORTHOPAEDIC SURGERY

## 2024-08-28 PROCEDURE — 94761 N-INVAS EAR/PLS OXIMETRY MLT: CPT

## 2024-08-28 PROCEDURE — 25000003 PHARM REV CODE 250: Performed by: ORTHOPAEDIC SURGERY

## 2024-08-28 PROCEDURE — 97165 OT EVAL LOW COMPLEX 30 MIN: CPT

## 2024-08-28 PROCEDURE — 99214 OFFICE O/P EST MOD 30 MIN: CPT | Mod: ,,, | Performed by: INTERNAL MEDICINE

## 2024-08-28 PROCEDURE — 36415 COLL VENOUS BLD VENIPUNCTURE: CPT | Performed by: INTERNAL MEDICINE

## 2024-08-28 PROCEDURE — 63600175 PHARM REV CODE 636 W HCPCS: Performed by: INTERNAL MEDICINE

## 2024-08-28 PROCEDURE — 99900035 HC TECH TIME PER 15 MIN (STAT)

## 2024-08-28 PROCEDURE — 96372 THER/PROPH/DIAG INJ SC/IM: CPT

## 2024-08-28 PROCEDURE — 97161 PT EVAL LOW COMPLEX 20 MIN: CPT

## 2024-08-28 PROCEDURE — 80053 COMPREHEN METABOLIC PANEL: CPT | Performed by: ORTHOPAEDIC SURGERY

## 2024-08-28 PROCEDURE — 83036 HEMOGLOBIN GLYCOSYLATED A1C: CPT | Performed by: INTERNAL MEDICINE

## 2024-08-28 RX ORDER — INSULIN GLARGINE 100 [IU]/ML
10 INJECTION, SOLUTION SUBCUTANEOUS DAILY
Status: DISCONTINUED | OUTPATIENT
Start: 2024-08-28 | End: 2024-08-28 | Stop reason: HOSPADM

## 2024-08-28 RX ORDER — ASPIRIN 325 MG
325 TABLET ORAL DAILY
COMMUNITY
Start: 2024-08-28 | End: 2024-09-27

## 2024-08-28 RX ORDER — HYDROCODONE BITARTRATE AND ACETAMINOPHEN 10; 325 MG/1; MG/1
1 TABLET ORAL EVERY 6 HOURS PRN
Qty: 28 TABLET | Refills: 0 | Status: SHIPPED | OUTPATIENT
Start: 2024-08-28 | End: 2024-09-04

## 2024-08-28 RX ADMIN — DOCUSATE SODIUM 100 MG: 100 CAPSULE, LIQUID FILLED ORAL at 09:08

## 2024-08-28 RX ADMIN — HYDROCHLOROTHIAZIDE 25 MG: 25 TABLET ORAL at 09:08

## 2024-08-28 RX ADMIN — HYDROCODONE BITARTRATE AND ACETAMINOPHEN 1 TABLET: 5; 325 TABLET ORAL at 06:08

## 2024-08-28 RX ADMIN — CYCLOBENZAPRINE 10 MG: 10 TABLET, FILM COATED ORAL at 02:08

## 2024-08-28 RX ADMIN — PANTOPRAZOLE SODIUM 40 MG: 40 TABLET, DELAYED RELEASE ORAL at 09:08

## 2024-08-28 RX ADMIN — LOSARTAN POTASSIUM 100 MG: 100 TABLET, FILM COATED ORAL at 09:08

## 2024-08-28 RX ADMIN — INSULIN GLARGINE 10 UNITS: 100 INJECTION, SOLUTION SUBCUTANEOUS at 10:08

## 2024-08-28 RX ADMIN — ASPIRIN 325 MG ORAL TABLET 325 MG: 325 PILL ORAL at 09:08

## 2024-08-28 RX ADMIN — HYDROCODONE BITARTRATE AND ACETAMINOPHEN 1 TABLET: 5; 325 TABLET ORAL at 02:08

## 2024-08-28 NOTE — SUBJECTIVE & OBJECTIVE
Interval History: Patient seen and examined at the bedside, reports pain is controlled.     Review of Systems   All other systems reviewed and are negative.    Objective:     Vital Signs (Most Recent):  Temp: 98 °F (36.7 °C) (08/28/24 0747)  Pulse: 79 (08/28/24 0747)  Resp: 18 (08/28/24 0747)  BP: 138/69 (08/28/24 0900)  SpO2: 95 % (08/28/24 0747) Vital Signs (24h Range):  Temp:  [97.8 °F (36.6 °C)-98.6 °F (37 °C)] 98 °F (36.7 °C)  Pulse:  [] 79  Resp:  [16-30] 18  SpO2:  [91 %-99 %] 95 %  BP: (138-170)/(65-89) 138/69     Weight: 93.9 kg (207 lb)  Body mass index is 36.67 kg/m².    Intake/Output Summary (Last 24 hours) at 8/28/2024 1053  Last data filed at 8/27/2024 1100  Gross per 24 hour   Intake --   Output 25 ml   Net -25 ml         Physical Exam  Vitals and nursing note reviewed.   Constitutional:       Appearance: Normal appearance.   HENT:      Head: Normocephalic and atraumatic.      Mouth/Throat:      Mouth: Mucous membranes are moist.   Eyes:      Extraocular Movements: Extraocular movements intact.      Pupils: Pupils are equal, round, and reactive to light.   Cardiovascular:      Rate and Rhythm: Normal rate and regular rhythm.   Pulmonary:      Effort: Pulmonary effort is normal.      Breath sounds: Normal breath sounds.   Abdominal:      General: Bowel sounds are normal.      Palpations: Abdomen is soft.   Musculoskeletal:      Cervical back: Normal range of motion and neck supple.      Comments: Right arm in sling.    Neurological:      General: No focal deficit present.      Mental Status: She is alert and oriented to person, place, and time. Mental status is at baseline.   Psychiatric:         Mood and Affect: Mood normal.         Behavior: Behavior normal.             Significant Labs: All pertinent labs within the past 24 hours have been reviewed.    Significant Imaging: I have reviewed all pertinent imaging results/findings within the past 24 hours.

## 2024-08-28 NOTE — PROGRESS NOTES
[facility] - Orthopedic Hospital Medicine  Progress Note    Patient Name: Meghna Dalal  MRN: [id number]  Patient Class: OP- Outpatient Recovery   Admission Date: 8/27/2024  Length of Stay: 0 days  Attending Physician: Jose Antonio Pablo MD  Primary Care Provider: Kwame Wyatt MD        Subjective:     Principal Problem:Rotator cuff tear arthropathy, right        HPI:  Ms. Dalal is a 74 Y O female with PMH of HTN, DM type II, RA who presented for elective right shoulder arthroplasty with Dr. Pablo. She denies chest pain, shortness of breath, nausea, vomiting. She reports compliance with her medications. She checks her glucose frequently which stays between 150-170.      Overview/Hospital Course:  8/27- Seen in consultation, reviewed PMH and home medications.   8/28- HBA1C noted to be > 10, recommend discussion with PCP about adjusting diabetes meds. Patient in agreement.     Interval History: Patient seen and examined at the bedside, reports pain is controlled.     Review of Systems   All other systems reviewed and are negative.    Objective:     Vital Signs (Most Recent):  Temp: 98 °F (36.7 °C) (08/28/24 0747)  Pulse: 79 (08/28/24 0747)  Resp: 18 (08/28/24 0747)  BP: 138/69 (08/28/24 0900)  SpO2: 95 % (08/28/24 0747) Vital Signs (24h Range):  Temp:  [97.8 °F (36.6 °C)-98.6 °F (37 °C)] 98 °F (36.7 °C)  Pulse:  [] 79  Resp:  [16-30] 18  SpO2:  [91 %-99 %] 95 %  BP: (138-170)/(65-89) 138/69     Weight: 93.9 kg (207 lb)  Body mass index is 36.67 kg/m².    Intake/Output Summary (Last 24 hours) at 8/28/2024 1053  Last data filed at 8/27/2024 1100  Gross per 24 hour   Intake --   Output 25 ml   Net -25 ml         Physical Exam  Vitals and nursing note reviewed.   Constitutional:       Appearance: Normal appearance.   HENT:      Head: Normocephalic and atraumatic.      Mouth/Throat:      Mouth: Mucous membranes are moist.   Eyes:      Extraocular Movements: Extraocular movements  intact.      Pupils: Pupils are equal, round, and reactive to light.   Cardiovascular:      Rate and Rhythm: Normal rate and regular rhythm.   Pulmonary:      Effort: Pulmonary effort is normal.      Breath sounds: Normal breath sounds.   Abdominal:      General: Bowel sounds are normal.      Palpations: Abdomen is soft.   Musculoskeletal:      Cervical back: Normal range of motion and neck supple.      Comments: Right arm in sling.    Neurological:      General: No focal deficit present.      Mental Status: She is alert and oriented to person, place, and time. Mental status is at baseline.   Psychiatric:         Mood and Affect: Mood normal.         Behavior: Behavior normal.             Significant Labs: All pertinent labs within the past 24 hours have been reviewed.    Significant Imaging: I have reviewed all pertinent imaging results/findings within the past 24 hours.    Assessment/Plan:      * Rotator cuff tear arthropathy, right  S/p right shoulder reverse arthroplasty by Dr. Pablo on 8/27.   PT/OT consulted.   Pain control.       Acute post-operative pain  Norco/Morphine ordered per primary team.     Acquired hypothyroidism  Resume home Synthroid.       Essential hypertension  Chronic, controlled. Latest blood pressure and vitals reviewed-     Temp:  [97.8 °F (36.6 °C)-98.6 °F (37 °C)]   Pulse:  []   Resp:  [16-30]   BP: (138-170)/(65-89)   SpO2:  [91 %-99 %] .   Home meds for hypertension were reviewed and noted below.   Hypertension Medications               hydroCHLOROthiazide (HYDRODIURIL) 12.5 MG Tab TAKE 1 TABLET BY MOUTH EVERY DAY    losartan-hydrochlorothiazide 100-12.5 mg (HYZAAR) 100-12.5 mg Tab TAKE 1 TABLET BY MOUTH EVERY DAY            While in the hospital, will manage blood pressure as follows; Continue home antihypertensive regimen    Will utilize p.r.n. blood pressure medication only if patient's blood pressure greater than 180/110 and she develops symptoms such as worsening chest pain  "or shortness of breath.    Uncontrolled type 2 diabetes mellitus with hyperglycemia  Patient's FSGs are uncontrolled due to hyperglycemia on current medication regimen.  Last A1c reviewed-   Lab Results   Component Value Date    HGBA1C 10.6 (H) 08/28/2024     Most recent fingerstick glucose reviewed- No results for input(s): "POCTGLUCOSE" in the last 24 hours.  Current correctional scale  Low  Maintain anti-hyperglycemic dose as follows-   Antihyperglycemics (From admission, onward)      Start     Stop Route Frequency Ordered    08/28/24 0900  insulin glargine U-100 (Lantus) injection 10 Units         -- SubQ Daily 08/28/24 0815    08/27/24 1547  insulin aspart U-100 injection 0-5 Units         -- SubQ Before meals & nightly PRN 08/27/24 1449          Hold Oral hypoglycemics while patient is in the hospital.  Poorly controlled, discussed with patient in detail, recommend close follow up with Dr. Wyatt and adjustment in diabetes management, patient in agreement.     Severe obesity (BMI 35.0-39.9) with comorbidity  Body mass index is 36.67 kg/m². Morbid obesity complicates all aspects of disease management from diagnostic modalities to treatment. Weight loss encouraged and health benefits explained to patient.         Rheumatoid arthritis involving multiple sites with positive rheumatoid factor  Stable.   On Orencia injections.         VTE Risk Mitigation (From admission, onward)           Ordered     IP VTE HIGH RISK PATIENT  Once         08/27/24 1226     Place PASCUAL hose  Until discontinued        Comments: PASCUAL to non-operative leg.    08/27/24 1226                    Discharge Planning   NAEEM: 8/28/2024     Code Status: Full Code   Is the patient medically ready for discharge?:     Reason for patient still in hospital (select all that apply): Pending disposition  Discharge Plan A: Home with family   Discharge Delays: None known at this time              NATY JENSEN MD  Department of Hospital Medicine   [facility] - Orthopedic

## 2024-08-28 NOTE — ASSESSMENT & PLAN NOTE
Chronic, controlled. Latest blood pressure and vitals reviewed-     Temp:  [97.8 °F (36.6 °C)-98.6 °F (37 °C)]   Pulse:  []   Resp:  [16-30]   BP: (138-170)/(65-89)   SpO2:  [91 %-99 %] .   Home meds for hypertension were reviewed and noted below.   Hypertension Medications               hydroCHLOROthiazide (HYDRODIURIL) 12.5 MG Tab TAKE 1 TABLET BY MOUTH EVERY DAY    losartan-hydrochlorothiazide 100-12.5 mg (HYZAAR) 100-12.5 mg Tab TAKE 1 TABLET BY MOUTH EVERY DAY            While in the hospital, will manage blood pressure as follows; Continue home antihypertensive regimen    Will utilize p.r.n. blood pressure medication only if patient's blood pressure greater than 180/110 and she develops symptoms such as worsening chest pain or shortness of breath.

## 2024-08-28 NOTE — PROGRESS NOTES
Visited patient on meal rounds. Patient with questions regarding 2000 calorie consistent carb diet. Discussed CHO servings, foods, and snacks with patient and family. Patient with several questions that were answered. Pt voiced comprehension and plans to comply. Contact information given for future needs.

## 2024-08-28 NOTE — DISCHARGE INSTRUCTIONS
*Keep dressing dry and intact  *Continue white stockings remove 2 times a day for 1 hour and replace  *Wear abduction pillow or sling to arm as ordered.  *Continue cool jet ice therapy to shoulder.  *Notify physicians office if any problems or concerns  *Continue incentive spirometry every 2 hours while awake.  *Shower, no tub baths, when drying be sure to pat dry not wipe.

## 2024-08-28 NOTE — PT/OT/SLP EVAL
Physical Therapy Evaluation and Discharge Note    Patient Name:  Meghna Dalal   MRN:  [id number]    Recommendations:     Discharge Recommendations: Low Intensity Therapy  Discharge Equipment Recommendations: none   Barriers to discharge: None    Assessment:     Meghna Dalal is a 74 y.o. female admitted with a medical diagnosis of Rotator cuff tear arthropathy, right. Pt has no mobility deficits post op. OT to address self care.  At this time, patient is functioning at their prior level of function and does not require further acute PT services.     Recent Surgery: Procedure(s) (LRB):  ARTHROPLASTY, SHOULDER, TOTAL, REVERSE (Right) 1 Day Post-Op    Plan:     During this hospitalization, patient does not require further acute PT services.  Please re-consult if situation changes.      Subjective     Chief Complaint: right r TSR   Patient/Family Comments/goals: Pt is agreeable to PT   Pain/Comfort:  Pain Rating 1: 7/10  Location - Side 1: Right  Location 1: shoulder  Pain Addressed 1: Pre-medicate for activity  Pain Rating Post-Intervention 1: 5/10    Patients cultural, spiritual, Pentec[employer]al conflicts given the current situation: no    Living Environment:  Pt lives at home with her   Prior to admission, patients level of function was independent.  Equipment used at home: none.  DME owned (not currently used): none.  Upon discharge, patient will have assistance from family.    Objective:     Communicated with TAWNY Mtz RN prior to session.  Patient found sitting edge of bed with peripheral IV upon PT entry to room.    General Precautions: Standard, fall    Orthopedic Precautions:RUE non weight bearing   Braces: UE Sling  Respiratory Status: Room air    Exams:  Cognitive Exam:  Patient is oriented to Person, Place, Time, and Situation  Gross Motor Coordination:  WFL    Functional Mobility:  Transfers:     Sit to Stand:  supervision with no AD  Gait: 40 ft supervision, no AD  Balance:  good    AM-PAC 6 CLICK MOBILITY  Total Score:24       Treatment and Education:  N/a    AM-PAC 6 CLICK MOBILITY  Total Score:24     Patient left sitting edge of bed with all lines intact and call button in reach.    GOALS:   Multidisciplinary Problems       Physical Therapy Goals       Not on file                    History:     Past Medical History:   Diagnosis Date    Arthritis     Diabetes mellitus, type 2     GERD (gastroesophageal reflux disease)     Hypertension     Iron deficiency anemia     Obesity     Personal history of colonic polyps 08/19/2019    Thyroid disease        Past Surgical History:   Procedure Laterality Date    ARTHROSCOPIC DEBRIDEMENT OF SHOULDER Left 12/21/2021    Procedure: DEBRIDEMENT, SHOULDER, ARTHROSCOPIC;  Surgeon: Jose Antonio Pablo MD;  [facility];  Service: Orthopedics;  Laterality: Left;  SHOULDER JOINT    ARTHROSCOPIC REMOVAL OF LOOSE BODY FROM JOINT Left 12/21/2021    Procedure: REMOVAL, LOOSE BODY, JOINT, ARTHROSCOPIC;  Surgeon: Jose Antonio Pablo MD;  Location: [facility] OR;  Service: Orthopedics;  Laterality: Left;    COLONOSCOPY W/ POLYPECTOMY  08/19/2019    DECOMPRESSION OF SUBACROMIAL SPACE Left 12/21/2021    Procedure: DECOMPRESSION, SUBACROMIAL SPACE;  Surgeon: Jose Antonio Pablo MD;  Location: [facility] OR;  Service: Orthopedics;  Laterality: Left;    DISTAL CLAVICLE EXCISION Left 12/21/2021    Procedure: EXCISION, CLAVICLE, DISTAL;  Surgeon: Jose Antonio Pablo MD;  Location: [facility] OR;  Service: Orthopedics;  Laterality: Left;    left total knee replacement      PARTIAL HYSTERECTOMY      REVERSE TOTAL SHOULDER ARTHROPLASTY Right 8/27/2024    Procedure: ARTHROPLASTY, SHOULDER, TOTAL, REVERSE;  Surgeon: Jose Antonio Pablo MD;  Location: [facility] OR;  Service: Orthopedics;  Laterality: Right;    ROTATOR CUFF REPAIR Left 12/21/2021    Procedure: REPAIR, ROTATOR CUFF;  Surgeon: Jose Antonio Pablo MD;  Location: [facility] OR;  Service:  Orthopedics;  Laterality: Left;    SHOULDER ARTHROSCOPY Left 12/21/2021    Procedure: ARTHROSCOPY, SHOULDER;  Surgeon: Jose Antonio Pablo MD;  Location: [facility];  Service: Orthopedics;  Laterality: Left;    SHOULDER SURGERY Left 12/21/2021    THYROID SURGERY      total thyroidectomy    TONSILLECTOMY AND ADENOIDECTOMY         Time Tracking:     PT Received On: 08/28/24  PT Start Time: 0944     PT Stop Time: 0952  PT Total Time (min): 8 min     Billable Minutes: Evaluation low complexity      08/28/2024

## 2024-08-28 NOTE — CARE UPDATE
Awake alert without any new complaints.  Right upper extremity she moves her fingers well has sensation of the entire upper extremity including over the deltoid.  Has active deltoid firing of the muscles.  At this time she was tolerating p.o. pain meds.  DC home.  Follow-up 2 weeks.  Aspirin for DVT prophylaxis

## 2024-08-28 NOTE — PLAN OF CARE
Problem: Gas Exchange Impaired  Goal: Optimal Gas Exchange  Outcome: Progressing     Problem: Adult Inpatient Plan of Care  Goal: Plan of Care Review  Reactivated  Goal: Patient-Specific Goal (Individualized)  Reactivated  Goal: Absence of Hospital-Acquired Illness or Injury  Reactivated  Goal: Optimal Comfort and Wellbeing  Reactivated  Goal: Readiness for Transition of Care  Reactivated     Problem: Wound  Goal: Optimal Coping  Reactivated  Goal: Optimal Functional Ability  Reactivated  Goal: Absence of Infection Signs and Symptoms  Reactivated  Goal: Improved Oral Intake  Reactivated  Goal: Optimal Pain Control and Function  Reactivated  Goal: Skin Health and Integrity  Reactivated  Goal: Optimal Wound Healing  Reactivated     Problem: Diabetes Comorbidity  Goal: Blood Glucose Level Within Targeted Range  Reactivated

## 2024-08-28 NOTE — PLAN OF CARE
[facility] - Orthopedic  Discharge Final Note    Primary Care Provider: Kwame Wyatt MD    Expected Discharge Date: 8/28/2024    Final Discharge Note (most recent)       Final Note - 08/28/24 1021          Final Note    Assessment Type Final Discharge Note     Anticipated Discharge Disposition Home or Self Care     What phone number can be called within the next 1-3 days to see how you are doing after discharge? [phone]        Post-Acute Status    Discharge Delays None known at this time                     Contact Info       Jose Antonio Pablo MD   Specialty: Orthopedic Surgery    [facility] [address]  Jose Antonio Pablo Md, Orthopedic & Sports Medicine, [facility] [address]   Phone: [phone]       Next Steps: Follow up in 2 week(s)    Instructions: Please follow up on September 11 at 8:40          [facility]

## 2024-08-28 NOTE — ASSESSMENT & PLAN NOTE
"Patient's FSGs are uncontrolled due to hyperglycemia on current medication regimen.  Last A1c reviewed-   Lab Results   Component Value Date    HGBA1C 10.6 (H) 08/28/2024     Most recent fingerstick glucose reviewed- No results for input(s): "POCTGLUCOSE" in the last 24 hours.  Current correctional scale  Low  Maintain anti-hyperglycemic dose as follows-   Antihyperglycemics (From admission, onward)      Start     Stop Route Frequency Ordered    08/28/24 0900  insulin glargine U-100 (Lantus) injection 10 Units         -- SubQ Daily 08/28/24 0815    08/27/24 1547  insulin aspart U-100 injection 0-5 Units         -- SubQ Before meals & nightly PRN 08/27/24 1449          Hold Oral hypoglycemics while patient is in the hospital.  Poorly controlled, discussed with patient in detail, recommend close follow up with Dr. Wyatt and adjustment in diabetes management, patient in agreement.   "

## 2024-08-28 NOTE — DISCHARGE SUMMARY
Department of Orthopedic Surgery  Discharge Note    Admit Date: 8/27/2024  Discharge Date and Time: 8/28/2024   Attending Physician: Jose Antonio Pablo MD   Discharge Provider: Juanita Honeycutt    Pre-op Diagnosis: Acute pain of right shoulder [M25.511]  Rotator cuff tear arthropathy, right [M75.101, M12.811]  Procedure:  Right total shoulder arthroplasty  Final Diagnosis:     Disposition: Home or Self Care  Discharged Condition: good    Hospital Course:   Patient came in on 8/27/2024 and underwent right total shoulder arthroplasty without complications. POD1 patient resting comfortably in bed without complaint.  Okay to DC home today.  Okay to remove dressing in 3 days.  Keep incision dry when showering.  Patient will go home with aspirin 325 mg daily for DVT prophylaxis and Norco 10/325 as needed for pain.  Follow up with Dr. Pablo in 2 weeks.    Physical Exam:  Right shoulder with surgical dressing clean dry and intact, deltoid firing appropriately, good range of motion of hand wrist and elbow, neurovascularly intact  Surgical incision is clean,dry,intact with minimal/expected erythema and swelling    Discharge Instructions:   Discharge Procedure Orders (must include Diet, Follow-up, Activity)   Diet Adult Regular     Ice to affected area     Lifting restrictions   Order Comments: No lifting pushing or pulling with right upper extremity     Notify your health care provider if you experience any of the following:  redness, tenderness, or signs of infection (pain, swelling, redness, odor or green/yellow discharge around incision site)     Notify your health care provider if you experience any of the following:  severe uncontrolled pain     Notify your health care provider if you experience any of the following:  persistent nausea and vomiting or diarrhea     Notify your health care provider if you experience any of the following:  temperature >100.4      Remove dressing in 72 hours     Activity as tolerated   Order  Comments: Okay to come out of sling for range of motion exercises of the hand wrist and elbow keeping shoulder immobilized     Shower on day dressing removed (No bath)        Medications:     Medication List        START taking these medications      HYDROcodone-acetaminophen  mg per tablet  Commonly known as: NORCO  Take 1 tablet by mouth every 6 (six) hours as needed for Pain.            CHANGE how you take these medications      * aspirin 325 MG tablet  Take 1 tablet (325 mg total) by mouth once daily.  What changed: You were already taking a medication with the same name, and this prescription was added. Make sure you understand how and when to take each.     * aspirin 81 mg Cap  What changed: Another medication with the same name was added. Make sure you understand how and when to take each.           * This list has 2 medication(s) that are the same as other medications prescribed for you. Read the directions carefully, and ask your doctor or other care provider to review them with you.                CONTINUE taking these medications      cyclobenzaprine 10 MG tablet  Commonly known as: FLEXERIL  Take 1 tablet (10 mg total) by mouth 3 (three) times daily as needed for Muscle spasms.     fluticasone propionate 50 mcg/actuation nasal spray  Commonly known as: FLONASE  1 spray in each nostril  Strength: 50 mcg/actuation     ibuprofen 200 MG tablet  Commonly known as: ADVIL,MOTRIN     levothyroxine 125 MCG tablet  Commonly known as: SYNTHROID  TAKE 1 TABLET BY MOUTH EVERY DAY     losartan-hydrochlorothiazide 100-12.5 mg 100-12.5 mg Tab  Commonly known as: HYZAAR  TAKE 1 TABLET BY MOUTH EVERY DAY     metFORMIN 500 MG tablet  Commonly known as: GLUCOPHAGE  TAKE 1 TABLET BY MOUTH THREE TIMES A DAY     omeprazole 40 MG capsule  Commonly known as: PRILOSEC  TAKE 1 CAPSULE BY MOUTH ONCE  DAILY     ORENCIA SUBQ     potassium chloride 8 mEq Cpsr  Commonly known as: MICRO-K  TAKE 1 CAPSULE BY MOUTH ONCE DAILY.      predniSONE 5 MG tablet  Commonly known as: DELTASONE            STOP taking these medications      azelastine 137 mcg (0.1 %) nasal spray  Commonly known as: ASTELIN     diazePAM 5 MG tablet  Commonly known as: VALIUM     folic acid 1 MG tablet  Commonly known as: FOLVITE     hydroCHLOROthiazide 12.5 MG Tab  Commonly known as: HYDRODIURIL     LAGEVRIO (EUA) 200 mg capsule (EUA)  Generic drug: molnupiravir     levocetirizine 5 MG tablet  Commonly known as: XYZAL     loratadine 10 mg tablet  Commonly known as: CLARITIN     promethazine-dextromethorphan 6.25-15 mg/5 mL Syrp  Commonly known as: PROMETHAZINE-DM     traZODone 50 MG tablet  Commonly known as: DESYREL               Where to Get Your Medications        These medications were sent to [facility]  [address] [place] [id number]      Hours: Mon-Fri, 8:30a-5:00p Phone: [phone]   HYDROcodone-acetaminophen  mg per tablet       You can get these medications from any pharmacy    You don't need a prescription for these medications  aspirin 325 MG tablet           Follow up/Patient Instructions:

## 2024-08-28 NOTE — NURSING
Pt received to 467 per [place] john Fried from OP surgery. Sling intact to right upper extremity. States area is numb and denies pain. AAOx3. Son at bedside. Ice pack in place. No acute distress noted. No needs expressed

## 2024-08-28 NOTE — PT/OT/SLP EVAL
Occupational Therapy   Evaluation and Discharge Note    Name: Meghna Dalal  MRN: [id number]  Admitting Diagnosis: Rotator cuff tear arthropathy, right  Recent Surgery: Procedure(s) (LRB):  ARTHROPLASTY, SHOULDER, TOTAL, REVERSE (Right) 1 Day Post-Op    Recommendations:     Discharge Recommendations: Low Intensity Therapy  Discharge Equipment Recommendations: none  Barriers to discharge:  None    Assessment:     Meghna Dalal is a 74 y.o. female with a medical diagnosis of Rotator cuff tear arthropathy, right. Pt s/p right reverse total shoulder replacement on 8/27/24. Pt educated on total shoulder precautions and range of motion of elbow, wrist, and hand. Pt to d/c home today with family assist as needed.   Plan:     During this hospitalization, patient does not require further acute OT services.  Please re-consult if situation changes.    Plan of Care Reviewed with: patient    Subjective     Chief Complaint: s/p right reverse shoulder replacement  Patient/Family Comments/goals: pt agreeable to OT eval    Occupational Profile:  Living Environment: pt lives with  in one story home  Previous level of function: independent with ADL tasks and functional mobility   Roles and Routines: perform self care  Equipment Used at home: none  Assistance upon Discharge: home with family assist    Pain/Comfort:  Pain Rating 1: 4/10  Location - Side 1: Right  Location 1: shoulder  Pain Addressed 1: Pre-medicate for activity    Patients cultural, spiritual, Latter day conflicts given the current situation: no    Objective:     Communicated with: PARUL Lopez prior to session.  Patient found supine with peripheral IV upon OT entry to room.    General Precautions: Standard, fall  Orthopedic Precautions: RUE non weight bearing  Braces: UE Sling  Respiratory Status: Room air     Occupational Performance:    Bed Mobility:    Patient completed Supine to Sit with stand by assistance    Functional  Mobility/Transfers:  Patient completed Sit <> Stand Transfer with stand by assistance  with  no assistive device   Patient completed Toilet Transfer Step Transfer technique with stand by assistance with  no AD  Functional Mobility: pt performed functional mobility to bathroom and back to bed with SBA with no AD    Activities of Daily Living:  Upper Body Dressing: minimum assistance to logan shirt  Lower Body Dressing: minimum assistance to logan pants  Toileting: stand by assistance to perform toileting; [place] to clothing management    Cognitive/Visual Perceptual:  Cognitive/Psychosocial Skills:     -       Oriented to: Person, Place, Time, and Situation   -       Follows Commands/attention:Follows multistep  commands  -       Mood/Affect/Coping skills/emotional control: Cooperative    Physical Exam:  Balance:    -       sitting balance SBA; standing balance SBA  Upper Extremity Range of Motion:     -       Right Upper Extremity: elbow, wrist, and hand WFL; shoulder NT  -       Left Upper Extremity: WFL  Upper Extremity Strength:    -       Right Upper Extremity: NT  -       Left Upper Extremity: WFL  Gross motor coordination:   decreased d/t s/p right reverse shoulder replacement    [facility] 6 Click ADL:  [facility] Total Score: 22    Treatment & Education:  Pt educated on total shoulder precautions and AROM of elbow, wrist, and hand. Pt verbalized understanding.     Patient left sitting edge of bed with all lines intact, call button in reach, and family present    GOALS:   Multidisciplinary Problems       Occupational Therapy Goals       Not on file                    History:     Past Medical History:   Diagnosis Date    Arthritis     Diabetes mellitus, type 2     GERD (gastroesophageal reflux disease)     Hypertension     Iron deficiency anemia     Obesity     Personal history of colonic polyps 08/19/2019    Thyroid disease          Past Surgical History:   Procedure Laterality Date    ARTHROSCOPIC DEBRIDEMENT OF SHOULDER  Left 12/21/2021    Procedure: DEBRIDEMENT, SHOULDER, ARTHROSCOPIC;  Surgeon: Jose Antonio Pablo MD;  Location: [facility] ORTHO OR;  Service: Orthopedics;  Laterality: Left;  SHOULDER JOINT    ARTHROSCOPIC REMOVAL OF LOOSE BODY FROM JOINT Left 12/21/2021    Procedure: REMOVAL, LOOSE BODY, JOINT, ARTHROSCOPIC;  Surgeon: Jose Antonio Pablo MD;  Location: [facility] ORTHO OR;  Service: Orthopedics;  Laterality: Left;    COLONOSCOPY W/ POLYPECTOMY  08/19/2019    DECOMPRESSION OF SUBACROMIAL SPACE Left 12/21/2021    Procedure: DECOMPRESSION, SUBACROMIAL SPACE;  Surgeon: Jose Antonio Pablo MD;  Location: [facility] ORTHO OR;  Service: Orthopedics;  Laterality: Left;    DISTAL CLAVICLE EXCISION Left 12/21/2021    Procedure: EXCISION, CLAVICLE, DISTAL;  Surgeon: Jose Antonio Pablo MD;  Location: [facility] ORTHO OR;  Service: Orthopedics;  Laterality: Left;    left total knee replacement      PARTIAL HYSTERECTOMY      REVERSE TOTAL SHOULDER ARTHROPLASTY Right 8/27/2024    Procedure: ARTHROPLASTY, SHOULDER, TOTAL, REVERSE;  Surgeon: Jose Antonio Pablo MD;  Location: [facility] ORTHO OR;  Service: Orthopedics;  Laterality: Right;    ROTATOR CUFF REPAIR Left 12/21/2021    Procedure: REPAIR, ROTATOR CUFF;  Surgeon: Jose Antonio Pablo MD;  Location: [facility] ORTHO OR;  Service: Orthopedics;  Laterality: Left;    SHOULDER ARTHROSCOPY Left 12/21/2021    Procedure: ARTHROSCOPY, SHOULDER;  Surgeon: Jose Antonio Pablo MD;  Location: [facility] ORTHO OR;  Service: Orthopedics;  Laterality: Left;    SHOULDER SURGERY Left 12/21/2021    THYROID SURGERY      total thyroidectomy    TONSILLECTOMY AND ADENOIDECTOMY         Time Tracking:     OT Date of Treatment: 08/28/24  OT Start Time: 0954  OT Stop Time: 1026  OT Total Time (min): 32 min    Billable Minutes:Evaluation OT min complexity eval    8/28/2024

## 2024-08-29 LAB — GLUCOSE SERPL-MCNC: 419 MG/DL (ref 70–105)

## 2024-08-31 ENCOUNTER — EXTERNAL CHRONIC CARE MANAGEMENT (OUTPATIENT)
Dept: INTERNAL MEDICINE | Facility: CLINIC | Age: 74
End: 2024-08-31
Payer: MEDICARE

## 2024-08-31 PROCEDURE — 99490 CHRNC CARE MGMT STAFF 1ST 20: CPT | Mod: S$PBB,,, | Performed by: INTERNAL MEDICINE

## 2024-08-31 PROCEDURE — 99439 CHRNC CARE MGMT STAF EA ADDL: CPT | Mod: S$PBB,,, | Performed by: INTERNAL MEDICINE

## 2024-08-31 PROCEDURE — 99490 CHRNC CARE MGMT STAFF 1ST 20: CPT | Mod: PBBFAC | Performed by: INTERNAL MEDICINE

## 2024-08-31 PROCEDURE — 99439 CHRNC CARE MGMT STAF EA ADDL: CPT | Mod: PBBFAC | Performed by: INTERNAL MEDICINE

## 2024-09-09 DIAGNOSIS — Z96.611 HISTORY OF RIGHT SHOULDER REPLACEMENT: Primary | ICD-10-CM

## 2024-09-11 ENCOUNTER — OFFICE VISIT (OUTPATIENT)
Dept: ORTHOPEDICS | Facility: CLINIC | Age: 74
End: 2024-09-11
Payer: MEDICARE

## 2024-09-11 ENCOUNTER — HOSPITAL ENCOUNTER (OUTPATIENT)
Dept: RADIOLOGY | Facility: HOSPITAL | Age: 74
Discharge: HOME | End: 2024-09-11
Attending: ORTHOPAEDIC SURGERY
Payer: MEDICARE

## 2024-09-11 DIAGNOSIS — Z47.89 ENCOUNTER FOR ORTHOPEDIC FOLLOW-UP CARE: Primary | ICD-10-CM

## 2024-09-11 DIAGNOSIS — Z96.611 HISTORY OF RIGHT SHOULDER REPLACEMENT: ICD-10-CM

## 2024-09-11 PROCEDURE — 99024 POSTOP FOLLOW-UP VISIT: CPT | Mod: ,,, | Performed by: ORTHOPAEDIC SURGERY

## 2024-09-11 PROCEDURE — 1159F MED LIST DOCD IN RCRD: CPT | Mod: CPTII,,, | Performed by: ORTHOPAEDIC SURGERY

## 2024-09-11 PROCEDURE — 73030 X-RAY EXAM OF SHOULDER: CPT | Mod: 26,RT,, | Performed by: ORTHOPAEDIC SURGERY

## 2024-09-11 PROCEDURE — 99999 PR PBB SHADOW E&M-EST. PATIENT-LVL III: CPT | Mod: PBBFAC,,, | Performed by: ORTHOPAEDIC SURGERY

## 2024-09-11 PROCEDURE — 3046F HEMOGLOBIN A1C LEVEL >9.0%: CPT | Mod: CPTII,,, | Performed by: ORTHOPAEDIC SURGERY

## 2024-09-11 PROCEDURE — 99213 OFFICE O/P EST LOW 20 MIN: CPT | Mod: PBBFAC,25 | Performed by: ORTHOPAEDIC SURGERY

## 2024-09-11 PROCEDURE — 73030 X-RAY EXAM OF SHOULDER: CPT | Mod: TC,RT

## 2024-09-11 NOTE — PROGRESS NOTES
Patient is here for right shoulder reverse total shoulder arthroplasty.  Wounds are clean and dry.  Staples removed.  Neurovascularly intact distally.  Keep her in his sling for the next month.  I will see her back in 4 weeks at that time we will start active range motion.  X-rays show no loosening of the components.  She was doing well.

## 2024-09-11 NOTE — PROGRESS NOTES
Radiology Interpretation        Patient Name: Meghna Dalal  Date: 9/11/2024  YOB: 1950  MRN# [id number]        ORDERING DIAGNOSIS:    Encounter Diagnosis   Name Primary?    Encounter for orthopedic follow-up care Yes             Two views AP lateral right shoulder skeletally mature individual reverse total shoulder arthroplasty in place no loosening fractures or subluxations impression reverse total shoulder arthroplasty in place right shoulder no loosening          Jose Antonio Pablo MD

## 2024-09-18 ENCOUNTER — INFUSION (OUTPATIENT)
Dept: INFUSION THERAPY | Facility: HOSPITAL | Age: 74
End: 2024-09-18
Attending: NURSE PRACTITIONER
Payer: MEDICARE

## 2024-09-18 ENCOUNTER — OFFICE VISIT (OUTPATIENT)
Dept: INTERNAL MEDICINE | Facility: CLINIC | Age: 74
End: 2024-09-18
Payer: MEDICARE

## 2024-09-18 VITALS
SYSTOLIC BLOOD PRESSURE: 130 MMHG | WEIGHT: 209 LBS | HEART RATE: 73 BPM | OXYGEN SATURATION: 98 % | HEIGHT: 63 IN | DIASTOLIC BLOOD PRESSURE: 72 MMHG | TEMPERATURE: 98 F | RESPIRATION RATE: 16 BRPM | BODY MASS INDEX: 37.03 KG/M2

## 2024-09-18 VITALS
SYSTOLIC BLOOD PRESSURE: 163 MMHG | HEART RATE: 70 BPM | RESPIRATION RATE: 17 BRPM | OXYGEN SATURATION: 97 % | DIASTOLIC BLOOD PRESSURE: 77 MMHG

## 2024-09-18 DIAGNOSIS — J30.2 SEASONAL ALLERGIES: ICD-10-CM

## 2024-09-18 DIAGNOSIS — E11.9 CONTROLLED TYPE 2 DIABETES MELLITUS WITHOUT COMPLICATION, WITHOUT LONG-TERM CURRENT USE OF INSULIN: ICD-10-CM

## 2024-09-18 DIAGNOSIS — M05.79 RHEUMATOID ARTHRITIS INVOLVING MULTIPLE SITES WITH POSITIVE RHEUMATOID FACTOR: ICD-10-CM

## 2024-09-18 DIAGNOSIS — K21.9 GERD WITHOUT ESOPHAGITIS: ICD-10-CM

## 2024-09-18 DIAGNOSIS — D64.9 ANEMIA, UNSPECIFIED TYPE: ICD-10-CM

## 2024-09-18 DIAGNOSIS — I10 ESSENTIAL HYPERTENSION: Primary | ICD-10-CM

## 2024-09-18 DIAGNOSIS — E03.9 HYPOTHYROIDISM, UNSPECIFIED TYPE: ICD-10-CM

## 2024-09-18 DIAGNOSIS — M05.79 RHEUMATOID ARTHRITIS INVOLVING MULTIPLE SITES WITH POSITIVE RHEUMATOID FACTOR: Primary | ICD-10-CM

## 2024-09-18 DIAGNOSIS — E78.5 HYPERLIPIDEMIA LDL GOAL <100: ICD-10-CM

## 2024-09-18 PROCEDURE — 3288F FALL RISK ASSESSMENT DOCD: CPT | Mod: CPTII,,, | Performed by: INTERNAL MEDICINE

## 2024-09-18 PROCEDURE — 3046F HEMOGLOBIN A1C LEVEL >9.0%: CPT | Mod: CPTII,,, | Performed by: INTERNAL MEDICINE

## 2024-09-18 PROCEDURE — 25000003 PHARM REV CODE 250

## 2024-09-18 PROCEDURE — 99214 OFFICE O/P EST MOD 30 MIN: CPT | Mod: S$PBB,,, | Performed by: INTERNAL MEDICINE

## 2024-09-18 PROCEDURE — 96375 TX/PRO/DX INJ NEW DRUG ADDON: CPT

## 2024-09-18 PROCEDURE — 1101F PT FALLS ASSESS-DOCD LE1/YR: CPT | Mod: CPTII,,, | Performed by: INTERNAL MEDICINE

## 2024-09-18 PROCEDURE — 3075F SYST BP GE 130 - 139MM HG: CPT | Mod: CPTII,,, | Performed by: INTERNAL MEDICINE

## 2024-09-18 PROCEDURE — 96365 THER/PROPH/DIAG IV INF INIT: CPT

## 2024-09-18 PROCEDURE — 99215 OFFICE O/P EST HI 40 MIN: CPT | Mod: PBBFAC | Performed by: INTERNAL MEDICINE

## 2024-09-18 PROCEDURE — 1159F MED LIST DOCD IN RCRD: CPT | Mod: CPTII,,, | Performed by: INTERNAL MEDICINE

## 2024-09-18 PROCEDURE — 3008F BODY MASS INDEX DOCD: CPT | Mod: CPTII,,, | Performed by: INTERNAL MEDICINE

## 2024-09-18 PROCEDURE — 99999 PR PBB SHADOW E&M-EST. PATIENT-LVL V: CPT | Mod: PBBFAC,,, | Performed by: INTERNAL MEDICINE

## 2024-09-18 PROCEDURE — 63600175 PHARM REV CODE 636 W HCPCS

## 2024-09-18 PROCEDURE — 1160F RVW MEDS BY RX/DR IN RCRD: CPT | Mod: CPTII,,, | Performed by: INTERNAL MEDICINE

## 2024-09-18 PROCEDURE — 3078F DIAST BP <80 MM HG: CPT | Mod: CPTII,,, | Performed by: INTERNAL MEDICINE

## 2024-09-18 RX ORDER — DIPHENHYDRAMINE HYDROCHLORIDE 12.5 MG/5ML
25 LIQUID ORAL ONCE AS NEEDED
Start: 2024-10-07

## 2024-09-18 RX ORDER — SODIUM CHLORIDE 0.9 % (FLUSH) 0.9 %
10 SYRINGE (ML) INJECTION
OUTPATIENT
Start: 2024-10-07

## 2024-09-18 RX ORDER — METHYLPREDNISOLONE SOD SUCC 125 MG
62.5 VIAL (EA) INJECTION ONCE AS NEEDED
Status: COMPLETED | OUTPATIENT
Start: 2024-09-18 | End: 2024-09-18

## 2024-09-18 RX ORDER — ACETAMINOPHEN 500 MG
1000 TABLET ORAL ONCE AS NEEDED
Status: COMPLETED | OUTPATIENT
Start: 2024-09-18 | End: 2024-09-18

## 2024-09-18 RX ORDER — DIPHENHYDRAMINE HYDROCHLORIDE 12.5 MG/5ML
25 LIQUID ORAL ONCE AS NEEDED
Status: COMPLETED | OUTPATIENT
Start: 2024-09-18 | End: 2024-09-18

## 2024-09-18 RX ORDER — ACETAMINOPHEN 500 MG
1000 TABLET ORAL ONCE AS NEEDED
Start: 2024-10-07

## 2024-09-18 RX ADMIN — SODIUM CHLORIDE 750 MG: 9 INJECTION, SOLUTION INTRAVENOUS at 11:09

## 2024-09-18 RX ADMIN — DIPHENHYDRAMINE HYDROCHLORIDE 25 MG: 12.5 SOLUTION ORAL at 11:09

## 2024-09-18 RX ADMIN — ACETAMINOPHEN 1000 MG: 500 TABLET ORAL at 11:09

## 2024-09-18 RX ADMIN — METHYLPREDNISOLONE SODIUM SUCCINATE 62.5 MG: 125 INJECTION, POWDER, FOR SOLUTION INTRAMUSCULAR; INTRAVENOUS at 11:09

## 2024-09-18 NOTE — PROGRESS NOTES
1055 Pt here for Orencia infusion, resting in recliner, TP released and pharmacy notified    Pre meds given and tolerated (benadryl elixir 25 mg, tylenol PO 1000 mg and Solumedrol 62.5mg IV)  1129 Orencia infusion started to infuse over 1 hr  1226 Orencia infusion complete, tolerated well, will continue to monitor  1245 pt discharged ambulatory with no adverse reaction noted, pt notified to go to ER with any adverse reactions, AVS given along with medication information  Follow up appt made 4 weeks

## 2024-09-18 NOTE — PROGRESS NOTES
Subjective:       Patient ID: Meghna Dalal is a 74 y.o. female.    Chief Complaint: Follow-up    Recient shoulder surg  doing well  bs 109 today  recient A1c 10   no problems    Follow-up  Pertinent negatives include no abdominal pain, chest pain, fatigue, rash or weakness.   Review of Systems   Constitutional:  Negative for fatigue and unexpected weight change.   HENT:  Negative for ear pain and goiter.    Respiratory:  Negative for chest tightness and shortness of breath.    Cardiovascular:  Negative for chest pain, palpitations and leg swelling.   Gastrointestinal:  Negative for abdominal pain and reflux.   Integumentary:  Negative for rash and breast tenderness.   Neurological:  Negative for dizziness and weakness.   Hematological:  Negative for adenopathy.   Psychiatric/Behavioral:  Negative for behavioral problems.    Breast: Negative for tenderness      Objective:      Physical Exam  Constitutional:       Appearance: Normal appearance.   HENT:      Head: Normocephalic and atraumatic.      Right Ear: External ear normal.      Left Ear: External ear normal.      Mouth/Throat:      Pharynx: Oropharynx is clear.   Eyes:      Extraocular Movements: Extraocular movements intact.      Pupils: Pupils are equal, round, and reactive to light.   Cardiovascular:      Rate and Rhythm: Normal rate and regular rhythm.      Pulses: Normal pulses.      Heart sounds: Normal heart sounds.   Pulmonary:      Effort: Pulmonary effort is normal.      Breath sounds: Normal breath sounds.   Abdominal:      General: Abdomen is flat. Bowel sounds are normal.      Palpations: Abdomen is soft.   Musculoskeletal:         General: Normal range of motion.      Cervical back: Normal range of motion and neck supple.   Skin:     General: Skin is warm.      Capillary Refill: Capillary refill takes less than 2 seconds.   Neurological:      General: No focal deficit present.      Mental Status: She is alert.   Psychiatric:         Mood  and Affect: Mood normal.         Thought Content: Thought content normal.         Judgment: Judgment normal.       Assessment:       1. Essential hypertension    2. GERD without esophagitis    3. Rheumatoid arthritis involving multiple sites with positive rheumatoid factor    4. Controlled type 2 diabetes mellitus without complication, without long-term current use of insulin    5. Hypothyroidism, unspecified type    6. Hyperlipidemia LDL goal <100    7. Seasonal allergies    8. Anemia, unspecified type        Plan:         Patient Instructions   Continue current meds and f/u 3 months      Problem List Items Addressed This Visit          Cardiac/Vascular    Essential hypertension - Primary       Immunology/Multi System    Rheumatoid arthritis involving multiple sites with positive rheumatoid factor     Other Visit Diagnoses       GERD without esophagitis        Controlled type 2 diabetes mellitus without complication, without long-term current use of insulin        Hypothyroidism, unspecified type        Hyperlipidemia LDL goal <100        Seasonal allergies        Anemia, unspecified type

## 2024-09-20 ENCOUNTER — TELEPHONE (OUTPATIENT)
Dept: INTERNAL MEDICINE | Facility: CLINIC | Age: 74
End: 2024-09-20
Payer: MEDICARE

## 2024-09-25 DIAGNOSIS — Z47.89 ENCOUNTER FOR ORTHOPEDIC FOLLOW-UP CARE: Primary | ICD-10-CM

## 2024-09-25 RX ORDER — HYDROCODONE BITARTRATE AND ACETAMINOPHEN 10; 325 MG/1; MG/1
1 TABLET ORAL EVERY 6 HOURS PRN
Qty: 28 TABLET | Refills: 0 | Status: SHIPPED | OUTPATIENT
Start: 2024-09-25

## 2024-09-25 NOTE — TELEPHONE ENCOUNTER
----- Message from Aixa Diane sent at 9/25/2024  1:13 PM CDT -----  Regarding: refill  Who Called: Meghna Dalal    Refill or New Rx:Refill  RX Name and Strength:hydrocodone/acetaminophen  mg  How is the patient currently taking it? (ex. 1XDay):every 6-8 hr  Is this a 30 day or 90 day RX:  Local or Mail Order:  List of preferred pharmacies on file (remove unneeded): [unfilled][facility]/pharmacy #[id number] - [place], [place] -  If different Pharmacy is requested, enter Pharmacy information here including location and phone number:  [phone]    Ordering Provider:Dr Pablo      Preferred Method of Contact: Phone Call  Patient's Preferred Phone Number on File: [phone]   Best Call Back Number, if different:  Additional Information:

## 2024-09-30 ENCOUNTER — EXTERNAL CHRONIC CARE MANAGEMENT (OUTPATIENT)
Dept: INTERNAL MEDICINE | Facility: CLINIC | Age: 74
End: 2024-09-30
Payer: MEDICARE

## 2024-09-30 PROCEDURE — 99439 CHRNC CARE MGMT STAF EA ADDL: CPT | Mod: S$PBB,,, | Performed by: INTERNAL MEDICINE

## 2024-09-30 PROCEDURE — 99439 CHRNC CARE MGMT STAF EA ADDL: CPT | Mod: PBBFAC | Performed by: INTERNAL MEDICINE

## 2024-09-30 PROCEDURE — 99490 CHRNC CARE MGMT STAFF 1ST 20: CPT | Mod: S$PBB,,, | Performed by: INTERNAL MEDICINE

## 2024-09-30 PROCEDURE — 99490 CHRNC CARE MGMT STAFF 1ST 20: CPT | Mod: PBBFAC | Performed by: INTERNAL MEDICINE

## 2024-10-01 ENCOUNTER — TELEPHONE (OUTPATIENT)
Dept: INTERNAL MEDICINE | Facility: CLINIC | Age: 74
End: 2024-10-01
Payer: MEDICARE

## 2024-10-01 RX ORDER — FUROSEMIDE 20 MG/1
20 TABLET ORAL DAILY
Qty: 30 TABLET | Refills: 11 | Status: SHIPPED | OUTPATIENT
Start: 2024-10-01 | End: 2025-10-01

## 2024-10-01 RX ORDER — GABAPENTIN 100 MG/1
100 CAPSULE ORAL 3 TIMES DAILY
Qty: 90 CAPSULE | Refills: 11 | Status: SHIPPED | OUTPATIENT
Start: 2024-10-01 | End: 2025-10-01

## 2024-10-09 ENCOUNTER — OFFICE VISIT (OUTPATIENT)
Dept: ORTHOPEDICS | Facility: CLINIC | Age: 74
End: 2024-10-09
Payer: MEDICARE

## 2024-10-09 ENCOUNTER — HOSPITAL ENCOUNTER (OUTPATIENT)
Dept: RADIOLOGY | Facility: HOSPITAL | Age: 74
Discharge: HOME | End: 2024-10-09
Attending: ORTHOPAEDIC SURGERY
Payer: MEDICARE

## 2024-10-09 VITALS
BODY MASS INDEX: 36.5 KG/M2 | OXYGEN SATURATION: 98 % | HEIGHT: 63 IN | SYSTOLIC BLOOD PRESSURE: 148 MMHG | TEMPERATURE: 97 F | WEIGHT: 206 LBS | DIASTOLIC BLOOD PRESSURE: 71 MMHG

## 2024-10-09 DIAGNOSIS — Z96.611 HISTORY OF RIGHT SHOULDER REPLACEMENT: ICD-10-CM

## 2024-10-09 DIAGNOSIS — Z47.89 ENCOUNTER FOR ORTHOPEDIC FOLLOW-UP CARE: Primary | ICD-10-CM

## 2024-10-09 DIAGNOSIS — M25.511 ACUTE PAIN OF RIGHT SHOULDER: ICD-10-CM

## 2024-10-09 DIAGNOSIS — M25.511 ACUTE PAIN OF RIGHT SHOULDER: Primary | ICD-10-CM

## 2024-10-09 PROCEDURE — 99999 PR PBB SHADOW E&M-EST. PATIENT-LVL V: CPT | Mod: PBBFAC,,, | Performed by: ORTHOPAEDIC SURGERY

## 2024-10-09 PROCEDURE — 73030 X-RAY EXAM OF SHOULDER: CPT | Mod: 26,RT,, | Performed by: ORTHOPAEDIC SURGERY

## 2024-10-09 PROCEDURE — 1159F MED LIST DOCD IN RCRD: CPT | Mod: CPTII,,, | Performed by: ORTHOPAEDIC SURGERY

## 2024-10-09 PROCEDURE — 99215 OFFICE O/P EST HI 40 MIN: CPT | Mod: PBBFAC,25 | Performed by: ORTHOPAEDIC SURGERY

## 2024-10-09 PROCEDURE — 3078F DIAST BP <80 MM HG: CPT | Mod: CPTII,,, | Performed by: ORTHOPAEDIC SURGERY

## 2024-10-09 PROCEDURE — 99024 POSTOP FOLLOW-UP VISIT: CPT | Mod: ,,, | Performed by: ORTHOPAEDIC SURGERY

## 2024-10-09 PROCEDURE — 3077F SYST BP >= 140 MM HG: CPT | Mod: CPTII,,, | Performed by: ORTHOPAEDIC SURGERY

## 2024-10-09 PROCEDURE — 73030 X-RAY EXAM OF SHOULDER: CPT | Mod: TC,RT

## 2024-10-09 PROCEDURE — 1126F AMNT PAIN NOTED NONE PRSNT: CPT | Mod: CPTII,,, | Performed by: ORTHOPAEDIC SURGERY

## 2024-10-09 PROCEDURE — 3046F HEMOGLOBIN A1C LEVEL >9.0%: CPT | Mod: CPTII,,, | Performed by: ORTHOPAEDIC SURGERY

## 2024-10-09 NOTE — PROGRESS NOTES
Patient is here for follow-up of right shoulder reverse total shoulder arthroplasty.  Neurovascularly she is intact distally.  We are going to start active range motion strengthening of the shoulder.  I will follow back up 6 weeks.  She was doing well.

## 2024-10-09 NOTE — PROGRESS NOTES
Radiology Interpretation        Patient Name: Meghna Dalal  Date: 10/9/2024  YOB: 1950  MRN# [id number]        ORDERING DIAGNOSIS:    Encounter Diagnosis   Name Primary?    Encounter for orthopedic follow-up care Yes        Two views AP lateral right shoulder skeletally mature individual there is a reverse total shoulder arthroplasty in place no loosening impression reverse total shoulder arthroplasty in place on right no loosening               Jose Antonio Pablo MD

## 2024-10-17 ENCOUNTER — CLINICAL SUPPORT (OUTPATIENT)
Dept: REHABILITATION | Facility: HOSPITAL | Age: 74
End: 2024-10-17
Payer: MEDICARE

## 2024-10-17 ENCOUNTER — INFUSION (OUTPATIENT)
Dept: INFUSION THERAPY | Facility: HOSPITAL | Age: 74
End: 2024-10-17
Attending: INTERNAL MEDICINE
Payer: MEDICARE

## 2024-10-17 VITALS
HEART RATE: 77 BPM | DIASTOLIC BLOOD PRESSURE: 70 MMHG | RESPIRATION RATE: 17 BRPM | SYSTOLIC BLOOD PRESSURE: 149 MMHG | OXYGEN SATURATION: 99 %

## 2024-10-17 DIAGNOSIS — M25.611 DECREASED ROM OF RIGHT SHOULDER: ICD-10-CM

## 2024-10-17 DIAGNOSIS — M05.79 RHEUMATOID ARTHRITIS INVOLVING MULTIPLE SITES WITH POSITIVE RHEUMATOID FACTOR: Primary | ICD-10-CM

## 2024-10-17 DIAGNOSIS — Z47.89 ENCOUNTER FOR ORTHOPEDIC FOLLOW-UP CARE: ICD-10-CM

## 2024-10-17 DIAGNOSIS — M79.601 CHRONIC PAIN OF RIGHT UPPER EXTREMITY: ICD-10-CM

## 2024-10-17 DIAGNOSIS — M25.511 ACUTE PAIN OF RIGHT SHOULDER: ICD-10-CM

## 2024-10-17 DIAGNOSIS — Z96.611 HISTORY OF RIGHT SHOULDER REPLACEMENT: Primary | ICD-10-CM

## 2024-10-17 DIAGNOSIS — G89.29 CHRONIC PAIN OF RIGHT UPPER EXTREMITY: ICD-10-CM

## 2024-10-17 PROCEDURE — 96375 TX/PRO/DX INJ NEW DRUG ADDON: CPT

## 2024-10-17 PROCEDURE — 25000003 PHARM REV CODE 250

## 2024-10-17 PROCEDURE — 96365 THER/PROPH/DIAG IV INF INIT: CPT

## 2024-10-17 PROCEDURE — 63600175 PHARM REV CODE 636 W HCPCS

## 2024-10-17 PROCEDURE — 97162 PT EVAL MOD COMPLEX 30 MIN: CPT

## 2024-10-17 PROCEDURE — 97110 THERAPEUTIC EXERCISES: CPT

## 2024-10-17 RX ORDER — DIPHENHYDRAMINE HYDROCHLORIDE 12.5 MG/5ML
25 LIQUID ORAL ONCE AS NEEDED
Status: COMPLETED | OUTPATIENT
Start: 2024-10-17 | End: 2024-10-17

## 2024-10-17 RX ORDER — SODIUM CHLORIDE 0.9 % (FLUSH) 0.9 %
10 SYRINGE (ML) INJECTION
Status: DISCONTINUED | OUTPATIENT
Start: 2024-10-17 | End: 2024-10-17 | Stop reason: HOSPADM

## 2024-10-17 RX ORDER — ACETAMINOPHEN 500 MG
1000 TABLET ORAL ONCE AS NEEDED
Status: COMPLETED | OUTPATIENT
Start: 2024-10-17 | End: 2024-10-17

## 2024-10-17 RX ORDER — SODIUM CHLORIDE 0.9 % (FLUSH) 0.9 %
10 SYRINGE (ML) INJECTION
OUTPATIENT
Start: 2024-11-04

## 2024-10-17 RX ORDER — ACETAMINOPHEN 500 MG
1000 TABLET ORAL ONCE AS NEEDED
Start: 2024-11-04

## 2024-10-17 RX ORDER — DIPHENHYDRAMINE HYDROCHLORIDE 12.5 MG/5ML
25 LIQUID ORAL ONCE AS NEEDED
Start: 2024-11-04

## 2024-10-17 RX ADMIN — ACETAMINOPHEN 1000 MG: 500 TABLET ORAL at 12:10

## 2024-10-17 RX ADMIN — SODIUM CHLORIDE 750 MG: 9 INJECTION, SOLUTION INTRAVENOUS at 12:10

## 2024-10-17 RX ADMIN — DIPHENHYDRAMINE HYDROCHLORIDE 25 MG: 12.5 SOLUTION ORAL at 12:10

## 2024-10-17 RX ADMIN — METHYLPREDNISOLONE SODIUM SUCCINATE 62.5 MG: 125 INJECTION, POWDER, FOR SOLUTION INTRAMUSCULAR; INTRAVENOUS at 12:10

## 2024-10-17 NOTE — PLAN OF CARE
[facility]   Physical Therapy Initial Evaluation      Name: Meghna Dalal  [facility] Number: [id number]    Therapy Diagnosis:   Encounter Diagnoses   Name Primary?    Encounter for orthopedic follow-up care     History of right shoulder replacement         Physician: Jose Antonio Pablo MD    Physician Orders: PT Eval and Treat   Medical Diagnosis from Referral: see above   Evaluation Date: 10/17/2024  Authorization Period Expiration: 10/09/2025  Plan of Care Expiration: 1/10/2025    Date of Surgery: 8/27/2024  Visit # / Visits authorized: 1/ 1 evaluation only   FOTO: 1/ 3    Precautions: Standard     Time In: 10:05  Time Out: 10:41  Total Billable Time: 36 minutes    Subjective     Date of onset: August 27 2024    History of current condition - Meghna reports: here to rehab a rTSA----currently in no pain and has no swelling--- some pain when at end range of all shoulder movements----she is able to sleep in her bed-----still has to be careful with it in bed so she does not aggravate it ----has noticed she has a little trouble straightening her elbow out due to being in a sling for a lengthy time---she has not attempted to drive yet---she has trouble reaching overhead and can not reach behind her head very well----also can not reach behind her back----she has not been cooking or cleaning due to being scared to hurt the arm    Falls: none    Imaging: x-ray   Findings:  Two views AP lateral right shoulder skeletally mature individual there is a reverse total shoulder arthroplasty in place no loosening impression reverse total shoulder arthroplasty in place on right no loosening     Prior Therapy: none  Social History:  lives with their spouse  Occupation: retired  Prior Level of Function: independent   Current Level of Function: independent toileting and bathing --- her  has helped with coming her hair and cooking, cleaning and driving, has trouble reaching hand behind head to  wash hair, she has not been cooking or cleaning due to being scared to hurt it    Pain:  Current 0/10, worst 6/10, best 0/10   Location: right shoulder   Description: Throbbing  Aggravating Factors: moving around a lot in any direction with the shoulder   Easing Factors: rest, takes motrin    Patients goals: regain adequate range of motion for functional use, decreased pain     Medical History:   Past Medical History:   Diagnosis Date    Arthritis     Diabetes mellitus, type 2     GERD (gastroesophageal reflux disease)     Hypertension     Iron deficiency anemia     Obesity     Personal history of colonic polyps 08/19/2019    Thyroid disease        Surgical History:   Meghna Dalal  has a past surgical history that includes Thyroid surgery; Tonsillectomy and adenoidectomy; left total knee replacement; Partial hysterectomy; Shoulder arthroscopy (Left, 12/21/2021); Arthroscopic debridement of shoulder (Left, 12/21/2021); Arthroscopic removal of loose body from joint (Left, 12/21/2021); Decompression of subacromial space (Left, 12/21/2021); Distal clavicle excision (Left, 12/21/2021); Rotator cuff repair (Left, 12/21/2021); Shoulder surgery (Left, 12/21/2021); Colonoscopy w/ polypectomy (08/19/2019); and Reverse total shoulder arthroplasty (Right, 8/27/2024).    Medications:   Meghna has a current medication list which includes the following prescription(s): abatacept, aspirin, cyclobenzaprine, fluticasone propionate, furosemide, gabapentin, hydrocodone-acetaminophen, ibuprofen, levothyroxine, losartan-hydrochlorothiazide 100-12.5 mg, metformin, omeprazole, potassium chloride, prednisone, and [DISCONTINUED] losartan, and the following Facility-Administered Medications: diphenhydramine, epinephrine, ondansetron, sodium chloride 0.9% 500 mL flush bag, and sodium chloride 0.9%.    Allergies:   Review of patient's allergies indicates:   Allergen Reactions    Codeine phosphate      Other reaction(s): facial  swelling    Codeine sulfate      Other reaction(s): Unknown        Objective          Range of motion  Motion Right  Left    Shoulder flexion 92  degrees  118 degrees   Shoulder extension NT WITHIN FUNCTIONAL LIMITS   Shoulder abduction 91 degrees WITHIN FUNCTIONAL LIMITS   Shoulder internal rotation sacrum WITHIN FUNCTIONAL LIMITS   Shoulder external rotation 34 degrees WITHIN FUNCTIONAL LIMITS       MANUAL MUSCLE TEST  Muscle Right    Shoulder flexion MMT strength: 3+/5   Shoulder extension MMT strength: 3+/5   Shoulder abduction MMT strength: 3+/5   Shoulder internal rotation MMT strength: 3+/5   Shoulder external rotation MMT strength: 3+/5     Functional ability:  Dressing: AMB PT LEVEL OF ASSISTANCE: independent  Driving: has not tried yet  Overhead activity: AMB PT LEVEL OF ASSISTANCE: independent  Work/hobbies: AMB PT LEVEL OF ASSISTANCE: has  help doing most house work         Intake Outcome Measure for FOTO Shoulder Survey    Therapist reviewed FOTO scores for Meghna Dalal on 10/17/2024.   FOTO report - see Media section or FOTO account episode details.    Intake Score: 42       Treatment     Total Treatment time (time-based codes) separate from Evaluation: 12 minutes     Meghna received the treatments listed below:      Supine cane flexion  Counter shoulder flexion  Elbow flexion/extension  Scapular retractions    Patient Education and Home Exercises     Education provided:   - evaluation results, plan of care and home exercise program     Written Home Exercises Provided: yes. Exercises were reviewed and Meghna was able to demonstrate them prior to the end of the session.  Meghna demonstrated good  understanding of the education provided. See EMR under Patient Instructions for exercises provided during therapy sessions.    Assessment     Meghna is a 74 y.o. female referred to outpatient Physical Therapy with a medical diagnosis of right shoulder replacement. Patient presents with  right shoulder pain at end range of motion, right upper extremity weakness, and decreased range of motion. States she is here to rehab a rTSA. She currently is in no pain and has no swelling. She has some pain when at end range of all shoulder movements. She is able to sleep in her bed. Says she still has to be careful with it in bed so she does not aggravate it. She has noticed she has a little trouble straightening her elbow out due to being in a sling for a lengthy time. She stated she has avoided using the arm due to fear of hurting it. She stated she has not attempted to cook or clean again yet. She was given a home exercise program and instructed on how to properly perform all exercises. She had no complaints with any exercises.    Patient prognosis is Good.   Patient will benefit from skilled outpatient Physical Therapy to address the deficits stated above and in the chart below, provide patient /family education, and to maximize patientt's level of independence.     Plan of care discussed with patient: Yes  Patient's spiritual, cultural and educational needs considered and patient is agreeable to the plan of care and goals as stated below:     Anticipated Barriers for therapy: none    Medical Necessity is demonstrated by the following  History  Co-morbidities and personal factors that may impact the plan of care [] LOW: no personal factors / co-morbidities  [x] MODERATE: 1-2 personal factors / co-morbidities  [] HIGH: 3+ personal factors / co-morbidities    Moderate / High Support Documentation:   Co-morbidities affecting plan of care:     Medical History:   Past Medical History:   Diagnosis Date    Arthritis     Diabetes mellitus, type 2     GERD (gastroesophageal reflux disease)     Hypertension     Iron deficiency anemia     Obesity     Personal history of colonic polyps 08/19/2019    Thyroid disease        Personal Factors:   no deficits     Examination  Body Structures and Functions, activity  limitations and participation restrictions that may impact the plan of care [] LOW: addressing 1-2 elements  [x] MODERATE: 3+ elements  [] HIGH: 4+ elements (please support below)    Moderate / High Support Documentation: right upper extremity weakness, right upper extremity decreased range of motion, right shoulder pain     Clinical Presentation [] LOW: stable  [x] MODERATE: Evolving  [] HIGH: Unstable     Decision Making/ Complexity Score: moderate         Short Term Goals: 6 weeks  Patient will be independent with home exercise program to facilitate carryover between visits.  Patient will be independent with dressing and driving without modification due to right shoulder.  Patient will be able to sleep all night in bed without waking due to right shoulder pain.    Long Term Goals: 12 weeks  Patient will have active range of motion as follow - 110 degrees flexion, 40 degrees external rotation and functional internal rotation to L3 for reaching overhead, behind head and behind back for dressing, grooming and hygiene.  Patient will have 4+-5/5 strength of right shoulder/upper extremity to perform household chores and work related tasks.  Patient will place 2# dumbbell on head height shelf without hiking or pain right shoulder.  Patient will return to doing household duties such as cooking, cleaning, and sweeping with limitations due to her right shoulder.     Plan     Plan of care Certification: 10/17/2024 to 1/10/2024.    Outpatient Physical Therapy 2 times weekly for 12 weeks to include the following interventions: 53264 [therapeutic exercise], 72356 [neuromuscular re-education], 36105 [manual therapy], 51206 [therapeutic activities], 18439 [ultrasound], 38885 [unattended electrical stimulation], and 86198 [vasopneumatic device].     SWETHA Olivarez PT        Physician's Signature: _________________________________________ Date: ________________

## 2024-10-17 NOTE — PROGRESS NOTES
Patient arrived for [facility] infusion today ambulatory to bay. Therapy plan released and pharmacy notified. Patients int started to left AC  Premedication   Benadryl Elixir 25mg   Tylenol 1000mg PO   62.5mg Solumedrol IV  1222 infusion started  1321 infusion finished  1321 Int flushed   1340 Patient discharged , given AVS and told to watch for signs and symptoms of adverse reactions , if had any to go to the ER. Patient given upcoming appointment

## 2024-10-31 ENCOUNTER — EXTERNAL CHRONIC CARE MANAGEMENT (OUTPATIENT)
Dept: INTERNAL MEDICINE | Facility: CLINIC | Age: 74
End: 2024-10-31
Payer: MEDICARE

## 2024-10-31 ENCOUNTER — CLINICAL SUPPORT (OUTPATIENT)
Dept: REHABILITATION | Facility: HOSPITAL | Age: 74
End: 2024-10-31
Payer: MEDICARE

## 2024-10-31 DIAGNOSIS — M25.611 DECREASED ROM OF RIGHT SHOULDER: ICD-10-CM

## 2024-10-31 DIAGNOSIS — M25.511 ACUTE PAIN OF RIGHT SHOULDER: ICD-10-CM

## 2024-10-31 DIAGNOSIS — G89.29 CHRONIC PAIN OF RIGHT UPPER EXTREMITY: ICD-10-CM

## 2024-10-31 DIAGNOSIS — M79.601 CHRONIC PAIN OF RIGHT UPPER EXTREMITY: ICD-10-CM

## 2024-10-31 DIAGNOSIS — Z96.611 HISTORY OF RIGHT SHOULDER REPLACEMENT: Primary | ICD-10-CM

## 2024-10-31 PROCEDURE — 99490 CHRNC CARE MGMT STAFF 1ST 20: CPT | Mod: PBBFAC | Performed by: INTERNAL MEDICINE

## 2024-10-31 PROCEDURE — 97110 THERAPEUTIC EXERCISES: CPT | Mod: CQ

## 2024-10-31 PROCEDURE — 99439 CHRNC CARE MGMT STAF EA ADDL: CPT | Mod: PBBFAC | Performed by: INTERNAL MEDICINE

## 2024-10-31 PROCEDURE — 97140 MANUAL THERAPY 1/> REGIONS: CPT | Mod: CQ

## 2024-10-31 PROCEDURE — 99439 CHRNC CARE MGMT STAF EA ADDL: CPT | Mod: S$PBB,,, | Performed by: INTERNAL MEDICINE

## 2024-10-31 PROCEDURE — 99490 CHRNC CARE MGMT STAFF 1ST 20: CPT | Mod: S$PBB,,, | Performed by: INTERNAL MEDICINE

## 2024-11-02 ENCOUNTER — HOSPITAL ENCOUNTER (INPATIENT)
Facility: HOSPITAL | Age: 74
LOS: 8 days | Discharge: HOME | DRG: 372 | End: 2024-11-10
Attending: EMERGENCY MEDICINE | Admitting: HOSPITALIST
Payer: MEDICARE

## 2024-11-02 DIAGNOSIS — E11.65 UNCONTROLLED TYPE 2 DIABETES MELLITUS WITH HYPERGLYCEMIA: ICD-10-CM

## 2024-11-02 DIAGNOSIS — R50.9 FEVER: ICD-10-CM

## 2024-11-02 DIAGNOSIS — A02.0 SALMONELLA ENTERITIS: ICD-10-CM

## 2024-11-02 DIAGNOSIS — R07.9 CHEST PAIN: ICD-10-CM

## 2024-11-02 DIAGNOSIS — N17.9 AKI (ACUTE KIDNEY INJURY): ICD-10-CM

## 2024-11-02 DIAGNOSIS — K52.9 GASTROENTERITIS: Primary | ICD-10-CM

## 2024-11-02 DIAGNOSIS — I10 SYSTOLIC HYPERTENSION: ICD-10-CM

## 2024-11-02 DIAGNOSIS — J96.22 ACUTE ON CHRONIC RESPIRATORY FAILURE WITH HYPOXIA AND HYPERCAPNIA: ICD-10-CM

## 2024-11-02 DIAGNOSIS — J96.21 ACUTE ON CHRONIC RESPIRATORY FAILURE WITH HYPOXIA AND HYPERCAPNIA: ICD-10-CM

## 2024-11-02 DIAGNOSIS — E87.21 ACUTE LACTIC ACIDOSIS: ICD-10-CM

## 2024-11-02 PROBLEM — E86.0 DEHYDRATION: Status: ACTIVE | Noted: 2024-11-02

## 2024-11-02 PROBLEM — R11.2 NAUSEA VOMITING AND DIARRHEA: Status: ACTIVE | Noted: 2024-11-02

## 2024-11-02 PROBLEM — R19.7 NAUSEA VOMITING AND DIARRHEA: Status: ACTIVE | Noted: 2024-11-02

## 2024-11-02 LAB
ALBUMIN SERPL BCP-MCNC: 3 G/DL (ref 3.5–5)
ALBUMIN/GLOB SERPL: 0.8 {RATIO}
ALP SERPL-CCNC: 63 U/L (ref 55–142)
ALT SERPL W P-5'-P-CCNC: 21 U/L (ref 13–56)
ANION GAP SERPL CALCULATED.3IONS-SCNC: 13 MMOL/L (ref 7–16)
AST SERPL W P-5'-P-CCNC: 13 U/L (ref 15–37)
BACTERIA #/AREA URNS HPF: ABNORMAL /HPF
BASOPHILS # BLD AUTO: 0.04 K/UL (ref 0–0.2)
BASOPHILS NFR BLD AUTO: 0.3 % (ref 0–1)
BILIRUB SERPL-MCNC: 0.5 MG/DL (ref ?–1.2)
BILIRUB UR QL STRIP: NEGATIVE
BUN SERPL-MCNC: 12 MG/DL (ref 7–18)
BUN/CREAT SERPL: 9 (ref 6–20)
CALCIUM SERPL-MCNC: 8.4 MG/DL (ref 8.5–10.1)
CHLORIDE SERPL-SCNC: 105 MMOL/L (ref 98–107)
CLARITY UR: CLEAR
CO2 SERPL-SCNC: 21 MMOL/L (ref 21–32)
COLOR UR: ABNORMAL
CREAT SERPL-MCNC: 1.39 MG/DL (ref 0.55–1.02)
D DIMER PPP FEU-MCNC: 16.62 ΜG/ML (ref 0–0.47)
DIFFERENTIAL METHOD BLD: ABNORMAL
EGFR (NO RACE VARIABLE) (RUSH/TITUS): 40 ML/MIN/1.73M2
EOSINOPHIL # BLD AUTO: 0.02 K/UL (ref 0–0.5)
EOSINOPHIL NFR BLD AUTO: 0.1 % (ref 1–4)
ERYTHROCYTE [DISTWIDTH] IN BLOOD BY AUTOMATED COUNT: 14.7 % (ref 11.5–14.5)
FECAL LEUKOCYTES: POSITIVE
GLOBULIN SER-MCNC: 4 G/DL (ref 2–4)
GLUCOSE SERPL-MCNC: 237 MG/DL (ref 70–105)
GLUCOSE SERPL-MCNC: 278 MG/DL (ref 70–105)
GLUCOSE SERPL-MCNC: 313 MG/DL (ref 74–106)
GLUCOSE UR STRIP-MCNC: 1000 MG/DL
HCT VFR BLD AUTO: 34.6 % (ref 38–47)
HGB BLD-MCNC: 10.6 G/DL (ref 12–16)
IMM GRANULOCYTES # BLD AUTO: 0.09 K/UL (ref 0–0.04)
IMM GRANULOCYTES NFR BLD: 0.6 % (ref 0–0.4)
INFLUENZA A MOLECULAR (OHS): NEGATIVE
INFLUENZA B MOLECULAR (OHS): NEGATIVE
INR BLD: 0.98
KETONES UR STRIP-SCNC: ABNORMAL MG/DL
LACTATE SERPL-SCNC: 4.4 MMOL/L (ref 0.4–2)
LACTATE SERPL-SCNC: 5.7 MMOL/L (ref 0.4–2)
LACTATE SERPL-SCNC: 5.8 MMOL/L (ref 0.4–2)
LACTATE SERPL-SCNC: 6.1 MMOL/L (ref 0.4–2)
LEUKOCYTE ESTERASE UR QL STRIP: NEGATIVE
LYMPHOCYTES # BLD AUTO: 1 K/UL (ref 1–4.8)
LYMPHOCYTES NFR BLD AUTO: 6.8 % (ref 27–41)
MCH RBC QN AUTO: 24.8 PG (ref 27–31)
MCHC RBC AUTO-ENTMCNC: 30.6 G/DL (ref 32–36)
MCV RBC AUTO: 80.8 FL (ref 80–96)
MONOCYTES # BLD AUTO: 1.23 K/UL (ref 0–0.8)
MONOCYTES NFR BLD AUTO: 8.4 % (ref 2–6)
MPC BLD CALC-MCNC: 9.1 FL (ref 9.4–12.4)
MUCOUS, UA: ABNORMAL /LPF
NEUTROPHILS # BLD AUTO: 12.33 K/UL (ref 1.8–7.7)
NEUTROPHILS NFR BLD AUTO: 83.8 % (ref 53–65)
NITRITE UR QL STRIP: NEGATIVE
NRBC # BLD AUTO: 0 X10E3/UL
NRBC, AUTO (.00): 0 %
PH UR STRIP: 5.5 PH UNITS
PLATELET # BLD AUTO: 319 K/UL (ref 150–400)
POTASSIUM SERPL-SCNC: 3.4 MMOL/L (ref 3.5–5.1)
PROT SERPL-MCNC: 7 G/DL (ref 6.4–8.2)
PROT UR QL STRIP: 100
PROTHROMBIN TIME: 12.9 SECONDS (ref 11.7–14.7)
RBC # BLD AUTO: 4.28 M/UL (ref 4.2–5.4)
RBC # UR STRIP: ABNORMAL /UL
RBC #/AREA URNS HPF: 1 /HPF
SARS-COV-2 RDRP RESP QL NAA+PROBE: NEGATIVE
SODIUM SERPL-SCNC: 136 MMOL/L (ref 136–145)
SP GR UR STRIP: 1.01
UROBILINOGEN UR STRIP-ACNC: NORMAL MG/DL
WBC # BLD AUTO: 14.71 K/UL (ref 4.5–11)
WBC #/AREA URNS HPF: 1 /HPF

## 2024-11-02 PROCEDURE — 63600175 PHARM REV CODE 636 W HCPCS: Performed by: HOSPITALIST

## 2024-11-02 PROCEDURE — 87324 CLOSTRIDIUM AG IA: CPT | Performed by: HOSPITALIST

## 2024-11-02 PROCEDURE — 63600175 PHARM REV CODE 636 W HCPCS: Performed by: EMERGENCY MEDICINE

## 2024-11-02 PROCEDURE — 25000003 PHARM REV CODE 250: Performed by: HOSPITALIST

## 2024-11-02 PROCEDURE — 36415 COLL VENOUS BLD VENIPUNCTURE: CPT | Performed by: HOSPITALIST

## 2024-11-02 PROCEDURE — 96361 HYDRATE IV INFUSION ADD-ON: CPT

## 2024-11-02 PROCEDURE — 93005 ELECTROCARDIOGRAM TRACING: CPT

## 2024-11-02 PROCEDURE — 96365 THER/PROPH/DIAG IV INF INIT: CPT

## 2024-11-02 PROCEDURE — 99223 1ST HOSP IP/OBS HIGH 75: CPT | Mod: ,,, | Performed by: HOSPITALIST

## 2024-11-02 PROCEDURE — 85025 COMPLETE CBC W/AUTO DIFF WBC: CPT | Performed by: EMERGENCY MEDICINE

## 2024-11-02 PROCEDURE — 99285 EMERGENCY DEPT VISIT HI MDM: CPT | Mod: 25

## 2024-11-02 PROCEDURE — 85379 FIBRIN DEGRADATION QUANT: CPT | Performed by: HOSPITALIST

## 2024-11-02 PROCEDURE — 83605 ASSAY OF LACTIC ACID: CPT | Performed by: EMERGENCY MEDICINE

## 2024-11-02 PROCEDURE — 83630 LACTOFERRIN FECAL (QUAL): CPT | Performed by: HOSPITALIST

## 2024-11-02 PROCEDURE — 87493 C DIFF AMPLIFIED PROBE: CPT | Performed by: HOSPITALIST

## 2024-11-02 PROCEDURE — 81003 URINALYSIS AUTO W/O SCOPE: CPT | Performed by: EMERGENCY MEDICINE

## 2024-11-02 PROCEDURE — 11000001 HC ACUTE MED/SURG PRIVATE ROOM

## 2024-11-02 PROCEDURE — 87186 SC STD MICRODIL/AGAR DIL: CPT | Performed by: HOSPITALIST

## 2024-11-02 PROCEDURE — 85610 PROTHROMBIN TIME: CPT | Performed by: EMERGENCY MEDICINE

## 2024-11-02 PROCEDURE — 87502 INFLUENZA DNA AMP PROBE: CPT | Performed by: EMERGENCY MEDICINE

## 2024-11-02 PROCEDURE — 25000003 PHARM REV CODE 250: Performed by: EMERGENCY MEDICINE

## 2024-11-02 PROCEDURE — 87506 IADNA-DNA/RNA PROBE TQ 6-11: CPT | Performed by: HOSPITALIST

## 2024-11-02 PROCEDURE — 36415 COLL VENOUS BLD VENIPUNCTURE: CPT | Performed by: EMERGENCY MEDICINE

## 2024-11-02 PROCEDURE — 87635 SARS-COV-2 COVID-19 AMP PRB: CPT | Performed by: EMERGENCY MEDICINE

## 2024-11-02 PROCEDURE — 82962 GLUCOSE BLOOD TEST: CPT

## 2024-11-02 PROCEDURE — 87040 BLOOD CULTURE FOR BACTERIA: CPT | Performed by: EMERGENCY MEDICINE

## 2024-11-02 PROCEDURE — 96375 TX/PRO/DX INJ NEW DRUG ADDON: CPT

## 2024-11-02 PROCEDURE — 80053 COMPREHEN METABOLIC PANEL: CPT | Performed by: EMERGENCY MEDICINE

## 2024-11-02 RX ORDER — ACETAMINOPHEN 325 MG/1
650 TABLET ORAL
Status: COMPLETED | OUTPATIENT
Start: 2024-11-02 | End: 2024-11-02

## 2024-11-02 RX ORDER — LEVOFLOXACIN 5 MG/ML
500 INJECTION, SOLUTION INTRAVENOUS
Status: DISCONTINUED | OUTPATIENT
Start: 2024-11-02 | End: 2024-11-03

## 2024-11-02 RX ORDER — METOCLOPRAMIDE HYDROCHLORIDE 5 MG/ML
10 INJECTION INTRAMUSCULAR; INTRAVENOUS ONCE
Status: COMPLETED | OUTPATIENT
Start: 2024-11-02 | End: 2024-11-02

## 2024-11-02 RX ORDER — LOSARTAN POTASSIUM 50 MG/1
50 TABLET ORAL DAILY
Status: DISCONTINUED | OUTPATIENT
Start: 2024-11-03 | End: 2024-11-04

## 2024-11-02 RX ORDER — SODIUM CHLORIDE 0.9 % (FLUSH) 0.9 %
10 SYRINGE (ML) INJECTION EVERY 12 HOURS PRN
Status: DISCONTINUED | OUTPATIENT
Start: 2024-11-02 | End: 2024-11-10 | Stop reason: HOSPADM

## 2024-11-02 RX ORDER — IBUPROFEN 200 MG
24 TABLET ORAL
Status: DISCONTINUED | OUTPATIENT
Start: 2024-11-02 | End: 2024-11-10 | Stop reason: HOSPADM

## 2024-11-02 RX ORDER — ACETAMINOPHEN 650 MG/1
650 SUPPOSITORY RECTAL EVERY 6 HOURS PRN
Status: DISCONTINUED | OUTPATIENT
Start: 2024-11-02 | End: 2024-11-10 | Stop reason: HOSPADM

## 2024-11-02 RX ORDER — ONDANSETRON HYDROCHLORIDE 2 MG/ML
4 INJECTION, SOLUTION INTRAVENOUS
Status: COMPLETED | OUTPATIENT
Start: 2024-11-02 | End: 2024-11-02

## 2024-11-02 RX ORDER — ENOXAPARIN SODIUM 100 MG/ML
40 INJECTION SUBCUTANEOUS EVERY 24 HOURS
Status: DISCONTINUED | OUTPATIENT
Start: 2024-11-02 | End: 2024-11-04

## 2024-11-02 RX ORDER — SODIUM CHLORIDE AND POTASSIUM CHLORIDE 150; 450 MG/100ML; MG/100ML
INJECTION, SOLUTION INTRAVENOUS CONTINUOUS
Status: DISPENSED | OUTPATIENT
Start: 2024-11-02 | End: 2024-11-03

## 2024-11-02 RX ORDER — KETOROLAC TROMETHAMINE 15 MG/ML
15 INJECTION, SOLUTION INTRAMUSCULAR; INTRAVENOUS ONCE
Status: COMPLETED | OUTPATIENT
Start: 2024-11-02 | End: 2024-11-02

## 2024-11-02 RX ORDER — DIPHENHYDRAMINE HYDROCHLORIDE 50 MG/ML
50 INJECTION INTRAMUSCULAR; INTRAVENOUS ONCE
Status: COMPLETED | OUTPATIENT
Start: 2024-11-02 | End: 2024-11-02

## 2024-11-02 RX ORDER — PROMETHAZINE HYDROCHLORIDE 25 MG/1
25 TABLET ORAL EVERY 6 HOURS PRN
Status: DISCONTINUED | OUTPATIENT
Start: 2024-11-02 | End: 2024-11-04

## 2024-11-02 RX ORDER — NALOXONE HCL 0.4 MG/ML
0.02 VIAL (ML) INJECTION
Status: DISCONTINUED | OUTPATIENT
Start: 2024-11-02 | End: 2024-11-10 | Stop reason: HOSPADM

## 2024-11-02 RX ORDER — ONDANSETRON HYDROCHLORIDE 2 MG/ML
4 INJECTION, SOLUTION INTRAVENOUS EVERY 8 HOURS PRN
Status: DISCONTINUED | OUTPATIENT
Start: 2024-11-02 | End: 2024-11-02

## 2024-11-02 RX ORDER — IBUPROFEN 200 MG
16 TABLET ORAL
Status: DISCONTINUED | OUTPATIENT
Start: 2024-11-02 | End: 2024-11-10 | Stop reason: HOSPADM

## 2024-11-02 RX ORDER — GABAPENTIN 100 MG/1
100 CAPSULE ORAL 3 TIMES DAILY
Status: DISCONTINUED | OUTPATIENT
Start: 2024-11-02 | End: 2024-11-03

## 2024-11-02 RX ORDER — ACETAMINOPHEN 325 MG/1
325 TABLET ORAL
Status: COMPLETED | OUTPATIENT
Start: 2024-11-02 | End: 2024-11-02

## 2024-11-02 RX ORDER — GLUCAGON 1 MG
1 KIT INJECTION
Status: DISCONTINUED | OUTPATIENT
Start: 2024-11-02 | End: 2024-11-10 | Stop reason: HOSPADM

## 2024-11-02 RX ORDER — INSULIN ASPART 100 [IU]/ML
0-10 INJECTION, SOLUTION INTRAVENOUS; SUBCUTANEOUS
Status: DISCONTINUED | OUTPATIENT
Start: 2024-11-02 | End: 2024-11-10 | Stop reason: HOSPADM

## 2024-11-02 RX ORDER — LEVOTHYROXINE SODIUM 125 UG/1
125 TABLET ORAL DAILY
Status: DISCONTINUED | OUTPATIENT
Start: 2024-11-03 | End: 2024-11-03

## 2024-11-02 RX ORDER — ONDANSETRON HYDROCHLORIDE 2 MG/ML
8 INJECTION, SOLUTION INTRAVENOUS EVERY 8 HOURS PRN
Status: DISCONTINUED | OUTPATIENT
Start: 2024-11-02 | End: 2024-11-04

## 2024-11-02 RX ORDER — ACETAMINOPHEN 325 MG/1
650 TABLET ORAL EVERY 8 HOURS PRN
Status: DISCONTINUED | OUTPATIENT
Start: 2024-11-02 | End: 2024-11-10 | Stop reason: HOSPADM

## 2024-11-02 RX ADMIN — PIPERACILLIN SODIUM AND TAZOBACTAM SODIUM 4.5 G: 4; .5 INJECTION, POWDER, LYOPHILIZED, FOR SOLUTION INTRAVENOUS at 09:11

## 2024-11-02 RX ADMIN — SODIUM CHLORIDE 1500 ML: 9 INJECTION, SOLUTION INTRAVENOUS at 09:11

## 2024-11-02 RX ADMIN — ACETAMINOPHEN 325 MG: 325 TABLET ORAL at 08:11

## 2024-11-02 RX ADMIN — DIPHENHYDRAMINE HYDROCHLORIDE 50 MG: 50 INJECTION INTRAMUSCULAR; INTRAVENOUS at 08:11

## 2024-11-02 RX ADMIN — KETOROLAC TROMETHAMINE 15 MG: 15 INJECTION, SOLUTION INTRAMUSCULAR; INTRAVENOUS at 08:11

## 2024-11-02 RX ADMIN — ONDANSETRON 4 MG: 2 INJECTION INTRAMUSCULAR; INTRAVENOUS at 07:11

## 2024-11-02 RX ADMIN — POTASSIUM CHLORIDE AND SODIUM CHLORIDE: 450; 150 INJECTION, SOLUTION INTRAVENOUS at 04:11

## 2024-11-02 RX ADMIN — ONDANSETRON 4 MG: 2 INJECTION INTRAMUSCULAR; INTRAVENOUS at 12:11

## 2024-11-02 RX ADMIN — ENOXAPARIN SODIUM 40 MG: 40 INJECTION SUBCUTANEOUS at 04:11

## 2024-11-02 RX ADMIN — INSULIN ASPART 2 UNITS: 100 INJECTION, SOLUTION INTRAVENOUS; SUBCUTANEOUS at 09:11

## 2024-11-02 RX ADMIN — LEVOFLOXACIN 500 MG: 500 INJECTION, SOLUTION INTRAVENOUS at 04:11

## 2024-11-02 RX ADMIN — ACETAMINOPHEN 650 MG: 325 TABLET ORAL at 06:11

## 2024-11-02 RX ADMIN — ONDANSETRON 4 MG: 2 INJECTION INTRAMUSCULAR; INTRAVENOUS at 06:11

## 2024-11-02 RX ADMIN — SODIUM CHLORIDE 1000 ML: 9 INJECTION, SOLUTION INTRAVENOUS at 07:11

## 2024-11-02 RX ADMIN — ACETAMINOPHEN 650 MG: 325 TABLET ORAL at 07:11

## 2024-11-02 RX ADMIN — METOCLOPRAMIDE 10 MG: 5 INJECTION, SOLUTION INTRAMUSCULAR; INTRAVENOUS at 08:11

## 2024-11-02 NOTE — PHARMACY MED REC
"Admission Medication History     The home medication history was taken by Ilda Cedeño.    You may go to "Admission" then "Reconcile Home Medications" tabs to review and/or act upon these items.     The home medication list has been updated by the Pharmacy department.   Please read ALL comments highlighted in yellow.   Please address this information as you see fit.    Feel free to contact us if you have any questions or require assistance.      Patient reports no longer taking the following medication(s). The medication(s) listed below were removed from the home medication list. Please reorder if appropriate:  Abatacept  Fluticasone nasal spray  Hydrocodone-acetaminophen  mg  Ibuprofen 200 mg    Medications listed below were obtained from: Patient/family, Medications brought from home, and Analytic software- [employer]  Facility-Administered Medications Prior to Admission   Medication    diphenhydrAMINE injection 25 mg    EPINEPHrine (EPIPEN) 0.3 mg/0.3 mL pen injection 0.3 mg    ondansetron disintegrating tablet 4 mg    sodium chloride 0.9% 500 mL flush bag    sodium chloride 0.9% flush 10 mL     PTA Medications   Medication Sig    aspirin 81 mg Cap Take 81 mg by mouth once daily.    cyclobenzaprine (FLEXERIL) 10 MG tablet Take 1 tablet (10 mg total) by mouth 3 (three) times daily as needed for Muscle spasms.    furosemide (LASIX) 20 MG tablet Take 1 tablet (20 mg total) by mouth once daily.    gabapentin (NEURONTIN) 100 MG capsule Take 1 capsule (100 mg total) by mouth 3 (three) times daily.    levothyroxine (SYNTHROID) 125 MCG tablet TAKE 1 TABLET BY MOUTH EVERY DAY    losartan-hydrochlorothiazide 100-12.5 mg (HYZAAR) 100-12.5 mg Tab TAKE 1 TABLET BY MOUTH EVERY DAY    metFORMIN (GLUCOPHAGE) 500 MG tablet TAKE 1 TABLET BY MOUTH THREE TIMES A DAY    omeprazole (PRILOSEC) 40 MG capsule TAKE 1 CAPSULE BY MOUTH ONCE  DAILY    potassium chloride (MICRO-K) 8 mEq CpSR TAKE 1 CAPSULE BY MOUTH ONCE DAILY.    " predniSONE (DELTASONE) 5 MG tablet Take 5 mg by mouth once daily.         Current Outpatient Medications on File Prior to Encounter   Medication Sig Dispense Refill Last Dose/Taking    aspirin 81 mg Cap Take 81 mg by mouth once daily.   11/1/2024    cyclobenzaprine (FLEXERIL) 10 MG tablet Take 1 tablet (10 mg total) by mouth 3 (three) times daily as needed for Muscle spasms. 90 tablet 11 11/1/2024    furosemide (LASIX) 20 MG tablet Take 1 tablet (20 mg total) by mouth once daily. 30 tablet 11 11/1/2024    gabapentin (NEURONTIN) 100 MG capsule Take 1 capsule (100 mg total) by mouth 3 (three) times daily. 90 capsule 11 11/1/2024    levothyroxine (SYNTHROID) 125 MCG tablet TAKE 1 TABLET BY MOUTH EVERY DAY 90 tablet 3 11/1/2024    losartan-hydrochlorothiazide 100-12.5 mg (HYZAAR) 100-12.5 mg Tab TAKE 1 TABLET BY MOUTH EVERY DAY 90 tablet 3 11/1/2024    metFORMIN (GLUCOPHAGE) 500 MG tablet TAKE 1 TABLET BY MOUTH THREE TIMES A  tablet 3 11/1/2024    omeprazole (PRILOSEC) 40 MG capsule TAKE 1 CAPSULE BY MOUTH ONCE  DAILY 90 capsule 3 11/1/2024    potassium chloride (MICRO-K) 8 mEq CpSR TAKE 1 CAPSULE BY MOUTH ONCE DAILY. 90 capsule 3 11/1/2024    predniSONE (DELTASONE) 5 MG tablet Take 5 mg by mouth once daily.   11/1/2024    [DISCONTINUED] abatacept (ORENCIA SUBQ)        [DISCONTINUED] fluticasone propionate (FLONASE) 50 mcg/actuation nasal spray 1 spray in each nostril  Strength: 50 mcg/actuation 16 g 11     [DISCONTINUED] HYDROcodone-acetaminophen (NORCO)  mg per tablet Take 1 tablet by mouth every 6 (six) hours as needed for Pain. 28 tablet 0     [DISCONTINUED] ibuprofen (ADVIL,MOTRIN) 200 MG tablet Take 200 mg by mouth every 8 (eight) hours as needed for Pain.       [DISCONTINUED] losartan (COZAAR) 50 MG tablet TAKE 1 TABLET BY MOUTH EVERY DAY 90 tablet 3          Potential issues to be addressed PRIOR TO DISCHARGE  No issues identified.    Ilda Cedeño  Medication Access Specialist  EXT.  [phone]    .

## 2024-11-02 NOTE — Clinical Note
Diagnosis: Fever [[id number]]   Future Attending Provider: JEAN CARLOS LUIS [[id number]]   Reason for IP Medical Treatment  (Clinical interventions that can only be accomplished in the IP setting? ) :: treatment   Plans for Post-Acute care--if anticipated (pick the single best option):: A. No post acute care anticipated at this time

## 2024-11-02 NOTE — ASSESSMENT & PLAN NOTE
Check stool studies  Blood culture have been drawn  Cover with levofloxacin for possible infectious diarrhea

## 2024-11-02 NOTE — ASSESSMENT & PLAN NOTE
>>ASSESSMENT AND PLAN FOR NAUSEA VOMITING AND DIARRHEA WRITTEN ON 11/2/2024  4:23 PM BY JEAN CARLOS LUIS MD      Continue hydration, GI upset better, checking stool studies  Liquid diet for now

## 2024-11-02 NOTE — PLAN OF CARE
Problem: Adult Inpatient Plan of Care  Goal: Plan of Care Review  11/2/2024 1611 by Carrie Aguillon RN  Outcome: Progressing  11/2/2024 1610 by Carrie Aguillon RN  Outcome: Progressing  Goal: Patient-Specific Goal (Individualized)  11/2/2024 1611 by Carrie Aguillon RN  Outcome: Progressing  11/2/2024 1610 by Carrie Aguillon RN  Outcome: Progressing  Goal: Absence of Hospital-Acquired Illness or Injury  11/2/2024 1611 by Carrie Aguillon RN  Outcome: Progressing  11/2/2024 1610 by Carrie Aguillon RN  Outcome: Progressing  Goal: Optimal Comfort and Wellbeing  11/2/2024 1611 by Carrie Aguillon RN  Outcome: Progressing  11/2/2024 1610 by Carrie Aguillon RN  Outcome: Progressing  Goal: Readiness for Transition of Care  11/2/2024 1611 by Carrie Aguillon RN  Outcome: Progressing  11/2/2024 1610 by Carrie Aguillon RN  Outcome: Progressing     Problem: Diabetes Comorbidity  Goal: Blood Glucose Level Within Targeted Range  11/2/2024 1611 by Carrie Aguillon RN  Outcome: Progressing  11/2/2024 1610 by Carrie Aguillon RN  Outcome: Progressing     Problem: Wound  Goal: Optimal Coping  11/2/2024 1611 by Carrie Aguillon RN  Outcome: Progressing  11/2/2024 1610 by Carrie Aguillon RN  Outcome: Progressing  Goal: Optimal Functional Ability  11/2/2024 1611 by Carrie Aguillon RN  Outcome: Progressing  11/2/2024 1610 by Carrie Aguillon RN  Outcome: Progressing  Goal: Absence of Infection Signs and Symptoms  11/2/2024 1611 by Carrie Aguillon RN  Outcome: Progressing  11/2/2024 1610 by Carrie Aguillon RN  Outcome: Progressing  Goal: Improved Oral Intake  11/2/2024 1611 by Carrie Aguillon RN  Outcome: Progressing  11/2/2024 1610 by Carrie Aguillon, RN  Outcome: Progressing  Goal: Optimal Pain Control and Function  11/2/2024 1611 by Carrie Aguillon, RN  Outcome: Progressing  11/2/2024 1610 by  Carrie Aguillon RN  Outcome: Progressing  Goal: Skin Health and Integrity  11/2/2024 1611 by Carrie Aguillon RN  Outcome: Progressing  11/2/2024 1610 by Carrie Aguillon RN  Outcome: Progressing  Goal: Optimal Wound Healing  11/2/2024 1611 by Carrie Aguillon RN  Outcome: Progressing  11/2/2024 1610 by Carrie Aguillon RN  Outcome: Progressing

## 2024-11-02 NOTE — SUBJECTIVE & OBJECTIVE
Past Medical History:   Diagnosis Date    Arthritis     Diabetes mellitus, type 2     GERD (gastroesophageal reflux disease)     Hypertension     Iron deficiency anemia     Obesity     Personal history of colonic polyps 08/19/2019    Thyroid disease        Past Surgical History:   Procedure Laterality Date    ARTHROSCOPIC DEBRIDEMENT OF SHOULDER Left 12/21/2021    Procedure: DEBRIDEMENT, SHOULDER, ARTHROSCOPIC;  Surgeon: Jose Antonio Pablo MD;  Location: [facility];  Service: Orthopedics;  Laterality: Left;  SHOULDER JOINT    ARTHROSCOPIC REMOVAL OF LOOSE BODY FROM JOINT Left 12/21/2021    Procedure: REMOVAL, LOOSE BODY, JOINT, ARTHROSCOPIC;  Surgeon: Jose Antonio Pablo MD;  Location: [facility];  Service: Orthopedics;  Laterality: Left;    COLONOSCOPY W/ POLYPECTOMY  08/19/2019    DECOMPRESSION OF SUBACROMIAL SPACE Left 12/21/2021    Procedure: DECOMPRESSION, SUBACROMIAL SPACE;  Surgeon: Jose Antonio Pablo MD;  Location: [facility];  Service: Orthopedics;  Laterality: Left;    DISTAL CLAVICLE EXCISION Left 12/21/2021    Procedure: EXCISION, CLAVICLE, DISTAL;  Surgeon: Jose Antonio Pablo MD;  Location: [facility];  Service: Orthopedics;  Laterality: Left;    left total knee replacement      PARTIAL HYSTERECTOMY      REVERSE TOTAL SHOULDER ARTHROPLASTY Right 8/27/2024    Procedure: ARTHROPLASTY, SHOULDER, TOTAL, REVERSE;  Surgeon: Jose Antonio Pablo MD;  Location: [facility];  Service: Orthopedics;  Laterality: Right;    ROTATOR CUFF REPAIR Left 12/21/2021    Procedure: REPAIR, ROTATOR CUFF;  Surgeon: Jose Antonio Pablo MD;  Location: [facility];  Service: Orthopedics;  Laterality: Left;    SHOULDER ARTHROSCOPY Left 12/21/2021    Procedure: ARTHROSCOPY, SHOULDER;  Surgeon: Jose Antonio Pablo MD;  Location: [facility];  Service: Orthopedics;  Laterality: Left;    SHOULDER SURGERY Left 12/21/2021    THYROID SURGERY      total thyroidectomy    TONSILLECTOMY AND ADENOIDECTOMY          Review of patient's allergies indicates:   Allergen Reactions    Codeine phosphate      Other reaction(s): facial swelling    Codeine sulfate      Other reaction(s): Unknown       No current facility-administered medications on file prior to encounter.     Current Outpatient Medications on File Prior to Encounter   Medication Sig    abatacept (ORENCIA SUBQ)     aspirin 81 mg Cap Take by mouth.    cyclobenzaprine (FLEXERIL) 10 MG tablet Take 1 tablet (10 mg total) by mouth 3 (three) times daily as needed for Muscle spasms.    fluticasone propionate (FLONASE) 50 mcg/actuation nasal spray 1 spray in each nostril  Strength: 50 mcg/actuation    furosemide (LASIX) 20 MG tablet Take 1 tablet (20 mg total) by mouth once daily.    gabapentin (NEURONTIN) 100 MG capsule Take 1 capsule (100 mg total) by mouth 3 (three) times daily.    HYDROcodone-acetaminophen (NORCO)  mg per tablet Take 1 tablet by mouth every 6 (six) hours as needed for Pain.    ibuprofen (ADVIL,MOTRIN) 200 MG tablet Take 200 mg by mouth every 8 (eight) hours as needed for Pain.    levothyroxine (SYNTHROID) 125 MCG tablet TAKE 1 TABLET BY MOUTH EVERY DAY    losartan-hydrochlorothiazide 100-12.5 mg (HYZAAR) 100-12.5 mg Tab TAKE 1 TABLET BY MOUTH EVERY DAY    metFORMIN (GLUCOPHAGE) 500 MG tablet TAKE 1 TABLET BY MOUTH THREE TIMES A DAY    omeprazole (PRILOSEC) 40 MG capsule TAKE 1 CAPSULE BY MOUTH ONCE  DAILY    potassium chloride (MICRO-K) 8 mEq CpSR TAKE 1 CAPSULE BY MOUTH ONCE DAILY.    predniSONE (DELTASONE) 5 MG tablet TAKE 1-2 TABLETS BY MOUTH DAILY AS NEEDED    [DISCONTINUED] losartan (COZAAR) 50 MG tablet TAKE 1 TABLET BY MOUTH EVERY DAY     Family History       Problem Relation (Age of Onset)    Alzheimer's disease Mother    Heart failure Father, Brother          Tobacco Use    Smoking status: Never    Smokeless tobacco: Never   Substance and Sexual Activity    Alcohol use: Never    Drug use: Never    Sexual activity: Yes     Review of  Systems   Constitutional:  Positive for appetite change, chills, fatigue and fever.   HENT:  Negative for congestion, hearing loss and trouble swallowing.    Respiratory:  Negative for chest tightness, shortness of breath and wheezing.    Cardiovascular:  Negative for chest pain and palpitations.   Gastrointestinal:  Positive for diarrhea, nausea and vomiting. Negative for abdominal pain and constipation.   Genitourinary:  Negative for difficulty urinating and dysuria.   Musculoskeletal:  Negative for back pain and neck stiffness.   Skin:  Negative for pallor and rash.   Neurological:  Positive for dizziness. Negative for speech difficulty and headaches.   Psychiatric/Behavioral:  Negative for confusion and suicidal ideas.      Objective:     Vital Signs (Most Recent):  Temp: (!) 102.8 °F (39.3 °C) (11/02/24 1511)  Pulse: (!) 113 (11/02/24 1613)  Resp: 15 (11/02/24 1613)  BP: (!) 160/80 (11/02/24 1613)  SpO2: 97 % (11/02/24 1613) Vital Signs (24h Range):  Temp:  [98.8 °F (37.1 °C)-102.8 °F (39.3 °C)] 102.8 °F (39.3 °C)  Pulse:  [105-121] 113  Resp:  [12-41] 15  SpO2:  [92 %-99 %] 97 %  BP: (130-185)/(60-80) 160/80     Weight: 93.4 kg (206 lb)  Body mass index is 36.49 kg/m².     Physical Exam  Vitals reviewed.   Constitutional:       General: She is awake. She is not in acute distress.     Appearance: She is well-developed. She is obese. She is ill-appearing. She is not toxic-appearing.   HENT:      Head: Normocephalic.      Nose: Nose normal.      Mouth/Throat:      Pharynx: Oropharynx is clear.   Eyes:      Extraocular Movements: Extraocular movements intact.      Pupils: Pupils are equal, round, and reactive to light.   Neck:      Thyroid: No thyroid mass.      Vascular: No carotid bruit.   Cardiovascular:      Rate and Rhythm: Regular rhythm. Tachycardia present.      Pulses: Normal pulses.      Heart sounds: Normal heart sounds. No murmur heard.  Pulmonary:      Effort: Pulmonary effort is normal.      Breath  sounds: Normal breath sounds and air entry. No wheezing.   Abdominal:      General: Bowel sounds are increased. There is no distension.      Palpations: Abdomen is soft.      Tenderness: There is no abdominal tenderness.   Musculoskeletal:         General: Normal range of motion.      Cervical back: Neck supple. No rigidity.   Skin:     General: Skin is warm.      Coloration: Skin is not jaundiced.      Findings: No lesion.   Neurological:      General: No focal deficit present.      Mental Status: She is alert and oriented to person, place, and time.      Cranial Nerves: No cranial nerve deficit.   Psychiatric:         Attention and Perception: Attention normal.         Mood and Affect: Mood normal.         Behavior: Behavior normal. Behavior is cooperative.         Thought Content: Thought content normal.         Cognition and Memory: Cognition normal.              CRANIAL NERVES     CN III, IV, VI   Pupils are equal, round, and reactive to light.       Significant Labs: All pertinent labs within the past 24 hours have been reviewed.  BMP:   Recent Labs   Lab 11/02/24  0653   *      K 3.4*      CO2 21   BUN 12   CREATININE 1.39*   CALCIUM 8.4*     CBC:   Recent Labs   Lab 11/02/24  0653   WBC 14.71*   HGB 10.6*   HCT 34.6*        CMP:   Recent Labs   Lab 11/02/24  0653      K 3.4*      CO2 21   *   BUN 12   CREATININE 1.39*   CALCIUM 8.4*   PROT 7.0   ALBUMIN 3.0*   BILITOT 0.5   ALKPHOS 63   AST 13*   ALT 21   ANIONGAP 13       Significant Imaging: I have reviewed all pertinent imaging results/findings within the past 24 hours.    Imaging Results              CT Abdomen Pelvis  Without Contrast (Final result)  Result time 11/02/24 10:13:57      Final result by Emerson Kelley MD (11/02/24 10:13:57)                   Impression:      1. Nonspecific intraluminal fluid within normal caliber loops of colon, as may be seen in the setting of diarrheal illness.  2. Patchy  airspace consolidation small nodules in the basilar right lower lobe, could reflect infectious or noninfectious inflammatory process.  For multiple solid nodules all <6 mm, Fleischner Society 2017 guidelines recommend no routine follow up for a low risk patient, or follow up with non-contrast chest CT at 12 months after discovery in a high risk patient.  3. Additional details of chronic and incidental findings, as provided in the body of report.      Electronically signed by: Emerson Kelley  Date:    11/02/2024  Time:    10:13               Narrative:    EXAMINATION:  CT ABDOMEN PELVIS WITHOUT CONTRAST    CLINICAL HISTORY:  Abdominal pain, acute, nonlocalized;abdominal pain;    TECHNIQUE:  Low dose axial images, sagittal and coronal reformations were obtained from the lung bases to the pubic symphysis.    COMPARISON:  CT of the abdomen and pelvis performed 03/13/2015.    FINDINGS:  This examination is limited due to lack of intravenous contrast.    Lower chest: Atelectasis.  Patchy airspace consolidation and small nodules noted in the basilar right lower lobe.    Liver: Suggested hepatomegaly.  Hepatic steatosis.    Gallbladder and bile ducts: Unremarkable.    Pancreas: Normal contour.    Spleen: Normal contour.    Adrenals: Normal contour.    Kidneys: Suggested mild degree of cortical atrophy of both kidneys.  Nonspecific bilateral perinephric stranding.  Subcentimeter hypodense lesions in the kidneys are too small to definitely characterize.    Lymph nodes: No abdominal or pelvic lymphadenopathy.    Bowel and mesentery: Small hiatal hernia.  No evidence of bowel obstruction.  Nonspecific intraluminal fluid within normal caliber loops of colon, as may be seen in the setting of diarrheal illness.  Nonspecific fatty infiltration in the region of the ileocecal valve.  Appendix not confidently identified.  No definite focal pericecal inflammation.    Abdominal aorta: Nonaneurysmal.  Mild atherosclerotic  calcification.    Inferior vena cava: Unremarkable.    Free fluid or free air: None.    Pelvis: Unremarkable.    Urinary bladder: Unremarkable.    Body wall: Remote operative sequela in the anterior abdominal wall.    Bones: Query osseous demineralization.  No definite acute findings.  Degenerative findings of the spine and hips.                                       X-Ray Chest 1 View (Final result)  Result time 11/02/24 08:34:51      Final result by Emerson Kelley MD (11/02/24 08:34:51)                   Impression:      Mildly prominent interstitial markings could relate to pulmonary vascular congestion/edema versus infectious or noninfectious inflammatory process.    Suspect left basilar atelectasis.      Electronically signed by: Emerson Kelley  Date:    11/02/2024  Time:    08:34               Narrative:    EXAMINATION:  XR CHEST 1 VIEW    CLINICAL HISTORY:  Fever, unspecified    TECHNIQUE:  Single frontal view of the chest was performed.    COMPARISON:  Chest radiograph performed 08/14/2024, 09:20 hours.    FINDINGS:  Monitoring leads overlie the chest.  Grossly unchanged cardiomediastinal contours.  Mildly prominent interstitial markings.    No definite focal airspace consolidation.  Suspect left basilar atelectasis.    No definite pneumothorax or large volume pleural effusion.    No acute findings in the visualized abdomen.    Osseous and soft tissue structures appear without definite acute change.                                    Intake/Output - Last 3 Shifts         10/31 0700  11/01 0659 11/01 0700  11/02 0659 11/02 0700  11/03 0659    IV Piggyback   2600    Total Intake(mL/kg)   2600 (27.8)    Net   +2600                 Microbiology Results (last 7 days)       Procedure Component Value Units Date/Time    C Diff Toxin by PCR [[id number]]     Order Status: Sent Specimen: Stool     Enteric Pathogen Panel [[id number]]     Order Status: Sent Specimen: Stool     Fecal leukocytes [[id number]]     Order  Status: Sent Specimen: Stool     Ova and Parasite Exam [[id number]]     Order Status: Sent Specimen: Stool     Influenza A & B by Molecular [[id number]]  (Normal) Collected: 11/02/24 0658    Order Status: Completed Specimen: Nasopharyngeal Swab Updated: 11/02/24 0747     INFLUENZA A MOLECULAR Negative     INFLUENZA B MOLECULAR  Negative    Blood culture #2 [[id number]] Collected: 11/02/24 0715    Order Status: Sent Specimen: Blood from Peripheral, Antecubital, Right Updated: 11/02/24 0721    Blood culture #1 [[id number]] Collected: 11/02/24 0653    Order Status: Sent Specimen: Blood Updated: 11/02/24 0656

## 2024-11-02 NOTE — ASSESSMENT & PLAN NOTE
Body mass index is 36.49 kg/m². Morbid obesity complicates all aspects of disease management from diagnostic modalities to treatment. Weight loss encouraged and health benefits explained to patient.

## 2024-11-02 NOTE — ASSESSMENT & PLAN NOTE
Although IV fluids on national shortage with patient's tachycardia and inability to take orally will leave on continue maintenance IV fluids for now.  Patient has had 2-1/2 L so far in the emergency room.

## 2024-11-02 NOTE — HPI
Chief complaint:  Weakness with recent GI upset    History of present illness:          Ms. Dalal is a 74-year-old presented to the emergency room after have onset prior day of nausea vomiting and diarrhea.  This was associated with fever and chills.  Patient denies similar problem in the past.  Denies any unusual exposure.  Has not been out of area her eaten any unusual food that others have not also shared.  Did have shoulder surgery by Dr. Pablo 08/27/24 but no antibiotics since.  History of rheumatoid arthritis and on Orencia.  When seen in the emergency room was noted to be tachycardic, febrile, appeared ill and have elevated lactic acid.  Patient does take metformin for diabetes.  Continued here in the emergency room with nausea vomiting and liquidy nonbloody diarrhea.  Hospitalist service was asked to see patient for evaluation of admission'    Review of systems:  See above.  No recent respiratory complaints.  Denies pain.  Generally relatively active and able to ambulate without issues.  Has been undergoing therapy following above-mentioned shoulder surgery.  No history of being significant snore or sleep disturbance but does take frequent naps in the daytime.  Review of systems otherwise negative.     Past medical history:  -hypertension proximally 10 years  -diabetes mellitus proximally 10 years  -rheumatoid arthritis on selective T-cell co stimulation modulators (Orencia).   -hypothyroidism    Past surgical history:  Recent shoulder surgery as above mentioned by Dr. Pablo on August 27  Lumbar back surgery  Knee replacement  Hysterectomy with 1 ovary removed    Allergy to codeine    Social history:  Lives with   No history of tobacco alcohol    Family history:  Son with myocardial infarction in his 50s    Health maintenance issues:  Primary care providers Dr. Wyatt  Has not had flu shot this year  Had prior Pneumovax  Daughter states patient has had COVID infection 2-3 times the last  of which proximally 1 year earlier  Patient has had COVID vaccination x2 at least 1 booster  No history of recent mammogram  No recent Pap smear  Colonoscopies apparently about 5 years earlier told unremarkable

## 2024-11-02 NOTE — ASSESSMENT & PLAN NOTE
"Patient's FSGs are uncontrolled due to hyperglycemia on current medication regimen.  Last A1c reviewed-   Lab Results   Component Value Date    HGBA1C 10.6 (H) 08/28/2024     Most recent fingerstick glucose reviewed- No results for input(s): "POCTGLUCOSE" in the last 24 hours.  Current correctional scale  Medium  Increase anti-hyperglycemic dose as follows-   Antihyperglycemics (From admission, onward)      Start     Stop Route Frequency Ordered    11/02/24 1643  insulin aspart U-100 injection 0-10 Units         -- SubQ Before meals & nightly PRN 11/02/24 1546          Hold Oral hypoglycemics while patient is in the hospital.  "

## 2024-11-02 NOTE — ED TRIAGE NOTES
Ems reports with nausea vomiting and diarrhea for 2 days with associated weakness. Positive for chills denies fever.

## 2024-11-02 NOTE — ASSESSMENT & PLAN NOTE
Continue with intravenous hydration  Checking blood cultures with possibility of septicemia  However metformin inpatients current situation of dehydration and GI upset could be associated with lactic acidosis

## 2024-11-02 NOTE — H&P
[facility]  [facility]  History & Physical    Patient Name: Meghna Dalal  MRN: [id number]  Patient Class: IP- Inpatient  Admission Date: 11/2/2024  Attending Physician: Tigre Owusu MD   Primary Care Provider: Kwame Wyatt MD         Patient information was obtained from patient, relative(s), past medical records, and ER records.     Subjective:     Principal Problem:Fever    Chief Complaint:   Chief Complaint   Patient presents with    Diarrhea    Nausea    Emesis        HPI:   Chief complaint:  Weakness with recent GI upset    History of present illness:          Ms. Dalal is a 74-year-old presented to the emergency room after have onset prior day of nausea vomiting and diarrhea.  This was associated with fever and chills.  Patient denies similar problem in the past.  Denies any unusual exposure.  Has not been out of area her eaten any unusual food that others have not also shared.  Did have shoulder surgery by Dr. Pablo 08/27/24 but no antibiotics since.  History of rheumatoid arthritis and on Orencia.  When seen in the emergency room was noted to be tachycardic, febrile, appeared ill and have elevated lactic acid.  Patient does take metformin for diabetes.  Continued here in the emergency room with nausea vomiting and liquidy nonbloody diarrhea.  Hospitalist service was asked to see patient for evaluation of admission'    Review of systems:  See above.  No recent respiratory complaints.  Denies pain.  Generally relatively active and able to ambulate without issues.  Has been undergoing therapy following above-mentioned shoulder surgery.  No history of being significant snore or sleep disturbance but does take frequent naps in the daytime.  Review of systems otherwise negative.     Past medical history:  -hypertension proximally 10 years  -diabetes mellitus proximally 10 years  -rheumatoid arthritis on selective T-cell co stimulation modulators (Orencia).    -hypothyroidism    Past surgical history:  Recent shoulder surgery as above mentioned by Dr. Pablo on August 27  Lumbar back surgery  Knee replacement  Hysterectomy with 1 ovary removed    Allergy to codeine    Social history:  Lives with   No history of tobacco alcohol    Family history:  Son with myocardial infarction in his 50s    Health maintenance issues:  Primary care providers Dr. Wyatt  Has not had flu shot this year  Had prior Pneumovax  Daughter states patient has had COVID infection 2-3 times the last of which proximally 1 year earlier  Patient has had COVID vaccination x2 at least 1 booster  No history of recent mammogram  No recent Pap smear  Colonoscopies apparently about 5 years earlier told unremarkable          Past Medical History:   Diagnosis Date    Arthritis     Diabetes mellitus, type 2     GERD (gastroesophageal reflux disease)     Hypertension     Iron deficiency anemia     Obesity     Personal history of colonic polyps 08/19/2019    Thyroid disease        Past Surgical History:   Procedure Laterality Date    ARTHROSCOPIC DEBRIDEMENT OF SHOULDER Left 12/21/2021    Procedure: DEBRIDEMENT, SHOULDER, ARTHROSCOPIC;  Surgeon: Jose Antonio Pablo MD;  Location: [facility];  Service: Orthopedics;  Laterality: Left;  SHOULDER JOINT    ARTHROSCOPIC REMOVAL OF LOOSE BODY FROM JOINT Left 12/21/2021    Procedure: REMOVAL, LOOSE BODY, JOINT, ARTHROSCOPIC;  Surgeon: Jose Antonio Pablo MD;  Location: [facility];  Service: Orthopedics;  Laterality: Left;    COLONOSCOPY W/ POLYPECTOMY  08/19/2019    DECOMPRESSION OF SUBACROMIAL SPACE Left 12/21/2021    Procedure: DECOMPRESSION, SUBACROMIAL SPACE;  Surgeon: Jose Antonio Pablo MD;  Location: [facility];  Service: Orthopedics;  Laterality: Left;    DISTAL CLAVICLE EXCISION Left 12/21/2021    Procedure: EXCISION, CLAVICLE, DISTAL;  Surgeon: Jose Antonio Pablo MD;  Location: [facility];  Service: Orthopedics;  Laterality: Left;     left total knee replacement      PARTIAL HYSTERECTOMY      REVERSE TOTAL SHOULDER ARTHROPLASTY Right 8/27/2024    Procedure: ARTHROPLASTY, SHOULDER, TOTAL, REVERSE;  Surgeon: Jose Antonio Pablo MD;  Location: [facility];  Service: Orthopedics;  Laterality: Right;    ROTATOR CUFF REPAIR Left 12/21/2021    Procedure: REPAIR, ROTATOR CUFF;  Surgeon: Jose Antonio Pablo MD;  Location: [facility];  Service: Orthopedics;  Laterality: Left;    SHOULDER ARTHROSCOPY Left 12/21/2021    Procedure: ARTHROSCOPY, SHOULDER;  Surgeon: Jose Antonio Pablo MD;  Location: [facility] OR;  Service: Orthopedics;  Laterality: Left;    SHOULDER SURGERY Left 12/21/2021    THYROID SURGERY      total thyroidectomy    TONSILLECTOMY AND ADENOIDECTOMY         Review of patient's allergies indicates:   Allergen Reactions    Codeine phosphate      Other reaction(s): facial swelling    Codeine sulfate      Other reaction(s): Unknown       No current facility-administered medications on file prior to encounter.     Current Outpatient Medications on File Prior to Encounter   Medication Sig    abatacept (ORENCIA SUBQ)     aspirin 81 mg Cap Take by mouth.    cyclobenzaprine (FLEXERIL) 10 MG tablet Take 1 tablet (10 mg total) by mouth 3 (three) times daily as needed for Muscle spasms.    fluticasone propionate (FLONASE) 50 mcg/actuation nasal spray 1 spray in each nostril  Strength: 50 mcg/actuation    furosemide (LASIX) 20 MG tablet Take 1 tablet (20 mg total) by mouth once daily.    gabapentin (NEURONTIN) 100 MG capsule Take 1 capsule (100 mg total) by mouth 3 (three) times daily.    HYDROcodone-acetaminophen (NORCO)  mg per tablet Take 1 tablet by mouth every 6 (six) hours as needed for Pain.    ibuprofen (ADVIL,MOTRIN) 200 MG tablet Take 200 mg by mouth every 8 (eight) hours as needed for Pain.    levothyroxine (SYNTHROID) 125 MCG tablet TAKE 1 TABLET BY MOUTH EVERY DAY    losartan-hydrochlorothiazide 100-12.5 mg (HYZAAR) 100-12.5  mg Tab TAKE 1 TABLET BY MOUTH EVERY DAY    metFORMIN (GLUCOPHAGE) 500 MG tablet TAKE 1 TABLET BY MOUTH THREE TIMES A DAY    omeprazole (PRILOSEC) 40 MG capsule TAKE 1 CAPSULE BY MOUTH ONCE  DAILY    potassium chloride (MICRO-K) 8 mEq CpSR TAKE 1 CAPSULE BY MOUTH ONCE DAILY.    predniSONE (DELTASONE) 5 MG tablet TAKE 1-2 TABLETS BY MOUTH DAILY AS NEEDED    [DISCONTINUED] losartan (COZAAR) 50 MG tablet TAKE 1 TABLET BY MOUTH EVERY DAY     Family History       Problem Relation (Age of Onset)    Alzheimer's disease Mother    Heart failure Father, Brother          Tobacco Use    Smoking status: Never    Smokeless tobacco: Never   Substance and Sexual Activity    Alcohol use: Never    Drug use: Never    Sexual activity: Yes     Review of Systems   Constitutional:  Positive for appetite change, chills, fatigue and fever.   HENT:  Negative for congestion, hearing loss and trouble swallowing.    Respiratory:  Negative for chest tightness, shortness of breath and wheezing.    Cardiovascular:  Negative for chest pain and palpitations.   Gastrointestinal:  Positive for diarrhea, nausea and vomiting. Negative for abdominal pain and constipation.   Genitourinary:  Negative for difficulty urinating and dysuria.   Musculoskeletal:  Negative for back pain and neck stiffness.   Skin:  Negative for pallor and rash.   Neurological:  Positive for dizziness. Negative for speech difficulty and headaches.   Psychiatric/Behavioral:  Negative for confusion and suicidal ideas.      Objective:     Vital Signs (Most Recent):  Temp: (!) 102.8 °F (39.3 °C) (11/02/24 1511)  Pulse: (!) 113 (11/02/24 1613)  Resp: 15 (11/02/24 1613)  BP: (!) 160/80 (11/02/24 1613)  SpO2: 97 % (11/02/24 1613) Vital Signs (24h Range):  Temp:  [98.8 °F (37.1 °C)-102.8 °F (39.3 °C)] 102.8 °F (39.3 °C)  Pulse:  [105-121] 113  Resp:  [12-41] 15  SpO2:  [92 %-99 %] 97 %  BP: (130-185)/(60-80) 160/80     Weight: 93.4 kg (206 lb)  Body mass index is 36.49 kg/m².     Physical  Exam  Vitals reviewed.   Constitutional:       General: She is awake. She is not in acute distress.     Appearance: She is well-developed. She is obese. She is ill-appearing. She is not toxic-appearing.   HENT:      Head: Normocephalic.      Nose: Nose normal.      Mouth/Throat:      Pharynx: Oropharynx is clear.   Eyes:      Extraocular Movements: Extraocular movements intact.      Pupils: Pupils are equal, round, and reactive to light.   Neck:      Thyroid: No thyroid mass.      Vascular: No carotid bruit.   Cardiovascular:      Rate and Rhythm: Regular rhythm. Tachycardia present.      Pulses: Normal pulses.      Heart sounds: Normal heart sounds. No murmur heard.  Pulmonary:      Effort: Pulmonary effort is normal.      Breath sounds: Normal breath sounds and air entry. No wheezing.   Abdominal:      General: Bowel sounds are increased. There is no distension.      Palpations: Abdomen is soft.      Tenderness: There is no abdominal tenderness.   Musculoskeletal:         General: Normal range of motion.      Cervical back: Neck supple. No rigidity.   Skin:     General: Skin is warm.      Coloration: Skin is not jaundiced.      Findings: No lesion.   Neurological:      General: No focal deficit present.      Mental Status: She is alert and oriented to person, place, and time.      Cranial Nerves: No cranial nerve deficit.   Psychiatric:         Attention and Perception: Attention normal.         Mood and Affect: Mood normal.         Behavior: Behavior normal. Behavior is cooperative.         Thought Content: Thought content normal.         Cognition and Memory: Cognition normal.              CRANIAL NERVES     CN III, IV, VI   Pupils are equal, round, and reactive to light.       Significant Labs: All pertinent labs within the past 24 hours have been reviewed.  BMP:   Recent Labs   Lab 11/02/24  0653   *      K 3.4*      CO2 21   BUN 12   CREATININE 1.39*   CALCIUM 8.4*     CBC:   Recent Labs    Lab 11/02/24  0653   WBC 14.71*   HGB 10.6*   HCT 34.6*        CMP:   Recent Labs   Lab 11/02/24  0653      K 3.4*      CO2 21   *   BUN 12   CREATININE 1.39*   CALCIUM 8.4*   PROT 7.0   ALBUMIN 3.0*   BILITOT 0.5   ALKPHOS 63   AST 13*   ALT 21   ANIONGAP 13       Significant Imaging: I have reviewed all pertinent imaging results/findings within the past 24 hours.    Imaging Results              CT Abdomen Pelvis  Without Contrast (Final result)  Result time 11/02/24 10:13:57      Final result by Emerson Kelley MD (11/02/24 10:13:57)                   Impression:      1. Nonspecific intraluminal fluid within normal caliber loops of colon, as may be seen in the setting of diarrheal illness.  2. Patchy airspace consolidation small nodules in the basilar right lower lobe, could reflect infectious or noninfectious inflammatory process.  For multiple solid nodules all <6 mm, Fleischner Society 2017 guidelines recommend no routine follow up for a low risk patient, or follow up with non-contrast chest CT at 12 months after discovery in a high risk patient.  3. Additional details of chronic and incidental findings, as provided in the body of report.      Electronically signed by: Emerson Kelley  Date:    11/02/2024  Time:    10:13               Narrative:    EXAMINATION:  CT ABDOMEN PELVIS WITHOUT CONTRAST    CLINICAL HISTORY:  Abdominal pain, acute, nonlocalized;abdominal pain;    TECHNIQUE:  Low dose axial images, sagittal and coronal reformations were obtained from the lung bases to the pubic symphysis.    COMPARISON:  CT of the abdomen and pelvis performed 03/13/2015.    FINDINGS:  This examination is limited due to lack of intravenous contrast.    Lower chest: Atelectasis.  Patchy airspace consolidation and small nodules noted in the basilar right lower lobe.    Liver: Suggested hepatomegaly.  Hepatic steatosis.    Gallbladder and bile ducts: Unremarkable.    Pancreas: Normal  contour.    Spleen: Normal contour.    Adrenals: Normal contour.    Kidneys: Suggested mild degree of cortical atrophy of both kidneys.  Nonspecific bilateral perinephric stranding.  Subcentimeter hypodense lesions in the kidneys are too small to definitely characterize.    Lymph nodes: No abdominal or pelvic lymphadenopathy.    Bowel and mesentery: Small hiatal hernia.  No evidence of bowel obstruction.  Nonspecific intraluminal fluid within normal caliber loops of colon, as may be seen in the setting of diarrheal illness.  Nonspecific fatty infiltration in the region of the ileocecal valve.  Appendix not confidently identified.  No definite focal pericecal inflammation.    Abdominal aorta: Nonaneurysmal.  Mild atherosclerotic calcification.    Inferior vena cava: Unremarkable.    Free fluid or free air: None.    Pelvis: Unremarkable.    Urinary bladder: Unremarkable.    Body wall: Remote operative sequela in the anterior abdominal wall.    Bones: Query osseous demineralization.  No definite acute findings.  Degenerative findings of the spine and hips.                                       X-Ray Chest 1 View (Final result)  Result time 11/02/24 08:34:51      Final result by Emerson Kelley MD (11/02/24 08:34:51)                   Impression:      Mildly prominent interstitial markings could relate to pulmonary vascular congestion/edema versus infectious or noninfectious inflammatory process.    Suspect left basilar atelectasis.      Electronically signed by: Emerson Kelley  Date:    11/02/2024  Time:    08:34               Narrative:    EXAMINATION:  XR CHEST 1 VIEW    CLINICAL HISTORY:  Fever, unspecified    TECHNIQUE:  Single frontal view of the chest was performed.    COMPARISON:  Chest radiograph performed 08/14/2024, 09:20 hours.    FINDINGS:  Monitoring leads overlie the chest.  Grossly unchanged cardiomediastinal contours.  Mildly prominent interstitial markings.    No definite focal airspace  consolidation.  Suspect left basilar atelectasis.    No definite pneumothorax or large volume pleural effusion.    No acute findings in the visualized abdomen.    Osseous and soft tissue structures appear without definite acute change.                                    Intake/Output - Last 3 Shifts         10/31 0700  11/01 0659 11/01 0700  11/02 0659 11/02 0700  11/03 0659    IV Piggyback   2600    Total Intake(mL/kg)   2600 (27.8)    Net   +2600                 Microbiology Results (last 7 days)       Procedure Component Value Units Date/Time    C Diff Toxin by PCR [[id number]]     Order Status: Sent Specimen: Stool     Enteric Pathogen Panel [[id number]]     Order Status: Sent Specimen: Stool     Fecal leukocytes [[id number]]     Order Status: Sent Specimen: Stool     Ova and Parasite Exam [[id number]]     Order Status: Sent Specimen: Stool     Influenza A & B by Molecular [[id number]]  (Normal) Collected: 11/02/24 0658    Order Status: Completed Specimen: Nasopharyngeal Swab Updated: 11/02/24 0747     INFLUENZA A MOLECULAR Negative     INFLUENZA B MOLECULAR  Negative    Blood culture #2 [[id number]] Collected: 11/02/24 0715    Order Status: Sent Specimen: Blood from Peripheral, Antecubital, Right Updated: 11/02/24 0721    Blood culture #1 [[id number]] Collected: 11/02/24 0653    Order Status: Sent Specimen: Blood Updated: 11/02/24 0656            Assessment/Plan:     * Fever  Check stool studies  Blood culture have been drawn  Cover with levofloxacin for possible infectious diarrhea      Acute lactic acidosis    Continue with intravenous hydration  Checking blood cultures with possibility of septicemia  However metformin inpatients current situation of dehydration and GI upset could be associated with lactic acidosis    Dehydration    Although IV fluids on national shortage with patient's tachycardia and inability to take orally will leave on continue maintenance IV fluids for now.  Patient has had 2-1/2 L so  "far in the emergency room.        Patient is in need of IV fluids for the treatment of dehydration and tachycardic.  Due to a shortage of IV fluids, patient to receive no more than necessary IV fluids.  Patient must receive this treatment in a hospital location due to the risk of adverse effects, insufficient response to treatment, or other potential complications of disease such as worsening renal failure continued tachycardia or hypotension.     Nausea vomiting and diarrhea    Continue hydration, GI upset better, checking stool studies  Liquid diet for now    History of right shoulder replacement    This back in August and was patient's last antibiotics not likely etiology but checking C diff    Acquired hypothyroidism  Continue Synthroid  Check T4 and TSH      Systolic hypertension  Patients blood pressure range in the last 24 hours was: BP  Min: 130/61  Max: 185/80.The patient's inpatient anti-hypertensive regimen is listed below:  Current Antihypertensives  losartan tablet 50 mg, Daily, Oral    Plan  - BP is uncontrolled, will adjust as follows:  Adjust meds as needed      Uncontrolled type 2 diabetes mellitus with hyperglycemia  Patient's FSGs are uncontrolled due to hyperglycemia on current medication regimen.  Last A1c reviewed-   Lab Results   Component Value Date    HGBA1C 10.6 (H) 08/28/2024     Most recent fingerstick glucose reviewed- No results for input(s): "POCTGLUCOSE" in the last 24 hours.  Current correctional scale  Medium  Increase anti-hyperglycemic dose as follows-   Antihyperglycemics (From admission, onward)      Start     Stop Route Frequency Ordered    11/02/24 1643  insulin aspart U-100 injection 0-10 Units         -- SubQ Before meals & nightly PRN 11/02/24 1546          Hold Oral hypoglycemics while patient is in the hospital.    Severe obesity (BMI 35.0-39.9) with comorbidity  Body mass index is 36.49 kg/m². Morbid obesity complicates all aspects of disease management from diagnostic " modalities to treatment. Weight loss encouraged and health benefits explained to patient.         Rheumatoid arthritis involving multiple sites with positive rheumatoid factor    Hold patient's selective T-cell code stimulator modulator Orencia while having acute illness      VTE Risk Mitigation (From admission, onward)           Ordered     enoxaparin injection 40 mg  Daily         11/02/24 1546     IP VTE HIGH RISK PATIENT  Once         11/02/24 1546     Place sequential compression device  Until discontinued         11/02/24 1546                                    Tigre Owusu MD  Department of Hospital Medicine  [facility]

## 2024-11-02 NOTE — ED PROVIDER NOTES
Encounter Date: 11/2/2024       History     Chief Complaint   Patient presents with    Diarrhea    Nausea    Emesis     Patient arrives by EMS reports multiple episodes of vomiting and diarrhea over the night.  Denies known fever.  This has been associated with some generalized abdominal cramping but no abdominal pain time of arrival.  No associated cough.  No chest pain.  Denies dysuria      Review of patient's allergies indicates:   Allergen Reactions    Codeine phosphate      Other reaction(s): facial swelling    Codeine sulfate      Other reaction(s): Unknown     Past Medical History:   Diagnosis Date    Arthritis     Diabetes mellitus, type 2     GERD (gastroesophageal reflux disease)     Hypertension     Iron deficiency anemia     Obesity     Personal history of colonic polyps 08/19/2019    Thyroid disease      Past Surgical History:   Procedure Laterality Date    ARTHROSCOPIC DEBRIDEMENT OF SHOULDER Left 12/21/2021    Procedure: DEBRIDEMENT, SHOULDER, ARTHROSCOPIC;  Surgeon: Jose nAtonio Pablo MD;  Location: [facility] OR;  Service: Orthopedics;  Laterality: Left;  SHOULDER JOINT    ARTHROSCOPIC REMOVAL OF LOOSE BODY FROM JOINT Left 12/21/2021    Procedure: REMOVAL, LOOSE BODY, JOINT, ARTHROSCOPIC;  Surgeon: Jose Antonio Pablo MD;  Location: [facility] OR;  Service: Orthopedics;  Laterality: Left;    COLONOSCOPY W/ POLYPECTOMY  08/19/2019    DECOMPRESSION OF SUBACROMIAL SPACE Left 12/21/2021    Procedure: DECOMPRESSION, SUBACROMIAL SPACE;  Surgeon: Jose Antonio Pablo MD;  Location: [facility] OR;  Service: Orthopedics;  Laterality: Left;    DISTAL CLAVICLE EXCISION Left 12/21/2021    Procedure: EXCISION, CLAVICLE, DISTAL;  Surgeon: Jose Antonio Pablo MD;  Location: [facility] OR;  Service: Orthopedics;  Laterality: Left;    left total knee replacement      PARTIAL HYSTERECTOMY      REVERSE TOTAL SHOULDER ARTHROPLASTY Right 8/27/2024    Procedure: ARTHROPLASTY, SHOULDER, TOTAL, REVERSE;  Surgeon:  Jose Antonio Pablo MD;  Location: [facility] OR;  Service: Orthopedics;  Laterality: Right;    ROTATOR CUFF REPAIR Left 12/21/2021    Procedure: REPAIR, ROTATOR CUFF;  Surgeon: Jose Antonio Pablo MD;  Location: [facility] OR;  Service: Orthopedics;  Laterality: Left;    SHOULDER ARTHROSCOPY Left 12/21/2021    Procedure: ARTHROSCOPY, SHOULDER;  Surgeon: Jose Antonio Pablo MD;  Location: [facility] OR;  Service: Orthopedics;  Laterality: Left;    SHOULDER SURGERY Left 12/21/2021    THYROID SURGERY      total thyroidectomy    TONSILLECTOMY AND ADENOIDECTOMY       Family History   Problem Relation Name Age of Onset    Alzheimer's disease Mother      Heart failure Father      Heart failure Brother       Social History     Tobacco Use    Smoking status: Never    Smokeless tobacco: Never   Substance Use Topics    Alcohol use: Never    Drug use: Never     Review of Systems   Constitutional:  Negative for fever.   HENT:  Negative for sore throat.    Respiratory:  Negative for shortness of breath.    Cardiovascular:  Negative for chest pain.   Gastrointestinal:  Negative for nausea.   Genitourinary:  Negative for dysuria.   Musculoskeletal:  Negative for back pain.   Skin:  Negative for rash.   Neurological:  Negative for weakness.   Hematological:  Does not bruise/bleed easily.       Physical Exam     Initial Vitals [11/02/24 0628]   BP Pulse Resp Temp SpO2   (!) 185/80 (!) 112 20 (!) 100.4 °F (38 °C) 96 %      MAP       --         Physical Exam    Nursing note and vitals reviewed.  Constitutional: She appears well-developed and well-nourished.   HENT:   Head: Normocephalic and atraumatic.   Eyes: EOM are normal. Pupils are equal, round, and reactive to light.   Neck: Neck supple. No thyromegaly present.   Normal range of motion.  Cardiovascular:  Regular rhythm, normal heart sounds and intact distal pulses.           No murmur heard.  Mild tachycardia   Pulmonary/Chest: Breath sounds normal. No respiratory distress.  She has no wheezes.   Abdominal: Abdomen is soft. Bowel sounds are normal. She exhibits no distension. There is no abdominal tenderness.   Musculoskeletal:         General: No tenderness or edema. Normal range of motion.      Cervical back: Normal range of motion and neck supple.     Lymphadenopathy:     She has no cervical adenopathy.   Neurological: She is alert and oriented to person, place, and time. She has normal strength. No cranial nerve deficit or sensory deficit.   Skin: Skin is warm and dry. No rash noted.   Psychiatric: She has a normal mood and affect.         Medical Screening Exam   See Full Note    ED Course   Procedures  Labs Reviewed   COMPREHENSIVE METABOLIC PANEL - Abnormal       Result Value    Sodium 136      Potassium 3.4 (*)     Chloride 105      CO2 21      Anion Gap 13      Glucose 313 (*)     BUN 12      Creatinine 1.39 (*)     BUN/Creatinine Ratio 9      Calcium 8.4 (*)     Total Protein 7.0      Albumin 3.0 (*)     Globulin 4.0      A/G Ratio 0.8      Bilirubin, Total 0.5      Alk Phos 63      ALT 21      AST 13 (*)     eGFR 40 (*)    LACTIC ACID, PLASMA - Abnormal    Lactic Acid 4.4 (*)    URINALYSIS, REFLEX TO URINE CULTURE - Abnormal    Color, UA Light-Yellow      Clarity, UA Clear      pH, UA 5.5      Leukocytes, UA Negative      Nitrites, UA Negative      Protein,  (*)     Glucose, UA 1000 (*)     Ketones, UA Trace      Urobilinogen, UA Normal      Bilirubin, UA Negative      Blood, UA Small (*)     Specific [place], UA 1.015     CBC WITH DIFFERENTIAL - Abnormal    WBC 14.71 (*)     RBC 4.28      Hemoglobin 10.6 (*)     Hematocrit 34.6 (*)     MCV 80.8      MCH 24.8 (*)     MCHC 30.6 (*)     RDW 14.7 (*)     Platelet Count 319      MPV 9.1 (*)     Neutrophils % 83.8 (*)     Lymphocytes % 6.8 (*)     Monocytes % 8.4 (*)     Eosinophils % 0.1 (*)     Basophils % 0.3      Immature Granulocytes % 0.6 (*)     nRBC, Auto 0.0      Neutrophils, Abs 12.33 (*)     Lymphocytes, Absolute  1.00      Monocytes, Absolute 1.23 (*)     Eosinophils, Absolute 0.02      Basophils, Absolute 0.04      Immature Granulocytes, Absolute 0.09 (*)     nRBC, Absolute 0.00      Diff Type Auto     URINALYSIS, MICROSCOPIC - Abnormal    WBC, UA 1      RBC, UA 1      Bacteria, UA Occasional (*)     Mucous Occasional (*)    LACTIC ACID, PLASMA - Abnormal    Lactic Acid 5.7 (*)    INFLUENZA A & B BY MOLECULAR - Normal    INFLUENZA A MOLECULAR Negative      INFLUENZA B MOLECULAR  Negative     PROTIME-INR - Normal    PT 12.9      INR 0.98     SARS-COV-2 RNA AMPLIFICATION, QUAL - Normal    SARS COV-2 Molecular Negative      Narrative:     Negative SARS-CoV results should not be used as the sole basis for treatment or patient management decisions; negative results should be considered in the context of a patient's recent exposures, history and the presene of clinical signs and symptoms consistent with COVID-19.  Negative results should be treated as presumptive and confirmed by molecular assay, if necessary for patient management.   CULTURE, BLOOD   CULTURE, BLOOD   CBC W/ AUTO DIFFERENTIAL    Narrative:     The following orders were created for panel order CBC auto differential.  Procedure                               Abnormality         Status                     ---------                               -----------         ------                     CBC with Differential[[id number]]       Abnormal            Final result                 Please view results for these tests on the individual orders.   LACTIC ACID, PLASMA          Imaging Results              CT Abdomen Pelvis  Without Contrast (Final result)  Result time 11/02/24 10:13:57      Final result by Emerson Kelley MD (11/02/24 10:13:57)                   Impression:      1. Nonspecific intraluminal fluid within normal caliber loops of colon, as may be seen in the setting of diarrheal illness.  2. Patchy airspace consolidation small nodules in the basilar right  lower lobe, could reflect infectious or noninfectious inflammatory process.  For multiple solid nodules all <6 mm, Fleischner Society 2017 guidelines recommend no routine follow up for a low risk patient, or follow up with non-contrast chest CT at 12 months after discovery in a high risk patient.  3. Additional details of chronic and incidental findings, as provided in the body of report.      Electronically signed by: Emerson Kelley  Date:    11/02/2024  Time:    10:13               Narrative:    EXAMINATION:  CT ABDOMEN PELVIS WITHOUT CONTRAST    CLINICAL HISTORY:  Abdominal pain, acute, nonlocalized;abdominal pain;    TECHNIQUE:  Low dose axial images, sagittal and coronal reformations were obtained from the lung bases to the pubic symphysis.    COMPARISON:  CT of the abdomen and pelvis performed 03/13/2015.    FINDINGS:  This examination is limited due to lack of intravenous contrast.    Lower chest: Atelectasis.  Patchy airspace consolidation and small nodules noted in the basilar right lower lobe.    Liver: Suggested hepatomegaly.  Hepatic steatosis.    Gallbladder and bile ducts: Unremarkable.    Pancreas: Normal contour.    Spleen: Normal contour.    Adrenals: Normal contour.    Kidneys: Suggested mild degree of cortical atrophy of both kidneys.  Nonspecific bilateral perinephric stranding.  Subcentimeter hypodense lesions in the kidneys are too small to definitely characterize.    Lymph nodes: No abdominal or pelvic lymphadenopathy.    Bowel and mesentery: Small hiatal hernia.  No evidence of bowel obstruction.  Nonspecific intraluminal fluid within normal caliber loops of colon, as may be seen in the setting of diarrheal illness.  Nonspecific fatty infiltration in the region of the ileocecal valve.  Appendix not confidently identified.  No definite focal pericecal inflammation.    Abdominal aorta: Nonaneurysmal.  Mild atherosclerotic calcification.    Inferior vena cava: Unremarkable.    Free fluid or free  air: None.    Pelvis: Unremarkable.    Urinary bladder: Unremarkable.    Body wall: Remote operative sequela in the anterior abdominal wall.    Bones: Query osseous demineralization.  No definite acute findings.  Degenerative findings of the spine and hips.                                       X-Ray Chest 1 View (Final result)  Result time 11/02/24 08:34:51      Final result by Emerson Kelley MD (11/02/24 08:34:51)                   Impression:      Mildly prominent interstitial markings could relate to pulmonary vascular congestion/edema versus infectious or noninfectious inflammatory process.    Suspect left basilar atelectasis.      Electronically signed by: Emerson Kelley  Date:    11/02/2024  Time:    08:34               Narrative:    EXAMINATION:  XR CHEST 1 VIEW    CLINICAL HISTORY:  Fever, unspecified    TECHNIQUE:  Single frontal view of the chest was performed.    COMPARISON:  Chest radiograph performed 08/14/2024, 09:20 hours.    FINDINGS:  Monitoring leads overlie the chest.  Grossly unchanged cardiomediastinal contours.  Mildly prominent interstitial markings.    No definite focal airspace consolidation.  Suspect left basilar atelectasis.    No definite pneumothorax or large volume pleural effusion.    No acute findings in the visualized abdomen.    Osseous and soft tissue structures appear without definite acute change.                                       Medications   acetaminophen tablet 650 mg (650 mg Oral Given 11/2/24 0706)   sodium chloride 0.9% bolus 1,000 mL 1,000 mL (0 mLs Intravenous Stopped 11/2/24 0935)   ondansetron injection 4 mg (4 mg Intravenous Given 11/2/24 0707)   acetaminophen tablet 325 mg (325 mg Oral Given 11/2/24 0840)   piperacillin-tazobactam (ZOSYN) 4.5 g in D5W 100 mL IVPB (MB+) (0 g Intravenous Stopped 11/2/24 0937)   sodium chloride 0.9% bolus 1,500 mL 1,500 mL (1,500 mLs Intravenous New Bag 11/2/24 0906)     Medical Decision Making             ED Course as of  11/02/24 1033   Sat Nov 02, 2024   0637 Suspecting viral illness with vomiting and diarrhea.  Will add lactic acid blood cultures as a precaution but will defer on antibiotics at this time [PK]   0837 Due to elevated white count increasing suspicion of possible bacterial infection to the point that will treat with IV antibiotics but still entertaining viral etiology is that has most likely scenario.  Adding CT abdomen pelvis [PK]   0846 Early goal-directed fluid therapy met by ideal body weight [PK]   0936 No anion gap.  Glucose 313 she had improved with IV fluids.  Not consistent with DKA.  No ketones in urine [PK]   0936 EKG done at 6:30 a.m. shows sinus tachycardia rate 115.  No ST elevation.  PA-C present [PK]   1027 Discussed with hospitalist about need to hospitalize patient for nausea vomiting diarrhea and increased lactic acid.  Symptoms started in the past 24 hours.  Has had some generalized associated abdominal cramps but no significant tenderness.  CT abdomen pelvis done without contrast shows no acute abnormality.  Lactic acid was elevated above 4.  She was being treated with early goal-directed fluid therapy and empiric antibiotics although viral gastroenteritis remains high in the differential [PK]      ED Course User Index  [PK] Man Boyle MD                           Clinical Impression:   Final diagnoses:  [R50.9] Fever  [K52.9] Gastroenteritis (Primary)  [N17.9] GEOVANY (acute kidney injury)        ED Disposition Condition    Admit Stable                Man Boyle MD  11/02/24 1033

## 2024-11-02 NOTE — ASSESSMENT & PLAN NOTE
Patients blood pressure range in the last 24 hours was: BP  Min: 130/61  Max: 185/80.The patient's inpatient anti-hypertensive regimen is listed below:  Current Antihypertensives  losartan tablet 50 mg, Daily, Oral    Plan  - BP is uncontrolled, will adjust as follows:  Adjust meds as needed

## 2024-11-03 PROBLEM — E55.9 VITAMIN D DEFICIENCY: Status: ACTIVE | Noted: 2024-11-03

## 2024-11-03 PROBLEM — A02.0 SALMONELLA ENTERITIS: Status: ACTIVE | Noted: 2024-11-03

## 2024-11-03 LAB
25(OH)D3 SERPL-MCNC: 14.8 NG/ML
ANION GAP SERPL CALCULATED.3IONS-SCNC: 16 MMOL/L (ref 7–16)
BASOPHILS # BLD AUTO: 0.07 K/UL (ref 0–0.2)
BASOPHILS NFR BLD AUTO: 0.7 % (ref 0–1)
BUN SERPL-MCNC: 22 MG/DL (ref 7–18)
BUN/CREAT SERPL: 9 (ref 6–20)
C COLI+JEJ+UPSA DNA STL QL NAA+NON-PROBE: NEGATIVE
CALCIUM SERPL-MCNC: 7.8 MG/DL (ref 8.5–10.1)
CHLORIDE SERPL-SCNC: 112 MMOL/L (ref 98–107)
CO2 SERPL-SCNC: 17 MMOL/L (ref 21–32)
CREAT SERPL-MCNC: 2.38 MG/DL (ref 0.55–1.02)
CRENATED CELLS: ABNORMAL
CRP SERPL-MCNC: 28.3 MG/DL (ref 0–0.8)
DACRYOCYTES BLD QL SMEAR: ABNORMAL
DIFFERENTIAL METHOD BLD: ABNORMAL
E COLI SXT1 STL QL IA: NEGATIVE
E COLI SXT2 STL QL IA: NEGATIVE
EGFR (NO RACE VARIABLE) (RUSH/TITUS): 21 ML/MIN/1.73M2
EOSINOPHIL # BLD AUTO: 0.07 K/UL (ref 0–0.5)
EOSINOPHIL NFR BLD AUTO: 0.7 % (ref 1–4)
ERYTHROCYTE [DISTWIDTH] IN BLOOD BY AUTOMATED COUNT: 15.1 % (ref 11.5–14.5)
FERRITIN SERPL-MCNC: 84 NG/ML (ref 8–252)
FOLATE SERPL-MCNC: 16 NG/ML (ref 3.1–17.5)
GLUCOSE SERPL-MCNC: 157 MG/DL (ref 70–105)
GLUCOSE SERPL-MCNC: 165 MG/DL (ref 70–105)
GLUCOSE SERPL-MCNC: 167 MG/DL (ref 70–105)
GLUCOSE SERPL-MCNC: 174 MG/DL (ref 74–106)
GLUCOSE SERPL-MCNC: 182 MG/DL (ref 70–105)
HCT VFR BLD AUTO: 36 % (ref 38–47)
HGB BLD-MCNC: 10.9 G/DL (ref 12–16)
IMM GRANULOCYTES # BLD AUTO: 0.09 K/UL (ref 0–0.04)
IMM GRANULOCYTES NFR BLD: 0.9 % (ref 0–0.4)
IRON SATN MFR SERPL: 4 % (ref 14–50)
IRON SERPL-MCNC: 14 ΜG/DL (ref 50–170)
LACTATE SERPL-SCNC: 2.5 MMOL/L (ref 0.4–2)
LACTATE SERPL-SCNC: 2.6 MMOL/L (ref 0.4–2)
LACTATE SERPL-SCNC: 2.8 MMOL/L (ref 0.4–2)
LACTATE SERPL-SCNC: 3.2 MMOL/L (ref 0.4–2)
LACTATE SERPL-SCNC: 3.3 MMOL/L (ref 0.4–2)
LACTATE SERPL-SCNC: 3.7 MMOL/L (ref 0.4–2)
LACTATE SERPL-SCNC: 3.8 MMOL/L (ref 0.4–2)
LACTATE SERPL-SCNC: 3.8 MMOL/L (ref 0.4–2)
LACTATE SERPL-SCNC: 4.7 MMOL/L (ref 0.4–2)
LACTATE SERPL-SCNC: 5.3 MMOL/L (ref 0.4–2)
LYMPHOCYTES # BLD AUTO: 0.86 K/UL (ref 1–4.8)
LYMPHOCYTES NFR BLD AUTO: 8.9 % (ref 27–41)
LYMPHOCYTES NFR BLD MANUAL: 12 % (ref 27–41)
MCH RBC QN AUTO: 24.5 PG (ref 27–31)
MCHC RBC AUTO-ENTMCNC: 30.3 G/DL (ref 32–36)
MCV RBC AUTO: 81.1 FL (ref 80–96)
MONOCYTES # BLD AUTO: 0.67 K/UL (ref 0–0.8)
MONOCYTES NFR BLD AUTO: 6.9 % (ref 2–6)
MONOCYTES NFR BLD MANUAL: 9 % (ref 2–6)
MPC BLD CALC-MCNC: 9.7 FL (ref 9.4–12.4)
NEUTROPHILS # BLD AUTO: 7.95 K/UL (ref 1.8–7.7)
NEUTROPHILS NFR BLD AUTO: 81.9 % (ref 53–65)
NEUTS BAND NFR BLD MANUAL: 11 % (ref 1–5)
NEUTS SEG NFR BLD MANUAL: 68 % (ref 50–62)
NOROVIRUS GI+II RNA STL QL NAA+NON-PROBE: NEGATIVE
NRBC # BLD AUTO: 0 X10E3/UL
NRBC, AUTO (.00): 0 %
OVALOCYTES BLD QL SMEAR: ABNORMAL
PLATELET # BLD AUTO: 284 K/UL (ref 150–400)
PLATELET MORPHOLOGY: NORMAL
POTASSIUM SERPL-SCNC: 3.8 MMOL/L (ref 3.5–5.1)
RBC # BLD AUTO: 4.44 M/UL (ref 4.2–5.4)
RVA RNA STL QL NAA+NON-PROBE: NEGATIVE
S ENT+BONG DNA STL QL NAA+NON-PROBE: POSITIVE
SHIGELLA SPECIES NAT: NEGATIVE
SODIUM SERPL-SCNC: 141 MMOL/L (ref 136–145)
T4 SERPL-MCNC: 7.7 ΜG/DL (ref 4.8–13.9)
TIBC SERPL-MCNC: 314 ΜG/DL (ref 250–450)
TSH SERPL DL<=0.005 MIU/L-ACNC: 0.31 UIU/ML (ref 0.36–3.74)
V CHOL+PARA+VUL DNA STL QL NAA+NON-PROBE: NEGATIVE
VIT B12 SERPL-MCNC: 715 PG/ML (ref 193–986)
WBC # BLD AUTO: 9.71 K/UL (ref 4.5–11)
Y ENTEROCOL DNA STL QL NAA+NON-PROBE: NEGATIVE

## 2024-11-03 PROCEDURE — 82746 ASSAY OF FOLIC ACID SERUM: CPT | Performed by: HOSPITALIST

## 2024-11-03 PROCEDURE — 82728 ASSAY OF FERRITIN: CPT | Performed by: HOSPITALIST

## 2024-11-03 PROCEDURE — 63600175 PHARM REV CODE 636 W HCPCS: Performed by: HOSPITALIST

## 2024-11-03 PROCEDURE — 82306 VITAMIN D 25 HYDROXY: CPT | Performed by: HOSPITALIST

## 2024-11-03 PROCEDURE — 96372 THER/PROPH/DIAG INJ SC/IM: CPT

## 2024-11-03 PROCEDURE — 85025 COMPLETE CBC W/AUTO DIFF WBC: CPT | Performed by: HOSPITALIST

## 2024-11-03 PROCEDURE — 94799 UNLISTED PULMONARY SVC/PX: CPT

## 2024-11-03 PROCEDURE — 94761 N-INVAS EAR/PLS OXIMETRY MLT: CPT

## 2024-11-03 PROCEDURE — 83605 ASSAY OF LACTIC ACID: CPT | Performed by: HOSPITALIST

## 2024-11-03 PROCEDURE — 80048 BASIC METABOLIC PNL TOTAL CA: CPT | Performed by: HOSPITALIST

## 2024-11-03 PROCEDURE — 82962 GLUCOSE BLOOD TEST: CPT

## 2024-11-03 PROCEDURE — 25000003 PHARM REV CODE 250: Performed by: HOSPITALIST

## 2024-11-03 PROCEDURE — 84443 ASSAY THYROID STIM HORMONE: CPT | Performed by: HOSPITALIST

## 2024-11-03 PROCEDURE — 99233 SBSQ HOSP IP/OBS HIGH 50: CPT | Mod: ,,, | Performed by: HOSPITALIST

## 2024-11-03 PROCEDURE — 83550 IRON BINDING TEST: CPT | Performed by: HOSPITALIST

## 2024-11-03 PROCEDURE — 99900035 HC TECH TIME PER 15 MIN (STAT)

## 2024-11-03 PROCEDURE — 36415 COLL VENOUS BLD VENIPUNCTURE: CPT | Performed by: HOSPITALIST

## 2024-11-03 PROCEDURE — 84436 ASSAY OF TOTAL THYROXINE: CPT | Performed by: HOSPITALIST

## 2024-11-03 PROCEDURE — 86140 C-REACTIVE PROTEIN: CPT | Performed by: HOSPITALIST

## 2024-11-03 PROCEDURE — 11000001 HC ACUTE MED/SURG PRIVATE ROOM

## 2024-11-03 RX ORDER — DIPHENHYDRAMINE HYDROCHLORIDE 50 MG/ML
50 INJECTION INTRAMUSCULAR; INTRAVENOUS ONCE
Status: COMPLETED | OUTPATIENT
Start: 2024-11-03 | End: 2024-11-03

## 2024-11-03 RX ORDER — CHOLECALCIFEROL (VITAMIN D3) 25 MCG
1000 TABLET ORAL DAILY
Status: DISCONTINUED | OUTPATIENT
Start: 2024-11-03 | End: 2024-11-10 | Stop reason: HOSPADM

## 2024-11-03 RX ORDER — LEVOFLOXACIN 5 MG/ML
250 INJECTION, SOLUTION INTRAVENOUS
Status: DISCONTINUED | OUTPATIENT
Start: 2024-11-03 | End: 2024-11-10 | Stop reason: HOSPADM

## 2024-11-03 RX ORDER — LEVOTHYROXINE SODIUM 112 UG/1
112 TABLET ORAL DAILY
Status: DISCONTINUED | OUTPATIENT
Start: 2024-11-04 | End: 2024-11-10 | Stop reason: HOSPADM

## 2024-11-03 RX ORDER — METOCLOPRAMIDE HYDROCHLORIDE 5 MG/ML
10 INJECTION INTRAMUSCULAR; INTRAVENOUS ONCE
Status: COMPLETED | OUTPATIENT
Start: 2024-11-03 | End: 2024-11-03

## 2024-11-03 RX ADMIN — ENOXAPARIN SODIUM 40 MG: 40 INJECTION SUBCUTANEOUS at 05:11

## 2024-11-03 RX ADMIN — ONDANSETRON 8 MG: 2 INJECTION INTRAMUSCULAR; INTRAVENOUS at 05:11

## 2024-11-03 RX ADMIN — LOSARTAN POTASSIUM 50 MG: 50 TABLET, FILM COATED ORAL at 08:11

## 2024-11-03 RX ADMIN — METOCLOPRAMIDE 10 MG: 5 INJECTION, SOLUTION INTRAMUSCULAR; INTRAVENOUS at 10:11

## 2024-11-03 RX ADMIN — POTASSIUM CHLORIDE AND SODIUM CHLORIDE: 450; 150 INJECTION, SOLUTION INTRAVENOUS at 04:11

## 2024-11-03 RX ADMIN — ACETAMINOPHEN 650 MG: 650 SUPPOSITORY RECTAL at 11:11

## 2024-11-03 RX ADMIN — Medication 1000 UNITS: at 03:11

## 2024-11-03 RX ADMIN — ONDANSETRON 8 MG: 2 INJECTION INTRAMUSCULAR; INTRAVENOUS at 01:11

## 2024-11-03 RX ADMIN — ACETAMINOPHEN 650 MG: 325 TABLET ORAL at 11:11

## 2024-11-03 RX ADMIN — DIPHENHYDRAMINE HYDROCHLORIDE 50 MG: 50 INJECTION INTRAMUSCULAR; INTRAVENOUS at 10:11

## 2024-11-03 RX ADMIN — LEVOFLOXACIN 250 MG: 250 INJECTION, SOLUTION INTRAVENOUS at 05:11

## 2024-11-03 RX ADMIN — LEVOTHYROXINE SODIUM 125 MCG: 0.12 TABLET ORAL at 08:11

## 2024-11-03 NOTE — PLAN OF CARE
Care plan ongoing       Problem: Adult Inpatient Plan of Care  Goal: Plan of Care Review  Outcome: Progressing  Goal: Patient-Specific Goal (Individualized)  Outcome: Progressing  Goal: Absence of Hospital-Acquired Illness or Injury  Outcome: Progressing  Goal: Optimal Comfort and Wellbeing  Outcome: Progressing  Goal: Readiness for Transition of Care  Outcome: Progressing     Problem: Diabetes Comorbidity  Goal: Blood Glucose Level Within Targeted Range  Outcome: Progressing     Problem: Wound  Goal: Optimal Coping  Outcome: Progressing  Goal: Optimal Functional Ability  Outcome: Progressing  Goal: Absence of Infection Signs and Symptoms  Outcome: Progressing  Goal: Improved Oral Intake  Outcome: Progressing  Goal: Optimal Pain Control and Function  Outcome: Progressing  Goal: Skin Health and Integrity  Outcome: Progressing  Goal: Optimal Wound Healing  Outcome: Progressing     Problem: Skin Injury Risk Increased  Goal: Skin Health and Integrity  Outcome: Progressing

## 2024-11-03 NOTE — NURSING
Acetaminophen 650mg PO documented at 1138am was given and within 1 minute patient vomited both pills up.  Acetaminophen suppository then administered and patient has tolerated at this time.

## 2024-11-03 NOTE — PROGRESS NOTES
[facility]  [place] Medicine  Progress Note    Patient Name: Meghna Dalal  MRN: [id number]  Patient Class: IP- Inpatient   Admission Date: 11/2/2024  Length of Stay: 1 days  Attending Physician: Tigre Owusu MD  Primary Care Provider: Kwame Wyatt MD        Subjective:     Principal Problem:Salmonella enteritis        HPI:    Chief complaint:  Weakness with recent GI upset    History of present illness:          Ms. Dalal is a 74-year-old presented to the emergency room after have onset prior day of nausea vomiting and diarrhea.  This was associated with fever and chills.  Patient denies similar problem in the past.  Denies any unusual exposure.  Has not been out of area her eaten any unusual food that others have not also shared.  Did have shoulder surgery by Dr. Pablo 08/27/24 but no antibiotics since.  History of rheumatoid arthritis and on Orencia.  When seen in the emergency room was noted to be tachycardic, febrile, appeared ill and have elevated lactic acid.  Patient does take metformin for diabetes.  Continued here in the emergency room with nausea vomiting and liquidy nonbloody diarrhea.  Hospitalist service was asked to see patient for evaluation of admission'    Review of systems:  See above.  No recent respiratory complaints.  Denies pain.  Generally relatively active and able to ambulate without issues.  Has been undergoing therapy following above-mentioned shoulder surgery.  No history of being significant snore or sleep disturbance but does take frequent naps in the daytime.  Review of systems otherwise negative.     Past medical history:  -hypertension proximally 10 years  -diabetes mellitus proximally 10 years  -rheumatoid arthritis on selective T-cell co stimulation modulators (Orencia).   -hypothyroidism    Past surgical history:  Recent shoulder surgery as above mentioned by Dr. Pablo on August 27  Lumbar back surgery  Knee  replacement  Hysterectomy with 1 ovary removed    Allergy to codeine    Social history:  Lives with   No history of tobacco alcohol    Family history:  Son with myocardial infarction in his 50s    Health maintenance issues:  Primary care providers Dr. Wyatt  Has not had flu shot this year  Had prior Pneumovax  Daughter states patient has had COVID infection 2-3 times the last of which proximally 1 year earlier  Patient has had COVID vaccination x2 at least 1 booster  No history of recent mammogram  No recent Pap smear  Colonoscopies apparently about 5 years earlier told unremarkable          Overview/Hospital Course:  11/03 feels some better.  Still with nausea vomiting not tolerating liquids by mouth.  Some diarrhea.  Studies returned showing salmonella as etiology.   in room and states no one else sick.  Told him to throw out all open food at their house.  Continue with hydration and antibiotics.  Monitor    Past Medical History:   Diagnosis Date    Arthritis     Diabetes mellitus, type 2     GERD (gastroesophageal reflux disease)     Hypertension     Iron deficiency anemia     Obesity     Personal history of colonic polyps 08/19/2019    Thyroid disease        Past Surgical History:   Procedure Laterality Date    ARTHROSCOPIC DEBRIDEMENT OF SHOULDER Left 12/21/2021    Procedure: DEBRIDEMENT, SHOULDER, ARTHROSCOPIC;  Surgeon: Jose Antonio Pablo MD;  Location: [facility];  Service: Orthopedics;  Laterality: Left;  SHOULDER JOINT    ARTHROSCOPIC REMOVAL OF LOOSE BODY FROM JOINT Left 12/21/2021    Procedure: REMOVAL, LOOSE BODY, JOINT, ARTHROSCOPIC;  Surgeon: Jose Antonio Pablo MD;  Location: [facility];  Service: Orthopedics;  Laterality: Left;    COLONOSCOPY W/ POLYPECTOMY  08/19/2019    DECOMPRESSION OF SUBACROMIAL SPACE Left 12/21/2021    Procedure: DECOMPRESSION, SUBACROMIAL SPACE;  Surgeon: Jose Antonio Pablo MD;  Location: [facility];  Service: Orthopedics;  Laterality: Left;     DISTAL CLAVICLE EXCISION Left 12/21/2021    Procedure: EXCISION, CLAVICLE, DISTAL;  Surgeon: Jose Antonio Pablo MD;  Location: [facility];  Service: Orthopedics;  Laterality: Left;    left total knee replacement      PARTIAL HYSTERECTOMY      REVERSE TOTAL SHOULDER ARTHROPLASTY Right 8/27/2024    Procedure: ARTHROPLASTY, SHOULDER, TOTAL, REVERSE;  Surgeon: Jose Antonio Pablo MD;  Location: [facility];  Service: Orthopedics;  Laterality: Right;    ROTATOR CUFF REPAIR Left 12/21/2021    Procedure: REPAIR, ROTATOR CUFF;  Surgeon: Jose Antonio Pablo MD;  Location: [facility];  Service: Orthopedics;  Laterality: Left;    SHOULDER ARTHROSCOPY Left 12/21/2021    Procedure: ARTHROSCOPY, SHOULDER;  Surgeon: Jose Antonio Pablo MD;  Location: [facility];  Service: Orthopedics;  Laterality: Left;    SHOULDER SURGERY Left 12/21/2021    THYROID SURGERY      total thyroidectomy    TONSILLECTOMY AND ADENOIDECTOMY         Review of patient's allergies indicates:   Allergen Reactions    Codeine phosphate      Other reaction(s): facial swelling    Codeine sulfate      Other reaction(s): Unknown       No current facility-administered medications on file prior to encounter.     Current Outpatient Medications on File Prior to Encounter   Medication Sig    aspirin 81 mg Cap Take 81 mg by mouth once daily.    cyclobenzaprine (FLEXERIL) 10 MG tablet Take 1 tablet (10 mg total) by mouth 3 (three) times daily as needed for Muscle spasms.    furosemide (LASIX) 20 MG tablet Take 1 tablet (20 mg total) by mouth once daily.    gabapentin (NEURONTIN) 100 MG capsule Take 1 capsule (100 mg total) by mouth 3 (three) times daily.    levothyroxine (SYNTHROID) 125 MCG tablet TAKE 1 TABLET BY MOUTH EVERY DAY    losartan-hydrochlorothiazide 100-12.5 mg (HYZAAR) 100-12.5 mg Tab TAKE 1 TABLET BY MOUTH EVERY DAY    metFORMIN (GLUCOPHAGE) 500 MG tablet TAKE 1 TABLET BY MOUTH THREE TIMES A DAY    omeprazole (PRILOSEC) 40 MG capsule TAKE 1  CAPSULE BY MOUTH ONCE  DAILY    potassium chloride (MICRO-K) 8 mEq CpSR TAKE 1 CAPSULE BY MOUTH ONCE DAILY.    predniSONE (DELTASONE) 5 MG tablet Take 5 mg by mouth once daily.    [DISCONTINUED] losartan (COZAAR) 50 MG tablet TAKE 1 TABLET BY MOUTH EVERY DAY     Family History       Problem Relation (Age of Onset)    Alzheimer's disease Mother    Heart failure Father, Brother          Tobacco Use    Smoking status: Never    Smokeless tobacco: Never   Substance and Sexual Activity    Alcohol use: Never    Drug use: Never    Sexual activity: Yes     Review of Systems   Constitutional:  Positive for appetite change, chills, fatigue and fever.   HENT:  Negative for congestion, hearing loss and trouble swallowing.    Respiratory:  Negative for chest tightness, shortness of breath and wheezing.    Cardiovascular:  Negative for chest pain and palpitations.   Gastrointestinal:  Positive for diarrhea, nausea and vomiting. Negative for abdominal pain and constipation.   Genitourinary:  Negative for difficulty urinating and dysuria.   Musculoskeletal:  Negative for back pain and neck stiffness.   Skin:  Negative for pallor and rash.   Neurological:  Positive for dizziness. Negative for speech difficulty and headaches.   Psychiatric/Behavioral:  Negative for confusion and suicidal ideas.      Objective:     Vital Signs (Most Recent):  Temp: 98 °F (36.7 °C) (11/03/24 1202)  Pulse: 106 (11/03/24 1202)  Resp: 18 (11/03/24 1202)  BP: 121/62 (11/03/24 1202)  SpO2: 97 % (11/03/24 1202) Vital Signs (24h Range):  Temp:  [98 °F (36.7 °C)-102.8 °F (39.3 °C)] 98 °F (36.7 °C)  Pulse:  [105-117] 106  Resp:  [12-18] 18  SpO2:  [95 %-99 %] 97 %  BP: (120-174)/(62-82) 121/62     Weight: 96.1 kg (211 lb 12.8 oz)  Body mass index is 37.52 kg/m².     Physical Exam  Vitals reviewed.   Constitutional:       General: She is awake. She is not in acute distress.     Appearance: She is well-developed. She is obese. She is ill-appearing. She is not  toxic-appearing.   HENT:      Head: Normocephalic.      Nose: Nose normal.      Mouth/Throat:      Pharynx: Oropharynx is clear.   Eyes:      Extraocular Movements: Extraocular movements intact.      Pupils: Pupils are equal, round, and reactive to light.   Neck:      Thyroid: No thyroid mass.      Vascular: No carotid bruit.   Cardiovascular:      Rate and Rhythm: Regular rhythm. Tachycardia present.      Pulses: Normal pulses.      Heart sounds: Normal heart sounds. No murmur heard.  Pulmonary:      Effort: Pulmonary effort is normal.      Breath sounds: Normal breath sounds and air entry. No wheezing.   Abdominal:      General: Bowel sounds are increased. There is no distension.      Palpations: Abdomen is soft.      Tenderness: There is no abdominal tenderness.   Musculoskeletal:         General: Normal range of motion.      Cervical back: Neck supple. No rigidity.   Skin:     General: Skin is warm.      Coloration: Skin is not jaundiced.      Findings: No lesion.   Neurological:      General: No focal deficit present.      Mental Status: She is alert and oriented to person, place, and time.      Cranial Nerves: No cranial nerve deficit.   Psychiatric:         Attention and Perception: Attention normal.         Mood and Affect: Mood normal.         Behavior: Behavior normal. Behavior is cooperative.         Thought Content: Thought content normal.         Cognition and Memory: Cognition normal.              CRANIAL NERVES     CN III, IV, VI   Pupils are equal, round, and reactive to light.       Significant Labs: All pertinent labs within the past 24 hours have been reviewed.  BMP:   Recent Labs   Lab 11/03/24 0335   *      K 3.8   *   CO2 17*   BUN 22*   CREATININE 2.38*   CALCIUM 7.8*     CBC:   Recent Labs   Lab 11/02/24 0653 11/03/24 0335   WBC 14.71* 9.71   HGB 10.6* 10.9*   HCT 34.6* 36.0*    284     CMP:   Recent Labs   Lab 11/02/24 0653 11/03/24 0335    141   K 3.4*  3.8    112*   CO2 21 17*   * 174*   BUN 12 22*   CREATININE 1.39* 2.38*   CALCIUM 8.4* 7.8*   PROT 7.0  --    ALBUMIN 3.0*  --    BILITOT 0.5  --    ALKPHOS 63  --    AST 13*  --    ALT 21  --    ANIONGAP 13 16       Significant Imaging: I have reviewed all pertinent imaging results/findings within the past 24 hours.    Imaging Results              CT Abdomen Pelvis  Without Contrast (Final result)  Result time 11/02/24 10:13:57      Final result by Emerson Kelley MD (11/02/24 10:13:57)                   Impression:      1. Nonspecific intraluminal fluid within normal caliber loops of colon, as may be seen in the setting of diarrheal illness.  2. Patchy airspace consolidation small nodules in the basilar right lower lobe, could reflect infectious or noninfectious inflammatory process.  For multiple solid nodules all <6 mm, Fleischner Society 2017 guidelines recommend no routine follow up for a low risk patient, or follow up with non-contrast chest CT at 12 months after discovery in a high risk patient.  3. Additional details of chronic and incidental findings, as provided in the body of report.      Electronically signed by: Emerson Kelley  Date:    11/02/2024  Time:    10:13               Narrative:    EXAMINATION:  CT ABDOMEN PELVIS WITHOUT CONTRAST    CLINICAL HISTORY:  Abdominal pain, acute, nonlocalized;abdominal pain;    TECHNIQUE:  Low dose axial images, sagittal and coronal reformations were obtained from the lung bases to the pubic symphysis.    COMPARISON:  CT of the abdomen and pelvis performed 03/13/2015.    FINDINGS:  This examination is limited due to lack of intravenous contrast.    Lower chest: Atelectasis.  Patchy airspace consolidation and small nodules noted in the basilar right lower lobe.    Liver: Suggested hepatomegaly.  Hepatic steatosis.    Gallbladder and bile ducts: Unremarkable.    Pancreas: Normal contour.    Spleen: Normal contour.    Adrenals: Normal  contour.    Kidneys: Suggested mild degree of cortical atrophy of both kidneys.  Nonspecific bilateral perinephric stranding.  Subcentimeter hypodense lesions in the kidneys are too small to definitely characterize.    Lymph nodes: No abdominal or pelvic lymphadenopathy.    Bowel and mesentery: Small hiatal hernia.  No evidence of bowel obstruction.  Nonspecific intraluminal fluid within normal caliber loops of colon, as may be seen in the setting of diarrheal illness.  Nonspecific fatty infiltration in the region of the ileocecal valve.  Appendix not confidently identified.  No definite focal pericecal inflammation.    Abdominal aorta: Nonaneurysmal.  Mild atherosclerotic calcification.    Inferior vena cava: Unremarkable.    Free fluid or free air: None.    Pelvis: Unremarkable.    Urinary bladder: Unremarkable.    Body wall: Remote operative sequela in the anterior abdominal wall.    Bones: Query osseous demineralization.  No definite acute findings.  Degenerative findings of the spine and hips.                                       X-Ray Chest 1 View (Final result)  Result time 11/02/24 08:34:51      Final result by Emerson Kelley MD (11/02/24 08:34:51)                   Impression:      Mildly prominent interstitial markings could relate to pulmonary vascular congestion/edema versus infectious or noninfectious inflammatory process.    Suspect left basilar atelectasis.      Electronically signed by: Emerson Kelley  Date:    11/02/2024  Time:    08:34               Narrative:    EXAMINATION:  XR CHEST 1 VIEW    CLINICAL HISTORY:  Fever, unspecified    TECHNIQUE:  Single frontal view of the chest was performed.    COMPARISON:  Chest radiograph performed 08/14/2024, 09:20 hours.    FINDINGS:  Monitoring leads overlie the chest.  Grossly unchanged cardiomediastinal contours.  Mildly prominent interstitial markings.    No definite focal airspace consolidation.  Suspect left basilar atelectasis.    No definite  pneumothorax or large volume pleural effusion.    No acute findings in the visualized abdomen.    Osseous and soft tissue structures appear without definite acute change.                                    Intake/Output - Last 3 Shifts         11/01 0700 11/02 0659 11/02 0700 11/03 0659 11/03 0700 11/04 0659    I.V. (mL/kg)  1265.6 (13.2)     IV Piggyback  2691.6     Total Intake(mL/kg)  3957.2 (41.2)     Net  +3957.2            Urine Occurrence   2 x    Stool Occurrence  2 x 5 x          Microbiology Results (last 7 days)       Procedure Component Value Units Date/Time    Culture, Enteric Pathogen [[id number]] Collected: 11/02/24 2114    Order Status: Completed Specimen: Stool Updated: 11/03/24 1009     Culture, Enteric Pathogens Culture In Progress    Blood culture #1 [[id number]] Collected: 11/02/24 0653    Order Status: Completed Specimen: Blood Updated: 11/03/24 0712     Culture, Blood No Growth At 24 Hours    Blood culture #2 [[id number]] Collected: 11/02/24 0715    Order Status: Completed Specimen: Blood from Peripheral, Antecubital, Right Updated: 11/03/24 0712     Culture, Blood No Growth At 24 Hours    Enteric Pathogen Panel [[id number]]  (Abnormal) Collected: 11/02/24 2114    Order Status: Completed Specimen: Stool Updated: 11/03/24 0028     Campylobacter Group LUIS Negative     Salmonella Species LUIS Positive     Shigella Species LUIS Negative     Vibrio Group LUIS Negative     Yersinia enterocolitica LUIS Negative     Shiga Toxin 1 LUIS Negative     Shiga Toxin 2 LUIS Negative     Norovirus GI/GII LUIS Negative     Rotavirus A LUIS Negative    Fecal leukocytes [[id number]] Collected: 11/02/24 2114    Order Status: Completed Specimen: Stool Updated: 11/02/24 2207     Fecal Leukocytes Positive    C Diff Toxin by PCR [[id number]] Collected: 11/02/24 2114    Order Status: Sent Specimen: Stool Updated: 11/02/24 2120    Ova and Parasite Exam [[id number]]     Order Status: Canceled Specimen: Stool     Influenza A &  B by Molecular [[id number]]  (Normal) Collected: 11/02/24 0658    Order Status: Completed Specimen: Nasopharyngeal Swab Updated: 11/02/24 0747     INFLUENZA A MOLECULAR Negative     INFLUENZA B MOLECULAR  Negative              Assessment/Plan:      * Salmonella enteritis    With severe illness treating with fluoroquinolones  Unable to take orally well so giving IV hydration and IV antibiotics  Discussed with , he is to throughout all open food had his house  Denies any recent travel, anyone else sick at home, no sick pets in his not been around poultry  Has eaten out once at a restaurant recently    Acute lactic acidosis    Continue with intravenous hydration  Checking blood cultures with possibility of septicemia  However metformin inpatients current situation of dehydration and GI upset could be associated with lactic acidosis    Dehydration    Although IV fluids on national shortage with patient's tachycardia and inability to take orally will leave on continue maintenance IV fluids for now.  Patient has had 2-1/2 L so far in the emergency room.        Fever  Check stool studies  Blood culture have been drawn  Cover with levofloxacin for possible infectious diarrhea      Nausea vomiting and diarrhea    Continue hydration, GI upset better, checking stool studies  Liquid diet for now    11/03 these symptoms persist and continue with hydration and IV antibiotics    History of right shoulder replacement    This back in August and was patient's last antibiotics not likely etiology but checking C diff    Acquired hypothyroidism  Continue Synthroid  Check T4 and TSH      Systolic hypertension  Patients blood pressure range in the last 24 hours was: BP  Min: 130/61  Max: 185/80.The patient's inpatient anti-hypertensive regimen is listed below:  Current Antihypertensives  losartan tablet 50 mg, Daily, Oral    Plan  - BP is uncontrolled, will adjust as follows:  Adjust meds as needed      Uncontrolled type 2 diabetes  "mellitus with hyperglycemia  Patient's FSGs are uncontrolled due to hyperglycemia on current medication regimen.  Last A1c reviewed-   Lab Results   Component Value Date    HGBA1C 10.6 (H) 08/28/2024     Most recent fingerstick glucose reviewed- No results for input(s): "POCTGLUCOSE" in the last 24 hours.  Current correctional scale  Medium  Increase anti-hyperglycemic dose as follows-   Antihyperglycemics (From admission, onward)      Start     Stop Route Frequency Ordered    11/02/24 1643  insulin aspart U-100 injection 0-10 Units         -- SubQ Before meals & nightly PRN 11/02/24 1546          Hold Oral hypoglycemics while patient is in the hospital.    Severe obesity (BMI 35.0-39.9) with comorbidity  Body mass index is 36.49 kg/m². Morbid obesity complicates all aspects of disease management from diagnostic modalities to treatment. Weight loss encouraged and health benefits explained to patient.         Rheumatoid arthritis involving multiple sites with positive rheumatoid factor    Hold patient's selective T-cell code stimulator modulator Orencia while having acute illness      VTE Risk Mitigation (From admission, onward)           Ordered     enoxaparin injection 40 mg  Daily         11/02/24 1546     IP VTE HIGH RISK PATIENT  Once         11/02/24 1546     Place sequential compression device  Until discontinued         11/02/24 1546                    Discharge Planning   NAEEM:      Code Status: Full Code   Is the patient medically ready for discharge?:     Reason for patient still in hospital (select all that apply): Laboratory test, Treatment, and Imaging  Discharge Plan A: Home, Home with family                  Tigre Owusu MD  Department of Hospital Medicine   [facility]    "

## 2024-11-03 NOTE — PROGRESS NOTES
Patient was identified at risk by screening criteria with MST2. She endorsed being unsure of weight loss but denied poor PO intakes. Patient is 96.1kg with a BMI of 37.52 and is obese. Chart review reveals no significant weight changes over the past six months. Per ASPEN guidelines, patient does not meet criteria for malnutrition at this time.

## 2024-11-03 NOTE — HOSPITAL COURSE
11/03 feels some better.  Still with nausea vomiting not tolerating liquids by mouth.  Some diarrhea.  Studies returned showing salmonella as etiology.   in room and states no one else sick.  Told him to throw out all open food at their house.  Continue with hydration and antibiotics.  Monitor  11/04 still with nausea vomiting diarrhea not able to hold down fluids well, with IV fluids out renal function slight worse.  Start IV fluids back.  Generally though patient states feeling some better.  Daughter in room  11/05 feels better but still NV and loose stools. Worse renal function. UOP but less. Asked Dr Conde to evaluate.  11/06 better but still not able to take much in orally. Continue hydration. Renal function better.  11/07 Still diarrhea and nausea. Still taking in poorly. Renal function better. Continue IVF for now.  11/10 - Summary.  Patient is a pleasant 74-year-old  female in mid with salmonella enteritis, dehydration, and subsequent acute renal failure.  Lab testing did show positive for salmonella.  The patient was treated with Levaquin and IV fluids.  Creatinine peaked at 5.93 on 11/05 but has rapidly improved since then.  Day of discharge creatinine is 1.45.  Diarrhea has essentially stopped.  No nausea and vomiting and patient is eating reasonably well.  Glucoses have been below 200.  Renal function still marginal for resuming her metformin.  We will discharge home today with 3 additional days of Levaquin.  Patient to hold her Lasix, Hyzaar, and metformin until Thursday.  I have asked her to try to see her internist Dr. Wyatt this week and have labs rechecked before resuming.

## 2024-11-03 NOTE — PROGRESS NOTES
Pharmacist Renal Dose Adjustment Note    Meghna Dalal is a 74 y.o. female being treated with the medication levofloxacin    Patient Data:    Vital Signs (Most Recent):  Temp: 98.3 °F (36.8 °C) (11/03/24 0741)  Pulse: 105 (11/03/24 0741)  Resp: 18 (11/03/24 0741)  BP: 120/67 (11/03/24 0820)  SpO2: 95 % (11/03/24 0741) Vital Signs (72h Range):  Temp:  [98.3 °F (36.8 °C)-102.8 °F (39.3 °C)]   Pulse:  [105-121]   Resp:  [12-41]   BP: (120-185)/(60-82)   SpO2:  [92 %-99 %]      Recent Labs   Lab 11/02/24  0653 11/03/24 0335   CREATININE 1.39* 2.38*     Serum creatinine: 2.38 mg/dL (H) 11/03/24 0335  Estimated creatinine clearance: 23.6 mL/min (A)    Medication:levofloxacin dose: 500 mg frequency daily will be changed to medication:levofloxacin dose:250 mg frequency:daily    Pharmacist's Name: Ana Corrales  Pharmacist's Extension: [phone]

## 2024-11-03 NOTE — ASSESSMENT & PLAN NOTE
With severe illness treating with fluoroquinolones  Unable to take orally well so giving IV hydration and IV antibiotics  Discussed with , he is to throughout all open food had his house  Denies any recent travel, anyone else sick at home, no sick pets in his not been around poultry  Has eaten out once at a restaurant recently

## 2024-11-03 NOTE — ASSESSMENT & PLAN NOTE
>>ASSESSMENT AND PLAN FOR NAUSEA VOMITING AND DIARRHEA WRITTEN ON 11/3/2024  3:00 PM BY JEAN CARLOS LUIS MD      Continue hydration, GI upset better, checking stool studies  Liquid diet for now    11/03 these symptoms persist and continue with hydration and IV antibiotics

## 2024-11-03 NOTE — PLAN OF CARE
[facility]  Initial Discharge Assessment       Primary Care Provider: Kwame Wyatt MD    Admission Diagnosis: Gastroenteritis [K52.9]  Fever [R50.9]  GEOVANY (acute kidney injury) [N17.9]    Admission Date: 11/2/2024  Expected Discharge Date:     Transition of Care Barriers: (P) None    Payor: [facility] MCARE / Plan: [employer] MEDICARE ADVANTAGE / Product Type: Medicare Advantage /     Extended Emergency Contact Information  Primary Emergency Contact: SHONDA HUBBARD  Mobile Phone: [phone]  Relation: Spouse  Preferred language: English   needed? No  Secondary Emergency Contact: SHERI ARMSTRONG  Mobile Phone: [phone]  Relation: Daughter  Preferred language: English   needed? No    Discharge Plan A: (P) Home, Home with family  Discharge Plan B: (P) Home, Home with family      [id number] - [place], [place] - [address]  [address] [facility] [address] [address]  Phone: [phone] Fax: [phone]    [facility] - [place], [place] - [address]  [address] [address]  Phone: [phone] Fax: [phone]    [facility]  [address] [place] [address]  Phone: [phone] Fax: [phone]      Initial Assessment (most recent)       Adult Discharge Assessment - 11/03/24 1133          Discharge Assessment    Assessment Type Discharge Planning Assessment (P)      Confirmed/corrected address, phone number and insurance Yes (P)      Source of Information family (P)    SpouseShonda [phone]    Communicated NAEEM with patient/caregiver Date not available/Unable to determine (P)      People in Home spouse (P)    SpouseShonda [phone]    Do you expect to return to your current living situation? Yes (P)      Do you have help at home or someone to help you manage your care at home? Yes (P)      Who are your caregiver(s) and their  phone number(s)? SpouseShonda [phone] (P)      Prior to hospitilization cognitive status: Alert/Oriented (P)      Current cognitive status: Alert/Oriented (P)      Walking or Climbing Stairs Difficulty no (P)      Dressing/Bathing Difficulty no (P)      Home Layout Able to live on 1st floor (P)      Equipment Currently Used at Home none (P)      Readmission within 30 days? No (P)      Patient currently being followed by outpatient case management? No (P)      Do you currently have service(s) that help you manage your care at home? No (P)      Do you take prescription medications? Yes (P)      Do you have prescription coverage? Yes (P)      Coverage [employer] Managed Medicare-Medicare Advantage (P)      Do you have any problems affording any of your prescribed medications? No (P)      Is the patient taking medications as prescribed? yes (P)      Who is going to help you get home at discharge? Spouse, Shonda Dumas [phone] (P)      How do you get to doctors appointments? car, drives self;family or friend will provide (P)      Are you on dialysis? No (P)      Do you take coumadin? No (P)    Pt's spouse stated that pt takes a baby aspirin daily.    Discharge Plan A Home;Home with family (P)      Discharge Plan B Home;Home with family (P)      DME Needed Upon Discharge  none (P)      Discharge Plan discussed with: Spouse/sig other (P)      Name(s) and Number(s) SpouseShonda [phone] (P)      Transition of Care Barriers None (P)         Physical Activity    On average, how many days per week do you engage in moderate to strenuous exercise (like a brisk walk)? 5 days (P)      On average, how many minutes do you engage in exercise at this level? 20 min (P)         Financial Resource Strain    How hard is it for you to pay for the very basics like food, housing, medical care, and heating? Not hard at all (P)         Housing Stability    In the last 12 months, was there a time when  you were not able to pay the mortgage or rent on time? No (P)      At any time in the past 12 months, were you homeless or living in a shelter (including now)? No (P)         Transportation Needs    Has the lack of transportation kept you from medical appointments, meetings, work or from getting things needed for daily living? No (P)         Food Insecurity    Within the past 12 months, you worried that your food would run out before you got the money to buy more. Never true (P)      Within the past 12 months, the food you bought just didn't last and you didn't have money to get more. Never true (P)         Stress    Do you feel stress - tense, restless, nervous, or anxious, or unable to sleep at night because your mind is troubled all the time - these days? Not at all (P)         Social Isolation    How often do you feel lonely or isolated from those around you?  Never (P)         Alcohol Use    Q1: How often do you have a drink containing alcohol? Never (P)      Q2: How many drinks containing alcohol do you have on a typical day when you are drinking? Patient does not drink (P)      Q3: How often do you have six or more drinks on one occasion? Never (P)         Utilities    In the past 12 months has the electric, gas, oil, or water company threatened to shut off services in your home? No (P)         Health Literacy    How often do you need to have someone help you when you read instructions, pamphlets, or other written material from your doctor or pharmacy? Sometimes (P)         OTHER    Name(s) of People in Home Spouse, Shonda Dumas [phone] (P)                  LSW spoke with pt's spouse Shonda Dumas via phone for pt's initial assessment. Mr. Dumas stated that pt is ambulatory and does not require assistance nor does she require assistance with her ADLs. Mr. Dumas stated that pt is alert and oriented x3. Pt is insured with [employer] Managed Medicare. IM was obtained via verbal consent. Pt will  return home when eligible for discharge. SS will follow up with dc planning.

## 2024-11-03 NOTE — NURSING
Pt is not tolerating oral fluids at this time, so she needs IV fluids.  I witnessed pt drink water this AM and then vomited immediately after.

## 2024-11-03 NOTE — ASSESSMENT & PLAN NOTE
Continue hydration, GI upset better, checking stool studies  Liquid diet for now    11/03 these symptoms persist and continue with hydration and IV antibiotics

## 2024-11-03 NOTE — SUBJECTIVE & OBJECTIVE
Past Medical History:   Diagnosis Date    Arthritis     Diabetes mellitus, type 2     GERD (gastroesophageal reflux disease)     Hypertension     Iron deficiency anemia     Obesity     Personal history of colonic polyps 08/19/2019    Thyroid disease        Past Surgical History:   Procedure Laterality Date    ARTHROSCOPIC DEBRIDEMENT OF SHOULDER Left 12/21/2021    Procedure: DEBRIDEMENT, SHOULDER, ARTHROSCOPIC;  Surgeon: Jose Antonio Pablo MD;  Location: [facility];  Service: Orthopedics;  Laterality: Left;  SHOULDER JOINT    ARTHROSCOPIC REMOVAL OF LOOSE BODY FROM JOINT Left 12/21/2021    Procedure: REMOVAL, LOOSE BODY, JOINT, ARTHROSCOPIC;  Surgeon: Jose Antonio Pablo MD;  Location: [facility];  Service: Orthopedics;  Laterality: Left;    COLONOSCOPY W/ POLYPECTOMY  08/19/2019    DECOMPRESSION OF SUBACROMIAL SPACE Left 12/21/2021    Procedure: DECOMPRESSION, SUBACROMIAL SPACE;  Surgeon: Jose Antonio Pablo MD;  Location: [facility];  Service: Orthopedics;  Laterality: Left;    DISTAL CLAVICLE EXCISION Left 12/21/2021    Procedure: EXCISION, CLAVICLE, DISTAL;  Surgeon: Jose Antonio Pablo MD;  Location: [facility];  Service: Orthopedics;  Laterality: Left;    left total knee replacement      PARTIAL HYSTERECTOMY      REVERSE TOTAL SHOULDER ARTHROPLASTY Right 8/27/2024    Procedure: ARTHROPLASTY, SHOULDER, TOTAL, REVERSE;  Surgeon: Jose Antonio Pablo MD;  Location: [facility];  Service: Orthopedics;  Laterality: Right;    ROTATOR CUFF REPAIR Left 12/21/2021    Procedure: REPAIR, ROTATOR CUFF;  Surgeon: Jose Antonio Pablo MD;  Location: [facility];  Service: Orthopedics;  Laterality: Left;    SHOULDER ARTHROSCOPY Left 12/21/2021    Procedure: ARTHROSCOPY, SHOULDER;  Surgeon: Jose Antonio Pablo MD;  Location: [facility];  Service: Orthopedics;  Laterality: Left;    SHOULDER SURGERY Left 12/21/2021    THYROID SURGERY      total thyroidectomy    TONSILLECTOMY AND ADENOIDECTOMY          Review of patient's allergies indicates:   Allergen Reactions    Codeine phosphate      Other reaction(s): facial swelling    Codeine sulfate      Other reaction(s): Unknown       No current facility-administered medications on file prior to encounter.     Current Outpatient Medications on File Prior to Encounter   Medication Sig    aspirin 81 mg Cap Take 81 mg by mouth once daily.    cyclobenzaprine (FLEXERIL) 10 MG tablet Take 1 tablet (10 mg total) by mouth 3 (three) times daily as needed for Muscle spasms.    furosemide (LASIX) 20 MG tablet Take 1 tablet (20 mg total) by mouth once daily.    gabapentin (NEURONTIN) 100 MG capsule Take 1 capsule (100 mg total) by mouth 3 (three) times daily.    levothyroxine (SYNTHROID) 125 MCG tablet TAKE 1 TABLET BY MOUTH EVERY DAY    losartan-hydrochlorothiazide 100-12.5 mg (HYZAAR) 100-12.5 mg Tab TAKE 1 TABLET BY MOUTH EVERY DAY    metFORMIN (GLUCOPHAGE) 500 MG tablet TAKE 1 TABLET BY MOUTH THREE TIMES A DAY    omeprazole (PRILOSEC) 40 MG capsule TAKE 1 CAPSULE BY MOUTH ONCE  DAILY    potassium chloride (MICRO-K) 8 mEq CpSR TAKE 1 CAPSULE BY MOUTH ONCE DAILY.    predniSONE (DELTASONE) 5 MG tablet Take 5 mg by mouth once daily.    [DISCONTINUED] losartan (COZAAR) 50 MG tablet TAKE 1 TABLET BY MOUTH EVERY DAY     Family History       Problem Relation (Age of Onset)    Alzheimer's disease Mother    Heart failure Father, Brother          Tobacco Use    Smoking status: Never    Smokeless tobacco: Never   Substance and Sexual Activity    Alcohol use: Never    Drug use: Never    Sexual activity: Yes     Review of Systems   Constitutional:  Positive for appetite change, chills, fatigue and fever.   HENT:  Negative for congestion, hearing loss and trouble swallowing.    Respiratory:  Negative for chest tightness, shortness of breath and wheezing.    Cardiovascular:  Negative for chest pain and palpitations.   Gastrointestinal:  Positive for diarrhea, nausea and vomiting.  Negative for abdominal pain and constipation.   Genitourinary:  Negative for difficulty urinating and dysuria.   Musculoskeletal:  Negative for back pain and neck stiffness.   Skin:  Negative for pallor and rash.   Neurological:  Positive for dizziness. Negative for speech difficulty and headaches.   Psychiatric/Behavioral:  Negative for confusion and suicidal ideas.      Objective:     Vital Signs (Most Recent):  Temp: 98 °F (36.7 °C) (11/03/24 1202)  Pulse: 106 (11/03/24 1202)  Resp: 18 (11/03/24 1202)  BP: 121/62 (11/03/24 1202)  SpO2: 97 % (11/03/24 1202) Vital Signs (24h Range):  Temp:  [98 °F (36.7 °C)-102.8 °F (39.3 °C)] 98 °F (36.7 °C)  Pulse:  [105-117] 106  Resp:  [12-18] 18  SpO2:  [95 %-99 %] 97 %  BP: (120-174)/(62-82) 121/62     Weight: 96.1 kg (211 lb 12.8 oz)  Body mass index is 37.52 kg/m².     Physical Exam  Vitals reviewed.   Constitutional:       General: She is awake. She is not in acute distress.     Appearance: She is well-developed. She is obese. She is ill-appearing. She is not toxic-appearing.   HENT:      Head: Normocephalic.      Nose: Nose normal.      Mouth/Throat:      Pharynx: Oropharynx is clear.   Eyes:      Extraocular Movements: Extraocular movements intact.      Pupils: Pupils are equal, round, and reactive to light.   Neck:      Thyroid: No thyroid mass.      Vascular: No carotid bruit.   Cardiovascular:      Rate and Rhythm: Regular rhythm. Tachycardia present.      Pulses: Normal pulses.      Heart sounds: Normal heart sounds. No murmur heard.  Pulmonary:      Effort: Pulmonary effort is normal.      Breath sounds: Normal breath sounds and air entry. No wheezing.   Abdominal:      General: Bowel sounds are increased. There is no distension.      Palpations: Abdomen is soft.      Tenderness: There is no abdominal tenderness.   Musculoskeletal:         General: Normal range of motion.      Cervical back: Neck supple. No rigidity.   Skin:     General: Skin is warm.       Coloration: Skin is not jaundiced.      Findings: No lesion.   Neurological:      General: No focal deficit present.      Mental Status: She is alert and oriented to person, place, and time.      Cranial Nerves: No cranial nerve deficit.   Psychiatric:         Attention and Perception: Attention normal.         Mood and Affect: Mood normal.         Behavior: Behavior normal. Behavior is cooperative.         Thought Content: Thought content normal.         Cognition and Memory: Cognition normal.              CRANIAL NERVES     CN III, IV, VI   Pupils are equal, round, and reactive to light.       Significant Labs: All pertinent labs within the past 24 hours have been reviewed.  BMP:   Recent Labs   Lab 11/03/24  0335   *      K 3.8   *   CO2 17*   BUN 22*   CREATININE 2.38*   CALCIUM 7.8*     CBC:   Recent Labs   Lab 11/02/24 0653 11/03/24 0335   WBC 14.71* 9.71   HGB 10.6* 10.9*   HCT 34.6* 36.0*    284     CMP:   Recent Labs   Lab 11/02/24 0653 11/03/24 0335    141   K 3.4* 3.8    112*   CO2 21 17*   * 174*   BUN 12 22*   CREATININE 1.39* 2.38*   CALCIUM 8.4* 7.8*   PROT 7.0  --    ALBUMIN 3.0*  --    BILITOT 0.5  --    ALKPHOS 63  --    AST 13*  --    ALT 21  --    ANIONGAP 13 16       Significant Imaging: I have reviewed all pertinent imaging results/findings within the past 24 hours.    Imaging Results              CT Abdomen Pelvis  Without Contrast (Final result)  Result time 11/02/24 10:13:57      Final result by Emerson Kelley MD (11/02/24 10:13:57)                   Impression:      1. Nonspecific intraluminal fluid within normal caliber loops of colon, as may be seen in the setting of diarrheal illness.  2. Patchy airspace consolidation small nodules in the basilar right lower lobe, could reflect infectious or noninfectious inflammatory process.  For multiple solid nodules all <6 mm, Fleischner Society 2017 guidelines recommend no routine follow up for  a low risk patient, or follow up with non-contrast chest CT at 12 months after discovery in a high risk patient.  3. Additional details of chronic and incidental findings, as provided in the body of report.      Electronically signed by: Emerson Kelley  Date:    11/02/2024  Time:    10:13               Narrative:    EXAMINATION:  CT ABDOMEN PELVIS WITHOUT CONTRAST    CLINICAL HISTORY:  Abdominal pain, acute, nonlocalized;abdominal pain;    TECHNIQUE:  Low dose axial images, sagittal and coronal reformations were obtained from the lung bases to the pubic symphysis.    COMPARISON:  CT of the abdomen and pelvis performed 03/13/2015.    FINDINGS:  This examination is limited due to lack of intravenous contrast.    Lower chest: Atelectasis.  Patchy airspace consolidation and small nodules noted in the basilar right lower lobe.    Liver: Suggested hepatomegaly.  Hepatic steatosis.    Gallbladder and bile ducts: Unremarkable.    Pancreas: Normal contour.    Spleen: Normal contour.    Adrenals: Normal contour.    Kidneys: Suggested mild degree of cortical atrophy of both kidneys.  Nonspecific bilateral perinephric stranding.  Subcentimeter hypodense lesions in the kidneys are too small to definitely characterize.    Lymph nodes: No abdominal or pelvic lymphadenopathy.    Bowel and mesentery: Small hiatal hernia.  No evidence of bowel obstruction.  Nonspecific intraluminal fluid within normal caliber loops of colon, as may be seen in the setting of diarrheal illness.  Nonspecific fatty infiltration in the region of the ileocecal valve.  Appendix not confidently identified.  No definite focal pericecal inflammation.    Abdominal aorta: Nonaneurysmal.  Mild atherosclerotic calcification.    Inferior vena cava: Unremarkable.    Free fluid or free air: None.    Pelvis: Unremarkable.    Urinary bladder: Unremarkable.    Body wall: Remote operative sequela in the anterior abdominal wall.    Bones: Query osseous demineralization.   No definite acute findings.  Degenerative findings of the spine and hips.                                       X-Ray Chest 1 View (Final result)  Result time 11/02/24 08:34:51      Final result by Emerson Kelley MD (11/02/24 08:34:51)                   Impression:      Mildly prominent interstitial markings could relate to pulmonary vascular congestion/edema versus infectious or noninfectious inflammatory process.    Suspect left basilar atelectasis.      Electronically signed by: Emerson Kelley  Date:    11/02/2024  Time:    08:34               Narrative:    EXAMINATION:  XR CHEST 1 VIEW    CLINICAL HISTORY:  Fever, unspecified    TECHNIQUE:  Single frontal view of the chest was performed.    COMPARISON:  Chest radiograph performed 08/14/2024, 09:20 hours.    FINDINGS:  Monitoring leads overlie the chest.  Grossly unchanged cardiomediastinal contours.  Mildly prominent interstitial markings.    No definite focal airspace consolidation.  Suspect left basilar atelectasis.    No definite pneumothorax or large volume pleural effusion.    No acute findings in the visualized abdomen.    Osseous and soft tissue structures appear without definite acute change.                                    Intake/Output - Last 3 Shifts         11/01 0700 11/02 0659 11/02 0700 11/03 0659 11/03 0700 11/04 0659    I.V. (mL/kg)  1265.6 (13.2)     IV Piggyback  2691.6     Total Intake(mL/kg)  3957.2 (41.2)     Net  +3957.2            Urine Occurrence   2 x    Stool Occurrence  2 x 5 x          Microbiology Results (last 7 days)       Procedure Component Value Units Date/Time    Culture, Enteric Pathogen [[id number]] Collected: 11/02/24 2114    Order Status: Completed Specimen: Stool Updated: 11/03/24 1009     Culture, Enteric Pathogens Culture In Progress    Blood culture #1 [[id number]] Collected: 11/02/24 0653    Order Status: Completed Specimen: Blood Updated: 11/03/24 0712     Culture, Blood No Growth At 24 Hours    Blood  culture #2 [[id number]] Collected: 11/02/24 0715    Order Status: Completed Specimen: Blood from Peripheral, Antecubital, Right Updated: 11/03/24 0712     Culture, Blood No Growth At 24 Hours    Enteric Pathogen Panel [[id number]]  (Abnormal) Collected: 11/02/24 2114    Order Status: Completed Specimen: Stool Updated: 11/03/24 0028     Campylobacter Group LUIS Negative     Salmonella Species LUIS Positive     Shigella Species LUIS Negative     Vibrio Group LUIS Negative     Yersinia enterocolitica LUIS Negative     Shiga Toxin 1 LUIS Negative     Shiga Toxin 2 LUIS Negative     Norovirus GI/GII LUIS Negative     Rotavirus A LUIS Negative    Fecal leukocytes [[id number]] Collected: 11/02/24 2114    Order Status: Completed Specimen: Stool Updated: 11/02/24 2207     Fecal Leukocytes Positive    C Diff Toxin by PCR [[id number]] Collected: 11/02/24 2114    Order Status: Sent Specimen: Stool Updated: 11/02/24 2120    Ova and Parasite Exam [[id number]]     Order Status: Canceled Specimen: Stool     Influenza A & B by Molecular [[id number]]  (Normal) Collected: 11/02/24 0658    Order Status: Completed Specimen: Nasopharyngeal Swab Updated: 11/02/24 0747     INFLUENZA A MOLECULAR Negative     INFLUENZA B MOLECULAR  Negative

## 2024-11-04 LAB
C DIFF TOX A+B STL QL IA: NEGATIVE
CLOSTRIDIUM DIFFICILE GDH ANTIGEN (OHS): NEGATIVE
GLUCOSE SERPL-MCNC: 161 MG/DL (ref 70–105)
GLUCOSE SERPL-MCNC: 183 MG/DL (ref 70–105)
GLUCOSE SERPL-MCNC: 205 MG/DL (ref 70–105)
GLUCOSE SERPL-MCNC: 210 MG/DL (ref 70–105)
LACTATE SERPL-SCNC: 1.7 MMOL/L (ref 0.4–2)

## 2024-11-04 PROCEDURE — 11000001 HC ACUTE MED/SURG PRIVATE ROOM

## 2024-11-04 PROCEDURE — 99900035 HC TECH TIME PER 15 MIN (STAT)

## 2024-11-04 PROCEDURE — 63600175 PHARM REV CODE 636 W HCPCS: Performed by: HOSPITALIST

## 2024-11-04 PROCEDURE — 25000003 PHARM REV CODE 250: Performed by: HOSPITALIST

## 2024-11-04 PROCEDURE — 82962 GLUCOSE BLOOD TEST: CPT

## 2024-11-04 PROCEDURE — 99232 SBSQ HOSP IP/OBS MODERATE 35: CPT | Mod: ,,, | Performed by: HOSPITALIST

## 2024-11-04 PROCEDURE — 83605 ASSAY OF LACTIC ACID: CPT | Performed by: HOSPITALIST

## 2024-11-04 PROCEDURE — 36415 COLL VENOUS BLD VENIPUNCTURE: CPT | Performed by: HOSPITALIST

## 2024-11-04 PROCEDURE — 27000221 HC OXYGEN, UP TO 24 HOURS

## 2024-11-04 PROCEDURE — 94761 N-INVAS EAR/PLS OXIMETRY MLT: CPT

## 2024-11-04 RX ORDER — ENOXAPARIN SODIUM 100 MG/ML
30 INJECTION SUBCUTANEOUS EVERY 24 HOURS
Status: DISCONTINUED | OUTPATIENT
Start: 2024-11-05 | End: 2024-11-10 | Stop reason: HOSPADM

## 2024-11-04 RX ORDER — ONDANSETRON HYDROCHLORIDE 2 MG/ML
4 INJECTION, SOLUTION INTRAVENOUS EVERY 6 HOURS PRN
Status: DISCONTINUED | OUTPATIENT
Start: 2024-11-04 | End: 2024-11-10 | Stop reason: HOSPADM

## 2024-11-04 RX ORDER — PROMETHAZINE HYDROCHLORIDE 25 MG/ML
25 INJECTION, SOLUTION INTRAMUSCULAR; INTRAVENOUS EVERY 6 HOURS PRN
Status: DISCONTINUED | OUTPATIENT
Start: 2024-11-04 | End: 2024-11-10 | Stop reason: HOSPADM

## 2024-11-04 RX ORDER — SODIUM CHLORIDE AND POTASSIUM CHLORIDE 150; 450 MG/100ML; MG/100ML
INJECTION, SOLUTION INTRAVENOUS CONTINUOUS
Status: DISCONTINUED | OUTPATIENT
Start: 2024-11-04 | End: 2024-11-10 | Stop reason: HOSPADM

## 2024-11-04 RX ADMIN — PROMETHAZINE HYDROCHLORIDE 25 MG: 25 TABLET ORAL at 03:11

## 2024-11-04 RX ADMIN — LOSARTAN POTASSIUM 50 MG: 50 TABLET, FILM COATED ORAL at 08:11

## 2024-11-04 RX ADMIN — Medication 1000 UNITS: at 08:11

## 2024-11-04 RX ADMIN — ONDANSETRON 8 MG: 2 INJECTION INTRAMUSCULAR; INTRAVENOUS at 08:11

## 2024-11-04 RX ADMIN — ONDANSETRON 4 MG: 2 INJECTION INTRAMUSCULAR; INTRAVENOUS at 05:11

## 2024-11-04 RX ADMIN — ENOXAPARIN SODIUM 40 MG: 40 INJECTION SUBCUTANEOUS at 05:11

## 2024-11-04 RX ADMIN — PROMETHAZINE HYDROCHLORIDE 25 MG: 25 INJECTION INTRAMUSCULAR; INTRAVENOUS at 10:11

## 2024-11-04 RX ADMIN — LEVOFLOXACIN 250 MG: 250 INJECTION, SOLUTION INTRAVENOUS at 05:11

## 2024-11-04 RX ADMIN — SODIUM CHLORIDE AND POTASSIUM CHLORIDE: 150; 450 INJECTION, SOLUTION INTRAVENOUS at 06:11

## 2024-11-04 RX ADMIN — SODIUM CHLORIDE 500 ML: 9 INJECTION, SOLUTION INTRAVENOUS at 03:11

## 2024-11-04 NOTE — PLAN OF CARE
Problem: Adult Inpatient Plan of Care  Goal: Plan of Care Review  Outcome: Progressing  Goal: Patient-Specific Goal (Individualized)  Outcome: Progressing  Goal: Absence of Hospital-Acquired Illness or Injury  Outcome: Progressing  Goal: Optimal Comfort and Wellbeing  Outcome: Progressing     Problem: Wound  Goal: Skin Health and Integrity  Outcome: Progressing     Problem: Skin Injury Risk Increased  Goal: Skin Health and Integrity  Outcome: Progressing

## 2024-11-04 NOTE — SUBJECTIVE & OBJECTIVE
Past Medical History:   Diagnosis Date    Arthritis     Diabetes mellitus, type 2     GERD (gastroesophageal reflux disease)     Hypertension     Iron deficiency anemia     Obesity     Personal history of colonic polyps 08/19/2019    Thyroid disease        Past Surgical History:   Procedure Laterality Date    ARTHROSCOPIC DEBRIDEMENT OF SHOULDER Left 12/21/2021    Procedure: DEBRIDEMENT, SHOULDER, ARTHROSCOPIC;  Surgeon: Jose Antonio Pablo MD;  Location: [facility] OR;  Service: Orthopedics;  Laterality: Left;  SHOULDER JOINT    ARTHROSCOPIC REMOVAL OF LOOSE BODY FROM JOINT Left 12/21/2021    Procedure: REMOVAL, LOOSE BODY, JOINT, ARTHROSCOPIC;  Surgeon: Jose Antonio Pablo MD;  Location: [facility] OR;  Service: Orthopedics;  Laterality: Left;    COLONOSCOPY W/ POLYPECTOMY  08/19/2019    DECOMPRESSION OF SUBACROMIAL SPACE Left 12/21/2021    Procedure: DECOMPRESSION, SUBACROMIAL SPACE;  Surgeon: Jose Antonio Pablo MD;  Location: [facility] OR;  Service: Orthopedics;  Laterality: Left;    DISTAL CLAVICLE EXCISION Left 12/21/2021    Procedure: EXCISION, CLAVICLE, DISTAL;  Surgeon: Jose Antonio Pablo MD;  Location: [facility] OR;  Service: Orthopedics;  Laterality: Left;    left total knee replacement      PARTIAL HYSTERECTOMY      REVERSE TOTAL SHOULDER ARTHROPLASTY Right 8/27/2024    Procedure: ARTHROPLASTY, SHOULDER, TOTAL, REVERSE;  Surgeon: Jose Antonio Pablo MD;  Location: [facility] OR;  Service: Orthopedics;  Laterality: Right;    ROTATOR CUFF REPAIR Left 12/21/2021    Procedure: REPAIR, ROTATOR CUFF;  Surgeon: Jose Antonio Pablo MD;  Location: [facility] OR;  Service: Orthopedics;  Laterality: Left;    SHOULDER ARTHROSCOPY Left 12/21/2021    Procedure: ARTHROSCOPY, SHOULDER;  Surgeon: Jose Antonio Pablo MD;  Location: [facility] OR;  Service: Orthopedics;  Laterality: Left;    SHOULDER SURGERY Left 12/21/2021    THYROID SURGERY      total thyroidectomy    TONSILLECTOMY AND ADENOIDECTOMY          Review of patient's allergies indicates:   Allergen Reactions    Codeine phosphate      Other reaction(s): facial swelling    Codeine sulfate      Other reaction(s): Unknown       No current facility-administered medications on file prior to encounter.     Current Outpatient Medications on File Prior to Encounter   Medication Sig    aspirin 81 mg Cap Take 81 mg by mouth once daily.    cyclobenzaprine (FLEXERIL) 10 MG tablet Take 1 tablet (10 mg total) by mouth 3 (three) times daily as needed for Muscle spasms.    furosemide (LASIX) 20 MG tablet Take 1 tablet (20 mg total) by mouth once daily.    gabapentin (NEURONTIN) 100 MG capsule Take 1 capsule (100 mg total) by mouth 3 (three) times daily.    levothyroxine (SYNTHROID) 125 MCG tablet TAKE 1 TABLET BY MOUTH EVERY DAY    losartan-hydrochlorothiazide 100-12.5 mg (HYZAAR) 100-12.5 mg Tab TAKE 1 TABLET BY MOUTH EVERY DAY    metFORMIN (GLUCOPHAGE) 500 MG tablet TAKE 1 TABLET BY MOUTH THREE TIMES A DAY    omeprazole (PRILOSEC) 40 MG capsule TAKE 1 CAPSULE BY MOUTH ONCE  DAILY    potassium chloride (MICRO-K) 8 mEq CpSR TAKE 1 CAPSULE BY MOUTH ONCE DAILY.    predniSONE (DELTASONE) 5 MG tablet Take 5 mg by mouth once daily.    [DISCONTINUED] losartan (COZAAR) 50 MG tablet TAKE 1 TABLET BY MOUTH EVERY DAY     Family History       Problem Relation (Age of Onset)    Alzheimer's disease Mother    Heart failure Father, Brother          Tobacco Use    Smoking status: Never    Smokeless tobacco: Never   Substance and Sexual Activity    Alcohol use: Never    Drug use: Never    Sexual activity: Yes     Review of Systems   Constitutional:  Positive for appetite change and fatigue.   HENT:  Negative for congestion, hearing loss and trouble swallowing.    Respiratory:  Negative for chest tightness, shortness of breath and wheezing.    Cardiovascular:  Negative for chest pain and palpitations.   Gastrointestinal:  Positive for diarrhea, nausea and vomiting. Negative for abdominal  pain and constipation.   Genitourinary:  Negative for difficulty urinating and dysuria.   Musculoskeletal:  Negative for back pain and neck stiffness.   Skin:  Negative for pallor and rash.   Neurological:  Negative for speech difficulty and headaches.   Psychiatric/Behavioral:  Negative for confusion and suicidal ideas.      Objective:     Vital Signs (Most Recent):  Temp: 98.5 °F (36.9 °C) (11/04/24 1500)  Pulse: 93 (11/04/24 1500)  Resp: 17 (11/04/24 1500)  BP: 114/69 (11/04/24 1500)  SpO2: 98 % (11/04/24 1500) Vital Signs (24h Range):  Temp:  [98 °F (36.7 °C)-98.6 °F (37 °C)] 98.5 °F (36.9 °C)  Pulse:  [84-96] 93  Resp:  [12-20] 17  SpO2:  [96 %-99 %] 98 %  BP: ()/(62-72) 114/69     Weight: 96.1 kg (211 lb 12.8 oz)  Body mass index is 37.52 kg/m².     Physical Exam  Vitals reviewed.   Constitutional:       General: She is awake. She is not in acute distress.     Appearance: She is well-developed. She is obese. She is not toxic-appearing.   HENT:      Head: Normocephalic.      Nose: Nose normal.      Mouth/Throat:      Pharynx: Oropharynx is clear.   Eyes:      Extraocular Movements: Extraocular movements intact.      Pupils: Pupils are equal, round, and reactive to light.   Neck:      Thyroid: No thyroid mass.      Vascular: No carotid bruit.   Cardiovascular:      Rate and Rhythm: Regular rhythm. Tachycardia present.      Pulses: Normal pulses.      Heart sounds: Normal heart sounds. No murmur heard.  Pulmonary:      Effort: Pulmonary effort is normal.      Breath sounds: Normal breath sounds and air entry. No wheezing.   Abdominal:      General: Bowel sounds are increased. There is no distension.      Palpations: Abdomen is soft.      Tenderness: There is no abdominal tenderness.   Musculoskeletal:         General: Normal range of motion.      Cervical back: Neck supple. No rigidity.   Skin:     General: Skin is warm.      Coloration: Skin is not jaundiced.      Findings: No lesion.   Neurological:       General: No focal deficit present.      Mental Status: She is alert and oriented to person, place, and time.      Cranial Nerves: No cranial nerve deficit.   Psychiatric:         Attention and Perception: Attention normal.         Mood and Affect: Mood normal.         Behavior: Behavior normal. Behavior is cooperative.         Thought Content: Thought content normal.         Cognition and Memory: Cognition normal.              CRANIAL NERVES     CN III, IV, VI   Pupils are equal, round, and reactive to light.       Significant Labs: All pertinent labs within the past 24 hours have been reviewed.  BMP:   Recent Labs   Lab 11/03/24  0335   *      K 3.8   *   CO2 17*   BUN 22*   CREATININE 2.38*   CALCIUM 7.8*     CBC:   Recent Labs   Lab 11/03/24 0335   WBC 9.71   HGB 10.9*   HCT 36.0*        CMP:   Recent Labs   Lab 11/03/24 0335      K 3.8   *   CO2 17*   *   BUN 22*   CREATININE 2.38*   CALCIUM 7.8*   ANIONGAP 16       Significant Imaging: I have reviewed all pertinent imaging results/findings within the past 24 hours.    Imaging Results              CT Abdomen Pelvis  Without Contrast (Final result)  Result time 11/02/24 10:13:57      Final result by Emerson Kelley MD (11/02/24 10:13:57)                   Impression:      1. Nonspecific intraluminal fluid within normal caliber loops of colon, as may be seen in the setting of diarrheal illness.  2. Patchy airspace consolidation small nodules in the basilar right lower lobe, could reflect infectious or noninfectious inflammatory process.  For multiple solid nodules all <6 mm, Fleischner Society 2017 guidelines recommend no routine follow up for a low risk patient, or follow up with non-contrast chest CT at 12 months after discovery in a high risk patient.  3. Additional details of chronic and incidental findings, as provided in the body of report.      Electronically signed by: Emerson  Allie  Date:    11/02/2024  Time:    10:13               Narrative:    EXAMINATION:  CT ABDOMEN PELVIS WITHOUT CONTRAST    CLINICAL HISTORY:  Abdominal pain, acute, nonlocalized;abdominal pain;    TECHNIQUE:  Low dose axial images, sagittal and coronal reformations were obtained from the lung bases to the pubic symphysis.    COMPARISON:  CT of the abdomen and pelvis performed 03/13/2015.    FINDINGS:  This examination is limited due to lack of intravenous contrast.    Lower chest: Atelectasis.  Patchy airspace consolidation and small nodules noted in the basilar right lower lobe.    Liver: Suggested hepatomegaly.  Hepatic steatosis.    Gallbladder and bile ducts: Unremarkable.    Pancreas: Normal contour.    Spleen: Normal contour.    Adrenals: Normal contour.    Kidneys: Suggested mild degree of cortical atrophy of both kidneys.  Nonspecific bilateral perinephric stranding.  Subcentimeter hypodense lesions in the kidneys are too small to definitely characterize.    Lymph nodes: No abdominal or pelvic lymphadenopathy.    Bowel and mesentery: Small hiatal hernia.  No evidence of bowel obstruction.  Nonspecific intraluminal fluid within normal caliber loops of colon, as may be seen in the setting of diarrheal illness.  Nonspecific fatty infiltration in the region of the ileocecal valve.  Appendix not confidently identified.  No definite focal pericecal inflammation.    Abdominal aorta: Nonaneurysmal.  Mild atherosclerotic calcification.    Inferior vena cava: Unremarkable.    Free fluid or free air: None.    Pelvis: Unremarkable.    Urinary bladder: Unremarkable.    Body wall: Remote operative sequela in the anterior abdominal wall.    Bones: Query osseous demineralization.  No definite acute findings.  Degenerative findings of the spine and hips.                                       X-Ray Chest 1 View (Final result)  Result time 11/02/24 08:34:51      Final result by Emerson Kelley MD (11/02/24 08:34:51)                    Impression:      Mildly prominent interstitial markings could relate to pulmonary vascular congestion/edema versus infectious or noninfectious inflammatory process.    Suspect left basilar atelectasis.      Electronically signed by: Emerson Kelley  Date:    11/02/2024  Time:    08:34               Narrative:    EXAMINATION:  XR CHEST 1 VIEW    CLINICAL HISTORY:  Fever, unspecified    TECHNIQUE:  Single frontal view of the chest was performed.    COMPARISON:  Chest radiograph performed 08/14/2024, 09:20 hours.    FINDINGS:  Monitoring leads overlie the chest.  Grossly unchanged cardiomediastinal contours.  Mildly prominent interstitial markings.    No definite focal airspace consolidation.  Suspect left basilar atelectasis.    No definite pneumothorax or large volume pleural effusion.    No acute findings in the visualized abdomen.    Osseous and soft tissue structures appear without definite acute change.                                    Intake/Output - Last 3 Shifts         11/02 0700 11/03 0659 11/03 0700 11/04 0659 11/04 0700 11/05 0659    P.O.  250     I.V. (mL/kg) 1265.6 (13.2) 836.8 (8.7)     IV Piggyback 2691.6 28.1     Total Intake(mL/kg) 3957.2 (41.2) 1114.8 (11.6)     Urine (mL/kg/hr)  1 (0)     Stool  1     Total Output  2     Net +3957.2 +1112.8            Urine Occurrence  2 x     Stool Occurrence 2 x 5 x           Microbiology Results (last 7 days)       Procedure Component Value Units Date/Time    C diff toxin/antigen with reflex to PCR [[id number]]  (Normal) Collected: 11/02/24 2114    Order Status: Completed Specimen: Stool Updated: 11/04/24 1328     Clostridium Difficile Toxin A/B Negative     Clostridium Difficile GDH Antigen Negative    Narrative:      Interpretive Information:    Absence of both GDH antigen and toxin is consistent with absence of a C difficile infection.  Presence of both GDH antigen and toxin is consistent with a C difficile infection in a symptomatic  patient. Detection of GDH and toxin in an asymptomatic patient is not specific for disease, as patients may be colonized with C difficile.  Presence of either GDH antigen or toxin coupled with presence of C difficile toxin B gene (ie, positive PCR test) is consistent with C difficile infection in a symptomatic patient.  Presence of GDH antigen coupled with absence of toxin and C difficile toxin B gene (ie, negative PCR test) is consistent with presence of a non-disease-causing strain of C difficile or another Clostridioides species.      Blood culture #1 [[id number]] Collected: 11/02/24 0653    Order Status: Completed Specimen: Blood Updated: 11/04/24 0703     Culture, Blood No Growth At 48 Hours    Blood culture #2 [[id number]] Collected: 11/02/24 0715    Order Status: Completed Specimen: Blood from Peripheral, Antecubital, Right Updated: 11/04/24 0703     Culture, Blood No Growth At 48 Hours    Culture, Enteric Pathogen [[id number]] Collected: 11/02/24 2114    Order Status: Completed Specimen: Stool Updated: 11/03/24 1009     Culture, Enteric Pathogens Culture In Progress    Enteric Pathogen Panel [[id number]]  (Abnormal) Collected: 11/02/24 2114    Order Status: Completed Specimen: Stool Updated: 11/03/24 0028     Campylobacter Group LUIS Negative     Salmonella Species LUIS Positive     Shigella Species LUIS Negative     Vibrio Group LUIS Negative     Yersinia enterocolitica LUIS Negative     Shiga Toxin 1 LUIS Negative     Shiga Toxin 2 LUIS Negative     Norovirus GI/GII LUIS Negative     Rotavirus A LUIS Negative    Fecal leukocytes [[id number]] Collected: 11/02/24 2114    Order Status: Completed Specimen: Stool Updated: 11/02/24 2207     Fecal Leukocytes Positive    C Diff Toxin by PCR [[id number]] Collected: 11/02/24 2114    Order Status: Canceled Specimen: Stool Updated: 11/02/24 2120    Ova and Parasite Exam [[id number]]     Order Status: Canceled Specimen: Stool     Influenza A & B by Molecular [[id number]]   (Normal) Collected: 11/02/24 0658    Order Status: Completed Specimen: Nasopharyngeal Swab Updated: 11/02/24 0747     INFLUENZA A MOLECULAR Negative     INFLUENZA B MOLECULAR  Negative

## 2024-11-04 NOTE — PLAN OF CARE
Problem: Adult Inpatient Plan of Care  Goal: Plan of Care Review  Outcome: Progressing  Goal: Patient-Specific Goal (Individualized)  Outcome: Progressing  Goal: Absence of Hospital-Acquired Illness or Injury  Outcome: Progressing     Problem: Diabetes Comorbidity  Goal: Blood Glucose Level Within Targeted Range  Outcome: Progressing     Problem: Skin Injury Risk Increased  Goal: Skin Health and Integrity  Outcome: Progressing

## 2024-11-04 NOTE — PROGRESS NOTES
[facility] - Orthopedic  Wound Care    Patient Name:  Meghna Dalal   MRN:  [id number]  Date: 11/4/2024  Diagnosis: Salmonella enteritis    History:     Past Medical History:   Diagnosis Date    Arthritis     Diabetes mellitus, type 2     GERD (gastroesophageal reflux disease)     Hypertension     Iron deficiency anemia     Obesity     Personal history of colonic polyps 08/19/2019    Thyroid disease        Social History     Socioeconomic History    Marital status:    Tobacco Use    Smoking status: Never    Smokeless tobacco: Never   Substance and Sexual Activity    Alcohol use: Never    Drug use: Never    Sexual activity: Yes     Social Drivers of Health     Financial Resource Strain: Low Risk  (11/3/2024)    Overall Financial Resource Strain (CARDIA)     Difficulty of Paying Living Expenses: Not hard at all   Food Insecurity: No Food Insecurity (11/3/2024)    Hunger Vital Sign     Worried About Running Out of Food in the Last Year: Never true     Ran Out of Food in the Last Year: Never true   Transportation Needs: No Transportation Needs (11/3/2024)    TRANSPORTATION NEEDS     Transportation : No   Physical Activity: Insufficiently Active (11/3/2024)    Exercise Vital Sign     Days of Exercise per Week: 5 days     Minutes of Exercise per Session: 20 min   Stress: No Stress Concern Present (11/3/2024)    [place] [employer] [employer] - Occupational Stress Questionnaire     Feeling of Stress : Not at all   Housing Stability: Low Risk  (11/3/2024)    Housing Stability Vital Sign     Unable to Pay for Housing in the Last Year: No     Homeless in the Last Year: No       Precautions:     Allergies as of 11/02/2024 - Reviewed 11/02/2024   Allergen Reaction Noted    Codeine phosphate  09/09/2021    Codeine sulfate  02/16/2018       WOC Assessment Details/Treatment     Narrative: Seen patient for initial preventative skin care measures.  No foam borders, redness, or open wounds noted to  bilateral heels and sacral.  Consult wound care of any findings.      11/04/2024

## 2024-11-04 NOTE — ASSESSMENT & PLAN NOTE
With severe illness treating with fluoroquinolones  Unable to take orally well so giving IV hydration and IV antibiotics  Discussed with , he is to throughout all open food had his house  Denies any recent travel, anyone else sick at home, no sick pets in his not been around poultry  Has eaten out once at a restaurant recently    11/04 still with GI upset but patient feels it is slowing down and she feels some better, has not done well tolerating oral intake and with serum creatinine going up will restart IV fluids

## 2024-11-04 NOTE — ASSESSMENT & PLAN NOTE
Check stool studies  Blood culture have been drawn  Cover with levofloxacin for possible infectious diarrhea    11/04 resolved

## 2024-11-04 NOTE — PROGRESS NOTES
[facility]  [facility] Medicine  Progress Note    Patient Name: Meghna Dalal  MRN: [id number]  Patient Class: IP- Inpatient   Admission Date: 11/2/2024  Length of Stay: 2 days  Attending Physician: Tigre Owusu MD  Primary Care Provider: Kwame Wyatt MD        Subjective:     Principal Problem:Salmonella enteritis        HPI:    Chief complaint:  Weakness with recent GI upset    History of present illness:          Ms. Dalal is a 74-year-old presented to the emergency room after have onset prior day of nausea vomiting and diarrhea.  This was associated with fever and chills.  Patient denies similar problem in the past.  Denies any unusual exposure.  Has not been out of area her eaten any unusual food that others have not also shared.  Did have shoulder surgery by Dr. Pablo 08/27/24 but no antibiotics since.  History of rheumatoid arthritis and on Orencia.  When seen in the emergency room was noted to be tachycardic, febrile, appeared ill and have elevated lactic acid.  Patient does take metformin for diabetes.  Continued here in the emergency room with nausea vomiting and liquidy nonbloody diarrhea.  Hospitalist service was asked to see patient for evaluation of admission'    Review of systems:  See above.  No recent respiratory complaints.  Denies pain.  Generally relatively active and able to ambulate without issues.  Has been undergoing therapy following above-mentioned shoulder surgery.  No history of being significant snore or sleep disturbance but does take frequent naps in the daytime.  Review of systems otherwise negative.     Past medical history:  -hypertension proximally 10 years  -diabetes mellitus proximally 10 years  -rheumatoid arthritis on selective T-cell co stimulation modulators (Orencia).   -hypothyroidism    Past surgical history:  Recent shoulder surgery as above mentioned by Dr. Pablo on August 27  Lumbar back surgery  Knee  replacement  Hysterectomy with 1 ovary removed    Allergy to codeine    Social history:  Lives with   No history of tobacco alcohol    Family history:  Son with myocardial infarction in his 50s    Health maintenance issues:  Primary care providers Dr. Wyatt  Has not had flu shot this year  Had prior Pneumovax  Daughter states patient has had COVID infection 2-3 times the last of which proximally 1 year earlier  Patient has had COVID vaccination x2 at least 1 booster  No history of recent mammogram  No recent Pap smear  Colonoscopies apparently about 5 years earlier told unremarkable          Overview/Hospital Course:  11/03 feels some better.  Still with nausea vomiting not tolerating liquids by mouth.  Some diarrhea.  Studies returned showing salmonella as etiology.   in room and states no one else sick.  Told him to throw out all open food at their house.  Continue with hydration and antibiotics.  Monitor  11/04 still with nausea vomiting diarrhea not able to hold down fluids well, with IV fluids out renal function slight worse.  Start IV fluids back.  Generally though patient states feeling some better.  Daughter in room    Past Medical History:   Diagnosis Date    Arthritis     Diabetes mellitus, type 2     GERD (gastroesophageal reflux disease)     Hypertension     Iron deficiency anemia     Obesity     Personal history of colonic polyps 08/19/2019    Thyroid disease        Past Surgical History:   Procedure Laterality Date    ARTHROSCOPIC DEBRIDEMENT OF SHOULDER Left 12/21/2021    Procedure: DEBRIDEMENT, SHOULDER, ARTHROSCOPIC;  Surgeon: Jose Antonio Pablo MD;  Location: [facility];  Service: Orthopedics;  Laterality: Left;  SHOULDER JOINT    ARTHROSCOPIC REMOVAL OF LOOSE BODY FROM JOINT Left 12/21/2021    Procedure: REMOVAL, LOOSE BODY, JOINT, ARTHROSCOPIC;  Surgeon: Jose Antonio Pablo MD;  Location: [facility];  Service: Orthopedics;  Laterality: Left;    COLONOSCOPY W/ POLYPECTOMY   08/19/2019    DECOMPRESSION OF SUBACROMIAL SPACE Left 12/21/2021    Procedure: DECOMPRESSION, SUBACROMIAL SPACE;  Surgeon: Jose Antonio Pablo MD;  Location: [facility];  Service: Orthopedics;  Laterality: Left;    DISTAL CLAVICLE EXCISION Left 12/21/2021    Procedure: EXCISION, CLAVICLE, DISTAL;  Surgeon: Jose Antonio Pablo MD;  Location: [facility];  Service: Orthopedics;  Laterality: Left;    left total knee replacement      PARTIAL HYSTERECTOMY      REVERSE TOTAL SHOULDER ARTHROPLASTY Right 8/27/2024    Procedure: ARTHROPLASTY, SHOULDER, TOTAL, REVERSE;  Surgeon: Jose Antonio Pablo MD;  Location: [facility];  Service: Orthopedics;  Laterality: Right;    ROTATOR CUFF REPAIR Left 12/21/2021    Procedure: REPAIR, ROTATOR CUFF;  Surgeon: Jose Antonio Pablo MD;  Location: [facility];  Service: Orthopedics;  Laterality: Left;    SHOULDER ARTHROSCOPY Left 12/21/2021    Procedure: ARTHROSCOPY, SHOULDER;  Surgeon: Jose Antonio Pablo MD;  Location: [facility];  Service: Orthopedics;  Laterality: Left;    SHOULDER SURGERY Left 12/21/2021    THYROID SURGERY      total thyroidectomy    TONSILLECTOMY AND ADENOIDECTOMY         Review of patient's allergies indicates:   Allergen Reactions    Codeine phosphate      Other reaction(s): facial swelling    Codeine sulfate      Other reaction(s): Unknown       No current facility-administered medications on file prior to encounter.     Current Outpatient Medications on File Prior to Encounter   Medication Sig    aspirin 81 mg Cap Take 81 mg by mouth once daily.    cyclobenzaprine (FLEXERIL) 10 MG tablet Take 1 tablet (10 mg total) by mouth 3 (three) times daily as needed for Muscle spasms.    furosemide (LASIX) 20 MG tablet Take 1 tablet (20 mg total) by mouth once daily.    gabapentin (NEURONTIN) 100 MG capsule Take 1 capsule (100 mg total) by mouth 3 (three) times daily.    levothyroxine (SYNTHROID) 125 MCG tablet TAKE 1 TABLET BY MOUTH EVERY DAY     losartan-hydrochlorothiazide 100-12.5 mg (HYZAAR) 100-12.5 mg Tab TAKE 1 TABLET BY MOUTH EVERY DAY    metFORMIN (GLUCOPHAGE) 500 MG tablet TAKE 1 TABLET BY MOUTH THREE TIMES A DAY    omeprazole (PRILOSEC) 40 MG capsule TAKE 1 CAPSULE BY MOUTH ONCE  DAILY    potassium chloride (MICRO-K) 8 mEq CpSR TAKE 1 CAPSULE BY MOUTH ONCE DAILY.    predniSONE (DELTASONE) 5 MG tablet Take 5 mg by mouth once daily.    [DISCONTINUED] losartan (COZAAR) 50 MG tablet TAKE 1 TABLET BY MOUTH EVERY DAY     Family History       Problem Relation (Age of Onset)    Alzheimer's disease Mother    Heart failure Father, Brother          Tobacco Use    Smoking status: Never    Smokeless tobacco: Never   Substance and Sexual Activity    Alcohol use: Never    Drug use: Never    Sexual activity: Yes     Review of Systems   Constitutional:  Positive for appetite change and fatigue.   HENT:  Negative for congestion, hearing loss and trouble swallowing.    Respiratory:  Negative for chest tightness, shortness of breath and wheezing.    Cardiovascular:  Negative for chest pain and palpitations.   Gastrointestinal:  Positive for diarrhea, nausea and vomiting. Negative for abdominal pain and constipation.   Genitourinary:  Negative for difficulty urinating and dysuria.   Musculoskeletal:  Negative for back pain and neck stiffness.   Skin:  Negative for pallor and rash.   Neurological:  Negative for speech difficulty and headaches.   Psychiatric/Behavioral:  Negative for confusion and suicidal ideas.      Objective:     Vital Signs (Most Recent):  Temp: 98.5 °F (36.9 °C) (11/04/24 1500)  Pulse: 93 (11/04/24 1500)  Resp: 17 (11/04/24 1500)  BP: 114/69 (11/04/24 1500)  SpO2: 98 % (11/04/24 1500) Vital Signs (24h Range):  Temp:  [98 °F (36.7 °C)-98.6 °F (37 °C)] 98.5 °F (36.9 °C)  Pulse:  [84-96] 93  Resp:  [12-20] 17  SpO2:  [96 %-99 %] 98 %  BP: ()/(62-72) 114/69     Weight: 96.1 kg (211 lb 12.8 oz)  Body mass index is 37.52 kg/m².     Physical  Exam  Vitals reviewed.   Constitutional:       General: She is awake. She is not in acute distress.     Appearance: She is well-developed. She is obese. She is not toxic-appearing.   HENT:      Head: Normocephalic.      Nose: Nose normal.      Mouth/Throat:      Pharynx: Oropharynx is clear.   Eyes:      Extraocular Movements: Extraocular movements intact.      Pupils: Pupils are equal, round, and reactive to light.   Neck:      Thyroid: No thyroid mass.      Vascular: No carotid bruit.   Cardiovascular:      Rate and Rhythm: Regular rhythm. Tachycardia present.      Pulses: Normal pulses.      Heart sounds: Normal heart sounds. No murmur heard.  Pulmonary:      Effort: Pulmonary effort is normal.      Breath sounds: Normal breath sounds and air entry. No wheezing.   Abdominal:      General: Bowel sounds are increased. There is no distension.      Palpations: Abdomen is soft.      Tenderness: There is no abdominal tenderness.   Musculoskeletal:         General: Normal range of motion.      Cervical back: Neck supple. No rigidity.   Skin:     General: Skin is warm.      Coloration: Skin is not jaundiced.      Findings: No lesion.   Neurological:      General: No focal deficit present.      Mental Status: She is alert and oriented to person, place, and time.      Cranial Nerves: No cranial nerve deficit.   Psychiatric:         Attention and Perception: Attention normal.         Mood and Affect: Mood normal.         Behavior: Behavior normal. Behavior is cooperative.         Thought Content: Thought content normal.         Cognition and Memory: Cognition normal.              CRANIAL NERVES     CN III, IV, VI   Pupils are equal, round, and reactive to light.       Significant Labs: All pertinent labs within the past 24 hours have been reviewed.  BMP:   Recent Labs   Lab 11/03/24  3605   *      K 3.8   *   CO2 17*   BUN 22*   CREATININE 2.38*   CALCIUM 7.8*     CBC:   Recent Labs   Lab 11/03/24  2738    WBC 9.71   HGB 10.9*   HCT 36.0*        CMP:   Recent Labs   Lab 11/03/24  0335      K 3.8   *   CO2 17*   *   BUN 22*   CREATININE 2.38*   CALCIUM 7.8*   ANIONGAP 16       Significant Imaging: I have reviewed all pertinent imaging results/findings within the past 24 hours.    Imaging Results              CT Abdomen Pelvis  Without Contrast (Final result)  Result time 11/02/24 10:13:57      Final result by Emerson Kelley MD (11/02/24 10:13:57)                   Impression:      1. Nonspecific intraluminal fluid within normal caliber loops of colon, as may be seen in the setting of diarrheal illness.  2. Patchy airspace consolidation small nodules in the basilar right lower lobe, could reflect infectious or noninfectious inflammatory process.  For multiple solid nodules all <6 mm, Fleischner Society 2017 guidelines recommend no routine follow up for a low risk patient, or follow up with non-contrast chest CT at 12 months after discovery in a high risk patient.  3. Additional details of chronic and incidental findings, as provided in the body of report.      Electronically signed by: Emerson Kelley  Date:    11/02/2024  Time:    10:13               Narrative:    EXAMINATION:  CT ABDOMEN PELVIS WITHOUT CONTRAST    CLINICAL HISTORY:  Abdominal pain, acute, nonlocalized;abdominal pain;    TECHNIQUE:  Low dose axial images, sagittal and coronal reformations were obtained from the lung bases to the pubic symphysis.    COMPARISON:  CT of the abdomen and pelvis performed 03/13/2015.    FINDINGS:  This examination is limited due to lack of intravenous contrast.    Lower chest: Atelectasis.  Patchy airspace consolidation and small nodules noted in the basilar right lower lobe.    Liver: Suggested hepatomegaly.  Hepatic steatosis.    Gallbladder and bile ducts: Unremarkable.    Pancreas: Normal contour.    Spleen: Normal contour.    Adrenals: Normal contour.    Kidneys: Suggested mild degree of  cortical atrophy of both kidneys.  Nonspecific bilateral perinephric stranding.  Subcentimeter hypodense lesions in the kidneys are too small to definitely characterize.    Lymph nodes: No abdominal or pelvic lymphadenopathy.    Bowel and mesentery: Small hiatal hernia.  No evidence of bowel obstruction.  Nonspecific intraluminal fluid within normal caliber loops of colon, as may be seen in the setting of diarrheal illness.  Nonspecific fatty infiltration in the region of the ileocecal valve.  Appendix not confidently identified.  No definite focal pericecal inflammation.    Abdominal aorta: Nonaneurysmal.  Mild atherosclerotic calcification.    Inferior vena cava: Unremarkable.    Free fluid or free air: None.    Pelvis: Unremarkable.    Urinary bladder: Unremarkable.    Body wall: Remote operative sequela in the anterior abdominal wall.    Bones: Query osseous demineralization.  No definite acute findings.  Degenerative findings of the spine and hips.                                       X-Ray Chest 1 View (Final result)  Result time 11/02/24 08:34:51      Final result by Emerson Kelley MD (11/02/24 08:34:51)                   Impression:      Mildly prominent interstitial markings could relate to pulmonary vascular congestion/edema versus infectious or noninfectious inflammatory process.    Suspect left basilar atelectasis.      Electronically signed by: Emerson Kelley  Date:    11/02/2024  Time:    08:34               Narrative:    EXAMINATION:  XR CHEST 1 VIEW    CLINICAL HISTORY:  Fever, unspecified    TECHNIQUE:  Single frontal view of the chest was performed.    COMPARISON:  Chest radiograph performed 08/14/2024, 09:20 hours.    FINDINGS:  Monitoring leads overlie the chest.  Grossly unchanged cardiomediastinal contours.  Mildly prominent interstitial markings.    No definite focal airspace consolidation.  Suspect left basilar atelectasis.    No definite pneumothorax or large volume pleural  effusion.    No acute findings in the visualized abdomen.    Osseous and soft tissue structures appear without definite acute change.                                    Intake/Output - Last 3 Shifts         11/02 0700 11/03 0659 11/03 0700 11/04 0659 11/04 0700 11/05 0659    P.O.  250     I.V. (mL/kg) 1265.6 (13.2) 836.8 (8.7)     IV Piggyback 2691.6 28.1     Total Intake(mL/kg) 3957.2 (41.2) 1114.8 (11.6)     Urine (mL/kg/hr)  1 (0)     Stool  1     Total Output  2     Net +3957.2 +1112.8            Urine Occurrence  2 x     Stool Occurrence 2 x 5 x           Microbiology Results (last 7 days)       Procedure Component Value Units Date/Time    C diff toxin/antigen with reflex to PCR [[id number]]  (Normal) Collected: 11/02/24 2114    Order Status: Completed Specimen: Stool Updated: 11/04/24 1328     Clostridium Difficile Toxin A/B Negative     Clostridium Difficile GDH Antigen Negative    Narrative:      Interpretive Information:    Absence of both GDH antigen and toxin is consistent with absence of a C difficile infection.  Presence of both GDH antigen and toxin is consistent with a C difficile infection in a symptomatic patient. Detection of GDH and toxin in an asymptomatic patient is not specific for disease, as patients may be colonized with C difficile.  Presence of either GDH antigen or toxin coupled with presence of C difficile toxin B gene (ie, positive PCR test) is consistent with C difficile infection in a symptomatic patient.  Presence of GDH antigen coupled with absence of toxin and C difficile toxin B gene (ie, negative PCR test) is consistent with presence of a non-disease-causing strain of C difficile or another Clostridioides species.      Blood culture #1 [[id number]] Collected: 11/02/24 0653    Order Status: Completed Specimen: Blood Updated: 11/04/24 0703     Culture, Blood No Growth At 48 Hours    Blood culture #2 [[id number]] Collected: 11/02/24 0715    Order Status: Completed Specimen: Blood  from Peripheral, Antecubital, Right Updated: 11/04/24 0703     Culture, Blood No Growth At 48 Hours    Culture, Enteric Pathogen [[id number]] Collected: 11/02/24 2114    Order Status: Completed Specimen: Stool Updated: 11/03/24 1009     Culture, Enteric Pathogens Culture In Progress    Enteric Pathogen Panel [[id number]]  (Abnormal) Collected: 11/02/24 2114    Order Status: Completed Specimen: Stool Updated: 11/03/24 0028     Campylobacter Group LUIS Negative     Salmonella Species LUIS Positive     Shigella Species LUIS Negative     Vibrio Group LUIS Negative     Yersinia enterocolitica LUIS Negative     Shiga Toxin 1 LUIS Negative     Shiga Toxin 2 LUIS Negative     Norovirus GI/GII LUIS Negative     Rotavirus A LUIS Negative    Fecal leukocytes [[id number]] Collected: 11/02/24 2114    Order Status: Completed Specimen: Stool Updated: 11/02/24 2207     Fecal Leukocytes Positive    C Diff Toxin by PCR [[id number]] Collected: 11/02/24 2114    Order Status: Canceled Specimen: Stool Updated: 11/02/24 2120    Ova and Parasite Exam [[id number]]     Order Status: Canceled Specimen: Stool     Influenza A & B by Molecular [[id number]]  (Normal) Collected: 11/02/24 0658    Order Status: Completed Specimen: Nasopharyngeal Swab Updated: 11/02/24 0747     INFLUENZA A MOLECULAR Negative     INFLUENZA B MOLECULAR  Negative              Assessment/Plan:      * Salmonella enteritis    With severe illness treating with fluoroquinolones  Unable to take orally well so giving IV hydration and IV antibiotics  Discussed with , he is to throughout all open food had his house  Denies any recent travel, anyone else sick at home, no sick pets in his not been around poultry  Has eaten out once at a restaurant recently    11/04 still with GI upset but patient feels it is slowing down and she feels some better, has not done well tolerating oral intake and with serum creatinine going up will restart IV fluids    Vitamin D deficiency    Replacement  "and continue at home    Acute lactic acidosis    Continue with intravenous hydration  Checking blood cultures with possibility of septicemia  However metformin inpatients current situation of dehydration and GI upset could be associated with lactic acidosis    Dehydration    Although IV fluids on national shortage with patient's tachycardia and inability to take orally will leave on continue maintenance IV fluids for now.  Patient has had 2-1/2 L so far in the emergency room.        Fever  Check stool studies  Blood culture have been drawn  Cover with levofloxacin for possible infectious diarrhea    11/04 resolved      Nausea vomiting and diarrhea    Continue hydration, GI upset better, checking stool studies  Liquid diet for now    11/03 these symptoms persist and continue with hydration and IV antibiotics    History of right shoulder replacement    This back in August and was patient's last antibiotics not likely etiology but checking C diff    Acquired hypothyroidism  Continue Synthroid  Check T4 and TSH      Systolic hypertension  Patients blood pressure range in the last 24 hours was: BP  Min: 130/61  Max: 185/80.The patient's inpatient anti-hypertensive regimen is listed below:  Current Antihypertensives  losartan tablet 50 mg, Daily, Oral    Plan  - BP is uncontrolled, will adjust as follows:  Adjust meds as needed      Uncontrolled type 2 diabetes mellitus with hyperglycemia  Patient's FSGs are uncontrolled due to hyperglycemia on current medication regimen.  Last A1c reviewed-   Lab Results   Component Value Date    HGBA1C 10.6 (H) 08/28/2024     Most recent fingerstick glucose reviewed- No results for input(s): "POCTGLUCOSE" in the last 24 hours.  Current correctional scale  Medium  Increase anti-hyperglycemic dose as follows-   Antihyperglycemics (From admission, onward)      Start     Stop Route Frequency Ordered    11/02/24 1644  insulin aspart U-100 injection 0-10 Units         -- SubQ Before meals & " nightly PRN 11/02/24 1546          Hold Oral hypoglycemics while patient is in the hospital.    Severe obesity (BMI 35.0-39.9) with comorbidity  Body mass index is 36.49 kg/m². Morbid obesity complicates all aspects of disease management from diagnostic modalities to treatment. Weight loss encouraged and health benefits explained to patient.         Rheumatoid arthritis involving multiple sites with positive rheumatoid factor    Hold patient's selective T-cell code stimulator modulator Orencia while having acute illness      VTE Risk Mitigation (From admission, onward)           Ordered     enoxaparin injection 40 mg  Daily         11/02/24 1546     IP VTE HIGH RISK PATIENT  Once         11/02/24 1546     Place sequential compression device  Until discontinued         11/02/24 1546                    Discharge Planning   NAEEM:      Code Status: Full Code   Is the patient medically ready for discharge?:     Reason for patient still in hospital (select all that apply): Laboratory test, Treatment, and Imaging  Discharge Plan A: Home, Home with family                  Tigre Owusu MD  Department of Hospital Medicine   [facility]

## 2024-11-05 PROBLEM — N17.9 ARF (ACUTE RENAL FAILURE): Status: ACTIVE | Noted: 2024-11-05

## 2024-11-05 LAB
ALBUMIN SERPL BCP-MCNC: 2.8 G/DL (ref 3.5–5)
ANION GAP SERPL CALCULATED.3IONS-SCNC: 15 MMOL/L (ref 7–16)
ANION GAP SERPL CALCULATED.3IONS-SCNC: 17 MMOL/L (ref 7–16)
BASOPHILS # BLD AUTO: 0.08 K/UL (ref 0–0.2)
BASOPHILS NFR BLD AUTO: 1 % (ref 0–1)
BUN SERPL-MCNC: 55 MG/DL (ref 7–18)
BUN SERPL-MCNC: 56 MG/DL (ref 7–18)
BUN/CREAT SERPL: 10 (ref 6–20)
BUN/CREAT SERPL: 9 (ref 6–20)
CALCIUM SERPL-MCNC: 7.7 MG/DL (ref 8.5–10.1)
CALCIUM SERPL-MCNC: 7.7 MG/DL (ref 8.5–10.1)
CHLORIDE SERPL-SCNC: 107 MMOL/L (ref 98–107)
CHLORIDE SERPL-SCNC: 107 MMOL/L (ref 98–107)
CO2 SERPL-SCNC: 18 MMOL/L (ref 21–32)
CO2 SERPL-SCNC: 19 MMOL/L (ref 21–32)
CREAT SERPL-MCNC: 5.86 MG/DL (ref 0.55–1.02)
CREAT SERPL-MCNC: 5.93 MG/DL (ref 0.55–1.02)
CULTURE, ENTERIC PATHOGENS: ABNORMAL
DIFFERENTIAL METHOD BLD: ABNORMAL
EGFR (NO RACE VARIABLE) (RUSH/TITUS): 7 ML/MIN/1.73M2
EGFR (NO RACE VARIABLE) (RUSH/TITUS): 7 ML/MIN/1.73M2
EOSINOPHIL # BLD AUTO: 0.07 K/UL (ref 0–0.5)
EOSINOPHIL NFR BLD AUTO: 0.9 % (ref 1–4)
ERYTHROCYTE [DISTWIDTH] IN BLOOD BY AUTOMATED COUNT: 15.3 % (ref 11.5–14.5)
GLUCOSE SERPL-MCNC: 124 MG/DL (ref 70–105)
GLUCOSE SERPL-MCNC: 128 MG/DL (ref 70–105)
GLUCOSE SERPL-MCNC: 139 MG/DL (ref 70–105)
GLUCOSE SERPL-MCNC: 146 MG/DL (ref 74–106)
GLUCOSE SERPL-MCNC: 148 MG/DL (ref 74–106)
GLUCOSE SERPL-MCNC: 156 MG/DL (ref 70–105)
HCT VFR BLD AUTO: 36.3 % (ref 38–47)
HGB BLD-MCNC: 11.5 G/DL (ref 12–16)
IMM GRANULOCYTES # BLD AUTO: 0.11 K/UL (ref 0–0.04)
IMM GRANULOCYTES NFR BLD: 1.4 % (ref 0–0.4)
LYMPHOCYTES # BLD AUTO: 1.25 K/UL (ref 1–4.8)
LYMPHOCYTES NFR BLD AUTO: 15.8 % (ref 27–41)
MCH RBC QN AUTO: 24.6 PG (ref 27–31)
MCHC RBC AUTO-ENTMCNC: 31.7 G/DL (ref 32–36)
MCV RBC AUTO: 77.6 FL (ref 80–96)
MONOCYTES # BLD AUTO: 0.64 K/UL (ref 0–0.8)
MONOCYTES NFR BLD AUTO: 8.1 % (ref 2–6)
MPC BLD CALC-MCNC: 9.4 FL (ref 9.4–12.4)
NEUTROPHILS # BLD AUTO: 5.78 K/UL (ref 1.8–7.7)
NEUTROPHILS NFR BLD AUTO: 72.8 % (ref 53–65)
NRBC # BLD AUTO: 0 X10E3/UL
NRBC, AUTO (.00): 0 %
PHOSPHATE SERPL-MCNC: 4.2 MG/DL (ref 2.5–4.5)
PLATELET # BLD AUTO: 336 K/UL (ref 150–400)
POTASSIUM SERPL-SCNC: 3.5 MMOL/L (ref 3.5–5.1)
POTASSIUM SERPL-SCNC: 3.6 MMOL/L (ref 3.5–5.1)
RBC # BLD AUTO: 4.68 M/UL (ref 4.2–5.4)
SODIUM SERPL-SCNC: 137 MMOL/L (ref 136–145)
SODIUM SERPL-SCNC: 138 MMOL/L (ref 136–145)
WBC # BLD AUTO: 7.93 K/UL (ref 4.5–11)

## 2024-11-05 PROCEDURE — 99900035 HC TECH TIME PER 15 MIN (STAT)

## 2024-11-05 PROCEDURE — 99232 SBSQ HOSP IP/OBS MODERATE 35: CPT | Mod: ,,, | Performed by: HOSPITALIST

## 2024-11-05 PROCEDURE — 36415 COLL VENOUS BLD VENIPUNCTURE: CPT | Performed by: HOSPITALIST

## 2024-11-05 PROCEDURE — 11000001 HC ACUTE MED/SURG PRIVATE ROOM

## 2024-11-05 PROCEDURE — 94761 N-INVAS EAR/PLS OXIMETRY MLT: CPT

## 2024-11-05 PROCEDURE — 27000221 HC OXYGEN, UP TO 24 HOURS

## 2024-11-05 PROCEDURE — 25000003 PHARM REV CODE 250: Performed by: HOSPITALIST

## 2024-11-05 PROCEDURE — 63600175 PHARM REV CODE 636 W HCPCS: Performed by: HOSPITALIST

## 2024-11-05 PROCEDURE — 85025 COMPLETE CBC W/AUTO DIFF WBC: CPT | Performed by: HOSPITALIST

## 2024-11-05 PROCEDURE — 80069 RENAL FUNCTION PANEL: CPT | Performed by: HOSPITALIST

## 2024-11-05 PROCEDURE — 80048 BASIC METABOLIC PNL TOTAL CA: CPT | Performed by: HOSPITALIST

## 2024-11-05 PROCEDURE — 82962 GLUCOSE BLOOD TEST: CPT

## 2024-11-05 RX ORDER — FAMOTIDINE 10 MG/ML
20 INJECTION INTRAVENOUS DAILY
Status: DISCONTINUED | OUTPATIENT
Start: 2024-11-05 | End: 2024-11-10 | Stop reason: HOSPADM

## 2024-11-05 RX ADMIN — Medication 1000 UNITS: at 08:11

## 2024-11-05 RX ADMIN — PROMETHAZINE HYDROCHLORIDE 25 MG: 25 INJECTION INTRAMUSCULAR; INTRAVENOUS at 01:11

## 2024-11-05 RX ADMIN — SODIUM CHLORIDE AND POTASSIUM CHLORIDE: 150; 450 INJECTION, SOLUTION INTRAVENOUS at 04:11

## 2024-11-05 RX ADMIN — LEVOFLOXACIN 250 MG: 250 INJECTION, SOLUTION INTRAVENOUS at 05:11

## 2024-11-05 RX ADMIN — FAMOTIDINE 20 MG: 10 INJECTION, SOLUTION INTRAVENOUS at 05:11

## 2024-11-05 RX ADMIN — LEVOTHYROXINE SODIUM 112 MCG: 0.11 TABLET ORAL at 04:11

## 2024-11-05 RX ADMIN — ENOXAPARIN SODIUM 30 MG: 30 INJECTION SUBCUTANEOUS at 05:11

## 2024-11-05 RX ADMIN — PROMETHAZINE HYDROCHLORIDE 25 MG: 25 INJECTION INTRAMUSCULAR; INTRAVENOUS at 05:11

## 2024-11-05 RX ADMIN — SODIUM CHLORIDE AND POTASSIUM CHLORIDE: 150; 450 INJECTION, SOLUTION INTRAVENOUS at 03:11

## 2024-11-05 NOTE — PLAN OF CARE
Problem: Adult Inpatient Plan of Care  Goal: Plan of Care Review  Outcome: Progressing  Goal: Patient-Specific Goal (Individualized)  Outcome: Progressing  Goal: Absence of Hospital-Acquired Illness or Injury  Outcome: Progressing  Goal: Optimal Comfort and Wellbeing  Outcome: Progressing  Goal: Readiness for Transition of Care  Outcome: Progressing     Problem: Diabetes Comorbidity  Goal: Blood Glucose Level Within Targeted Range  Outcome: Progressing     Problem: Wound  Goal: Optimal Coping  Outcome: Progressing  Goal: Optimal Functional Ability  Outcome: Progressing  Goal: Absence of Infection Signs and Symptoms  Outcome: Progressing  Goal: Improved Oral Intake  Outcome: Progressing  Goal: Optimal Pain Control and Function  Outcome: Progressing  Goal: Skin Health and Integrity  Outcome: Progressing  Goal: Optimal Wound Healing  Outcome: Progressing     Problem: Skin Injury Risk Increased  Goal: Skin Health and Integrity  Outcome: Progressing     Problem: Gas Exchange Impaired  Goal: Optimal Gas Exchange  Outcome: Progressing

## 2024-11-05 NOTE — HPI
74-year-old woman admitted with nausea vomiting and diarrhea.  Stool culture positive for salmonella.  She has developed acute renal failure since admission.  No history of prior renal insufficiency.

## 2024-11-05 NOTE — PLAN OF CARE
Problem: Adult Inpatient Plan of Care  Goal: Plan of Care Review  Outcome: Progressing  Goal: Patient-Specific Goal (Individualized)  Outcome: Progressing  Goal: Absence of Hospital-Acquired Illness or Injury  Outcome: Progressing  Goal: Optimal Comfort and Wellbeing  Outcome: Progressing  Goal: Readiness for Transition of Care  Outcome: Progressing     Problem: Diabetes Comorbidity  Goal: Blood Glucose Level Within Targeted Range  Outcome: Progressing     Problem: Wound  Goal: Optimal Coping  Outcome: Progressing  Goal: Optimal Functional Ability  Outcome: Progressing  Goal: Absence of Infection Signs and Symptoms  Outcome: Progressing  Goal: Improved Oral Intake  Outcome: Progressing  Goal: Optimal Pain Control and Function  Outcome: Progressing  Goal: Skin Health and Integrity  Outcome: Progressing  Goal: Optimal Wound Healing  Outcome: Progressing     Problem: Skin Injury Risk Increased  Goal: Skin Health and Integrity  Outcome: Progressing     Problem: Acute Kidney Injury/Impairment  Goal: Fluid and Electrolyte Balance  Outcome: Progressing  Goal: Improved Oral Intake  Outcome: Progressing  Goal: Effective Renal Function  Outcome: Progressing

## 2024-11-05 NOTE — CONSULTS
[facility]  Nephrology  Consult Note    Patient Name: Meghna Dalal  MRN: [id number]  Admission Date: 11/2/2024  Hospital Length of Stay: 3 days  Attending Provider: Tigre Owusu MD   Primary Care Physician: Kwame Wyatt MD  Principal Problem:Salmonella enteritis    Consults  Subjective:     HPI: 74-year-old woman admitted with nausea vomiting and diarrhea.  Stool culture positive for salmonella.  She has developed acute renal failure since admission.  No history of prior renal insufficiency.    Past Medical History:   Diagnosis Date    Arthritis     Diabetes mellitus, type 2     GERD (gastroesophageal reflux disease)     Hypertension     Iron deficiency anemia     Obesity     Personal history of colonic polyps 08/19/2019    Thyroid disease        Past Surgical History:   Procedure Laterality Date    ARTHROSCOPIC DEBRIDEMENT OF SHOULDER Left 12/21/2021    Procedure: DEBRIDEMENT, SHOULDER, ARTHROSCOPIC;  Surgeon: Jose Antonio Pablo MD;  Location: [facility];  Service: Orthopedics;  Laterality: Left;  SHOULDER JOINT    ARTHROSCOPIC REMOVAL OF LOOSE BODY FROM JOINT Left 12/21/2021    Procedure: REMOVAL, LOOSE BODY, JOINT, ARTHROSCOPIC;  Surgeon: Jose Antonio Pablo MD;  Location: [facility];  Service: Orthopedics;  Laterality: Left;    COLONOSCOPY W/ POLYPECTOMY  08/19/2019    DECOMPRESSION OF SUBACROMIAL SPACE Left 12/21/2021    Procedure: DECOMPRESSION, SUBACROMIAL SPACE;  Surgeon: Jose Antonio Pablo MD;  Location: [facility];  Service: Orthopedics;  Laterality: Left;    DISTAL CLAVICLE EXCISION Left 12/21/2021    Procedure: EXCISION, CLAVICLE, DISTAL;  Surgeon: Jose Antonio Pablo MD;  Location: [facility];  Service: Orthopedics;  Laterality: Left;    left total knee replacement      PARTIAL HYSTERECTOMY      REVERSE TOTAL SHOULDER ARTHROPLASTY Right 8/27/2024    Procedure: ARTHROPLASTY, SHOULDER, TOTAL, REVERSE;  Surgeon: Jose Antonio Pablo MD;  Location: [place]  [facility];  Service: Orthopedics;  Laterality: Right;    ROTATOR CUFF REPAIR Left 12/21/2021    Procedure: REPAIR, ROTATOR CUFF;  Surgeon: Jose Antonio Pablo MD;  Location: [facility];  Service: Orthopedics;  Laterality: Left;    SHOULDER ARTHROSCOPY Left 12/21/2021    Procedure: ARTHROSCOPY, SHOULDER;  Surgeon: Jose Antonio Pablo MD;  Location: [facility];  Service: Orthopedics;  Laterality: Left;    SHOULDER SURGERY Left 12/21/2021    THYROID SURGERY      total thyroidectomy    TONSILLECTOMY AND ADENOIDECTOMY         Review of patient's allergies indicates:   Allergen Reactions    Codeine phosphate      Other reaction(s): facial swelling    Codeine sulfate      Other reaction(s): Unknown     Current Facility-Administered Medications   Medication Frequency    0.45% NaCl with KCl 20 mEq infusion Continuous    acetaminophen suppository 650 mg Q6H PRN    acetaminophen tablet 650 mg Q8H PRN    dextrose 10% bolus 125 mL 125 mL PRN    dextrose 10% bolus 250 mL 250 mL PRN    enoxaparin injection 30 mg Daily    glucagon (human recombinant) injection 1 mg PRN    glucose chewable tablet 16 g PRN    glucose chewable tablet 24 g PRN    insulin aspart U-100 injection 0-10 Units QID (AC + HS) PRN    levoFLOXacin 250 mg/50 mL IVPB 250 mg Q24H    levothyroxine tablet 112 mcg Daily    naloxone 0.4 mg/mL injection 0.02 mg PRN    ondansetron injection 4 mg Q6H PRN    promethazine injection 25 mg Q6H PRN    sodium chloride 0.9% flush 10 mL Q12H PRN    vitamin D 1000 units tablet 1,000 Units Daily     Family History       Problem Relation (Age of Onset)    Alzheimer's disease Mother    Heart failure Father, Brother          Tobacco Use    Smoking status: Never    Smokeless tobacco: Never   Substance and Sexual Activity    Alcohol use: Never    Drug use: Never    Sexual activity: Yes     Review of Systems   Constitutional:  Positive for appetite change. Negative for fever.   Respiratory:  Negative for shortness of breath.     Cardiovascular:  Negative for chest pain and leg swelling.   Gastrointestinal:  Positive for diarrhea, nausea and vomiting.   Genitourinary:  Negative for dysuria and hematuria.   Musculoskeletal:  Negative for arthralgias.   Skin:  Negative for wound.   Neurological:  Negative for syncope.   Psychiatric/Behavioral:  Negative for confusion.      Objective:     Vital Signs (Most Recent):  Temp: 97.7 °F (36.5 °C) (11/05/24 0732)  Pulse: 74 (11/05/24 0732)  Resp: 16 (11/05/24 0732)  BP: 114/66 (11/05/24 0732)  SpO2: 99 % (room air) (11/05/24 0906) Vital Signs (24h Range):  Temp:  [97.5 °F (36.4 °C)-98.5 °F (36.9 °C)] 97.7 °F (36.5 °C)  Pulse:  [74-93] 74  Resp:  [16-17] 16  SpO2:  [96 %-99 %] 99 %  BP: ()/(62-72) 114/66     Weight: 96.1 kg (211 lb 12.8 oz) (11/03/24 0500)  Body mass index is 37.52 kg/m².  Body surface area is 2.07 meters squared.    I/O last 3 completed shifts:  In: 2 [I.V.:2]  Out: -      Physical Exam  Constitutional:       General: She is not in acute distress.  HENT:      Head: Normocephalic and atraumatic.   Eyes:      Pupils: Pupils are equal, round, and reactive to light.   Cardiovascular:      Rate and Rhythm: Normal rate and regular rhythm.      Heart sounds: No murmur heard.     No friction rub. No gallop.   Pulmonary:      Effort: No respiratory distress.      Breath sounds: Normal breath sounds.   Abdominal:      General: Bowel sounds are normal.      Tenderness: There is no abdominal tenderness. There is no guarding.   Musculoskeletal:      Right lower leg: No edema.      Left lower leg: No edema.   Neurological:      Mental Status: She is alert and oriented to person, place, and time.   Psychiatric:         Behavior: Behavior normal.          Significant Labs:  BMP:   Recent Labs   Lab 11/05/24  0508   *      K 3.5      CO2 19*   BUN 55*   CREATININE 5.93*   CALCIUM 7.7*     CBC:   Recent Labs   Lab 11/05/24  0509   WBC 7.93   RBC 4.68   HGB 11.5*   HCT 36.3*       MCV 77.6*   MCH 24.6*   MCHC 31.7*     Recent Labs   Lab 11/02/24  0801   COLORU Light-Yellow   SPECGRAV 1.015   PHUR 5.5   PROTEINUA 100*   BACTERIA Occasional*   NITRITE Negative   LEUKOCYTESUR Negative   UROBILINOGEN Normal       Significant Imaging:    Assessment/Plan:     Cardiac/Vascular  Systolic hypertension  Controlled    Renal/  ARF (acute renal failure)  Creatinine 1.0 in August 20, 2024.  Creatinine 1.39 on admission.  It has risen to 5.93.  She is currently on no nephrotoxic medications.  She is volume depleted due to gastroenteritis.  I agree with IV fluid.  Renal ultrasound and repeat UA are pending    ID  * Salmonella enteritis  Continue Levaquin    Immunology/Multi System  Rheumatoid arthritis involving multiple sites with positive rheumatoid factor  Underlying disease.  Immunocompromised as a result    Endocrine  Uncontrolled type 2 diabetes mellitus with hyperglycemia  Now controlled        Thank you for your consult.     Bernard Conde MD  Nephrology  [facility]

## 2024-11-05 NOTE — ASSESSMENT & PLAN NOTE
Creatinine 1.0 in August 20, 2024.  Creatinine 1.39 on admission.  It has risen to 5.93.  She is currently on no nephrotoxic medications.  She is volume depleted due to gastroenteritis.  I agree with IV fluid.  Renal ultrasound and repeat UA are pending

## 2024-11-05 NOTE — SUBJECTIVE & OBJECTIVE
Past Medical History:   Diagnosis Date    Arthritis     Diabetes mellitus, type 2     GERD (gastroesophageal reflux disease)     Hypertension     Iron deficiency anemia     Obesity     Personal history of colonic polyps 08/19/2019    Thyroid disease        Past Surgical History:   Procedure Laterality Date    ARTHROSCOPIC DEBRIDEMENT OF SHOULDER Left 12/21/2021    Procedure: DEBRIDEMENT, SHOULDER, ARTHROSCOPIC;  Surgeon: Jose Antonio Pablo MD;  Location: [facility];  Service: Orthopedics;  Laterality: Left;  SHOULDER JOINT    ARTHROSCOPIC REMOVAL OF LOOSE BODY FROM JOINT Left 12/21/2021    Procedure: REMOVAL, LOOSE BODY, JOINT, ARTHROSCOPIC;  Surgeon: Jose Antonio Pablo MD;  Location: [facility];  Service: Orthopedics;  Laterality: Left;    COLONOSCOPY W/ POLYPECTOMY  08/19/2019    DECOMPRESSION OF SUBACROMIAL SPACE Left 12/21/2021    Procedure: DECOMPRESSION, SUBACROMIAL SPACE;  Surgeon: Jose Antonio Pablo MD;  Location: [facility];  Service: Orthopedics;  Laterality: Left;    DISTAL CLAVICLE EXCISION Left 12/21/2021    Procedure: EXCISION, CLAVICLE, DISTAL;  Surgeon: Jose Antonio Pablo MD;  Location: [facility];  Service: Orthopedics;  Laterality: Left;    left total knee replacement      PARTIAL HYSTERECTOMY      REVERSE TOTAL SHOULDER ARTHROPLASTY Right 8/27/2024    Procedure: ARTHROPLASTY, SHOULDER, TOTAL, REVERSE;  Surgeon: Jose Antonio Pablo MD;  Location: [facility];  Service: Orthopedics;  Laterality: Right;    ROTATOR CUFF REPAIR Left 12/21/2021    Procedure: REPAIR, ROTATOR CUFF;  Surgeon: Jose Antonio Pablo MD;  Location: [facility];  Service: Orthopedics;  Laterality: Left;    SHOULDER ARTHROSCOPY Left 12/21/2021    Procedure: ARTHROSCOPY, SHOULDER;  Surgeon: Jose Antonio Pablo MD;  Location: [facility];  Service: Orthopedics;  Laterality: Left;    SHOULDER SURGERY Left 12/21/2021    THYROID SURGERY      total thyroidectomy    TONSILLECTOMY AND ADENOIDECTOMY          Review of patient's allergies indicates:   Allergen Reactions    Codeine phosphate      Other reaction(s): facial swelling    Codeine sulfate      Other reaction(s): Unknown     Current Facility-Administered Medications   Medication Frequency    0.45% NaCl with KCl 20 mEq infusion Continuous    acetaminophen suppository 650 mg Q6H PRN    acetaminophen tablet 650 mg Q8H PRN    dextrose 10% bolus 125 mL 125 mL PRN    dextrose 10% bolus 250 mL 250 mL PRN    enoxaparin injection 30 mg Daily    glucagon (human recombinant) injection 1 mg PRN    glucose chewable tablet 16 g PRN    glucose chewable tablet 24 g PRN    insulin aspart U-100 injection 0-10 Units QID (AC + HS) PRN    levoFLOXacin 250 mg/50 mL IVPB 250 mg Q24H    levothyroxine tablet 112 mcg Daily    naloxone 0.4 mg/mL injection 0.02 mg PRN    ondansetron injection 4 mg Q6H PRN    promethazine injection 25 mg Q6H PRN    sodium chloride 0.9% flush 10 mL Q12H PRN    vitamin D 1000 units tablet 1,000 Units Daily     Family History       Problem Relation (Age of Onset)    Alzheimer's disease Mother    Heart failure Father, Brother          Tobacco Use    Smoking status: Never    Smokeless tobacco: Never   Substance and Sexual Activity    Alcohol use: Never    Drug use: Never    Sexual activity: Yes     Review of Systems   Constitutional:  Positive for appetite change. Negative for fever.   Respiratory:  Negative for shortness of breath.    Cardiovascular:  Negative for chest pain and leg swelling.   Gastrointestinal:  Positive for diarrhea, nausea and vomiting.   Genitourinary:  Negative for dysuria and hematuria.   Musculoskeletal:  Negative for arthralgias.   Skin:  Negative for wound.   Neurological:  Negative for syncope.   Psychiatric/Behavioral:  Negative for confusion.      Objective:     Vital Signs (Most Recent):  Temp: 97.7 °F (36.5 °C) (11/05/24 0732)  Pulse: 74 (11/05/24 0732)  Resp: 16 (11/05/24 0732)  BP: 114/66 (11/05/24 0732)  SpO2: 99 % (room  air) (11/05/24 0906) Vital Signs (24h Range):  Temp:  [97.5 °F (36.4 °C)-98.5 °F (36.9 °C)] 97.7 °F (36.5 °C)  Pulse:  [74-93] 74  Resp:  [16-17] 16  SpO2:  [96 %-99 %] 99 %  BP: ()/(62-72) 114/66     Weight: 96.1 kg (211 lb 12.8 oz) (11/03/24 0500)  Body mass index is 37.52 kg/m².  Body surface area is 2.07 meters squared.    I/O last 3 completed shifts:  In: 2 [I.V.:2]  Out: -      Physical Exam  Constitutional:       General: She is not in acute distress.  HENT:      Head: Normocephalic and atraumatic.   Eyes:      Pupils: Pupils are equal, round, and reactive to light.   Cardiovascular:      Rate and Rhythm: Normal rate and regular rhythm.      Heart sounds: No murmur heard.     No friction rub. No gallop.   Pulmonary:      Effort: No respiratory distress.      Breath sounds: Normal breath sounds.   Abdominal:      General: Bowel sounds are normal.      Tenderness: There is no abdominal tenderness. There is no guarding.   Musculoskeletal:      Right lower leg: No edema.      Left lower leg: No edema.   Neurological:      Mental Status: She is alert and oriented to person, place, and time.   Psychiatric:         Behavior: Behavior normal.          Significant Labs:  BMP:   Recent Labs   Lab 11/05/24  0508   *      K 3.5      CO2 19*   BUN 55*   CREATININE 5.93*   CALCIUM 7.7*     CBC:   Recent Labs   Lab 11/05/24  0509   WBC 7.93   RBC 4.68   HGB 11.5*   HCT 36.3*      MCV 77.6*   MCH 24.6*   MCHC 31.7*     Recent Labs   Lab 11/02/24  0801   COLORU Light-Yellow   SPECGRAV 1.015   PHUR 5.5   PROTEINUA 100*   BACTERIA Occasional*   NITRITE Negative   LEUKOCYTESUR Negative   UROBILINOGEN Normal       Significant Imaging:

## 2024-11-06 LAB
ANION GAP SERPL CALCULATED.3IONS-SCNC: 11 MMOL/L (ref 7–16)
BUN SERPL-MCNC: 54 MG/DL (ref 7–18)
BUN/CREAT SERPL: 13 (ref 6–20)
CALCIUM SERPL-MCNC: 6.8 MG/DL (ref 8.5–10.1)
CHLORIDE SERPL-SCNC: 110 MMOL/L (ref 98–107)
CO2 SERPL-SCNC: 18 MMOL/L (ref 21–32)
CREAT SERPL-MCNC: 4.12 MG/DL (ref 0.55–1.02)
EGFR (NO RACE VARIABLE) (RUSH/TITUS): 11 ML/MIN/1.73M2
GLUCOSE SERPL-MCNC: 112 MG/DL (ref 70–105)
GLUCOSE SERPL-MCNC: 130 MG/DL (ref 70–105)
GLUCOSE SERPL-MCNC: 133 MG/DL (ref 70–105)
GLUCOSE SERPL-MCNC: 157 MG/DL (ref 74–106)
POTASSIUM SERPL-SCNC: 2.8 MMOL/L (ref 3.5–5.1)
SODIUM SERPL-SCNC: 136 MMOL/L (ref 136–145)

## 2024-11-06 PROCEDURE — 82962 GLUCOSE BLOOD TEST: CPT

## 2024-11-06 PROCEDURE — 99900035 HC TECH TIME PER 15 MIN (STAT)

## 2024-11-06 PROCEDURE — 99232 SBSQ HOSP IP/OBS MODERATE 35: CPT | Mod: ,,, | Performed by: HOSPITALIST

## 2024-11-06 PROCEDURE — 11000001 HC ACUTE MED/SURG PRIVATE ROOM

## 2024-11-06 PROCEDURE — 25000003 PHARM REV CODE 250: Performed by: HOSPITALIST

## 2024-11-06 PROCEDURE — 94761 N-INVAS EAR/PLS OXIMETRY MLT: CPT

## 2024-11-06 PROCEDURE — 63600175 PHARM REV CODE 636 W HCPCS: Performed by: HOSPITALIST

## 2024-11-06 PROCEDURE — 36415 COLL VENOUS BLD VENIPUNCTURE: CPT | Performed by: INTERNAL MEDICINE

## 2024-11-06 PROCEDURE — 80048 BASIC METABOLIC PNL TOTAL CA: CPT | Performed by: INTERNAL MEDICINE

## 2024-11-06 RX ORDER — SIMETHICONE 80 MG
1 TABLET,CHEWABLE ORAL 3 TIMES DAILY PRN
Status: DISCONTINUED | OUTPATIENT
Start: 2024-11-06 | End: 2024-11-10 | Stop reason: HOSPADM

## 2024-11-06 RX ADMIN — ENOXAPARIN SODIUM 30 MG: 30 INJECTION SUBCUTANEOUS at 03:11

## 2024-11-06 RX ADMIN — Medication 1000 UNITS: at 09:11

## 2024-11-06 RX ADMIN — PROMETHAZINE HYDROCHLORIDE 25 MG: 25 INJECTION INTRAMUSCULAR; INTRAVENOUS at 08:11

## 2024-11-06 RX ADMIN — SODIUM CHLORIDE AND POTASSIUM CHLORIDE: 150; 450 INJECTION, SOLUTION INTRAVENOUS at 01:11

## 2024-11-06 RX ADMIN — ONDANSETRON 4 MG: 2 INJECTION INTRAMUSCULAR; INTRAVENOUS at 09:11

## 2024-11-06 RX ADMIN — SODIUM CHLORIDE AND POTASSIUM CHLORIDE: 150; 450 INJECTION, SOLUTION INTRAVENOUS at 09:11

## 2024-11-06 RX ADMIN — PROMETHAZINE HYDROCHLORIDE 25 MG: 25 INJECTION INTRAMUSCULAR; INTRAVENOUS at 05:11

## 2024-11-06 RX ADMIN — ACETAMINOPHEN 650 MG: 325 TABLET ORAL at 03:11

## 2024-11-06 RX ADMIN — SODIUM CHLORIDE AND POTASSIUM CHLORIDE: 150; 450 INJECTION, SOLUTION INTRAVENOUS at 07:11

## 2024-11-06 RX ADMIN — LEVOTHYROXINE SODIUM 112 MCG: 0.11 TABLET ORAL at 05:11

## 2024-11-06 RX ADMIN — LEVOFLOXACIN 250 MG: 250 INJECTION, SOLUTION INTRAVENOUS at 03:11

## 2024-11-06 RX ADMIN — FAMOTIDINE 20 MG: 10 INJECTION, SOLUTION INTRAVENOUS at 09:11

## 2024-11-06 NOTE — PLAN OF CARE
Problem: Adult Inpatient Plan of Care  Goal: Plan of Care Review  Outcome: Progressing  Goal: Patient-Specific Goal (Individualized)  Outcome: Progressing  Goal: Absence of Hospital-Acquired Illness or Injury  Outcome: Progressing  Goal: Optimal Comfort and Wellbeing  Outcome: Progressing  Goal: Readiness for Transition of Care  Outcome: Progressing     Problem: Diabetes Comorbidity  Goal: Blood Glucose Level Within Targeted Range  Outcome: Progressing     Problem: Wound  Goal: Optimal Coping  Outcome: Progressing  Goal: Optimal Functional Ability  Outcome: Progressing  Goal: Absence of Infection Signs and Symptoms  Outcome: Progressing  Goal: Improved Oral Intake  Outcome: Progressing  Goal: Optimal Pain Control and Function  Outcome: Progressing  Goal: Skin Health and Integrity  Outcome: Progressing  Goal: Optimal Wound Healing  Outcome: Progressing     Problem: Skin Injury Risk Increased  Goal: Skin Health and Integrity  Outcome: Progressing     Problem: Acute Kidney Injury/Impairment  Goal: Fluid and Electrolyte Balance  Outcome: Progressing  Goal: Improved Oral Intake  Outcome: Progressing  Goal: Effective Renal Function  Outcome: Progressing     Problem: Fall Injury Risk  Goal: Absence of Fall and Fall-Related Injury  Outcome: Progressing

## 2024-11-06 NOTE — ASSESSMENT & PLAN NOTE
GEOVANY is likely due to pre-renal azotemia due to dehydration. Baseline creatinine is  1.1 . Most recent creatinine and eGFR are listed below.  Recent Labs     11/03/24  0335 11/05/24  0508   CREATININE 2.38* 5.86*  5.93*   EGFRNORACEVR 21* 7*  7*      Plan  - GEOVANY is worsening. Will hydrate  - Avoid nephrotoxins and renally dose meds for GFR listed above  - Monitor urine output, serial BMP, and adjust therapy as needed  - nephrology asked to see

## 2024-11-06 NOTE — ASSESSMENT & PLAN NOTE
>>ASSESSMENT AND PLAN FOR GASTROENTERITIS WRITTEN ON 11/5/2024 10:03 PM BY JEAN CARLOS LUIS MD Still NV and diarrhea

## 2024-11-06 NOTE — PLAN OF CARE
Problem: Adult Inpatient Plan of Care  Goal: Plan of Care Review  11/6/2024 0515 by Nitin Johnson RN  Outcome: Progressing  11/5/2024 2244 by Nitin oJhnson RN  Outcome: Progressing  Goal: Patient-Specific Goal (Individualized)  11/6/2024 0515 by Nitin Johnson RN  Outcome: Progressing  11/5/2024 2244 by Nitin Johnson RN  Outcome: Progressing  Goal: Absence of Hospital-Acquired Illness or Injury  11/6/2024 0515 by Nitin Johnson RN  Outcome: Progressing  11/5/2024 2244 by Nitin Johnson RN  Outcome: Progressing  Goal: Optimal Comfort and Wellbeing  11/6/2024 0515 by Nitin Johnson RN  Outcome: Progressing  11/5/2024 2244 by Nitin Johnson RN  Outcome: Progressing  Goal: Readiness for Transition of Care  11/6/2024 0515 by Nitin Johnson RN  Outcome: Progressing  11/5/2024 2244 by Nitin Johnson RN  Outcome: Progressing     Problem: Diabetes Comorbidity  Goal: Blood Glucose Level Within Targeted Range  11/6/2024 0515 by Nitin Johnson RN  Outcome: Progressing  11/5/2024 2244 by Nitin Johnson RN  Outcome: Progressing     Problem: Wound  Goal: Optimal Coping  11/6/2024 0515 by Nitin Johnson RN  Outcome: Progressing  11/5/2024 2244 by Nitin Johnson RN  Outcome: Progressing  Goal: Optimal Functional Ability  11/6/2024 0515 by Nitin Johnson RN  Outcome: Progressing  11/5/2024 2244 by Nitin Johnson RN  Outcome: Progressing  Goal: Absence of Infection Signs and Symptoms  11/6/2024 0515 by Nitin Johnson RN  Outcome: Progressing  11/5/2024 2244 by Nitin Johnson RN  Outcome: Progressing  Goal: Improved Oral Intake  11/6/2024 0515 by Nitin Johnson RN  Outcome: Progressing  11/5/2024 2244 by Nitin Johnson RN  Outcome: Progressing  Goal: Optimal Pain Control and Function  11/6/2024 0515 by Nitin Johnson RN  Outcome: Progressing  11/5/2024 2244 by Nitin Johnson RN  Outcome: Progressing  Goal: Skin Health and Integrity  11/6/2024 0515 by Alex,  PARUL Taveras  Outcome: Progressing  11/5/2024 2244 by Nitin Johnson RN  Outcome: Progressing  Goal: Optimal Wound Healing  11/6/2024 0515 by Nitin Johnson RN  Outcome: Progressing  11/5/2024 2244 by Nitin Johnson RN  Outcome: Progressing     Problem: Skin Injury Risk Increased  Goal: Skin Health and Integrity  11/6/2024 0515 by Nitin Johnson RN  Outcome: Progressing  11/5/2024 2244 by Nitin Johnson RN  Outcome: Progressing     Problem: Acute Kidney Injury/Impairment  Goal: Fluid and Electrolyte Balance  11/6/2024 0515 by Nitin Johnson RN  Outcome: Progressing  11/5/2024 2244 by Nitin Johnson RN  Outcome: Progressing  Goal: Improved Oral Intake  11/6/2024 0515 by Nitin Johnson RN  Outcome: Progressing  11/5/2024 2244 by Nitin Johnson RN  Outcome: Progressing  Goal: Effective Renal Function  11/6/2024 0515 by Nitin Johnson RN  Outcome: Progressing  11/5/2024 2244 by Nitin Johnson RN  Outcome: Progressing     Problem: Fall Injury Risk  Goal: Absence of Fall and Fall-Related Injury  11/6/2024 0515 by Nitin Johnson RN  Outcome: Progressing  11/5/2024 2244 by Nitin Johnson RN  Outcome: Progressing

## 2024-11-06 NOTE — PROGRESS NOTES
[facility]  [facility] Medicine  Progress Note    Patient Name: Meghna Dalal  MRN: [id number]  Patient Class: IP- Inpatient   Admission Date: 11/2/2024  Length of Stay: 3 days  Attending Physician: Tigre Owusu MD  Primary Care Provider: Kwame Wyatt MD        Subjective:     Principal Problem:Salmonella enteritis        HPI:    Chief complaint:  Weakness with recent GI upset    History of present illness:          Ms. Dalal is a 74-year-old presented to the emergency room after have onset prior day of nausea vomiting and diarrhea.  This was associated with fever and chills.  Patient denies similar problem in the past.  Denies any unusual exposure.  Has not been out of area her eaten any unusual food that others have not also shared.  Did have shoulder surgery by Dr. Pablo 08/27/24 but no antibiotics since.  History of rheumatoid arthritis and on Orencia.  When seen in the emergency room was noted to be tachycardic, febrile, appeared ill and have elevated lactic acid.  Patient does take metformin for diabetes.  Continued here in the emergency room with nausea vomiting and liquidy nonbloody diarrhea.  Hospitalist service was asked to see patient for evaluation of admission'    Review of systems:  See above.  No recent respiratory complaints.  Denies pain.  Generally relatively active and able to ambulate without issues.  Has been undergoing therapy following above-mentioned shoulder surgery.  No history of being significant snore or sleep disturbance but does take frequent naps in the daytime.  Review of systems otherwise negative.     Past medical history:  -hypertension proximally 10 years  -diabetes mellitus proximally 10 years  -rheumatoid arthritis on selective T-cell co stimulation modulators (Orencia).   -hypothyroidism    Past surgical history:  Recent shoulder surgery as above mentioned by Dr. Pablo on August 27  Lumbar back surgery  Knee  replacement  Hysterectomy with 1 ovary removed    Allergy to codeine    Social history:  Lives with   No history of tobacco alcohol    Family history:  Son with myocardial infarction in his 50s    Health maintenance issues:  Primary care providers Dr. Wyatt  Has not had flu shot this year  Had prior Pneumovax  Daughter states patient has had COVID infection 2-3 times the last of which proximally 1 year earlier  Patient has had COVID vaccination x2 at least 1 booster  No history of recent mammogram  No recent Pap smear  Colonoscopies apparently about 5 years earlier told unremarkable          Overview/Hospital Course:  11/03 feels some better.  Still with nausea vomiting not tolerating liquids by mouth.  Some diarrhea.  Studies returned showing salmonella as etiology.   in room and states no one else sick.  Told him to throw out all open food at their house.  Continue with hydration and antibiotics.  Monitor  11/04 still with nausea vomiting diarrhea not able to hold down fluids well, with IV fluids out renal function slight worse.  Start IV fluids back.  Generally though patient states feeling some better.  Daughter in room  11/05 feels better but still NV and loose stools. Worse renal function. UOP but less. Asked Dr Conde to evaluate.    No new subjective & objective note has been filed under this hospital service since the last note was generated.      Assessment/Plan:      * Salmonella enteritis    With severe illness treating with fluoroquinolones  Unable to take orally well so giving IV hydration and IV antibiotics  Discussed with , he is to throughout all open food had his house  Denies any recent travel, anyone else sick at home, no sick pets in his not been around poultry  Has eaten out once at a restaurant recently    11/04 still with GI upset but patient feels it is slowing down and she feels some better, has not done well tolerating oral intake and with serum creatinine going up  will restart IV fluids    ARF (acute renal failure)  GEOVANY is likely due to pre-renal azotemia due to dehydration. Baseline creatinine is  1.1 . Most recent creatinine and eGFR are listed below.  Recent Labs     11/03/24  0335 11/05/24  0508   CREATININE 2.38* 5.86*  5.93*   EGFRNORACEVR 21* 7*  7*      Plan  - GEOVANY is worsening. Will hydrate  - Avoid nephrotoxins and renally dose meds for GFR listed above  - Monitor urine output, serial BMP, and adjust therapy as needed  - nephrology asked to see    Vitamin D deficiency    Replacement and continue at home    Gastroenteritis    Still NV and diarrhea    Acute lactic acidosis    Continue with intravenous hydration  Checking blood cultures with possibility of septicemia  However metformin inpatients current situation of dehydration and GI upset could be associated with lactic acidosis    Dehydration    Although IV fluids on national shortage with patient's tachycardia and inability to take orally will leave on continue maintenance IV fluids for now.  Patient has had 2-1/2 L so far in the emergency room.        Fever  Check stool studies  Blood culture have been drawn  Cover with levofloxacin for possible infectious diarrhea    11/04 resolved      Nausea vomiting and diarrhea    Continue hydration, GI upset better, checking stool studies  Liquid diet for now    11/03 these symptoms persist and continue with hydration and IV antibiotics    History of right shoulder replacement    This back in August and was patient's last antibiotics not likely etiology but checking C diff    Acquired hypothyroidism  Continue Synthroid  Check T4 and TSH      Systolic hypertension  Patients blood pressure range in the last 24 hours was: BP  Min: 94/62  Max: 185/80.The patient's inpatient anti-hypertensive regimen is listed below:  Current Antihypertensives       Plan  - BP is uncontrolled, will adjust as follows:  Adjust meds as needed      Uncontrolled type 2 diabetes mellitus with  "hyperglycemia  Patient's FSGs are uncontrolled due to hyperglycemia on current medication regimen.  Last A1c reviewed-   Lab Results   Component Value Date    HGBA1C 10.6 (H) 08/28/2024     Most recent fingerstick glucose reviewed- No results for input(s): "POCTGLUCOSE" in the last 24 hours.  Current correctional scale  Medium  Increase anti-hyperglycemic dose as follows-   Antihyperglycemics (From admission, onward)      Start     Stop Route Frequency Ordered    11/02/24 1643  insulin aspart U-100 injection 0-10 Units         -- SubQ Before meals & nightly PRN 11/02/24 1546          Hold Oral hypoglycemics while patient is in the hospital.    Severe obesity (BMI 35.0-39.9) with comorbidity  Body mass index is 36.49 kg/m². Morbid obesity complicates all aspects of disease management from diagnostic modalities to treatment. Weight loss encouraged and health benefits explained to patient.         Rheumatoid arthritis involving multiple sites with positive rheumatoid factor    Hold patient's selective T-cell code stimulator modulator Orencia while having acute illness      VTE Risk Mitigation (From admission, onward)           Ordered     enoxaparin injection 30 mg  Daily         11/04/24 1740     IP VTE HIGH RISK PATIENT  Once         11/02/24 1546     Place sequential compression device  Until discontinued         11/02/24 1546                    Discharge Planning   NAEEM:      Code Status: Full Code   Is the patient medically ready for discharge?:     Reason for patient still in hospital (select all that apply): Laboratory test, Treatment, and Imaging  Discharge Plan A: Home, Home with family                  Tigre Owusu MD  Department of Hospital Medicine   [facility]    "

## 2024-11-06 NOTE — ASSESSMENT & PLAN NOTE
Patients blood pressure range in the last 24 hours was: BP  Min: 94/62  Max: 185/80.The patient's inpatient anti-hypertensive regimen is listed below:  Current Antihypertensives       Plan  - BP is uncontrolled, will adjust as follows:  Adjust meds as needed

## 2024-11-06 NOTE — PLAN OF CARE
Problem: Adult Inpatient Plan of Care  Goal: Plan of Care Review  Outcome: Progressing  Goal: Patient-Specific Goal (Individualized)  Outcome: Progressing  Goal: Absence of Hospital-Acquired Illness or Injury  Outcome: Progressing  Goal: Optimal Comfort and Wellbeing  Outcome: Progressing  Goal: Readiness for Transition of Care  Outcome: Progressing     Problem: Diabetes Comorbidity  Goal: Blood Glucose Level Within Targeted Range  Outcome: Progressing     Problem: Wound  Goal: Optimal Coping  Outcome: Progressing  Goal: Optimal Functional Ability  Outcome: Progressing  Goal: Absence of Infection Signs and Symptoms  Outcome: Progressing  Goal: Improved Oral Intake  Outcome: Progressing  Goal: Optimal Pain Control and Function  Outcome: Progressing

## 2024-11-06 NOTE — NURSING
"Patient still unable to provide urine sample. States "every time I sit down and pee I poop too and it is liquid so I can't tell when I am just peeing. I can't pee in that cap because poop goes into it too and I can't just catch pee in that little cup without getting poop". Will relay to day team and let ordering provider decide if he wants to in and out cath.   "

## 2024-11-07 LAB
ANION GAP SERPL CALCULATED.3IONS-SCNC: 9 MMOL/L (ref 7–16)
BACTERIA BLD CULT: NORMAL
BUN SERPL-MCNC: 40 MG/DL (ref 7–18)
BUN/CREAT SERPL: 15 (ref 6–20)
CALCIUM SERPL-MCNC: 6.5 MG/DL (ref 8.5–10.1)
CHLORIDE SERPL-SCNC: 111 MMOL/L (ref 98–107)
CO2 SERPL-SCNC: 16 MMOL/L (ref 21–32)
CREAT SERPL-MCNC: 2.68 MG/DL (ref 0.55–1.02)
EGFR (NO RACE VARIABLE) (RUSH/TITUS): 18 ML/MIN/1.73M2
GLUCOSE SERPL-MCNC: 122 MG/DL (ref 74–106)
GLUCOSE SERPL-MCNC: 125 MG/DL (ref 70–105)
GLUCOSE SERPL-MCNC: 125 MG/DL (ref 70–105)
GLUCOSE SERPL-MCNC: 132 MG/DL (ref 70–105)
GLUCOSE SERPL-MCNC: 133 MG/DL (ref 70–105)
POTASSIUM SERPL-SCNC: 4.3 MMOL/L (ref 3.5–5.1)
SODIUM SERPL-SCNC: 132 MMOL/L (ref 136–145)

## 2024-11-07 PROCEDURE — 25000003 PHARM REV CODE 250: Performed by: HOSPITALIST

## 2024-11-07 PROCEDURE — 80048 BASIC METABOLIC PNL TOTAL CA: CPT | Performed by: INTERNAL MEDICINE

## 2024-11-07 PROCEDURE — 94761 N-INVAS EAR/PLS OXIMETRY MLT: CPT

## 2024-11-07 PROCEDURE — 99900035 HC TECH TIME PER 15 MIN (STAT)

## 2024-11-07 PROCEDURE — 99232 SBSQ HOSP IP/OBS MODERATE 35: CPT | Mod: ,,, | Performed by: HOSPITALIST

## 2024-11-07 PROCEDURE — 63600175 PHARM REV CODE 636 W HCPCS: Performed by: HOSPITALIST

## 2024-11-07 PROCEDURE — 82962 GLUCOSE BLOOD TEST: CPT

## 2024-11-07 PROCEDURE — 11000001 HC ACUTE MED/SURG PRIVATE ROOM

## 2024-11-07 PROCEDURE — 36415 COLL VENOUS BLD VENIPUNCTURE: CPT | Performed by: INTERNAL MEDICINE

## 2024-11-07 RX ADMIN — LEVOTHYROXINE SODIUM 112 MCG: 0.11 TABLET ORAL at 05:11

## 2024-11-07 RX ADMIN — FAMOTIDINE 20 MG: 10 INJECTION, SOLUTION INTRAVENOUS at 10:11

## 2024-11-07 RX ADMIN — ENOXAPARIN SODIUM 30 MG: 30 INJECTION SUBCUTANEOUS at 04:11

## 2024-11-07 RX ADMIN — SODIUM CHLORIDE AND POTASSIUM CHLORIDE: 150; 450 INJECTION, SOLUTION INTRAVENOUS at 08:11

## 2024-11-07 RX ADMIN — Medication 1000 UNITS: at 10:11

## 2024-11-07 RX ADMIN — SODIUM CHLORIDE AND POTASSIUM CHLORIDE: 150; 450 INJECTION, SOLUTION INTRAVENOUS at 11:11

## 2024-11-07 RX ADMIN — SODIUM CHLORIDE AND POTASSIUM CHLORIDE: 150; 450 INJECTION, SOLUTION INTRAVENOUS at 03:11

## 2024-11-07 RX ADMIN — ONDANSETRON 4 MG: 2 INJECTION INTRAMUSCULAR; INTRAVENOUS at 05:11

## 2024-11-07 RX ADMIN — LEVOFLOXACIN 250 MG: 250 INJECTION, SOLUTION INTRAVENOUS at 04:11

## 2024-11-07 NOTE — SUBJECTIVE & OBJECTIVE
Past Medical History:   Diagnosis Date    Arthritis     Diabetes mellitus, type 2     GERD (gastroesophageal reflux disease)     Hypertension     Iron deficiency anemia     Obesity     Personal history of colonic polyps 08/19/2019    Thyroid disease        Past Surgical History:   Procedure Laterality Date    ARTHROSCOPIC DEBRIDEMENT OF SHOULDER Left 12/21/2021    Procedure: DEBRIDEMENT, SHOULDER, ARTHROSCOPIC;  Surgeon: Jose Antonio Pablo MD;  Location: [facility];  Service: Orthopedics;  Laterality: Left;  SHOULDER JOINT    ARTHROSCOPIC REMOVAL OF LOOSE BODY FROM JOINT Left 12/21/2021    Procedure: REMOVAL, LOOSE BODY, JOINT, ARTHROSCOPIC;  Surgeon: Jose Antonio Pablo MD;  Location: [facility] OR;  Service: Orthopedics;  Laterality: Left;    COLONOSCOPY W/ POLYPECTOMY  08/19/2019    DECOMPRESSION OF SUBACROMIAL SPACE Left 12/21/2021    Procedure: DECOMPRESSION, SUBACROMIAL SPACE;  Surgeon: Jose Antonio Pablo MD;  Location: [facility];  Service: Orthopedics;  Laterality: Left;    DISTAL CLAVICLE EXCISION Left 12/21/2021    Procedure: EXCISION, CLAVICLE, DISTAL;  Surgeon: Jose Antonio Pablo MD;  Location: [facility];  Service: Orthopedics;  Laterality: Left;    left total knee replacement      PARTIAL HYSTERECTOMY      REVERSE TOTAL SHOULDER ARTHROPLASTY Right 8/27/2024    Procedure: ARTHROPLASTY, SHOULDER, TOTAL, REVERSE;  Surgeon: Jose Antonio Pablo MD;  Location: [facility];  Service: Orthopedics;  Laterality: Right;    ROTATOR CUFF REPAIR Left 12/21/2021    Procedure: REPAIR, ROTATOR CUFF;  Surgeon: Jose Antonio Pablo MD;  Location: [facility];  Service: Orthopedics;  Laterality: Left;    SHOULDER ARTHROSCOPY Left 12/21/2021    Procedure: ARTHROSCOPY, SHOULDER;  Surgeon: Jose Antonio Pablo MD;  Location: [facility];  Service: Orthopedics;  Laterality: Left;    SHOULDER SURGERY Left 12/21/2021    THYROID SURGERY      total thyroidectomy    TONSILLECTOMY AND ADENOIDECTOMY          Review of patient's allergies indicates:   Allergen Reactions    Codeine phosphate      Other reaction(s): facial swelling    Codeine sulfate      Other reaction(s): Unknown       No current facility-administered medications on file prior to encounter.     Current Outpatient Medications on File Prior to Encounter   Medication Sig    aspirin 81 mg Cap Take 81 mg by mouth once daily.    cyclobenzaprine (FLEXERIL) 10 MG tablet Take 1 tablet (10 mg total) by mouth 3 (three) times daily as needed for Muscle spasms.    furosemide (LASIX) 20 MG tablet Take 1 tablet (20 mg total) by mouth once daily.    gabapentin (NEURONTIN) 100 MG capsule Take 1 capsule (100 mg total) by mouth 3 (three) times daily.    levothyroxine (SYNTHROID) 125 MCG tablet TAKE 1 TABLET BY MOUTH EVERY DAY    losartan-hydrochlorothiazide 100-12.5 mg (HYZAAR) 100-12.5 mg Tab TAKE 1 TABLET BY MOUTH EVERY DAY    metFORMIN (GLUCOPHAGE) 500 MG tablet TAKE 1 TABLET BY MOUTH THREE TIMES A DAY    omeprazole (PRILOSEC) 40 MG capsule TAKE 1 CAPSULE BY MOUTH ONCE  DAILY    potassium chloride (MICRO-K) 8 mEq CpSR TAKE 1 CAPSULE BY MOUTH ONCE DAILY.    predniSONE (DELTASONE) 5 MG tablet Take 5 mg by mouth once daily.    [DISCONTINUED] losartan (COZAAR) 50 MG tablet TAKE 1 TABLET BY MOUTH EVERY DAY     Family History       Problem Relation (Age of Onset)    Alzheimer's disease Mother    Heart failure Father, Brother          Tobacco Use    Smoking status: Never    Smokeless tobacco: Never   Substance and Sexual Activity    Alcohol use: Never    Drug use: Never    Sexual activity: Yes     Review of Systems   Constitutional:  Positive for appetite change and fatigue.   HENT:  Negative for congestion, hearing loss and trouble swallowing.    Respiratory:  Negative for chest tightness, shortness of breath and wheezing.    Cardiovascular:  Negative for chest pain and palpitations.   Gastrointestinal:  Positive for diarrhea, nausea and vomiting. Negative for abdominal  pain and constipation.   Genitourinary:  Negative for difficulty urinating and dysuria.   Musculoskeletal:  Negative for back pain and neck stiffness.   Skin:  Negative for pallor and rash.   Neurological:  Negative for speech difficulty and headaches.   Psychiatric/Behavioral:  Negative for confusion and suicidal ideas.      Objective:     Vital Signs (Most Recent):  Temp: 97.9 °F (36.6 °C) (11/06/24 2025)  Pulse: 87 (11/06/24 2025)  Resp: 16 (11/06/24 1907)  BP: 118/71 (11/06/24 2025)  SpO2: 96 % (11/06/24 2025) Vital Signs (24h Range):  Temp:  [97.8 °F (36.6 °C)-99 °F (37.2 °C)] 97.9 °F (36.6 °C)  Pulse:  [71-87] 87  Resp:  [14-16] 16  SpO2:  [94 %-99 %] 96 %  BP: (109-136)/(69-80) 118/71     Weight: 96.1 kg (211 lb 12.8 oz)  Body mass index is 37.52 kg/m².     Physical Exam  Vitals reviewed.   Constitutional:       General: She is awake. She is not in acute distress.     Appearance: She is well-developed. She is obese. She is not toxic-appearing.   HENT:      Head: Normocephalic.      Nose: Nose normal.      Mouth/Throat:      Pharynx: Oropharynx is clear.   Eyes:      Extraocular Movements: Extraocular movements intact.      Pupils: Pupils are equal, round, and reactive to light.   Neck:      Thyroid: No thyroid mass.      Vascular: No carotid bruit.   Cardiovascular:      Rate and Rhythm: Regular rhythm. Tachycardia present.      Pulses: Normal pulses.      Heart sounds: Normal heart sounds. No murmur heard.  Pulmonary:      Effort: Pulmonary effort is normal.      Breath sounds: Normal breath sounds and air entry. No wheezing.   Abdominal:      General: Bowel sounds are increased. There is no distension.      Palpations: Abdomen is soft.      Tenderness: There is no abdominal tenderness.   Musculoskeletal:         General: Normal range of motion.      Cervical back: Neck supple. No rigidity.   Skin:     General: Skin is warm.      Coloration: Skin is not jaundiced.      Findings: No lesion.   Neurological:       General: No focal deficit present.      Mental Status: She is alert and oriented to person, place, and time.      Cranial Nerves: No cranial nerve deficit.   Psychiatric:         Attention and Perception: Attention normal.         Mood and Affect: Mood normal.         Behavior: Behavior normal. Behavior is cooperative.         Thought Content: Thought content normal.         Cognition and Memory: Cognition normal.              CRANIAL NERVES     CN III, IV, VI   Pupils are equal, round, and reactive to light.       Significant Labs: All pertinent labs within the past 24 hours have been reviewed.  BMP:   Recent Labs   Lab 11/06/24  0716   *      K 2.8*   *   CO2 18*   BUN 54*   CREATININE 4.12*   CALCIUM 6.8*     CBC:   Recent Labs   Lab 11/05/24  0509   WBC 7.93   HGB 11.5*   HCT 36.3*        CMP:   Recent Labs   Lab 11/05/24  0508 11/06/24  0716     137 136   K 3.6  3.5 2.8*     107 110*   CO2 18*  19* 18*   *  146* 157*   BUN 56*  55* 54*   CREATININE 5.86*  5.93* 4.12*   CALCIUM 7.7*  7.7* 6.8*   ALBUMIN 2.8*  --    ANIONGAP 17*  15 11       Significant Imaging: I have reviewed all pertinent imaging results/findings within the past 24 hours.    Imaging Results              CT Abdomen Pelvis  Without Contrast (Final result)  Result time 11/02/24 10:13:57      Final result by Emerson Kelley MD (11/02/24 10:13:57)                   Impression:      1. Nonspecific intraluminal fluid within normal caliber loops of colon, as may be seen in the setting of diarrheal illness.  2. Patchy airspace consolidation small nodules in the basilar right lower lobe, could reflect infectious or noninfectious inflammatory process.  For multiple solid nodules all <6 mm, Fleischner Society 2017 guidelines recommend no routine follow up for a low risk patient, or follow up with non-contrast chest CT at 12 months after discovery in a high risk patient.  3. Additional details of  chronic and incidental findings, as provided in the body of report.      Electronically signed by: Emerson Kelley  Date:    11/02/2024  Time:    10:13               Narrative:    EXAMINATION:  CT ABDOMEN PELVIS WITHOUT CONTRAST    CLINICAL HISTORY:  Abdominal pain, acute, nonlocalized;abdominal pain;    TECHNIQUE:  Low dose axial images, sagittal and coronal reformations were obtained from the lung bases to the pubic symphysis.    COMPARISON:  CT of the abdomen and pelvis performed 03/13/2015.    FINDINGS:  This examination is limited due to lack of intravenous contrast.    Lower chest: Atelectasis.  Patchy airspace consolidation and small nodules noted in the basilar right lower lobe.    Liver: Suggested hepatomegaly.  Hepatic steatosis.    Gallbladder and bile ducts: Unremarkable.    Pancreas: Normal contour.    Spleen: Normal contour.    Adrenals: Normal contour.    Kidneys: Suggested mild degree of cortical atrophy of both kidneys.  Nonspecific bilateral perinephric stranding.  Subcentimeter hypodense lesions in the kidneys are too small to definitely characterize.    Lymph nodes: No abdominal or pelvic lymphadenopathy.    Bowel and mesentery: Small hiatal hernia.  No evidence of bowel obstruction.  Nonspecific intraluminal fluid within normal caliber loops of colon, as may be seen in the setting of diarrheal illness.  Nonspecific fatty infiltration in the region of the ileocecal valve.  Appendix not confidently identified.  No definite focal pericecal inflammation.    Abdominal aorta: Nonaneurysmal.  Mild atherosclerotic calcification.    Inferior vena cava: Unremarkable.    Free fluid or free air: None.    Pelvis: Unremarkable.    Urinary bladder: Unremarkable.    Body wall: Remote operative sequela in the anterior abdominal wall.    Bones: Query osseous demineralization.  No definite acute findings.  Degenerative findings of the spine and hips.                                       X-Ray Chest 1 View (Final  result)  Result time 11/02/24 08:34:51      Final result by Emerson Kelley MD (11/02/24 08:34:51)                   Impression:      Mildly prominent interstitial markings could relate to pulmonary vascular congestion/edema versus infectious or noninfectious inflammatory process.    Suspect left basilar atelectasis.      Electronically signed by: Emerson Kelley  Date:    11/02/2024  Time:    08:34               Narrative:    EXAMINATION:  XR CHEST 1 VIEW    CLINICAL HISTORY:  Fever, unspecified    TECHNIQUE:  Single frontal view of the chest was performed.    COMPARISON:  Chest radiograph performed 08/14/2024, 09:20 hours.    FINDINGS:  Monitoring leads overlie the chest.  Grossly unchanged cardiomediastinal contours.  Mildly prominent interstitial markings.    No definite focal airspace consolidation.  Suspect left basilar atelectasis.    No definite pneumothorax or large volume pleural effusion.    No acute findings in the visualized abdomen.    Osseous and soft tissue structures appear without definite acute change.                                    Intake/Output - Last 3 Shifts         11/05 0700 11/06 0659 11/06 0700 11/07 0659    P.O.  160    I.V. (mL/kg) 3815.9 (39.7) 72.2 (0.8)    IV Piggyback 112.4     Total Intake(mL/kg) 3928.3 (40.9) 232.2 (2.4)    Net +3928.3 +232.2          Urine Occurrence 4 x     Stool Occurrence 5 x           Microbiology Results (last 7 days)       Procedure Component Value Units Date/Time    Blood culture #1 [[id number]] Collected: 11/02/24 0653    Order Status: Completed Specimen: Blood Updated: 11/06/24 0641     Culture, Blood No Growth At 72 Hours    Blood culture #2 [[id number]] Collected: 11/02/24 0715    Order Status: Completed Specimen: Blood from Peripheral, Antecubital, Right Updated: 11/06/24 0641     Culture, Blood No Growth At 72 Hours    Culture, Enteric Pathogen [[id number]]  (Abnormal)  (Susceptibility) Collected: 11/02/24 2114    Order Status: Completed  Specimen: Stool Updated: 11/05/24 0930     Culture, Enteric Pathogens Salmonella species    C diff toxin/antigen with reflex to PCR [[id number]]  (Normal) Collected: 11/02/24 2114    Order Status: Completed Specimen: Stool Updated: 11/04/24 1328     Clostridium Difficile Toxin A/B Negative     Clostridium Difficile GDH Antigen Negative    Narrative:      Interpretive Information:    Absence of both GDH antigen and toxin is consistent with absence of a C difficile infection.  Presence of both GDH antigen and toxin is consistent with a C difficile infection in a symptomatic patient. Detection of GDH and toxin in an asymptomatic patient is not specific for disease, as patients may be colonized with C difficile.  Presence of either GDH antigen or toxin coupled with presence of C difficile toxin B gene (ie, positive PCR test) is consistent with C difficile infection in a symptomatic patient.  Presence of GDH antigen coupled with absence of toxin and C difficile toxin B gene (ie, negative PCR test) is consistent with presence of a non-disease-causing strain of C difficile or another Clostridioides species.      Enteric Pathogen Panel [[id number]]  (Abnormal) Collected: 11/02/24 2114    Order Status: Completed Specimen: Stool Updated: 11/03/24 0028     Campylobacter Group LUIS Negative     Salmonella Species LUIS Positive     Shigella Species LUIS Negative     Vibrio Group ULIS Negative     Yersinia enterocolitica LUIS Negative     Shiga Toxin 1 LUIS Negative     Shiga Toxin 2 LUIS Negative     Norovirus GI/GII LUIS Negative     Rotavirus A LUIS Negative    Fecal leukocytes [[id number]] Collected: 11/02/24 2114    Order Status: Completed Specimen: Stool Updated: 11/02/24 2207     Fecal Leukocytes Positive    C Diff Toxin by PCR [[id number]] Collected: 11/02/24 2114    Order Status: Canceled Specimen: Stool Updated: 11/02/24 2120    Ova and Parasite Exam [[id number]]     Order Status: Canceled Specimen: Stool     Influenza A & B by  Molecular [[id number]]  (Normal) Collected: 11/02/24 0658    Order Status: Completed Specimen: Nasopharyngeal Swab Updated: 11/02/24 0747     INFLUENZA A MOLECULAR Negative     INFLUENZA B MOLECULAR  Negative

## 2024-11-07 NOTE — PLAN OF CARE
Problem: Adult Inpatient Plan of Care  Goal: Plan of Care Review  11/7/2024 0556 by Nitin Johnson RN  Outcome: Progressing  11/6/2024 2316 by Nitin Johnson RN  Outcome: Progressing  Goal: Patient-Specific Goal (Individualized)  11/7/2024 0556 by Nitin Johnson RN  Outcome: Progressing  11/6/2024 2316 by Nitin Johnson RN  Outcome: Progressing  Goal: Absence of Hospital-Acquired Illness or Injury  11/7/2024 0556 by Nitin Johnson RN  Outcome: Progressing  11/6/2024 2316 by Nitin Johnson RN  Outcome: Progressing  Goal: Optimal Comfort and Wellbeing  11/7/2024 0556 by Nitin Johnson RN  Outcome: Progressing  11/6/2024 2316 by Nitin Johnson RN  Outcome: Progressing  Goal: Readiness for Transition of Care  11/7/2024 0556 by Nitin Johnson RN  Outcome: Progressing  11/6/2024 2316 by Nitin Johnson RN  Outcome: Progressing     Problem: Diabetes Comorbidity  Goal: Blood Glucose Level Within Targeted Range  11/7/2024 0556 by Nitin Johnson RN  Outcome: Progressing  11/6/2024 2316 by Nitin Johnson RN  Outcome: Progressing     Problem: Wound  Goal: Optimal Coping  11/7/2024 0556 by Nitin Johnson RN  Outcome: Progressing  11/6/2024 2316 by Nitin Johnson RN  Outcome: Progressing  Goal: Optimal Functional Ability  11/7/2024 0556 by Nitin Johnson RN  Outcome: Progressing  11/6/2024 2316 by Nitin Johnson RN  Outcome: Progressing  Goal: Absence of Infection Signs and Symptoms  11/7/2024 0556 by Nitin Johnson RN  Outcome: Progressing  11/6/2024 2316 by Nitin Johnson RN  Outcome: Progressing  Goal: Improved Oral Intake  11/7/2024 0556 by Nitin Johnson RN  Outcome: Progressing  11/6/2024 2316 by Nitin Johnson RN  Outcome: Progressing  Goal: Optimal Pain Control and Function  11/7/2024 0556 by Nitin Johnson RN  Outcome: Progressing  11/6/2024 2316 by Nitin Johnson RN  Outcome: Progressing  Goal: Skin Health and Integrity  11/7/2024 0556 by Alex,  PARUL Taveras  Outcome: Progressing  11/6/2024 2316 by Nitin Johnson RN  Outcome: Progressing  Goal: Optimal Wound Healing  11/7/2024 0556 by Nitin Johnson RN  Outcome: Progressing  11/6/2024 2316 by Nitin Johnson RN  Outcome: Progressing     Problem: Skin Injury Risk Increased  Goal: Skin Health and Integrity  11/7/2024 0556 by Nitin Johnson RN  Outcome: Progressing  11/6/2024 2316 by Nitin Johnson RN  Outcome: Progressing     Problem: Acute Kidney Injury/Impairment  Goal: Fluid and Electrolyte Balance  11/7/2024 0556 by Nitin Johnson RN  Outcome: Progressing  11/6/2024 2316 by Nitin Johnson RN  Outcome: Progressing  Goal: Improved Oral Intake  11/7/2024 0556 by Nitin Johnson RN  Outcome: Progressing  11/6/2024 2316 by Nitin Johnson RN  Outcome: Progressing  Goal: Effective Renal Function  11/7/2024 0556 by Nitin Johnson RN  Outcome: Progressing  11/6/2024 2316 by Nitin Johnson RN  Outcome: Progressing     Problem: Fall Injury Risk  Goal: Absence of Fall and Fall-Related Injury  11/7/2024 0556 by Nitin Johnson RN  Outcome: Progressing  11/6/2024 2316 by Nitin Johnson RN  Outcome: Progressing

## 2024-11-07 NOTE — PLAN OF CARE
Problem: Adult Inpatient Plan of Care  Goal: Plan of Care Review  Outcome: Progressing  Goal: Patient-Specific Goal (Individualized)  Outcome: Progressing     Problem: Diabetes Comorbidity  Goal: Blood Glucose Level Within Targeted Range  Outcome: Progressing     Problem: Skin Injury Risk Increased  Goal: Skin Health and Integrity  Outcome: Progressing     Problem: Acute Kidney Injury/Impairment  Goal: Fluid and Electrolyte Balance  Outcome: Progressing  Goal: Improved Oral Intake  Outcome: Progressing  Goal: Effective Renal Function  Outcome: Progressing     Problem: Fall Injury Risk  Goal: Absence of Fall and Fall-Related Injury  Outcome: Progressing

## 2024-11-07 NOTE — PROGRESS NOTES
[facility]  Nephrology  Progress Note    Patient Name: Meghna Dalal  MRN: [id number]  Admission Date: 11/2/2024  Hospital Length of Stay: 4 days  Attending Provider: Tigre Owusu MD   Primary Care Physician: Kwame Wyatt MD  Principal Problem:Salmonella enteritis    Subjective:     HPI: 74-year-old woman admitted with nausea vomiting and diarrhea.  Stool culture positive for salmonella.  She has developed acute renal failure since admission.  No history of prior renal insufficiency.    Interval History:  No nausea.  Diarrhea improving.    Review of patient's allergies indicates:   Allergen Reactions    Codeine phosphate      Other reaction(s): facial swelling    Codeine sulfate      Other reaction(s): Unknown     Current Facility-Administered Medications   Medication Frequency    0.45% NaCl with KCl 20 mEq infusion Continuous    acetaminophen suppository 650 mg Q6H PRN    acetaminophen tablet 650 mg Q8H PRN    dextrose 10% bolus 125 mL 125 mL PRN    dextrose 10% bolus 250 mL 250 mL PRN    enoxaparin injection 30 mg Daily    famotidine (PF) injection 20 mg Daily    glucagon (human recombinant) injection 1 mg PRN    glucose chewable tablet 16 g PRN    glucose chewable tablet 24 g PRN    insulin aspart U-100 injection 0-10 Units QID (AC + HS) PRN    levoFLOXacin 250 mg/50 mL IVPB 250 mg Q24H    levothyroxine tablet 112 mcg Daily    naloxone 0.4 mg/mL injection 0.02 mg PRN    ondansetron injection 4 mg Q6H PRN    promethazine injection 25 mg Q6H PRN    simethicone chewable tablet 80 mg TID PRN    sodium chloride 0.9% flush 10 mL Q12H PRN    vitamin D 1000 units tablet 1,000 Units Daily       Objective:     Vital Signs (Most Recent):  Temp: 97.9 °F (36.6 °C) (11/06/24 2025)  Pulse: 87 (11/06/24 2025)  Resp: 16 (11/06/24 1907)  BP: 118/71 (11/06/24 2025)  SpO2: 96 % (11/06/24 2025) Vital Signs (24h Range):  Temp:  [97.8 °F (36.6 °C)-99 °F (37.2 °C)] 97.9 °F (36.6 °C)  Pulse:  [71-87]  87  Resp:  [14-16] 16  SpO2:  [94 %-99 %] 96 %  BP: (109-136)/(69-80) 118/71     Weight: 96.1 kg (211 lb 12.8 oz) (11/03/24 0500)  Body mass index is 37.52 kg/m².  Body surface area is 2.07 meters squared.    I/O last 3 completed shifts:  In: 4159.4 [P.O.:160; I.V.:3887.1; IV Piggyback:112.4]  Out: -      Physical Exam  Constitutional:       General: She is not in acute distress.  HENT:      Head: Normocephalic and atraumatic.   Eyes:      Pupils: Pupils are equal, round, and reactive to light.   Cardiovascular:      Rate and Rhythm: Normal rate and regular rhythm.      Heart sounds: No murmur heard.     No friction rub. No gallop.   Pulmonary:      Effort: No respiratory distress.      Breath sounds: Normal breath sounds.   Abdominal:      General: Bowel sounds are normal.      Tenderness: There is no abdominal tenderness. There is no guarding.   Musculoskeletal:      Right lower leg: No edema.      Left lower leg: No edema.   Neurological:      Mental Status: She is alert and oriented to person, place, and time.   Psychiatric:         Behavior: Behavior normal.          Significant Labs:  BMP:   Recent Labs   Lab 11/06/24  0716   *      K 2.8*   *   CO2 18*   BUN 54*   CREATININE 4.12*   CALCIUM 6.8*     CBC:   Recent Labs   Lab 11/05/24  0509   WBC 7.93   RBC 4.68   HGB 11.5*   HCT 36.3*      MCV 77.6*   MCH 24.6*   MCHC 31.7*        Significant Imaging:    Assessment/Plan:     Cardiac/Vascular  Systolic hypertension  Controlled    Renal/  ARF (acute renal failure)  Creatinine 1.0 in August 20, 2024.  Creatinine 1.39 on admission.    Creatinine has improved from 5.86-4.12.  Continue IV fluid    ID  * Salmonella enteritis  Continue Levaquin    Immunology/Multi System  Rheumatoid arthritis involving multiple sites with positive rheumatoid factor  Underlying disease.  Immunocompromised as a result    Endocrine  Uncontrolled type 2 diabetes mellitus with hyperglycemia  Now  controlled        Thank you for your consult.     Bernard Conde MD  Nephrology  [facility]

## 2024-11-07 NOTE — ASSESSMENT & PLAN NOTE
Creatinine 1.0 in August 20, 2024.  Creatinine 1.39 on admission.    Creatinine has improved from 5.86-4.12.  Continue IV fluid

## 2024-11-07 NOTE — PROGRESS NOTES
[facility]  [facility] Medicine  Progress Note    Patient Name: Meghna Dalal  MRN: [id number]  Patient Class: IP- Inpatient   Admission Date: 11/2/2024  Length of Stay: 4 days  Attending Physician: Tigre Owusu MD  Primary Care Provider: Kwame Wyatt MD        Subjective:     Principal Problem:Salmonella enteritis        HPI:    Chief complaint:  Weakness with recent GI upset    History of present illness:          Ms. Dalal is a 74-year-old presented to the emergency room after have onset prior day of nausea vomiting and diarrhea.  This was associated with fever and chills.  Patient denies similar problem in the past.  Denies any unusual exposure.  Has not been out of area her eaten any unusual food that others have not also shared.  Did have shoulder surgery by Dr. Pablo 08/27/24 but no antibiotics since.  History of rheumatoid arthritis and on Orencia.  When seen in the emergency room was noted to be tachycardic, febrile, appeared ill and have elevated lactic acid.  Patient does take metformin for diabetes.  Continued here in the emergency room with nausea vomiting and liquidy nonbloody diarrhea.  Hospitalist service was asked to see patient for evaluation of admission'    Review of systems:  See above.  No recent respiratory complaints.  Denies pain.  Generally relatively active and able to ambulate without issues.  Has been undergoing therapy following above-mentioned shoulder surgery.  No history of being significant snore or sleep disturbance but does take frequent naps in the daytime.  Review of systems otherwise negative.     Past medical history:  -hypertension proximally 10 years  -diabetes mellitus proximally 10 years  -rheumatoid arthritis on selective T-cell co stimulation modulators (Orencia).   -hypothyroidism    Past surgical history:  Recent shoulder surgery as above mentioned by Dr. Pablo on August 27  Lumbar back surgery  Knee  replacement  Hysterectomy with 1 ovary removed    Allergy to codeine    Social history:  Lives with   No history of tobacco alcohol    Family history:  Son with myocardial infarction in his 50s    Health maintenance issues:  Primary care providers Dr. Wyatt  Has not had flu shot this year  Had prior Pneumovax  Daughter states patient has had COVID infection 2-3 times the last of which proximally 1 year earlier  Patient has had COVID vaccination x2 at least 1 booster  No history of recent mammogram  No recent Pap smear  Colonoscopies apparently about 5 years earlier told unremarkable          Overview/Hospital Course:  11/03 feels some better.  Still with nausea vomiting not tolerating liquids by mouth.  Some diarrhea.  Studies returned showing salmonella as etiology.   in room and states no one else sick.  Told him to throw out all open food at their house.  Continue with hydration and antibiotics.  Monitor  11/04 still with nausea vomiting diarrhea not able to hold down fluids well, with IV fluids out renal function slight worse.  Start IV fluids back.  Generally though patient states feeling some better.  Daughter in room  11/05 feels better but still NV and loose stools. Worse renal function. UOP but less. Asked Dr Conde to evaluate.  11/06 better but still not able to take much in orally. Continue hydration. Renal function better.    Past Medical History:   Diagnosis Date    Arthritis     Diabetes mellitus, type 2     GERD (gastroesophageal reflux disease)     Hypertension     Iron deficiency anemia     Obesity     Personal history of colonic polyps 08/19/2019    Thyroid disease        Past Surgical History:   Procedure Laterality Date    ARTHROSCOPIC DEBRIDEMENT OF SHOULDER Left 12/21/2021    Procedure: DEBRIDEMENT, SHOULDER, ARTHROSCOPIC;  Surgeon: Jose Antonio Pbalo MD;  Location: [facility];  Service: Orthopedics;  Laterality: Left;  SHOULDER JOINT    ARTHROSCOPIC REMOVAL OF LOOSE BODY  FROM JOINT Left 12/21/2021    Procedure: REMOVAL, LOOSE BODY, JOINT, ARTHROSCOPIC;  Surgeon: Jose Antonio Pablo MD;  Location: [facility];  Service: Orthopedics;  Laterality: Left;    COLONOSCOPY W/ POLYPECTOMY  08/19/2019    DECOMPRESSION OF SUBACROMIAL SPACE Left 12/21/2021    Procedure: DECOMPRESSION, SUBACROMIAL SPACE;  Surgeon: Jose Antonio Pablo MD;  Location: [facility];  Service: Orthopedics;  Laterality: Left;    DISTAL CLAVICLE EXCISION Left 12/21/2021    Procedure: EXCISION, CLAVICLE, DISTAL;  Surgeon: Jose Antonio Pablo MD;  Location: [facility];  Service: Orthopedics;  Laterality: Left;    left total knee replacement      PARTIAL HYSTERECTOMY      REVERSE TOTAL SHOULDER ARTHROPLASTY Right 8/27/2024    Procedure: ARTHROPLASTY, SHOULDER, TOTAL, REVERSE;  Surgeon: Jose Antonio Pablo MD;  Location: [facility];  Service: Orthopedics;  Laterality: Right;    ROTATOR CUFF REPAIR Left 12/21/2021    Procedure: REPAIR, ROTATOR CUFF;  Surgeon: Jose Antonio Pablo MD;  Location: [facility];  Service: Orthopedics;  Laterality: Left;    SHOULDER ARTHROSCOPY Left 12/21/2021    Procedure: ARTHROSCOPY, SHOULDER;  Surgeon: Jose Antonio Pablo MD;  Location: [facility] OR;  Service: Orthopedics;  Laterality: Left;    SHOULDER SURGERY Left 12/21/2021    THYROID SURGERY      total thyroidectomy    TONSILLECTOMY AND ADENOIDECTOMY         Review of patient's allergies indicates:   Allergen Reactions    Codeine phosphate      Other reaction(s): facial swelling    Codeine sulfate      Other reaction(s): Unknown       No current facility-administered medications on file prior to encounter.     Current Outpatient Medications on File Prior to Encounter   Medication Sig    aspirin 81 mg Cap Take 81 mg by mouth once daily.    cyclobenzaprine (FLEXERIL) 10 MG tablet Take 1 tablet (10 mg total) by mouth 3 (three) times daily as needed for Muscle spasms.    furosemide (LASIX) 20 MG tablet Take 1 tablet (20 mg  total) by mouth once daily.    gabapentin (NEURONTIN) 100 MG capsule Take 1 capsule (100 mg total) by mouth 3 (three) times daily.    levothyroxine (SYNTHROID) 125 MCG tablet TAKE 1 TABLET BY MOUTH EVERY DAY    losartan-hydrochlorothiazide 100-12.5 mg (HYZAAR) 100-12.5 mg Tab TAKE 1 TABLET BY MOUTH EVERY DAY    metFORMIN (GLUCOPHAGE) 500 MG tablet TAKE 1 TABLET BY MOUTH THREE TIMES A DAY    omeprazole (PRILOSEC) 40 MG capsule TAKE 1 CAPSULE BY MOUTH ONCE  DAILY    potassium chloride (MICRO-K) 8 mEq CpSR TAKE 1 CAPSULE BY MOUTH ONCE DAILY.    predniSONE (DELTASONE) 5 MG tablet Take 5 mg by mouth once daily.    [DISCONTINUED] losartan (COZAAR) 50 MG tablet TAKE 1 TABLET BY MOUTH EVERY DAY     Family History       Problem Relation (Age of Onset)    Alzheimer's disease Mother    Heart failure Father, Brother          Tobacco Use    Smoking status: Never    Smokeless tobacco: Never   Substance and Sexual Activity    Alcohol use: Never    Drug use: Never    Sexual activity: Yes     Review of Systems   Constitutional:  Positive for appetite change and fatigue.   HENT:  Negative for congestion, hearing loss and trouble swallowing.    Respiratory:  Negative for chest tightness, shortness of breath and wheezing.    Cardiovascular:  Negative for chest pain and palpitations.   Gastrointestinal:  Positive for diarrhea, nausea and vomiting. Negative for abdominal pain and constipation.   Genitourinary:  Negative for difficulty urinating and dysuria.   Musculoskeletal:  Negative for back pain and neck stiffness.   Skin:  Negative for pallor and rash.   Neurological:  Negative for speech difficulty and headaches.   Psychiatric/Behavioral:  Negative for confusion and suicidal ideas.      Objective:     Vital Signs (Most Recent):  Temp: 97.9 °F (36.6 °C) (11/06/24 2025)  Pulse: 87 (11/06/24 2025)  Resp: 16 (11/06/24 1907)  BP: 118/71 (11/06/24 2025)  SpO2: 96 % (11/06/24 2025) Vital Signs (24h Range):  Temp:  [97.8 °F (36.6 °C)-99 °F  (37.2 °C)] 97.9 °F (36.6 °C)  Pulse:  [71-87] 87  Resp:  [14-16] 16  SpO2:  [94 %-99 %] 96 %  BP: (109-136)/(69-80) 118/71     Weight: 96.1 kg (211 lb 12.8 oz)  Body mass index is 37.52 kg/m².     Physical Exam  Vitals reviewed.   Constitutional:       General: She is awake. She is not in acute distress.     Appearance: She is well-developed. She is obese. She is not toxic-appearing.   HENT:      Head: Normocephalic.      Nose: Nose normal.      Mouth/Throat:      Pharynx: Oropharynx is clear.   Eyes:      Extraocular Movements: Extraocular movements intact.      Pupils: Pupils are equal, round, and reactive to light.   Neck:      Thyroid: No thyroid mass.      Vascular: No carotid bruit.   Cardiovascular:      Rate and Rhythm: Regular rhythm. Tachycardia present.      Pulses: Normal pulses.      Heart sounds: Normal heart sounds. No murmur heard.  Pulmonary:      Effort: Pulmonary effort is normal.      Breath sounds: Normal breath sounds and air entry. No wheezing.   Abdominal:      General: Bowel sounds are increased. There is no distension.      Palpations: Abdomen is soft.      Tenderness: There is no abdominal tenderness.   Musculoskeletal:         General: Normal range of motion.      Cervical back: Neck supple. No rigidity.   Skin:     General: Skin is warm.      Coloration: Skin is not jaundiced.      Findings: No lesion.   Neurological:      General: No focal deficit present.      Mental Status: She is alert and oriented to person, place, and time.      Cranial Nerves: No cranial nerve deficit.   Psychiatric:         Attention and Perception: Attention normal.         Mood and Affect: Mood normal.         Behavior: Behavior normal. Behavior is cooperative.         Thought Content: Thought content normal.         Cognition and Memory: Cognition normal.              CRANIAL NERVES     CN III, IV, VI   Pupils are equal, round, and reactive to light.       Significant Labs: All pertinent labs within the past  24 hours have been reviewed.  BMP:   Recent Labs   Lab 11/06/24  0716   *      K 2.8*   *   CO2 18*   BUN 54*   CREATININE 4.12*   CALCIUM 6.8*     CBC:   Recent Labs   Lab 11/05/24  0509   WBC 7.93   HGB 11.5*   HCT 36.3*        CMP:   Recent Labs   Lab 11/05/24  0508 11/06/24  0716     137 136   K 3.6  3.5 2.8*     107 110*   CO2 18*  19* 18*   *  146* 157*   BUN 56*  55* 54*   CREATININE 5.86*  5.93* 4.12*   CALCIUM 7.7*  7.7* 6.8*   ALBUMIN 2.8*  --    ANIONGAP 17*  15 11       Significant Imaging: I have reviewed all pertinent imaging results/findings within the past 24 hours.    Imaging Results              CT Abdomen Pelvis  Without Contrast (Final result)  Result time 11/02/24 10:13:57      Final result by Emerson Kelley MD (11/02/24 10:13:57)                   Impression:      1. Nonspecific intraluminal fluid within normal caliber loops of colon, as may be seen in the setting of diarrheal illness.  2. Patchy airspace consolidation small nodules in the basilar right lower lobe, could reflect infectious or noninfectious inflammatory process.  For multiple solid nodules all <6 mm, Fleischner Society 2017 guidelines recommend no routine follow up for a low risk patient, or follow up with non-contrast chest CT at 12 months after discovery in a high risk patient.  3. Additional details of chronic and incidental findings, as provided in the body of report.      Electronically signed by: Emerson Kelley  Date:    11/02/2024  Time:    10:13               Narrative:    EXAMINATION:  CT ABDOMEN PELVIS WITHOUT CONTRAST    CLINICAL HISTORY:  Abdominal pain, acute, nonlocalized;abdominal pain;    TECHNIQUE:  Low dose axial images, sagittal and coronal reformations were obtained from the lung bases to the pubic symphysis.    COMPARISON:  CT of the abdomen and pelvis performed 03/13/2015.    FINDINGS:  This examination is limited due to lack of intravenous  contrast.    Lower chest: Atelectasis.  Patchy airspace consolidation and small nodules noted in the basilar right lower lobe.    Liver: Suggested hepatomegaly.  Hepatic steatosis.    Gallbladder and bile ducts: Unremarkable.    Pancreas: Normal contour.    Spleen: Normal contour.    Adrenals: Normal contour.    Kidneys: Suggested mild degree of cortical atrophy of both kidneys.  Nonspecific bilateral perinephric stranding.  Subcentimeter hypodense lesions in the kidneys are too small to definitely characterize.    Lymph nodes: No abdominal or pelvic lymphadenopathy.    Bowel and mesentery: Small hiatal hernia.  No evidence of bowel obstruction.  Nonspecific intraluminal fluid within normal caliber loops of colon, as may be seen in the setting of diarrheal illness.  Nonspecific fatty infiltration in the region of the ileocecal valve.  Appendix not confidently identified.  No definite focal pericecal inflammation.    Abdominal aorta: Nonaneurysmal.  Mild atherosclerotic calcification.    Inferior vena cava: Unremarkable.    Free fluid or free air: None.    Pelvis: Unremarkable.    Urinary bladder: Unremarkable.    Body wall: Remote operative sequela in the anterior abdominal wall.    Bones: Query osseous demineralization.  No definite acute findings.  Degenerative findings of the spine and hips.                                       X-Ray Chest 1 View (Final result)  Result time 11/02/24 08:34:51      Final result by Emerson Kelley MD (11/02/24 08:34:51)                   Impression:      Mildly prominent interstitial markings could relate to pulmonary vascular congestion/edema versus infectious or noninfectious inflammatory process.    Suspect left basilar atelectasis.      Electronically signed by: Emerson Kelley  Date:    11/02/2024  Time:    08:34               Narrative:    EXAMINATION:  XR CHEST 1 VIEW    CLINICAL HISTORY:  Fever, unspecified    TECHNIQUE:  Single frontal view of the chest was  performed.    COMPARISON:  Chest radiograph performed 08/14/2024, 09:20 hours.    FINDINGS:  Monitoring leads overlie the chest.  Grossly unchanged cardiomediastinal contours.  Mildly prominent interstitial markings.    No definite focal airspace consolidation.  Suspect left basilar atelectasis.    No definite pneumothorax or large volume pleural effusion.    No acute findings in the visualized abdomen.    Osseous and soft tissue structures appear without definite acute change.                                    Intake/Output - Last 3 Shifts         11/05 0700 11/06 0659 11/06 0700 11/07 0659    P.O.  160    I.V. (mL/kg) 3815.9 (39.7) 72.2 (0.8)    IV Piggyback 112.4     Total Intake(mL/kg) 3928.3 (40.9) 232.2 (2.4)    Net +3928.3 +232.2          Urine Occurrence 4 x     Stool Occurrence 5 x           Microbiology Results (last 7 days)       Procedure Component Value Units Date/Time    Blood culture #1 [[id number]] Collected: 11/02/24 0653    Order Status: Completed Specimen: Blood Updated: 11/06/24 0641     Culture, Blood No Growth At 72 Hours    Blood culture #2 [[id number]] Collected: 11/02/24 0715    Order Status: Completed Specimen: Blood from Peripheral, Antecubital, Right Updated: 11/06/24 0641     Culture, Blood No Growth At 72 Hours    Culture, Enteric Pathogen [[id number]]  (Abnormal)  (Susceptibility) Collected: 11/02/24 2114    Order Status: Completed Specimen: Stool Updated: 11/05/24 0930     Culture, Enteric Pathogens Salmonella species    C diff toxin/antigen with reflex to PCR [[id number]]  (Normal) Collected: 11/02/24 2114    Order Status: Completed Specimen: Stool Updated: 11/04/24 1328     Clostridium Difficile Toxin A/B Negative     Clostridium Difficile GDH Antigen Negative    Narrative:      Interpretive Information:    Absence of both GDH antigen and toxin is consistent with absence of a C difficile infection.  Presence of both GDH antigen and toxin is consistent with a C difficile infection  in a symptomatic patient. Detection of GDH and toxin in an asymptomatic patient is not specific for disease, as patients may be colonized with C difficile.  Presence of either GDH antigen or toxin coupled with presence of C difficile toxin B gene (ie, positive PCR test) is consistent with C difficile infection in a symptomatic patient.  Presence of GDH antigen coupled with absence of toxin and C difficile toxin B gene (ie, negative PCR test) is consistent with presence of a non-disease-causing strain of C difficile or another Clostridioides species.      Enteric Pathogen Panel [[id number]]  (Abnormal) Collected: 11/02/24 2114    Order Status: Completed Specimen: Stool Updated: 11/03/24 0028     Campylobacter Group LUIS Negative     Salmonella Species LUIS Positive     Shigella Species LUIS Negative     Vibrio Group LUIS Negative     Yersinia enterocolitica LUIS Negative     Shiga Toxin 1 LUIS Negative     Shiga Toxin 2 LUIS Negative     Norovirus GI/GII LUIS Negative     Rotavirus A LUIS Negative    Fecal leukocytes [[id number]] Collected: 11/02/24 2114    Order Status: Completed Specimen: Stool Updated: 11/02/24 2207     Fecal Leukocytes Positive    C Diff Toxin by PCR [[id number]] Collected: 11/02/24 2114    Order Status: Canceled Specimen: Stool Updated: 11/02/24 2120    Ova and Parasite Exam [[id number]]     Order Status: Canceled Specimen: Stool     Influenza A & B by Molecular [[id number]]  (Normal) Collected: 11/02/24 0658    Order Status: Completed Specimen: Nasopharyngeal Swab Updated: 11/02/24 0747     INFLUENZA A MOLECULAR Negative     INFLUENZA B MOLECULAR  Negative              Assessment/Plan:      * Salmonella enteritis    With severe illness treating with fluoroquinolones  Unable to take orally well so giving IV hydration and IV antibiotics  Discussed with , he is to throughout all open food had his house  Denies any recent travel, anyone else sick at home, no sick pets in his not been around  poultry  Has eaten out once at a restaurant recently    11/04 still with GI upset but patient feels it is slowing down and she feels some better, has not done well tolerating oral intake and with serum creatinine going up will restart IV fluids    ARF (acute renal failure)  GEOVANY is likely due to pre-renal azotemia due to dehydration. Baseline creatinine is  1.1 . Most recent creatinine and eGFR are listed below.  Recent Labs     11/05/24  0508 11/06/24  0716   CREATININE 5.86*  5.93* 4.12*   EGFRNORACEVR 7*  7* 11*        Plan  - GEOVANY is worsening. Will hydrate  - Avoid nephrotoxins and renally dose meds for GFR listed above  - Monitor urine output, serial BMP, and adjust therapy as needed  - nephrology asked to see    Vitamin D deficiency    Replacement and continue at home    Gastroenteritis    Still NV and diarrhea    Acute lactic acidosis    Continue with intravenous hydration  Checking blood cultures with possibility of septicemia  However metformin inpatients current situation of dehydration and GI upset could be associated with lactic acidosis    Dehydration    Although IV fluids on national shortage with patient's tachycardia and inability to take orally will leave on continue maintenance IV fluids for now.  Patient has had 2-1/2 L so far in the emergency room.        Fever  Check stool studies  Blood culture have been drawn  Cover with levofloxacin for possible infectious diarrhea    11/04 resolved      Nausea vomiting and diarrhea    Continue hydration, GI upset better, checking stool studies  Liquid diet for now    11/03 these symptoms persist and continue with hydration and IV antibiotics    History of right shoulder replacement    This back in August and was patient's last antibiotics not likely etiology but checking C diff    Acquired hypothyroidism  Continue Synthroid  Check T4 and TSH      Systolic hypertension  Patients blood pressure range in the last 24 hours was: BP  Min: 94/62  Max: 185/80.The  "patient's inpatient anti-hypertensive regimen is listed below:  Current Antihypertensives       Plan  - BP is uncontrolled, will adjust as follows:  Adjust meds as needed      Uncontrolled type 2 diabetes mellitus with hyperglycemia  Patient's FSGs are uncontrolled due to hyperglycemia on current medication regimen.  Last A1c reviewed-   Lab Results   Component Value Date    HGBA1C 10.6 (H) 08/28/2024     Most recent fingerstick glucose reviewed- No results for input(s): "POCTGLUCOSE" in the last 24 hours.  Current correctional scale  Medium  Increase anti-hyperglycemic dose as follows-   Antihyperglycemics (From admission, onward)      Start     Stop Route Frequency Ordered    11/02/24 1643  insulin aspart U-100 injection 0-10 Units         -- SubQ Before meals & nightly PRN 11/02/24 1546          Hold Oral hypoglycemics while patient is in the hospital.    Severe obesity (BMI 35.0-39.9) with comorbidity  Body mass index is 36.49 kg/m². Morbid obesity complicates all aspects of disease management from diagnostic modalities to treatment. Weight loss encouraged and health benefits explained to patient.         Rheumatoid arthritis involving multiple sites with positive rheumatoid factor    Hold patient's selective T-cell code stimulator modulator Orencia while having acute illness      VTE Risk Mitigation (From admission, onward)           Ordered     enoxaparin injection 30 mg  Daily         11/04/24 1740     IP VTE HIGH RISK PATIENT  Once         11/02/24 1546     Place sequential compression device  Until discontinued         11/02/24 1546                    Discharge Planning   NAEEM:      Code Status: Full Code   Is the patient medically ready for discharge?:     Reason for patient still in hospital (select all that apply): Treatment, Imaging, and Consult recommendations  Discharge Plan A: Home, Home with family                  Tigre Owusu MD  Department of Hospital Medicine   [facility]

## 2024-11-07 NOTE — SUBJECTIVE & OBJECTIVE
Interval History:  She denies SOB.  Diarrhea has improved but is still present    Review of patient's allergies indicates:   Allergen Reactions    Codeine phosphate      Other reaction(s): facial swelling    Codeine sulfate      Other reaction(s): Unknown     Current Facility-Administered Medications   Medication Frequency    0.45% NaCl with KCl 20 mEq infusion Continuous    acetaminophen suppository 650 mg Q6H PRN    acetaminophen tablet 650 mg Q8H PRN    dextrose 10% bolus 125 mL 125 mL PRN    dextrose 10% bolus 250 mL 250 mL PRN    enoxaparin injection 30 mg Daily    famotidine (PF) injection 20 mg Daily    glucagon (human recombinant) injection 1 mg PRN    glucose chewable tablet 16 g PRN    glucose chewable tablet 24 g PRN    insulin aspart U-100 injection 0-10 Units QID (AC + HS) PRN    levoFLOXacin 250 mg/50 mL IVPB 250 mg Q24H    levothyroxine tablet 112 mcg Daily    naloxone 0.4 mg/mL injection 0.02 mg PRN    ondansetron injection 4 mg Q6H PRN    promethazine injection 25 mg Q6H PRN    simethicone chewable tablet 80 mg TID PRN    sodium chloride 0.9% flush 10 mL Q12H PRN    vitamin D 1000 units tablet 1,000 Units Daily       Objective:     Vital Signs (Most Recent):  Temp: 97.6 °F (36.4 °C) (11/07/24 1648)  Pulse: (!) 121 (11/07/24 1648)  Resp: 17 (11/07/24 1648)  BP: (!) 149/82 (11/07/24 1648)  SpO2: 96 % (11/07/24 1648) Vital Signs (24h Range):  Temp:  [97.5 °F (36.4 °C)-98.2 °F (36.8 °C)] 97.6 °F (36.4 °C)  Pulse:  [] 121  Resp:  [16-17] 17  SpO2:  [96 %-99 %] 96 %  BP: (118-163)/(71-93) 149/82     Weight: 96.1 kg (211 lb 12.8 oz) (11/03/24 0500)  Body mass index is 37.52 kg/m².  Body surface area is 2.07 meters squared.    I/O last 3 completed shifts:  In: 6682.4 [P.O.:160; I.V.:6401.5; IV Piggyback:120.9]  Out: -      Physical Exam  Constitutional:       General: She is not in acute distress.  HENT:      Head: Normocephalic and atraumatic.   Eyes:      Pupils: Pupils are equal, round, and  reactive to light.   Cardiovascular:      Rate and Rhythm: Normal rate and regular rhythm.      Heart sounds: No murmur heard.     No friction rub. No gallop.   Pulmonary:      Effort: No respiratory distress.      Breath sounds: Normal breath sounds.   Abdominal:      General: Bowel sounds are normal.      Tenderness: There is no abdominal tenderness. There is no guarding.   Musculoskeletal:      Right lower leg: No edema.      Left lower leg: No edema.   Neurological:      Mental Status: She is alert and oriented to person, place, and time.   Psychiatric:         Behavior: Behavior normal.          Significant Labs:  BMP:   Recent Labs   Lab 11/07/24  0505   *   *   K 4.3   *   CO2 16*   BUN 40*   CREATININE 2.68*   CALCIUM 6.5*     CBC:   Recent Labs   Lab 11/05/24  0509   WBC 7.93   RBC 4.68   HGB 11.5*   HCT 36.3*      MCV 77.6*   MCH 24.6*   MCHC 31.7*        Significant Imaging:

## 2024-11-07 NOTE — SUBJECTIVE & OBJECTIVE
Interval History:  No nausea.  Diarrhea improving.    Review of patient's allergies indicates:   Allergen Reactions    Codeine phosphate      Other reaction(s): facial swelling    Codeine sulfate      Other reaction(s): Unknown     Current Facility-Administered Medications   Medication Frequency    0.45% NaCl with KCl 20 mEq infusion Continuous    acetaminophen suppository 650 mg Q6H PRN    acetaminophen tablet 650 mg Q8H PRN    dextrose 10% bolus 125 mL 125 mL PRN    dextrose 10% bolus 250 mL 250 mL PRN    enoxaparin injection 30 mg Daily    famotidine (PF) injection 20 mg Daily    glucagon (human recombinant) injection 1 mg PRN    glucose chewable tablet 16 g PRN    glucose chewable tablet 24 g PRN    insulin aspart U-100 injection 0-10 Units QID (AC + HS) PRN    levoFLOXacin 250 mg/50 mL IVPB 250 mg Q24H    levothyroxine tablet 112 mcg Daily    naloxone 0.4 mg/mL injection 0.02 mg PRN    ondansetron injection 4 mg Q6H PRN    promethazine injection 25 mg Q6H PRN    simethicone chewable tablet 80 mg TID PRN    sodium chloride 0.9% flush 10 mL Q12H PRN    vitamin D 1000 units tablet 1,000 Units Daily       Objective:     Vital Signs (Most Recent):  Temp: 97.9 °F (36.6 °C) (11/06/24 2025)  Pulse: 87 (11/06/24 2025)  Resp: 16 (11/06/24 1907)  BP: 118/71 (11/06/24 2025)  SpO2: 96 % (11/06/24 2025) Vital Signs (24h Range):  Temp:  [97.8 °F (36.6 °C)-99 °F (37.2 °C)] 97.9 °F (36.6 °C)  Pulse:  [71-87] 87  Resp:  [14-16] 16  SpO2:  [94 %-99 %] 96 %  BP: (109-136)/(69-80) 118/71     Weight: 96.1 kg (211 lb 12.8 oz) (11/03/24 0500)  Body mass index is 37.52 kg/m².  Body surface area is 2.07 meters squared.    I/O last 3 completed shifts:  In: 4159.4 [P.O.:160; I.V.:3887.1; IV Piggyback:112.4]  Out: -      Physical Exam  Constitutional:       General: She is not in acute distress.  HENT:      Head: Normocephalic and atraumatic.   Eyes:      Pupils: Pupils are equal, round, and reactive to light.   Cardiovascular:      Rate  and Rhythm: Normal rate and regular rhythm.      Heart sounds: No murmur heard.     No friction rub. No gallop.   Pulmonary:      Effort: No respiratory distress.      Breath sounds: Normal breath sounds.   Abdominal:      General: Bowel sounds are normal.      Tenderness: There is no abdominal tenderness. There is no guarding.   Musculoskeletal:      Right lower leg: No edema.      Left lower leg: No edema.   Neurological:      Mental Status: She is alert and oriented to person, place, and time.   Psychiatric:         Behavior: Behavior normal.          Significant Labs:  BMP:   Recent Labs   Lab 11/06/24  0716   *      K 2.8*   *   CO2 18*   BUN 54*   CREATININE 4.12*   CALCIUM 6.8*     CBC:   Recent Labs   Lab 11/05/24  0509   WBC 7.93   RBC 4.68   HGB 11.5*   HCT 36.3*      MCV 77.6*   MCH 24.6*   MCHC 31.7*        Significant Imaging:

## 2024-11-07 NOTE — ASSESSMENT & PLAN NOTE
GEOVANY is likely due to pre-renal azotemia due to dehydration. Baseline creatinine is  1.1 . Most recent creatinine and eGFR are listed below.  Recent Labs     11/05/24  0508 11/06/24  0716   CREATININE 5.86*  5.93* 4.12*   EGFRNORACEVR 7*  7* 11*        Plan  - GEOVANY is worsening. Will hydrate  - Avoid nephrotoxins and renally dose meds for GFR listed above  - Monitor urine output, serial BMP, and adjust therapy as needed  - nephrology asked to see

## 2024-11-07 NOTE — ASSESSMENT & PLAN NOTE
Creatinine 1.0 in August 20, 2024.  Creatinine 1.39 on admission.    Renal function is improving since IV fluid started.  Continue same.

## 2024-11-08 PROBLEM — A02.0 SALMONELLA ENTERITIS: Status: ACTIVE | Noted: 2024-11-02

## 2024-11-08 LAB
ANION GAP SERPL CALCULATED.3IONS-SCNC: 11 MMOL/L (ref 7–16)
BACTERIA BLD CULT: NORMAL
BUN SERPL-MCNC: 32 MG/DL (ref 7–18)
BUN/CREAT SERPL: 15 (ref 6–20)
CALCIUM SERPL-MCNC: 6.9 MG/DL (ref 8.5–10.1)
CHLORIDE SERPL-SCNC: 111 MMOL/L (ref 98–107)
CO2 SERPL-SCNC: 16 MMOL/L (ref 21–32)
CREAT SERPL-MCNC: 2.18 MG/DL (ref 0.55–1.02)
EGFR (NO RACE VARIABLE) (RUSH/TITUS): 24 ML/MIN/1.73M2
GLUCOSE SERPL-MCNC: 108 MG/DL (ref 70–105)
GLUCOSE SERPL-MCNC: 109 MG/DL (ref 70–105)
GLUCOSE SERPL-MCNC: 121 MG/DL (ref 74–106)
GLUCOSE SERPL-MCNC: 134 MG/DL (ref 70–105)
GLUCOSE SERPL-MCNC: 95 MG/DL (ref 70–105)
POTASSIUM SERPL-SCNC: 3.2 MMOL/L (ref 3.5–5.1)
SODIUM SERPL-SCNC: 135 MMOL/L (ref 136–145)

## 2024-11-08 PROCEDURE — 99233 SBSQ HOSP IP/OBS HIGH 50: CPT | Mod: ,,, | Performed by: FAMILY MEDICINE

## 2024-11-08 PROCEDURE — 25000003 PHARM REV CODE 250: Performed by: HOSPITALIST

## 2024-11-08 PROCEDURE — 82962 GLUCOSE BLOOD TEST: CPT

## 2024-11-08 PROCEDURE — 63600175 PHARM REV CODE 636 W HCPCS: Performed by: HOSPITALIST

## 2024-11-08 PROCEDURE — 11000001 HC ACUTE MED/SURG PRIVATE ROOM

## 2024-11-08 PROCEDURE — 25000003 PHARM REV CODE 250: Performed by: INTERNAL MEDICINE

## 2024-11-08 PROCEDURE — 80048 BASIC METABOLIC PNL TOTAL CA: CPT | Performed by: INTERNAL MEDICINE

## 2024-11-08 PROCEDURE — 94761 N-INVAS EAR/PLS OXIMETRY MLT: CPT

## 2024-11-08 PROCEDURE — 36415 COLL VENOUS BLD VENIPUNCTURE: CPT | Performed by: INTERNAL MEDICINE

## 2024-11-08 RX ORDER — CALCIUM GLUCONATE 20 MG/ML
1 INJECTION, SOLUTION INTRAVENOUS ONCE
Status: COMPLETED | OUTPATIENT
Start: 2024-11-08 | End: 2024-11-08

## 2024-11-08 RX ADMIN — ACETAMINOPHEN 650 MG: 325 TABLET ORAL at 08:11

## 2024-11-08 RX ADMIN — ENOXAPARIN SODIUM 30 MG: 30 INJECTION SUBCUTANEOUS at 04:11

## 2024-11-08 RX ADMIN — ONDANSETRON 4 MG: 2 INJECTION INTRAMUSCULAR; INTRAVENOUS at 08:11

## 2024-11-08 RX ADMIN — CALCIUM GLUCONATE 1 G: 20 INJECTION, SOLUTION INTRAVENOUS at 06:11

## 2024-11-08 RX ADMIN — LEVOFLOXACIN 250 MG: 250 INJECTION, SOLUTION INTRAVENOUS at 04:11

## 2024-11-08 RX ADMIN — LEVOTHYROXINE SODIUM 112 MCG: 0.11 TABLET ORAL at 05:11

## 2024-11-08 RX ADMIN — SODIUM CHLORIDE AND POTASSIUM CHLORIDE: 150; 450 INJECTION, SOLUTION INTRAVENOUS at 05:11

## 2024-11-08 RX ADMIN — SODIUM CHLORIDE AND POTASSIUM CHLORIDE: 150; 450 INJECTION, SOLUTION INTRAVENOUS at 04:11

## 2024-11-08 RX ADMIN — FAMOTIDINE 20 MG: 10 INJECTION, SOLUTION INTRAVENOUS at 08:11

## 2024-11-08 RX ADMIN — Medication 1000 UNITS: at 08:11

## 2024-11-08 RX ADMIN — POTASSIUM BICARBONATE 20 MEQ: 782 TABLET, EFFERVESCENT ORAL at 06:11

## 2024-11-08 NOTE — ASSESSMENT & PLAN NOTE
GEOVANY is likely due to pre-renal azotemia due to dehydration. Baseline creatinine is  1.1 . Most recent creatinine and eGFR are listed below.  Recent Labs     11/05/24  0508 11/06/24  0716 11/07/24  0505   CREATININE 5.86*  5.93* 4.12* 2.68*   EGFRNORACEVR 7*  7* 11* 18*        Plan  - GEOVANY is worsening. Will hydrate  - Avoid nephrotoxins and renally dose meds for GFR listed above  - Monitor urine output, serial BMP, and adjust therapy as needed  - nephrology asked to see

## 2024-11-08 NOTE — PROGRESS NOTES
[facility]  Nephrology  Progress Note    Patient Name: Meghna Dalal  MRN: [id number]  Admission Date: 11/2/2024  Hospital Length of Stay: 5 days  Attending Provider: Tigre Owusu MD   Primary Care Physician: Kwame Wyatt MD  Principal Problem:Salmonella enteritis    Subjective:     HPI: 74-year-old woman admitted with nausea vomiting and diarrhea.  Stool culture positive for salmonella.  She has developed acute renal failure since admission.  No history of prior renal insufficiency.    Interval History:  She denies SOB.  Diarrhea has improved but is still present    Review of patient's allergies indicates:   Allergen Reactions    Codeine phosphate      Other reaction(s): facial swelling    Codeine sulfate      Other reaction(s): Unknown     Current Facility-Administered Medications   Medication Frequency    0.45% NaCl with KCl 20 mEq infusion Continuous    acetaminophen suppository 650 mg Q6H PRN    acetaminophen tablet 650 mg Q8H PRN    dextrose 10% bolus 125 mL 125 mL PRN    dextrose 10% bolus 250 mL 250 mL PRN    enoxaparin injection 30 mg Daily    famotidine (PF) injection 20 mg Daily    glucagon (human recombinant) injection 1 mg PRN    glucose chewable tablet 16 g PRN    glucose chewable tablet 24 g PRN    insulin aspart U-100 injection 0-10 Units QID (AC + HS) PRN    levoFLOXacin 250 mg/50 mL IVPB 250 mg Q24H    levothyroxine tablet 112 mcg Daily    naloxone 0.4 mg/mL injection 0.02 mg PRN    ondansetron injection 4 mg Q6H PRN    promethazine injection 25 mg Q6H PRN    simethicone chewable tablet 80 mg TID PRN    sodium chloride 0.9% flush 10 mL Q12H PRN    vitamin D 1000 units tablet 1,000 Units Daily       Objective:     Vital Signs (Most Recent):  Temp: 97.6 °F (36.4 °C) (11/07/24 1648)  Pulse: (!) 121 (11/07/24 1648)  Resp: 17 (11/07/24 1648)  BP: (!) 149/82 (11/07/24 1648)  SpO2: 96 % (11/07/24 1648) Vital Signs (24h Range):  Temp:  [97.5 °F (36.4 °C)-98.2 °F (36.8  °C)] 97.6 °F (36.4 °C)  Pulse:  [] 121  Resp:  [16-17] 17  SpO2:  [96 %-99 %] 96 %  BP: (118-163)/(71-93) 149/82     Weight: 96.1 kg (211 lb 12.8 oz) (11/03/24 0500)  Body mass index is 37.52 kg/m².  Body surface area is 2.07 meters squared.    I/O last 3 completed shifts:  In: 6682.4 [P.O.:160; I.V.:6401.5; IV Piggyback:120.9]  Out: -      Physical Exam  Constitutional:       General: She is not in acute distress.  HENT:      Head: Normocephalic and atraumatic.   Eyes:      Pupils: Pupils are equal, round, and reactive to light.   Cardiovascular:      Rate and Rhythm: Normal rate and regular rhythm.      Heart sounds: No murmur heard.     No friction rub. No gallop.   Pulmonary:      Effort: No respiratory distress.      Breath sounds: Normal breath sounds.   Abdominal:      General: Bowel sounds are normal.      Tenderness: There is no abdominal tenderness. There is no guarding.   Musculoskeletal:      Right lower leg: No edema.      Left lower leg: No edema.   Neurological:      Mental Status: She is alert and oriented to person, place, and time.   Psychiatric:         Behavior: Behavior normal.          Significant Labs:  BMP:   Recent Labs   Lab 11/07/24  0505   *   *   K 4.3   *   CO2 16*   BUN 40*   CREATININE 2.68*   CALCIUM 6.5*     CBC:   Recent Labs   Lab 11/05/24  0509   WBC 7.93   RBC 4.68   HGB 11.5*   HCT 36.3*      MCV 77.6*   MCH 24.6*   MCHC 31.7*        Significant Imaging:    Assessment/Plan:     Cardiac/Vascular  Systolic hypertension  Controlled    Renal/  ARF (acute renal failure)  Creatinine 1.0 in August 20, 2024.  Creatinine 1.39 on admission.    Renal function is improving since IV fluid started.  Continue same.    ID  * Salmonella enteritis  Continue Levaquin    Immunology/Multi System  Rheumatoid arthritis involving multiple sites with positive rheumatoid factor  Underlying disease.  Immunocompromised as a result    Endocrine  Uncontrolled type 2  diabetes mellitus with hyperglycemia  Now controlled        Thank you for your consult.     Bernard Conde MD  Nephrology  [facility]

## 2024-11-08 NOTE — SUBJECTIVE & OBJECTIVE
"Interval History: Eating a little, would like to try advancing diet.     Review of Systems  Objective:     Vital Signs (Most Recent):  Temp: 98.2 °F (36.8 °C) (11/08/24 1655)  Pulse: 79 (11/08/24 1724)  Resp: 18 (11/08/24 1655)  BP: (!) 147/81 (11/08/24 1655)  SpO2: 98 % (11/08/24 1655) Vital Signs (24h Range):  Temp:  [97.8 °F (36.6 °C)-98.5 °F (36.9 °C)] 98.2 °F (36.8 °C)  Pulse:  [70-86] 79  Resp:  [16-18] 18  SpO2:  [96 %-100 %] 98 %  BP: (125-149)/(68-81) 147/81     Weight: 96.1 kg (211 lb 12.8 oz)  Body mass index is 37.52 kg/m².  No intake or output data in the 24 hours ending 11/08/24 1749      Physical Exam  Vitals reviewed.   Constitutional:       General: She is not in acute distress.     Appearance: She is not toxic-appearing.   Cardiovascular:      Rate and Rhythm: Normal rate and regular rhythm.      Heart sounds: Normal heart sounds.   Pulmonary:      Effort: Pulmonary effort is normal. No respiratory distress.      Breath sounds: Normal breath sounds. No wheezing.   Abdominal:      General: Abdomen is flat. There is no distension.      Palpations: Abdomen is soft.      Tenderness: There is no abdominal tenderness.   Musculoskeletal:      Right lower leg: No edema.      Left lower leg: No edema.   Skin:     General: Skin is warm and dry.      Coloration: Skin is not jaundiced.      Findings: No bruising.   Neurological:      General: No focal deficit present.      Mental Status: She is alert and oriented to person, place, and time.             Significant Labs: All pertinent labs within the past 24 hours have been reviewed.  BMP:   Recent Labs   Lab 11/08/24  0344   *   *   K 3.2*   *   CO2 16*   BUN 32*   CREATININE 2.18*   CALCIUM 6.9*     CBC: No results for input(s): "WBC", "HGB", "HCT", "PLT" in the last 48 hours.    Significant Imaging: I have reviewed all pertinent imaging results/findings within the past 24 hours.  "

## 2024-11-08 NOTE — SUBJECTIVE & OBJECTIVE
Past Medical History:   Diagnosis Date    Arthritis     Diabetes mellitus, type 2     GERD (gastroesophageal reflux disease)     Hypertension     Iron deficiency anemia     Obesity     Personal history of colonic polyps 08/19/2019    Thyroid disease        Past Surgical History:   Procedure Laterality Date    ARTHROSCOPIC DEBRIDEMENT OF SHOULDER Left 12/21/2021    Procedure: DEBRIDEMENT, SHOULDER, ARTHROSCOPIC;  Surgeon: Jose Antonio Pablo MD;  Location: [facility] OR;  Service: Orthopedics;  Laterality: Left;  SHOULDER JOINT    ARTHROSCOPIC REMOVAL OF LOOSE BODY FROM JOINT Left 12/21/2021    Procedure: REMOVAL, LOOSE BODY, JOINT, ARTHROSCOPIC;  Surgeon: Jose Antonio Pablo MD;  Location: [facility] OR;  Service: Orthopedics;  Laterality: Left;    COLONOSCOPY W/ POLYPECTOMY  08/19/2019    DECOMPRESSION OF SUBACROMIAL SPACE Left 12/21/2021    Procedure: DECOMPRESSION, SUBACROMIAL SPACE;  Surgeon: Jose Antonio Pablo MD;  Location: [facility] OR;  Service: Orthopedics;  Laterality: Left;    DISTAL CLAVICLE EXCISION Left 12/21/2021    Procedure: EXCISION, CLAVICLE, DISTAL;  Surgeon: Jose Antonio Pablo MD;  Location: [facility] OR;  Service: Orthopedics;  Laterality: Left;    left total knee replacement      PARTIAL HYSTERECTOMY      REVERSE TOTAL SHOULDER ARTHROPLASTY Right 8/27/2024    Procedure: ARTHROPLASTY, SHOULDER, TOTAL, REVERSE;  Surgeon: Jose Antonio Pablo MD;  Location: [facility] OR;  Service: Orthopedics;  Laterality: Right;    ROTATOR CUFF REPAIR Left 12/21/2021    Procedure: REPAIR, ROTATOR CUFF;  Surgeon: Jose Antonio Pablo MD;  Location: [facility] OR;  Service: Orthopedics;  Laterality: Left;    SHOULDER ARTHROSCOPY Left 12/21/2021    Procedure: ARTHROSCOPY, SHOULDER;  Surgeon: Jose Antonio Pablo MD;  Location: [facility] OR;  Service: Orthopedics;  Laterality: Left;    SHOULDER SURGERY Left 12/21/2021    THYROID SURGERY      total thyroidectomy    TONSILLECTOMY AND ADENOIDECTOMY          Review of patient's allergies indicates:   Allergen Reactions    Codeine phosphate      Other reaction(s): facial swelling    Codeine sulfate      Other reaction(s): Unknown       No current facility-administered medications on file prior to encounter.     Current Outpatient Medications on File Prior to Encounter   Medication Sig    aspirin 81 mg Cap Take 81 mg by mouth once daily.    cyclobenzaprine (FLEXERIL) 10 MG tablet Take 1 tablet (10 mg total) by mouth 3 (three) times daily as needed for Muscle spasms.    furosemide (LASIX) 20 MG tablet Take 1 tablet (20 mg total) by mouth once daily.    gabapentin (NEURONTIN) 100 MG capsule Take 1 capsule (100 mg total) by mouth 3 (three) times daily.    levothyroxine (SYNTHROID) 125 MCG tablet TAKE 1 TABLET BY MOUTH EVERY DAY    losartan-hydrochlorothiazide 100-12.5 mg (HYZAAR) 100-12.5 mg Tab TAKE 1 TABLET BY MOUTH EVERY DAY    metFORMIN (GLUCOPHAGE) 500 MG tablet TAKE 1 TABLET BY MOUTH THREE TIMES A DAY    omeprazole (PRILOSEC) 40 MG capsule TAKE 1 CAPSULE BY MOUTH ONCE  DAILY    potassium chloride (MICRO-K) 8 mEq CpSR TAKE 1 CAPSULE BY MOUTH ONCE DAILY.    predniSONE (DELTASONE) 5 MG tablet Take 5 mg by mouth once daily.    [DISCONTINUED] losartan (COZAAR) 50 MG tablet TAKE 1 TABLET BY MOUTH EVERY DAY     Family History       Problem Relation (Age of Onset)    Alzheimer's disease Mother    Heart failure Father, Brother          Tobacco Use    Smoking status: Never    Smokeless tobacco: Never   Substance and Sexual Activity    Alcohol use: Never    Drug use: Never    Sexual activity: Yes     Review of Systems   Constitutional:  Positive for appetite change and fatigue.   HENT:  Negative for congestion, hearing loss and trouble swallowing.    Respiratory:  Negative for chest tightness, shortness of breath and wheezing.    Cardiovascular:  Negative for chest pain and palpitations.   Gastrointestinal:  Positive for diarrhea, nausea and vomiting. Negative for abdominal  pain and constipation.   Genitourinary:  Negative for difficulty urinating and dysuria.   Musculoskeletal:  Negative for back pain and neck stiffness.   Skin:  Negative for pallor and rash.   Neurological:  Negative for speech difficulty and headaches.   Psychiatric/Behavioral:  Negative for confusion and suicidal ideas.      Objective:     Vital Signs (Most Recent):  Temp: 98.3 °F (36.8 °C) (11/07/24 1915)  Pulse: 78 (11/07/24 1915)  Resp: 16 (11/07/24 1905)  BP: 126/72 (11/07/24 1915)  SpO2: 99 % (11/07/24 1915) Vital Signs (24h Range):  Temp:  [97.5 °F (36.4 °C)-98.3 °F (36.8 °C)] 98.3 °F (36.8 °C)  Pulse:  [] 78  Resp:  [16-17] 16  SpO2:  [96 %-99 %] 99 %  BP: (123-163)/(72-93) 126/72     Weight: 96.1 kg (211 lb 12.8 oz)  Body mass index is 37.52 kg/m².     Physical Exam  Vitals reviewed.   Constitutional:       General: She is awake. She is not in acute distress.     Appearance: She is well-developed. She is obese. She is not toxic-appearing.   HENT:      Head: Normocephalic.      Nose: Nose normal.      Mouth/Throat:      Pharynx: Oropharynx is clear.   Eyes:      Extraocular Movements: Extraocular movements intact.      Pupils: Pupils are equal, round, and reactive to light.   Neck:      Thyroid: No thyroid mass.      Vascular: No carotid bruit.   Cardiovascular:      Rate and Rhythm: Regular rhythm. Tachycardia present.      Pulses: Normal pulses.      Heart sounds: Normal heart sounds. No murmur heard.  Pulmonary:      Effort: Pulmonary effort is normal.      Breath sounds: Normal breath sounds and air entry. No wheezing.   Abdominal:      General: Bowel sounds are increased. There is no distension.      Palpations: Abdomen is soft.      Tenderness: There is no abdominal tenderness.   Musculoskeletal:         General: Normal range of motion.      Cervical back: Neck supple. No rigidity.   Skin:     General: Skin is warm.      Coloration: Skin is not jaundiced.      Findings: No lesion.   Neurological:  "     General: No focal deficit present.      Mental Status: She is alert and oriented to person, place, and time.      Cranial Nerves: No cranial nerve deficit.   Psychiatric:         Attention and Perception: Attention normal.         Mood and Affect: Mood normal.         Behavior: Behavior normal. Behavior is cooperative.         Thought Content: Thought content normal.         Cognition and Memory: Cognition normal.              CRANIAL NERVES     CN III, IV, VI   Pupils are equal, round, and reactive to light.       Significant Labs: All pertinent labs within the past 24 hours have been reviewed.  BMP:   Recent Labs   Lab 11/07/24  0505   *   *   K 4.3   *   CO2 16*   BUN 40*   CREATININE 2.68*   CALCIUM 6.5*     CBC:   No results for input(s): "WBC", "HGB", "HCT", "PLT" in the last 48 hours.    CMP:   Recent Labs   Lab 11/06/24  0716 11/07/24  0505    132*   K 2.8* 4.3   * 111*   CO2 18* 16*   * 122*   BUN 54* 40*   CREATININE 4.12* 2.68*   CALCIUM 6.8* 6.5*   ANIONGAP 11 9       Significant Imaging: I have reviewed all pertinent imaging results/findings within the past 24 hours.    Imaging Results              CT Abdomen Pelvis  Without Contrast (Final result)  Result time 11/02/24 10:13:57      Final result by Emerson Kelley MD (11/02/24 10:13:57)                   Impression:      1. Nonspecific intraluminal fluid within normal caliber loops of colon, as may be seen in the setting of diarrheal illness.  2. Patchy airspace consolidation small nodules in the basilar right lower lobe, could reflect infectious or noninfectious inflammatory process.  For multiple solid nodules all <6 mm, Fleischner Society 2017 guidelines recommend no routine follow up for a low risk patient, or follow up with non-contrast chest CT at 12 months after discovery in a high risk patient.  3. Additional details of chronic and incidental findings, as provided in the body of " report.      Electronically signed by: Emerson Allie  Date:    11/02/2024  Time:    10:13               Narrative:    EXAMINATION:  CT ABDOMEN PELVIS WITHOUT CONTRAST    CLINICAL HISTORY:  Abdominal pain, acute, nonlocalized;abdominal pain;    TECHNIQUE:  Low dose axial images, sagittal and coronal reformations were obtained from the lung bases to the pubic symphysis.    COMPARISON:  CT of the abdomen and pelvis performed 03/13/2015.    FINDINGS:  This examination is limited due to lack of intravenous contrast.    Lower chest: Atelectasis.  Patchy airspace consolidation and small nodules noted in the basilar right lower lobe.    Liver: Suggested hepatomegaly.  Hepatic steatosis.    Gallbladder and bile ducts: Unremarkable.    Pancreas: Normal contour.    Spleen: Normal contour.    Adrenals: Normal contour.    Kidneys: Suggested mild degree of cortical atrophy of both kidneys.  Nonspecific bilateral perinephric stranding.  Subcentimeter hypodense lesions in the kidneys are too small to definitely characterize.    Lymph nodes: No abdominal or pelvic lymphadenopathy.    Bowel and mesentery: Small hiatal hernia.  No evidence of bowel obstruction.  Nonspecific intraluminal fluid within normal caliber loops of colon, as may be seen in the setting of diarrheal illness.  Nonspecific fatty infiltration in the region of the ileocecal valve.  Appendix not confidently identified.  No definite focal pericecal inflammation.    Abdominal aorta: Nonaneurysmal.  Mild atherosclerotic calcification.    Inferior vena cava: Unremarkable.    Free fluid or free air: None.    Pelvis: Unremarkable.    Urinary bladder: Unremarkable.    Body wall: Remote operative sequela in the anterior abdominal wall.    Bones: Query osseous demineralization.  No definite acute findings.  Degenerative findings of the spine and hips.                                       X-Ray Chest 1 View (Final result)  Result time 11/02/24 08:34:51      Final result by  Emerson Kelley MD (11/02/24 08:34:51)                   Impression:      Mildly prominent interstitial markings could relate to pulmonary vascular congestion/edema versus infectious or noninfectious inflammatory process.    Suspect left basilar atelectasis.      Electronically signed by: Emerson Kelley  Date:    11/02/2024  Time:    08:34               Narrative:    EXAMINATION:  XR CHEST 1 VIEW    CLINICAL HISTORY:  Fever, unspecified    TECHNIQUE:  Single frontal view of the chest was performed.    COMPARISON:  Chest radiograph performed 08/14/2024, 09:20 hours.    FINDINGS:  Monitoring leads overlie the chest.  Grossly unchanged cardiomediastinal contours.  Mildly prominent interstitial markings.    No definite focal airspace consolidation.  Suspect left basilar atelectasis.    No definite pneumothorax or large volume pleural effusion.    No acute findings in the visualized abdomen.    Osseous and soft tissue structures appear without definite acute change.                                    Intake/Output - Last 3 Shifts         11/06 0700 11/07 0659 11/07 0700 11/08 0659    P.O. 160     I.V. (mL/kg) 2586.6 (26.9)     IV Piggyback 8.5     Total Intake(mL/kg) 2755.2 (28.7)     Net +2755.2           Urine Occurrence 3 x     Stool Occurrence 2 x           Microbiology Results (last 7 days)       Procedure Component Value Units Date/Time    Blood culture #2 [[id number]] Collected: 11/02/24 0715    Order Status: Completed Specimen: Blood from Peripheral, Antecubital, Right Updated: 11/07/24 0659     Culture, Blood No Growth At 72 Hours    Blood culture #1 [[id number]] Collected: 11/02/24 0653    Order Status: Completed Specimen: Blood Updated: 11/07/24 0658     Culture, Blood No Growth at 5 days    Culture, Enteric Pathogen [[id number]]  (Abnormal)  (Susceptibility) Collected: 11/02/24 2114    Order Status: Completed Specimen: Stool Updated: 11/05/24 0930     Culture, Enteric Pathogens Salmonella species    C  diff toxin/antigen with reflex to PCR [[id number]]  (Normal) Collected: 11/02/24 2114    Order Status: Completed Specimen: Stool Updated: 11/04/24 1328     Clostridium Difficile Toxin A/B Negative     Clostridium Difficile GDH Antigen Negative    Narrative:      Interpretive Information:    Absence of both GDH antigen and toxin is consistent with absence of a C difficile infection.  Presence of both GDH antigen and toxin is consistent with a C difficile infection in a symptomatic patient. Detection of GDH and toxin in an asymptomatic patient is not specific for disease, as patients may be colonized with C difficile.  Presence of either GDH antigen or toxin coupled with presence of C difficile toxin B gene (ie, positive PCR test) is consistent with C difficile infection in a symptomatic patient.  Presence of GDH antigen coupled with absence of toxin and C difficile toxin B gene (ie, negative PCR test) is consistent with presence of a non-disease-causing strain of C difficile or another Clostridioides species.      Enteric Pathogen Panel [[id number]]  (Abnormal) Collected: 11/02/24 2114    Order Status: Completed Specimen: Stool Updated: 11/03/24 0028     Campylobacter Group LUIS Negative     Salmonella Species LUIS Positive     Shigella Species LUIS Negative     Vibrio Group LUIS Negative     Yersinia enterocolitica LUIS Negative     Shiga Toxin 1 LUIS Negative     Shiga Toxin 2 LUIS Negative     Norovirus GI/GII LUIS Negative     Rotavirus A LUIS Negative    Fecal leukocytes [[id number]] Collected: 11/02/24 2114    Order Status: Completed Specimen: Stool Updated: 11/02/24 2207     Fecal Leukocytes Positive    C Diff Toxin by PCR [[id number]] Collected: 11/02/24 2114    Order Status: Canceled Specimen: Stool Updated: 11/02/24 2120    Ova and Parasite Exam [[id number]]     Order Status: Canceled Specimen: Stool     Influenza A & B by Molecular [[id number]]  (Normal) Collected: 11/02/24 0658    Order Status: Completed Specimen:  Nasopharyngeal Swab Updated: 11/02/24 0747     INFLUENZA A MOLECULAR Negative     INFLUENZA B MOLECULAR  Negative

## 2024-11-08 NOTE — PROGRESS NOTES
[facility]  [facility] Medicine  Progress Note    Patient Name: Meghna Dalal  MRN: [id number]  Patient Class: IP- Inpatient   Admission Date: 11/2/2024  Length of Stay: 5 days  Attending Physician: Tigre Owusu MD  Primary Care Provider: Kwame Wyatt MD        Subjective:     Principal Problem:Salmonella enteritis        HPI:    Chief complaint:  Weakness with recent GI upset    History of present illness:          Ms. Dalal is a 74-year-old presented to the emergency room after have onset prior day of nausea vomiting and diarrhea.  This was associated with fever and chills.  Patient denies similar problem in the past.  Denies any unusual exposure.  Has not been out of area her eaten any unusual food that others have not also shared.  Did have shoulder surgery by Dr. Pablo 08/27/24 but no antibiotics since.  History of rheumatoid arthritis and on Orencia.  When seen in the emergency room was noted to be tachycardic, febrile, appeared ill and have elevated lactic acid.  Patient does take metformin for diabetes.  Continued here in the emergency room with nausea vomiting and liquidy nonbloody diarrhea.  Hospitalist service was asked to see patient for evaluation of admission'    Review of systems:  See above.  No recent respiratory complaints.  Denies pain.  Generally relatively active and able to ambulate without issues.  Has been undergoing therapy following above-mentioned shoulder surgery.  No history of being significant snore or sleep disturbance but does take frequent naps in the daytime.  Review of systems otherwise negative.     Past medical history:  -hypertension proximally 10 years  -diabetes mellitus proximally 10 years  -rheumatoid arthritis on selective T-cell co stimulation modulators (Orencia).   -hypothyroidism    Past surgical history:  Recent shoulder surgery as above mentioned by Dr. Pablo on August 27  Lumbar back surgery  Knee  replacement  Hysterectomy with 1 ovary removed    Allergy to codeine    Social history:  Lives with   No history of tobacco alcohol    Family history:  Son with myocardial infarction in his 50s    Health maintenance issues:  Primary care providers Dr. Wyatt  Has not had flu shot this year  Had prior Pneumovax  Daughter states patient has had COVID infection 2-3 times the last of which proximally 1 year earlier  Patient has had COVID vaccination x2 at least 1 booster  No history of recent mammogram  No recent Pap smear  Colonoscopies apparently about 5 years earlier told unremarkable          Overview/Hospital Course:  11/03 feels some better.  Still with nausea vomiting not tolerating liquids by mouth.  Some diarrhea.  Studies returned showing salmonella as etiology.   in room and states no one else sick.  Told him to throw out all open food at their house.  Continue with hydration and antibiotics.  Monitor  11/04 still with nausea vomiting diarrhea not able to hold down fluids well, with IV fluids out renal function slight worse.  Start IV fluids back.  Generally though patient states feeling some better.  Daughter in room  11/05 feels better but still NV and loose stools. Worse renal function. UOP but less. Asked Dr Conde to evaluate.  11/06 better but still not able to take much in orally. Continue hydration. Renal function better.  11/07 Still diarrhea and nausea. Still taking in poorly. Renal function better. Continue IVF for now.    Past Medical History:   Diagnosis Date    Arthritis     Diabetes mellitus, type 2     GERD (gastroesophageal reflux disease)     Hypertension     Iron deficiency anemia     Obesity     Personal history of colonic polyps 08/19/2019    Thyroid disease        Past Surgical History:   Procedure Laterality Date    ARTHROSCOPIC DEBRIDEMENT OF SHOULDER Left 12/21/2021    Procedure: DEBRIDEMENT, SHOULDER, ARTHROSCOPIC;  Surgeon: Jose Antonio Pablo MD;  Location: [facility]  OR;  Service: Orthopedics;  Laterality: Left;  SHOULDER JOINT    ARTHROSCOPIC REMOVAL OF LOOSE BODY FROM JOINT Left 12/21/2021    Procedure: REMOVAL, LOOSE BODY, JOINT, ARTHROSCOPIC;  Surgeon: Jose Antonio Pablo MD;  Location: [facility] OR;  Service: Orthopedics;  Laterality: Left;    COLONOSCOPY W/ POLYPECTOMY  08/19/2019    DECOMPRESSION OF SUBACROMIAL SPACE Left 12/21/2021    Procedure: DECOMPRESSION, SUBACROMIAL SPACE;  Surgeon: Jose Antonio Pablo MD;  Location: [facility] OR;  Service: Orthopedics;  Laterality: Left;    DISTAL CLAVICLE EXCISION Left 12/21/2021    Procedure: EXCISION, CLAVICLE, DISTAL;  Surgeon: Jose Antonio Pablo MD;  Location: [facility] OR;  Service: Orthopedics;  Laterality: Left;    left total knee replacement      PARTIAL HYSTERECTOMY      REVERSE TOTAL SHOULDER ARTHROPLASTY Right 8/27/2024    Procedure: ARTHROPLASTY, SHOULDER, TOTAL, REVERSE;  Surgeon: Jose Antonio Pablo MD;  Location: [facility] OR;  Service: Orthopedics;  Laterality: Right;    ROTATOR CUFF REPAIR Left 12/21/2021    Procedure: REPAIR, ROTATOR CUFF;  Surgeon: Jose Antonio Pablo MD;  Location: [facility] OR;  Service: Orthopedics;  Laterality: Left;    SHOULDER ARTHROSCOPY Left 12/21/2021    Procedure: ARTHROSCOPY, SHOULDER;  Surgeon: Jose Antonio Pablo MD;  Location: [facility] OR;  Service: Orthopedics;  Laterality: Left;    SHOULDER SURGERY Left 12/21/2021    THYROID SURGERY      total thyroidectomy    TONSILLECTOMY AND ADENOIDECTOMY         Review of patient's allergies indicates:   Allergen Reactions    Codeine phosphate      Other reaction(s): facial swelling    Codeine sulfate      Other reaction(s): Unknown       No current facility-administered medications on file prior to encounter.     Current Outpatient Medications on File Prior to Encounter   Medication Sig    aspirin 81 mg Cap Take 81 mg by mouth once daily.    cyclobenzaprine (FLEXERIL) 10 MG tablet Take 1 tablet (10 mg total) by mouth 3  (three) times daily as needed for Muscle spasms.    furosemide (LASIX) 20 MG tablet Take 1 tablet (20 mg total) by mouth once daily.    gabapentin (NEURONTIN) 100 MG capsule Take 1 capsule (100 mg total) by mouth 3 (three) times daily.    levothyroxine (SYNTHROID) 125 MCG tablet TAKE 1 TABLET BY MOUTH EVERY DAY    losartan-hydrochlorothiazide 100-12.5 mg (HYZAAR) 100-12.5 mg Tab TAKE 1 TABLET BY MOUTH EVERY DAY    metFORMIN (GLUCOPHAGE) 500 MG tablet TAKE 1 TABLET BY MOUTH THREE TIMES A DAY    omeprazole (PRILOSEC) 40 MG capsule TAKE 1 CAPSULE BY MOUTH ONCE  DAILY    potassium chloride (MICRO-K) 8 mEq CpSR TAKE 1 CAPSULE BY MOUTH ONCE DAILY.    predniSONE (DELTASONE) 5 MG tablet Take 5 mg by mouth once daily.    [DISCONTINUED] losartan (COZAAR) 50 MG tablet TAKE 1 TABLET BY MOUTH EVERY DAY     Family History       Problem Relation (Age of Onset)    Alzheimer's disease Mother    Heart failure Father, Brother          Tobacco Use    Smoking status: Never    Smokeless tobacco: Never   Substance and Sexual Activity    Alcohol use: Never    Drug use: Never    Sexual activity: Yes     Review of Systems   Constitutional:  Positive for appetite change and fatigue.   HENT:  Negative for congestion, hearing loss and trouble swallowing.    Respiratory:  Negative for chest tightness, shortness of breath and wheezing.    Cardiovascular:  Negative for chest pain and palpitations.   Gastrointestinal:  Positive for diarrhea, nausea and vomiting. Negative for abdominal pain and constipation.   Genitourinary:  Negative for difficulty urinating and dysuria.   Musculoskeletal:  Negative for back pain and neck stiffness.   Skin:  Negative for pallor and rash.   Neurological:  Negative for speech difficulty and headaches.   Psychiatric/Behavioral:  Negative for confusion and suicidal ideas.      Objective:     Vital Signs (Most Recent):  Temp: 98.3 °F (36.8 °C) (11/07/24 1915)  Pulse: 78 (11/07/24 1915)  Resp: 16 (11/07/24 1905)  BP:  126/72 (11/07/24 1915)  SpO2: 99 % (11/07/24 1915) Vital Signs (24h Range):  Temp:  [97.5 °F (36.4 °C)-98.3 °F (36.8 °C)] 98.3 °F (36.8 °C)  Pulse:  [] 78  Resp:  [16-17] 16  SpO2:  [96 %-99 %] 99 %  BP: (123-163)/(72-93) 126/72     Weight: 96.1 kg (211 lb 12.8 oz)  Body mass index is 37.52 kg/m².     Physical Exam  Vitals reviewed.   Constitutional:       General: She is awake. She is not in acute distress.     Appearance: She is well-developed. She is obese. She is not toxic-appearing.   HENT:      Head: Normocephalic.      Nose: Nose normal.      Mouth/Throat:      Pharynx: Oropharynx is clear.   Eyes:      Extraocular Movements: Extraocular movements intact.      Pupils: Pupils are equal, round, and reactive to light.   Neck:      Thyroid: No thyroid mass.      Vascular: No carotid bruit.   Cardiovascular:      Rate and Rhythm: Regular rhythm. Tachycardia present.      Pulses: Normal pulses.      Heart sounds: Normal heart sounds. No murmur heard.  Pulmonary:      Effort: Pulmonary effort is normal.      Breath sounds: Normal breath sounds and air entry. No wheezing.   Abdominal:      General: Bowel sounds are increased. There is no distension.      Palpations: Abdomen is soft.      Tenderness: There is no abdominal tenderness.   Musculoskeletal:         General: Normal range of motion.      Cervical back: Neck supple. No rigidity.   Skin:     General: Skin is warm.      Coloration: Skin is not jaundiced.      Findings: No lesion.   Neurological:      General: No focal deficit present.      Mental Status: She is alert and oriented to person, place, and time.      Cranial Nerves: No cranial nerve deficit.   Psychiatric:         Attention and Perception: Attention normal.         Mood and Affect: Mood normal.         Behavior: Behavior normal. Behavior is cooperative.         Thought Content: Thought content normal.         Cognition and Memory: Cognition normal.              CRANIAL NERVES     CN III, IV,  "VI   Pupils are equal, round, and reactive to light.       Significant Labs: All pertinent labs within the past 24 hours have been reviewed.  BMP:   Recent Labs   Lab 11/07/24  0505   *   *   K 4.3   *   CO2 16*   BUN 40*   CREATININE 2.68*   CALCIUM 6.5*     CBC:   No results for input(s): "WBC", "HGB", "HCT", "PLT" in the last 48 hours.    CMP:   Recent Labs   Lab 11/06/24  0716 11/07/24  0505    132*   K 2.8* 4.3   * 111*   CO2 18* 16*   * 122*   BUN 54* 40*   CREATININE 4.12* 2.68*   CALCIUM 6.8* 6.5*   ANIONGAP 11 9       Significant Imaging: I have reviewed all pertinent imaging results/findings within the past 24 hours.    Imaging Results              CT Abdomen Pelvis  Without Contrast (Final result)  Result time 11/02/24 10:13:57      Final result by Emerson Kelley MD (11/02/24 10:13:57)                   Impression:      1. Nonspecific intraluminal fluid within normal caliber loops of colon, as may be seen in the setting of diarrheal illness.  2. Patchy airspace consolidation small nodules in the basilar right lower lobe, could reflect infectious or noninfectious inflammatory process.  For multiple solid nodules all <6 mm, Fleischner Society 2017 guidelines recommend no routine follow up for a low risk patient, or follow up with non-contrast chest CT at 12 months after discovery in a high risk patient.  3. Additional details of chronic and incidental findings, as provided in the body of report.      Electronically signed by: Emerson Kelley  Date:    11/02/2024  Time:    10:13               Narrative:    EXAMINATION:  CT ABDOMEN PELVIS WITHOUT CONTRAST    CLINICAL HISTORY:  Abdominal pain, acute, nonlocalized;abdominal pain;    TECHNIQUE:  Low dose axial images, sagittal and coronal reformations were obtained from the lung bases to the pubic symphysis.    COMPARISON:  CT of the abdomen and pelvis performed 03/13/2015.    FINDINGS:  This examination is limited due " to lack of intravenous contrast.    Lower chest: Atelectasis.  Patchy airspace consolidation and small nodules noted in the basilar right lower lobe.    Liver: Suggested hepatomegaly.  Hepatic steatosis.    Gallbladder and bile ducts: Unremarkable.    Pancreas: Normal contour.    Spleen: Normal contour.    Adrenals: Normal contour.    Kidneys: Suggested mild degree of cortical atrophy of both kidneys.  Nonspecific bilateral perinephric stranding.  Subcentimeter hypodense lesions in the kidneys are too small to definitely characterize.    Lymph nodes: No abdominal or pelvic lymphadenopathy.    Bowel and mesentery: Small hiatal hernia.  No evidence of bowel obstruction.  Nonspecific intraluminal fluid within normal caliber loops of colon, as may be seen in the setting of diarrheal illness.  Nonspecific fatty infiltration in the region of the ileocecal valve.  Appendix not confidently identified.  No definite focal pericecal inflammation.    Abdominal aorta: Nonaneurysmal.  Mild atherosclerotic calcification.    Inferior vena cava: Unremarkable.    Free fluid or free air: None.    Pelvis: Unremarkable.    Urinary bladder: Unremarkable.    Body wall: Remote operative sequela in the anterior abdominal wall.    Bones: Query osseous demineralization.  No definite acute findings.  Degenerative findings of the spine and hips.                                       X-Ray Chest 1 View (Final result)  Result time 11/02/24 08:34:51      Final result by Emerson Kelley MD (11/02/24 08:34:51)                   Impression:      Mildly prominent interstitial markings could relate to pulmonary vascular congestion/edema versus infectious or noninfectious inflammatory process.    Suspect left basilar atelectasis.      Electronically signed by: Emerson Kelley  Date:    11/02/2024  Time:    08:34               Narrative:    EXAMINATION:  XR CHEST 1 VIEW    CLINICAL HISTORY:  Fever, unspecified    TECHNIQUE:  Single frontal view of the  chest was performed.    COMPARISON:  Chest radiograph performed 08/14/2024, 09:20 hours.    FINDINGS:  Monitoring leads overlie the chest.  Grossly unchanged cardiomediastinal contours.  Mildly prominent interstitial markings.    No definite focal airspace consolidation.  Suspect left basilar atelectasis.    No definite pneumothorax or large volume pleural effusion.    No acute findings in the visualized abdomen.    Osseous and soft tissue structures appear without definite acute change.                                    Intake/Output - Last 3 Shifts         11/06 0700 11/07 0659 11/07 0700 11/08 0659    P.O. 160     I.V. (mL/kg) 2586.6 (26.9)     IV Piggyback 8.5     Total Intake(mL/kg) 2755.2 (28.7)     Net +2755.2           Urine Occurrence 3 x     Stool Occurrence 2 x           Microbiology Results (last 7 days)       Procedure Component Value Units Date/Time    Blood culture #2 [[id number]] Collected: 11/02/24 0715    Order Status: Completed Specimen: Blood from Peripheral, Antecubital, Right Updated: 11/07/24 0659     Culture, Blood No Growth At 72 Hours    Blood culture #1 [[id number]] Collected: 11/02/24 0653    Order Status: Completed Specimen: Blood Updated: 11/07/24 0658     Culture, Blood No Growth at 5 days    Culture, Enteric Pathogen [[id number]]  (Abnormal)  (Susceptibility) Collected: 11/02/24 2114    Order Status: Completed Specimen: Stool Updated: 11/05/24 0930     Culture, Enteric Pathogens Salmonella species    C diff toxin/antigen with reflex to PCR [[id number]]  (Normal) Collected: 11/02/24 2114    Order Status: Completed Specimen: Stool Updated: 11/04/24 1328     Clostridium Difficile Toxin A/B Negative     Clostridium Difficile GDH Antigen Negative    Narrative:      Interpretive Information:    Absence of both GDH antigen and toxin is consistent with absence of a C difficile infection.  Presence of both GDH antigen and toxin is consistent with a C difficile infection in a symptomatic  patient. Detection of GDH and toxin in an asymptomatic patient is not specific for disease, as patients may be colonized with C difficile.  Presence of either GDH antigen or toxin coupled with presence of C difficile toxin B gene (ie, positive PCR test) is consistent with C difficile infection in a symptomatic patient.  Presence of GDH antigen coupled with absence of toxin and C difficile toxin B gene (ie, negative PCR test) is consistent with presence of a non-disease-causing strain of C difficile or another Clostridioides species.      Enteric Pathogen Panel [[id number]]  (Abnormal) Collected: 11/02/24 2114    Order Status: Completed Specimen: Stool Updated: 11/03/24 0028     Campylobacter Group LUIS Negative     Salmonella Species LUIS Positive     Shigella Species LUIS Negative     Vibrio Group LUIS Negative     Yersinia enterocolitica LUIS Negative     Shiga Toxin 1 LUIS Negative     Shiga Toxin 2 LUIS Negative     Norovirus GI/GII LUIS Negative     Rotavirus A LUIS Negative    Fecal leukocytes [[id number]] Collected: 11/02/24 2114    Order Status: Completed Specimen: Stool Updated: 11/02/24 2207     Fecal Leukocytes Positive    C Diff Toxin by PCR [[id number]] Collected: 11/02/24 2114    Order Status: Canceled Specimen: Stool Updated: 11/02/24 2120    Ova and Parasite Exam [[id number]]     Order Status: Canceled Specimen: Stool     Influenza A & B by Molecular [[id number]]  (Normal) Collected: 11/02/24 0658    Order Status: Completed Specimen: Nasopharyngeal Swab Updated: 11/02/24 0747     INFLUENZA A MOLECULAR Negative     INFLUENZA B MOLECULAR  Negative              Assessment/Plan:      * Salmonella enteritis    With severe illness treating with fluoroquinolones  Unable to take orally well so giving IV hydration and IV antibiotics  Discussed with , he is to throughout all open food had his house  Denies any recent travel, anyone else sick at home, no sick pets in his not been around poultry  Has eaten out  once at a restaurant recently    11/04 still with GI upset but patient feels it is slowing down and she feels some better, has not done well tolerating oral intake and with serum creatinine going up will restart IV fluids    ARF (acute renal failure)  GEOVANY is likely due to pre-renal azotemia due to dehydration. Baseline creatinine is  1.1 . Most recent creatinine and eGFR are listed below.  Recent Labs     11/05/24  0508 11/06/24  0716 11/07/24  0505   CREATININE 5.86*  5.93* 4.12* 2.68*   EGFRNORACEVR 7*  7* 11* 18*        Plan  - GEOVANY is worsening. Will hydrate  - Avoid nephrotoxins and renally dose meds for GFR listed above  - Monitor urine output, serial BMP, and adjust therapy as needed  - nephrology asked to see    Vitamin D deficiency    Replacement and continue at home    Gastroenteritis    Still NV and diarrhea    Acute lactic acidosis    Continue with intravenous hydration  Checking blood cultures with possibility of septicemia  However metformin inpatients current situation of dehydration and GI upset could be associated with lactic acidosis    Dehydration    Although IV fluids on national shortage with patient's tachycardia and inability to take orally will leave on continue maintenance IV fluids for now.  Patient has had 2-1/2 L so far in the emergency room.        Fever  Check stool studies  Blood culture have been drawn  Cover with levofloxacin for possible infectious diarrhea    11/04 resolved      Nausea vomiting and diarrhea    Continue hydration, GI upset better, checking stool studies  Liquid diet for now    11/03 these symptoms persist and continue with hydration and IV antibiotics    History of right shoulder replacement    This back in August and was patient's last antibiotics not likely etiology but checking C diff    Acquired hypothyroidism  Continue Synthroid  Check T4 and TSH      Systolic hypertension  Patients blood pressure range in the last 24 hours was: BP  Min: 94/62  Max: 185/80.The  "patient's inpatient anti-hypertensive regimen is listed below:  Current Antihypertensives       Plan  - BP is uncontrolled, will adjust as follows:  Adjust meds as needed      Uncontrolled type 2 diabetes mellitus with hyperglycemia  Patient's FSGs are uncontrolled due to hyperglycemia on current medication regimen.  Last A1c reviewed-   Lab Results   Component Value Date    HGBA1C 10.6 (H) 08/28/2024     Most recent fingerstick glucose reviewed- No results for input(s): "POCTGLUCOSE" in the last 24 hours.  Current correctional scale  Medium  Increase anti-hyperglycemic dose as follows-   Antihyperglycemics (From admission, onward)      Start     Stop Route Frequency Ordered    11/02/24 1643  insulin aspart U-100 injection 0-10 Units         -- SubQ Before meals & nightly PRN 11/02/24 1546          Hold Oral hypoglycemics while patient is in the hospital.    Severe obesity (BMI 35.0-39.9) with comorbidity  Body mass index is 36.49 kg/m². Morbid obesity complicates all aspects of disease management from diagnostic modalities to treatment. Weight loss encouraged and health benefits explained to patient.         Rheumatoid arthritis involving multiple sites with positive rheumatoid factor    Hold patient's selective T-cell code stimulator modulator Orencia while having acute illness      VTE Risk Mitigation (From admission, onward)           Ordered     enoxaparin injection 30 mg  Daily         11/04/24 1740     IP VTE HIGH RISK PATIENT  Once         11/02/24 1546     Place sequential compression device  Until discontinued         11/02/24 1546                    Discharge Planning   NAEEM:      Code Status: Full Code   Is the patient medically ready for discharge?:     Reason for patient still in hospital (select all that apply): Laboratory test, Treatment, and Imaging  Discharge Plan A: Home, Home with family                  Tigre Owusu MD  Department of Hospital Medicine   [facility]    "

## 2024-11-08 NOTE — PLAN OF CARE
11/08/24 1155   Rounds   Attendance Provider;;Charge nurse;Physical therapist;Pharmacist   Discharge Plan A Home with family   Why the patient remains in the hospital Requires continued medical care   Transition of Care Barriers None     Chart reviewed. Per SIBR meeting, pt's CR is improving. Advancing diet. IM explained and updated. SS following

## 2024-11-08 NOTE — ASSESSMENT & PLAN NOTE
>>ASSESSMENT AND PLAN FOR GASTROENTERITIS WRITTEN ON 11/5/2024 10:03 PM BY JEAN CARLOS LUIS MD      Still NV and diarrhea    11/8 - Still some diarrhea but improved.  Continues on levaquin.  Advancing diet as tolerated.

## 2024-11-09 PROBLEM — E87.20 METABOLIC ACIDOSIS, NAG, BICARBONATE LOSSES: Status: ACTIVE | Noted: 2024-11-09

## 2024-11-09 LAB
ANION GAP SERPL CALCULATED.3IONS-SCNC: 11 MMOL/L (ref 7–16)
BUN SERPL-MCNC: 19 MG/DL (ref 7–18)
BUN/CREAT SERPL: 11 (ref 6–20)
CALCIUM SERPL-MCNC: 7.5 MG/DL (ref 8.5–10.1)
CHLORIDE SERPL-SCNC: 114 MMOL/L (ref 98–107)
CO2 SERPL-SCNC: 16 MMOL/L (ref 21–32)
CREAT SERPL-MCNC: 1.69 MG/DL (ref 0.55–1.02)
EGFR (NO RACE VARIABLE) (RUSH/TITUS): 32 ML/MIN/1.73M2
GLUCOSE SERPL-MCNC: 106 MG/DL (ref 70–105)
GLUCOSE SERPL-MCNC: 110 MG/DL (ref 74–106)
GLUCOSE SERPL-MCNC: 113 MG/DL (ref 70–105)
GLUCOSE SERPL-MCNC: 146 MG/DL (ref 70–105)
GLUCOSE SERPL-MCNC: 173 MG/DL (ref 70–105)
POTASSIUM SERPL-SCNC: 3.7 MMOL/L (ref 3.5–5.1)
SODIUM SERPL-SCNC: 137 MMOL/L (ref 136–145)

## 2024-11-09 PROCEDURE — 96372 THER/PROPH/DIAG INJ SC/IM: CPT

## 2024-11-09 PROCEDURE — 11000001 HC ACUTE MED/SURG PRIVATE ROOM

## 2024-11-09 PROCEDURE — 36415 COLL VENOUS BLD VENIPUNCTURE: CPT | Performed by: INTERNAL MEDICINE

## 2024-11-09 PROCEDURE — 63600175 PHARM REV CODE 636 W HCPCS: Performed by: HOSPITALIST

## 2024-11-09 PROCEDURE — 25000003 PHARM REV CODE 250: Performed by: FAMILY MEDICINE

## 2024-11-09 PROCEDURE — 99233 SBSQ HOSP IP/OBS HIGH 50: CPT | Mod: ,,, | Performed by: FAMILY MEDICINE

## 2024-11-09 PROCEDURE — 94761 N-INVAS EAR/PLS OXIMETRY MLT: CPT

## 2024-11-09 PROCEDURE — 82962 GLUCOSE BLOOD TEST: CPT

## 2024-11-09 PROCEDURE — 63600175 PHARM REV CODE 636 W HCPCS: Performed by: FAMILY MEDICINE

## 2024-11-09 PROCEDURE — 80048 BASIC METABOLIC PNL TOTAL CA: CPT | Performed by: INTERNAL MEDICINE

## 2024-11-09 PROCEDURE — 25000003 PHARM REV CODE 250: Performed by: HOSPITALIST

## 2024-11-09 RX ORDER — SODIUM BICARBONATE 650 MG/1
650 TABLET ORAL 2 TIMES DAILY
Status: DISCONTINUED | OUTPATIENT
Start: 2024-11-09 | End: 2024-11-10 | Stop reason: HOSPADM

## 2024-11-09 RX ADMIN — SODIUM CHLORIDE AND POTASSIUM CHLORIDE: 150; 450 INJECTION, SOLUTION INTRAVENOUS at 09:11

## 2024-11-09 RX ADMIN — ENOXAPARIN SODIUM 30 MG: 30 INJECTION SUBCUTANEOUS at 04:11

## 2024-11-09 RX ADMIN — FAMOTIDINE 20 MG: 10 INJECTION, SOLUTION INTRAVENOUS at 08:11

## 2024-11-09 RX ADMIN — SODIUM CHLORIDE AND POTASSIUM CHLORIDE: 150; 450 INJECTION, SOLUTION INTRAVENOUS at 01:11

## 2024-11-09 RX ADMIN — Medication 1000 UNITS: at 08:11

## 2024-11-09 RX ADMIN — INSULIN ASPART 1 UNITS: 100 INJECTION, SOLUTION INTRAVENOUS; SUBCUTANEOUS at 10:11

## 2024-11-09 RX ADMIN — SODIUM BICARBONATE 650 MG TABLET 650 MG: at 04:11

## 2024-11-09 RX ADMIN — LEVOFLOXACIN 250 MG: 250 INJECTION, SOLUTION INTRAVENOUS at 04:11

## 2024-11-09 RX ADMIN — LEVOTHYROXINE SODIUM 112 MCG: 0.11 TABLET ORAL at 05:11

## 2024-11-09 RX ADMIN — SODIUM CHLORIDE AND POTASSIUM CHLORIDE: 150; 450 INJECTION, SOLUTION INTRAVENOUS at 06:11

## 2024-11-09 NOTE — PROGRESS NOTES
[facility]  Nephrology  Progress Note    Patient Name: Meghna Dalal  MRN: [id number]  Admission Date: 11/2/2024  Hospital Length of Stay: 7 days  Attending Provider: Errol Posey Jr., MD   Primary Care Physician: Kwame Wyatt MD  Principal Problem:Salmonella enteritis    Subjective:     HPI: 74-year-old woman admitted with nausea vomiting and diarrhea.  Stool culture positive for salmonella.  She has developed acute renal failure since admission.  No history of prior renal insufficiency.    Interval History:  She denies SOB.  Diarrhea is improving.    Review of patient's allergies indicates:   Allergen Reactions    Codeine phosphate      Other reaction(s): facial swelling    Codeine sulfate      Other reaction(s): Unknown     Current Facility-Administered Medications   Medication Frequency    0.45% NaCl with KCl 20 mEq infusion Continuous    acetaminophen suppository 650 mg Q6H PRN    acetaminophen tablet 650 mg Q8H PRN    dextrose 10% bolus 125 mL 125 mL PRN    dextrose 10% bolus 250 mL 250 mL PRN    enoxaparin injection 30 mg Daily    famotidine (PF) injection 20 mg Daily    glucagon (human recombinant) injection 1 mg PRN    glucose chewable tablet 16 g PRN    glucose chewable tablet 24 g PRN    insulin aspart U-100 injection 0-10 Units QID (AC + HS) PRN    levoFLOXacin 250 mg/50 mL IVPB 250 mg Q24H    levothyroxine tablet 112 mcg Daily    naloxone 0.4 mg/mL injection 0.02 mg PRN    ondansetron injection 4 mg Q6H PRN    promethazine injection 25 mg Q6H PRN    simethicone chewable tablet 80 mg TID PRN    sodium chloride 0.9% flush 10 mL Q12H PRN    vitamin D 1000 units tablet 1,000 Units Daily       Objective:     Vital Signs (Most Recent):  Temp: 98.2 °F (36.8 °C) (11/09/24 1152)  Pulse: 74 (11/09/24 1152)  Resp: 18 (11/09/24 1152)  BP: 133/74 (11/09/24 1152)  SpO2: 100 % (11/09/24 1152) Vital Signs (24h Range):  Temp:  [97.4 °F (36.3 °C)-98.2 °F (36.8 °C)] 98.2 °F (36.8  °C)  Pulse:  [68-79] 74  Resp:  [16-18] 18  SpO2:  [98 %-100 %] 100 %  BP: (121-152)/(52-81) 133/74     Weight: 89.4 kg (197 lb 1.5 oz) (11/09/24 0547)  Body mass index is 34.91 kg/m².  Body surface area is 1.99 meters squared.    No intake/output data recorded.     Physical Exam  Constitutional:       General: She is not in acute distress.  HENT:      Head: Normocephalic and atraumatic.   Eyes:      Pupils: Pupils are equal, round, and reactive to light.   Cardiovascular:      Rate and Rhythm: Normal rate and regular rhythm.      Heart sounds: No murmur heard.     No friction rub. No gallop.   Pulmonary:      Effort: No respiratory distress.      Breath sounds: Normal breath sounds.   Abdominal:      General: Bowel sounds are normal.      Tenderness: There is no abdominal tenderness. There is no guarding.   Musculoskeletal:      Right lower leg: No edema.      Left lower leg: No edema.   Neurological:      Mental Status: She is alert and oriented to person, place, and time.   Psychiatric:         Behavior: Behavior normal.          Significant Labs:  BMP:   Recent Labs   Lab 11/09/24  0552   *      K 3.7   *   CO2 16*   BUN 19*   CREATININE 1.69*   CALCIUM 7.5*     CBC:   Recent Labs   Lab 11/05/24  0509   WBC 7.93   RBC 4.68   HGB 11.5*   HCT 36.3*      MCV 77.6*   MCH 24.6*   MCHC 31.7*        Significant Imaging:    Assessment/Plan:     Renal/  ARF (acute renal failure)  Creatinine 1.0 in August 20, 2024.  Creatinine 1.39 on admission.    Creatinine improved to 1.7.  Approaching baseline.    ID  * Salmonella enteritis  Continue Levaquin    Endocrine  Uncontrolled type 2 diabetes mellitus with hyperglycemia  Now controlled        Thank you for your consult.     Bernard Conde MD  Nephrology  [facility]

## 2024-11-09 NOTE — ASSESSMENT & PLAN NOTE
Body mass index is 37.52 kg/m². Morbid obesity complicates all aspects of disease management from diagnostic modalities to treatment. Weight loss encouraged and health benefits explained to patient.

## 2024-11-09 NOTE — ASSESSMENT & PLAN NOTE
>>ASSESSMENT AND PLAN FOR NAUSEA VOMITING AND DIARRHEA WRITTEN ON 11/3/2024  3:00 PM BY JEAN CARLOS LUIS MD      Continue hydration, GI upset better, checking stool studies  Liquid diet for now    11/03 these symptoms persist and continue with hydration and IV antibiotics    11/8 - Continue levaquin, advance diet as tolerated.   11/9 - Continue levaquin. Advancing diet

## 2024-11-09 NOTE — PROGRESS NOTES
[facility]  Nephrology  Progress Note    Patient Name: Meghna Dalal  MRN: [id number]  Admission Date: 11/2/2024  Hospital Length of Stay: 6 days  Attending Provider: Errol Posey Jr., MD   Primary Care Physician: Kwame Wyatt MD  Principal Problem:Salmonella enteritis    Subjective:     HPI: 74-year-old woman admitted with nausea vomiting and diarrhea.  Stool culture positive for salmonella.  She has developed acute renal failure since admission.  No history of prior renal insufficiency.    Interval History:  No SOB.  Diarrhea slowly improving.    Review of patient's allergies indicates:   Allergen Reactions    Codeine phosphate      Other reaction(s): facial swelling    Codeine sulfate      Other reaction(s): Unknown     Current Facility-Administered Medications   Medication Frequency    0.45% NaCl with KCl 20 mEq infusion Continuous    acetaminophen suppository 650 mg Q6H PRN    acetaminophen tablet 650 mg Q8H PRN    dextrose 10% bolus 125 mL 125 mL PRN    dextrose 10% bolus 250 mL 250 mL PRN    enoxaparin injection 30 mg Daily    famotidine (PF) injection 20 mg Daily    glucagon (human recombinant) injection 1 mg PRN    glucose chewable tablet 16 g PRN    glucose chewable tablet 24 g PRN    insulin aspart U-100 injection 0-10 Units QID (AC + HS) PRN    levoFLOXacin 250 mg/50 mL IVPB 250 mg Q24H    levothyroxine tablet 112 mcg Daily    naloxone 0.4 mg/mL injection 0.02 mg PRN    ondansetron injection 4 mg Q6H PRN    promethazine injection 25 mg Q6H PRN    simethicone chewable tablet 80 mg TID PRN    sodium chloride 0.9% flush 10 mL Q12H PRN    vitamin D 1000 units tablet 1,000 Units Daily       Objective:     Vital Signs (Most Recent):  Temp: 98.1 °F (36.7 °C) (11/08/24 1922)  Pulse: 70 (11/08/24 1922)  Resp: 18 (11/08/24 1655)  BP: 138/68 (11/08/24 1922)  SpO2: 99 % (11/08/24 1922) Vital Signs (24h Range):  Temp:  [97.8 °F (36.6 °C)-98.5 °F (36.9 °C)] 98.1 °F (36.7  °C)  Pulse:  [70-81] 70  Resp:  [16-18] 18  SpO2:  [97 %-100 %] 99 %  BP: (125-149)/(68-81) 138/68     Weight: 96.1 kg (211 lb 12.8 oz) (11/03/24 0500)  Body mass index is 37.52 kg/m².  Body surface area is 2.07 meters squared.    No intake/output data recorded.     Physical Exam  Constitutional:       General: She is not in acute distress.  HENT:      Head: Normocephalic and atraumatic.   Eyes:      Pupils: Pupils are equal, round, and reactive to light.   Cardiovascular:      Rate and Rhythm: Normal rate and regular rhythm.      Heart sounds: No murmur heard.     No friction rub. No gallop.   Pulmonary:      Effort: No respiratory distress.      Breath sounds: Normal breath sounds.   Abdominal:      General: Bowel sounds are normal.      Tenderness: There is no abdominal tenderness. There is no guarding.   Musculoskeletal:      Right lower leg: No edema.      Left lower leg: No edema.   Neurological:      Mental Status: She is alert and oriented to person, place, and time.   Psychiatric:         Behavior: Behavior normal.          Significant Labs:  BMP:   Recent Labs   Lab 11/08/24  0344   *   *   K 3.2*   *   CO2 16*   BUN 32*   CREATININE 2.18*   CALCIUM 6.9*        Significant Imaging:    Assessment/Plan:     Cardiac/Vascular  Systolic hypertension  Controlled    Renal/  ARF (acute renal failure)  Creatinine 1.0 in August 20, 2024.  Creatinine 1.39 on admission.    Renal function continues to improve.  Continue IV fluid.  Encourage p.o. fluids as tolerated    ID  * Salmonella enteritis  Continue Levaquin    Endocrine  Uncontrolled type 2 diabetes mellitus with hyperglycemia  Now controlled        Thank you for your consult.     Bernard Conde MD  Nephrology  [facility]

## 2024-11-09 NOTE — ASSESSMENT & PLAN NOTE
Patients blood pressure range in the last 24 hours was: BP  Min: 94/62  Max: 185/80.The patient's inpatient anti-hypertensive regimen is listed below:  Current Antihypertensives       Plan  - BP is uncontrolled, will adjust as follows:  Adjust meds as needed    11/9 - BP controlled.

## 2024-11-09 NOTE — ASSESSMENT & PLAN NOTE
Creatinine 1.0 in August 20, 2024.  Creatinine 1.39 on admission.    Creatinine improved to 1.7.  Approaching baseline.

## 2024-11-09 NOTE — PROGRESS NOTES
[facility] Hospital Medicine  Progress Note    Patient Name: Meghna Dalal  MRN: [id number]  Patient Class: IP- Inpatient   Admission Date: 11/2/2024  Length of Stay: 6 days  Attending Physician: Errol Posey Jr., MD  Primary Care Provider: Kwame Wyatt MD        Subjective:     Principal Problem:Salmonella enteritis        HPI:    Chief complaint:  Weakness with recent GI upset    History of present illness:          Ms. Dalal is a 74-year-old presented to the emergency room after have onset prior day of nausea vomiting and diarrhea.  This was associated with fever and chills.  Patient denies similar problem in the past.  Denies any unusual exposure.  Has not been out of area her eaten any unusual food that others have not also shared.  Did have shoulder surgery by Dr. Pablo 08/27/24 but no antibiotics since.  History of rheumatoid arthritis and on Orencia.  When seen in the emergency room was noted to be tachycardic, febrile, appeared ill and have elevated lactic acid.  Patient does take metformin for diabetes.  Continued here in the emergency room with nausea vomiting and liquidy nonbloody diarrhea.  Hospitalist service was asked to see patient for evaluation of admission'    Review of systems:  See above.  No recent respiratory complaints.  Denies pain.  Generally relatively active and able to ambulate without issues.  Has been undergoing therapy following above-mentioned shoulder surgery.  No history of being significant snore or sleep disturbance but does take frequent naps in the daytime.  Review of systems otherwise negative.     Past medical history:  -hypertension proximally 10 years  -diabetes mellitus proximally 10 years  -rheumatoid arthritis on selective T-cell co stimulation modulators (Orencia).   -hypothyroidism    Past surgical history:  Recent shoulder surgery as above mentioned by Dr. Pablo on August 27  Lumbar back surgery  Knee  replacement  Hysterectomy with 1 ovary removed    Allergy to codeine    Social history:  Lives with   No history of tobacco alcohol    Family history:  Son with myocardial infarction in his 50s    Health maintenance issues:  Primary care providers Dr. Wyatt  Has not had flu shot this year  Had prior Pneumovax  Daughter states patient has had COVID infection 2-3 times the last of which proximally 1 year earlier  Patient has had COVID vaccination x2 at least 1 booster  No history of recent mammogram  No recent Pap smear  Colonoscopies apparently about 5 years earlier told unremarkable          Overview/Hospital Course:  11/03 feels some better.  Still with nausea vomiting not tolerating liquids by mouth.  Some diarrhea.  Studies returned showing salmonella as etiology.   in room and states no one else sick.  Told him to throw out all open food at their house.  Continue with hydration and antibiotics.  Monitor  11/04 still with nausea vomiting diarrhea not able to hold down fluids well, with IV fluids out renal function slight worse.  Start IV fluids back.  Generally though patient states feeling some better.  Daughter in room  11/05 feels better but still NV and loose stools. Worse renal function. UOP but less. Asked Dr Conde to evaluate.  11/06 better but still not able to take much in orally. Continue hydration. Renal function better.  11/07 Still diarrhea and nausea. Still taking in poorly. Renal function better. Continue IVF for now.    Interval History: Eating a little, would like to try advancing diet.     Review of Systems  Objective:     Vital Signs (Most Recent):  Temp: 98.2 °F (36.8 °C) (11/08/24 1655)  Pulse: 79 (11/08/24 1724)  Resp: 18 (11/08/24 1655)  BP: (!) 147/81 (11/08/24 1655)  SpO2: 98 % (11/08/24 1655) Vital Signs (24h Range):  Temp:  [97.8 °F (36.6 °C)-98.5 °F (36.9 °C)] 98.2 °F (36.8 °C)  Pulse:  [70-86] 79  Resp:  [16-18] 18  SpO2:  [96 %-100 %] 98 %  BP: (125-149)/(68-81) 147/81  "    Weight: 96.1 kg (211 lb 12.8 oz)  Body mass index is 37.52 kg/m².  No intake or output data in the 24 hours ending 11/08/24 1749      Physical Exam  Vitals reviewed.   Constitutional:       General: She is not in acute distress.     Appearance: She is not toxic-appearing.   Cardiovascular:      Rate and Rhythm: Normal rate and regular rhythm.      Heart sounds: Normal heart sounds.   Pulmonary:      Effort: Pulmonary effort is normal. No respiratory distress.      Breath sounds: Normal breath sounds. No wheezing.   Abdominal:      General: Abdomen is flat. There is no distension.      Palpations: Abdomen is soft.      Tenderness: There is no abdominal tenderness.   Musculoskeletal:      Right lower leg: No edema.      Left lower leg: No edema.   Skin:     General: Skin is warm and dry.      Coloration: Skin is not jaundiced.      Findings: No bruising.   Neurological:      General: No focal deficit present.      Mental Status: She is alert and oriented to person, place, and time.             Significant Labs: All pertinent labs within the past 24 hours have been reviewed.  BMP:   Recent Labs   Lab 11/08/24  0344   *   *   K 3.2*   *   CO2 16*   BUN 32*   CREATININE 2.18*   CALCIUM 6.9*     CBC: No results for input(s): "WBC", "HGB", "HCT", "PLT" in the last 48 hours.    Significant Imaging: I have reviewed all pertinent imaging results/findings within the past 24 hours.    Assessment/Plan:      * Salmonella enteritis  >>ASSESSMENT AND PLAN FOR NAUSEA VOMITING AND DIARRHEA WRITTEN ON 11/3/2024  3:00 PM BY JEAN CARLOS LUIS MD      Continue hydration, GI upset better, checking stool studies  Liquid diet for now    11/03 these symptoms persist and continue with hydration and IV antibiotics    11/8 - Continue levaquin, advance diet as tolerated.                         ARF (acute renal failure)  GEOVANY is likely due to pre-renal azotemia due to dehydration. Baseline creatinine is  1.1 . Most recent " "creatinine and eGFR are listed below.  Recent Labs     11/06/24  0716 11/07/24  0505 11/08/24  0344   CREATININE 4.12* 2.68* 2.18*   EGFRNORACEVR 11* 18* 24*        Plan  - GEOVANY is worsening. Will hydrate  - Avoid nephrotoxins and renally dose meds for GFR listed above  - Monitor urine output, serial BMP, and adjust therapy as needed  - nephrology asked to see  11/8 - Improving, continue IVF.     Uncontrolled type 2 diabetes mellitus with hyperglycemia  Patient's FSGs are uncontrolled due to hyperglycemia on current medication regimen.  Last A1c reviewed-   Lab Results   Component Value Date    HGBA1C 10.6 (H) 08/28/2024     Most recent fingerstick glucose reviewed- No results for input(s): "POCTGLUCOSE" in the last 24 hours.  Current correctional scale  Medium  Increase anti-hyperglycemic dose as follows-   Antihyperglycemics (From admission, onward)      Start     Stop Route Frequency Ordered    11/02/24 1643  insulin aspart U-100 injection 0-10 Units         -- SubQ Before meals & nightly PRN 11/02/24 1546          Hold Oral hypoglycemics while patient is in the hospital.    11/8 - glucoses ok    Vitamin D deficiency    Replacement and continue at home    Acute lactic acidosis    Continue with intravenous hydration  Checking blood cultures with possibility of septicemia  However metformin inpatients current situation of dehydration and GI upset could be associated with lactic acidosis    Dehydration    Although IV fluids on national shortage with patient's tachycardia and inability to take orally will leave on continue maintenance IV fluids for now.  Patient has had 2-1/2 L so far in the emergency room.        Fever  Check stool studies  Blood culture have been drawn  Cover with levofloxacin for possible infectious diarrhea    11/04 resolved      History of right shoulder replacement    This back in August and was patient's last antibiotics not likely etiology but checking C diff    Acquired " hypothyroidism  Continue Synthroid  Check T4 and TSH      Systolic hypertension  Patients blood pressure range in the last 24 hours was: BP  Min: 94/62  Max: 185/80.The patient's inpatient anti-hypertensive regimen is listed below:  Current Antihypertensives       Plan  - BP is uncontrolled, will adjust as follows:  Adjust meds as needed      Severe obesity (BMI 35.0-39.9) with comorbidity  Body mass index is 37.52 kg/m². Morbid obesity complicates all aspects of disease management from diagnostic modalities to treatment. Weight loss encouraged and health benefits explained to patient.         Rheumatoid arthritis involving multiple sites with positive rheumatoid factor    Hold patient's selective T-cell code stimulator modulator Orencia while having acute illness      VTE Risk Mitigation (From admission, onward)           Ordered     enoxaparin injection 30 mg  Daily         11/04/24 1740     IP VTE HIGH RISK PATIENT  Once         11/02/24 1546     Place sequential compression device  Until discontinued         11/02/24 1546                    Discharge Planning   NAEEM: 11/9/2024     Code Status: Full Code   Is the patient medically ready for discharge?:     Reason for patient still in hospital (select all that apply): Treatment  Discharge Plan A: Home with family                  Errol Posey Jr, MD  Department of Hospital Medicine   [facility]

## 2024-11-09 NOTE — SUBJECTIVE & OBJECTIVE
Interval History:  She denies SOB.  Diarrhea is improving.    Review of patient's allergies indicates:   Allergen Reactions    Codeine phosphate      Other reaction(s): facial swelling    Codeine sulfate      Other reaction(s): Unknown     Current Facility-Administered Medications   Medication Frequency    0.45% NaCl with KCl 20 mEq infusion Continuous    acetaminophen suppository 650 mg Q6H PRN    acetaminophen tablet 650 mg Q8H PRN    dextrose 10% bolus 125 mL 125 mL PRN    dextrose 10% bolus 250 mL 250 mL PRN    enoxaparin injection 30 mg Daily    famotidine (PF) injection 20 mg Daily    glucagon (human recombinant) injection 1 mg PRN    glucose chewable tablet 16 g PRN    glucose chewable tablet 24 g PRN    insulin aspart U-100 injection 0-10 Units QID (AC + HS) PRN    levoFLOXacin 250 mg/50 mL IVPB 250 mg Q24H    levothyroxine tablet 112 mcg Daily    naloxone 0.4 mg/mL injection 0.02 mg PRN    ondansetron injection 4 mg Q6H PRN    promethazine injection 25 mg Q6H PRN    simethicone chewable tablet 80 mg TID PRN    sodium chloride 0.9% flush 10 mL Q12H PRN    vitamin D 1000 units tablet 1,000 Units Daily       Objective:     Vital Signs (Most Recent):  Temp: 98.2 °F (36.8 °C) (11/09/24 1152)  Pulse: 74 (11/09/24 1152)  Resp: 18 (11/09/24 1152)  BP: 133/74 (11/09/24 1152)  SpO2: 100 % (11/09/24 1152) Vital Signs (24h Range):  Temp:  [97.4 °F (36.3 °C)-98.2 °F (36.8 °C)] 98.2 °F (36.8 °C)  Pulse:  [68-79] 74  Resp:  [16-18] 18  SpO2:  [98 %-100 %] 100 %  BP: (121-152)/(52-81) 133/74     Weight: 89.4 kg (197 lb 1.5 oz) (11/09/24 0547)  Body mass index is 34.91 kg/m².  Body surface area is 1.99 meters squared.    No intake/output data recorded.     Physical Exam  Constitutional:       General: She is not in acute distress.  HENT:      Head: Normocephalic and atraumatic.   Eyes:      Pupils: Pupils are equal, round, and reactive to light.   Cardiovascular:      Rate and Rhythm: Normal rate and regular rhythm.       Heart sounds: No murmur heard.     No friction rub. No gallop.   Pulmonary:      Effort: No respiratory distress.      Breath sounds: Normal breath sounds.   Abdominal:      General: Bowel sounds are normal.      Tenderness: There is no abdominal tenderness. There is no guarding.   Musculoskeletal:      Right lower leg: No edema.      Left lower leg: No edema.   Neurological:      Mental Status: She is alert and oriented to person, place, and time.   Psychiatric:         Behavior: Behavior normal.          Significant Labs:  BMP:   Recent Labs   Lab 11/09/24  0552   *      K 3.7   *   CO2 16*   BUN 19*   CREATININE 1.69*   CALCIUM 7.5*     CBC:   Recent Labs   Lab 11/05/24  0509   WBC 7.93   RBC 4.68   HGB 11.5*   HCT 36.3*      MCV 77.6*   MCH 24.6*   MCHC 31.7*        Significant Imaging:

## 2024-11-09 NOTE — ASSESSMENT & PLAN NOTE
"Patient's FSGs are uncontrolled due to hyperglycemia on current medication regimen.  Last A1c reviewed-   Lab Results   Component Value Date    HGBA1C 10.6 (H) 08/28/2024     Most recent fingerstick glucose reviewed- No results for input(s): "POCTGLUCOSE" in the last 24 hours.  Current correctional scale  Medium  Increase anti-hyperglycemic dose as follows-   Antihyperglycemics (From admission, onward)      Start     Stop Route Frequency Ordered    11/02/24 1643  insulin aspart U-100 injection 0-10 Units         -- SubQ Before meals & nightly PRN 11/02/24 1546          Hold Oral hypoglycemics while patient is in the hospital.    11/8 - glucoses ok  "

## 2024-11-09 NOTE — SUBJECTIVE & OBJECTIVE
"Interval History: Improving.  Still some diarrhea but less frequent. Starting to eat.     Review of Systems  Objective:     Vital Signs (Most Recent):  Temp: 98.2 °F (36.8 °C) (11/09/24 1152)  Pulse: 74 (11/09/24 1152)  Resp: 18 (11/09/24 1152)  BP: 133/74 (11/09/24 1152)  SpO2: 100 % (11/09/24 1152) Vital Signs (24h Range):  Temp:  [97.4 °F (36.3 °C)-98.2 °F (36.8 °C)] 98.2 °F (36.8 °C)  Pulse:  [68-79] 74  Resp:  [16-18] 18  SpO2:  [98 %-100 %] 100 %  BP: (121-152)/(52-81) 133/74     Weight: 89.4 kg (197 lb 1.5 oz)  Body mass index is 34.91 kg/m².  No intake or output data in the 24 hours ending 11/09/24 1530      Physical Exam  Vitals reviewed.   Constitutional:       General: She is not in acute distress.     Appearance: She is not toxic-appearing.   Cardiovascular:      Rate and Rhythm: Normal rate and regular rhythm.      Heart sounds: Normal heart sounds.   Pulmonary:      Effort: Pulmonary effort is normal. No respiratory distress.      Breath sounds: Normal breath sounds. No wheezing.   Abdominal:      General: Abdomen is flat. There is no distension.      Palpations: Abdomen is soft.      Tenderness: There is no abdominal tenderness.   Musculoskeletal:      Right lower leg: No edema.      Left lower leg: No edema.   Skin:     General: Skin is warm and dry.      Coloration: Skin is not jaundiced.      Findings: No bruising.   Neurological:      General: No focal deficit present.      Mental Status: She is alert and oriented to person, place, and time.             Significant Labs: All pertinent labs within the past 24 hours have been reviewed.  BMP:   Recent Labs   Lab 11/09/24  0552   *      K 3.7   *   CO2 16*   BUN 19*   CREATININE 1.69*   CALCIUM 7.5*     CBC: No results for input(s): "WBC", "HGB", "HCT", "PLT" in the last 48 hours.    Significant Imaging: I have reviewed all pertinent imaging results/findings within the past 24 hours.  "

## 2024-11-09 NOTE — SUBJECTIVE & OBJECTIVE
Interval History:  No SOB.  Diarrhea slowly improving.    Review of patient's allergies indicates:   Allergen Reactions    Codeine phosphate      Other reaction(s): facial swelling    Codeine sulfate      Other reaction(s): Unknown     Current Facility-Administered Medications   Medication Frequency    0.45% NaCl with KCl 20 mEq infusion Continuous    acetaminophen suppository 650 mg Q6H PRN    acetaminophen tablet 650 mg Q8H PRN    dextrose 10% bolus 125 mL 125 mL PRN    dextrose 10% bolus 250 mL 250 mL PRN    enoxaparin injection 30 mg Daily    famotidine (PF) injection 20 mg Daily    glucagon (human recombinant) injection 1 mg PRN    glucose chewable tablet 16 g PRN    glucose chewable tablet 24 g PRN    insulin aspart U-100 injection 0-10 Units QID (AC + HS) PRN    levoFLOXacin 250 mg/50 mL IVPB 250 mg Q24H    levothyroxine tablet 112 mcg Daily    naloxone 0.4 mg/mL injection 0.02 mg PRN    ondansetron injection 4 mg Q6H PRN    promethazine injection 25 mg Q6H PRN    simethicone chewable tablet 80 mg TID PRN    sodium chloride 0.9% flush 10 mL Q12H PRN    vitamin D 1000 units tablet 1,000 Units Daily       Objective:     Vital Signs (Most Recent):  Temp: 98.1 °F (36.7 °C) (11/08/24 1922)  Pulse: 70 (11/08/24 1922)  Resp: 18 (11/08/24 1655)  BP: 138/68 (11/08/24 1922)  SpO2: 99 % (11/08/24 1922) Vital Signs (24h Range):  Temp:  [97.8 °F (36.6 °C)-98.5 °F (36.9 °C)] 98.1 °F (36.7 °C)  Pulse:  [70-81] 70  Resp:  [16-18] 18  SpO2:  [97 %-100 %] 99 %  BP: (125-149)/(68-81) 138/68     Weight: 96.1 kg (211 lb 12.8 oz) (11/03/24 0500)  Body mass index is 37.52 kg/m².  Body surface area is 2.07 meters squared.    No intake/output data recorded.     Physical Exam  Constitutional:       General: She is not in acute distress.  HENT:      Head: Normocephalic and atraumatic.   Eyes:      Pupils: Pupils are equal, round, and reactive to light.   Cardiovascular:      Rate and Rhythm: Normal rate and regular rhythm.      Heart  sounds: No murmur heard.     No friction rub. No gallop.   Pulmonary:      Effort: No respiratory distress.      Breath sounds: Normal breath sounds.   Abdominal:      General: Bowel sounds are normal.      Tenderness: There is no abdominal tenderness. There is no guarding.   Musculoskeletal:      Right lower leg: No edema.      Left lower leg: No edema.   Neurological:      Mental Status: She is alert and oriented to person, place, and time.   Psychiatric:         Behavior: Behavior normal.          Significant Labs:  BMP:   Recent Labs   Lab 11/08/24  0344   *   *   K 3.2*   *   CO2 16*   BUN 32*   CREATININE 2.18*   CALCIUM 6.9*        Significant Imaging:

## 2024-11-09 NOTE — ASSESSMENT & PLAN NOTE
"Patient's FSGs are uncontrolled due to hyperglycemia on current medication regimen.  Last A1c reviewed-   Lab Results   Component Value Date    HGBA1C 10.6 (H) 08/28/2024     Most recent fingerstick glucose reviewed- No results for input(s): "POCTGLUCOSE" in the last 24 hours.  Current correctional scale  Medium  Increase anti-hyperglycemic dose as follows-   Antihyperglycemics (From admission, onward)      Start     Stop Route Frequency Ordered    11/02/24 1643  insulin aspart U-100 injection 0-10 Units         -- SubQ Before meals & nightly PRN 11/02/24 1546          Hold Oral hypoglycemics while patient is in the hospital.    11/8 - glucoses ok  11/9 - Glucoses improved.   "

## 2024-11-09 NOTE — ASSESSMENT & PLAN NOTE
>>ASSESSMENT AND PLAN FOR NAUSEA VOMITING AND DIARRHEA WRITTEN ON 11/3/2024  3:00 PM BY JEAN CARLOS LUIS MD      Continue hydration, GI upset better, checking stool studies  Liquid diet for now    11/03 these symptoms persist and continue with hydration and IV antibiotics    11/8 - Continue levaquin, advance diet as tolerated.

## 2024-11-09 NOTE — PROGRESS NOTES
[facility] Hospital Medicine  Progress Note    Patient Name: Meghna Dalal  MRN: [id number]  Patient Class: IP- Inpatient   Admission Date: 11/2/2024  Length of Stay: 7 days  Attending Physician: Errol Posey Jr., MD  Primary Care Provider: Kwame Wyatt MD        Subjective:     Principal Problem:Salmonella enteritis        HPI:    Chief complaint:  Weakness with recent GI upset    History of present illness:          Ms. Dalal is a 74-year-old presented to the emergency room after have onset prior day of nausea vomiting and diarrhea.  This was associated with fever and chills.  Patient denies similar problem in the past.  Denies any unusual exposure.  Has not been out of area her eaten any unusual food that others have not also shared.  Did have shoulder surgery by Dr. Pablo 08/27/24 but no antibiotics since.  History of rheumatoid arthritis and on Orencia.  When seen in the emergency room was noted to be tachycardic, febrile, appeared ill and have elevated lactic acid.  Patient does take metformin for diabetes.  Continued here in the emergency room with nausea vomiting and liquidy nonbloody diarrhea.  Hospitalist service was asked to see patient for evaluation of admission'    Review of systems:  See above.  No recent respiratory complaints.  Denies pain.  Generally relatively active and able to ambulate without issues.  Has been undergoing therapy following above-mentioned shoulder surgery.  No history of being significant snore or sleep disturbance but does take frequent naps in the daytime.  Review of systems otherwise negative.     Past medical history:  -hypertension proximally 10 years  -diabetes mellitus proximally 10 years  -rheumatoid arthritis on selective T-cell co stimulation modulators (Orencia).   -hypothyroidism    Past surgical history:  Recent shoulder surgery as above mentioned by Dr. Pablo on August 27  Lumbar back surgery  Knee  replacement  Hysterectomy with 1 ovary removed    Allergy to codeine    Social history:  Lives with   No history of tobacco alcohol    Family history:  Son with myocardial infarction in his 50s    Health maintenance issues:  Primary care providers Dr. Wyatt  Has not had flu shot this year  Had prior Pneumovax  Daughter states patient has had COVID infection 2-3 times the last of which proximally 1 year earlier  Patient has had COVID vaccination x2 at least 1 booster  No history of recent mammogram  No recent Pap smear  Colonoscopies apparently about 5 years earlier told unremarkable          Overview/Hospital Course:  11/03 feels some better.  Still with nausea vomiting not tolerating liquids by mouth.  Some diarrhea.  Studies returned showing salmonella as etiology.   in room and states no one else sick.  Told him to throw out all open food at their house.  Continue with hydration and antibiotics.  Monitor  11/04 still with nausea vomiting diarrhea not able to hold down fluids well, with IV fluids out renal function slight worse.  Start IV fluids back.  Generally though patient states feeling some better.  Daughter in room  11/05 feels better but still NV and loose stools. Worse renal function. UOP but less. Asked Dr Conde to evaluate.  11/06 better but still not able to take much in orally. Continue hydration. Renal function better.  11/07 Still diarrhea and nausea. Still taking in poorly. Renal function better. Continue IVF for now.    Interval History: Improving.  Still some diarrhea but less frequent. Starting to eat.     Review of Systems  Objective:     Vital Signs (Most Recent):  Temp: 98.2 °F (36.8 °C) (11/09/24 1152)  Pulse: 74 (11/09/24 1152)  Resp: 18 (11/09/24 1152)  BP: 133/74 (11/09/24 1152)  SpO2: 100 % (11/09/24 1152) Vital Signs (24h Range):  Temp:  [97.4 °F (36.3 °C)-98.2 °F (36.8 °C)] 98.2 °F (36.8 °C)  Pulse:  [68-79] 74  Resp:  [16-18] 18  SpO2:  [98 %-100 %] 100 %  BP:  "(121-152)/(52-81) 133/74     Weight: 89.4 kg (197 lb 1.5 oz)  Body mass index is 34.91 kg/m².  No intake or output data in the 24 hours ending 11/09/24 1530      Physical Exam  Vitals reviewed.   Constitutional:       General: She is not in acute distress.     Appearance: She is not toxic-appearing.   Cardiovascular:      Rate and Rhythm: Normal rate and regular rhythm.      Heart sounds: Normal heart sounds.   Pulmonary:      Effort: Pulmonary effort is normal. No respiratory distress.      Breath sounds: Normal breath sounds. No wheezing.   Abdominal:      General: Abdomen is flat. There is no distension.      Palpations: Abdomen is soft.      Tenderness: There is no abdominal tenderness.   Musculoskeletal:      Right lower leg: No edema.      Left lower leg: No edema.   Skin:     General: Skin is warm and dry.      Coloration: Skin is not jaundiced.      Findings: No bruising.   Neurological:      General: No focal deficit present.      Mental Status: She is alert and oriented to person, place, and time.             Significant Labs: All pertinent labs within the past 24 hours have been reviewed.  BMP:   Recent Labs   Lab 11/09/24  0552   *      K 3.7   *   CO2 16*   BUN 19*   CREATININE 1.69*   CALCIUM 7.5*     CBC: No results for input(s): "WBC", "HGB", "HCT", "PLT" in the last 48 hours.    Significant Imaging: I have reviewed all pertinent imaging results/findings within the past 24 hours.    Assessment/Plan:      * Salmonella enteritis  >>ASSESSMENT AND PLAN FOR NAUSEA VOMITING AND DIARRHEA WRITTEN ON 11/3/2024  3:00 PM BY JAEN CARLOS LUIS MD      Continue hydration, GI upset better, checking stool studies  Liquid diet for now    11/03 these symptoms persist and continue with hydration and IV antibiotics    11/8 - Continue levaquin, advance diet as tolerated.   11/9 - Continue levaquin. Advancing diet                        ARF (acute renal failure)  GEOVANY is likely due to pre-renal azotemia " "due to dehydration. Baseline creatinine is  1.1 . Most recent creatinine and eGFR are listed below.  Recent Labs     11/07/24  0505 11/08/24  0344 11/09/24  0552   CREATININE 2.68* 2.18* 1.69*   EGFRNORACEVR 18* 24* 32*        Plan  - GEOVANY is worsening. Will hydrate  - Avoid nephrotoxins and renally dose meds for GFR listed above  - Monitor urine output, serial BMP, and adjust therapy as needed  - nephrology asked to see  11/8 - Improving, continue IVF.   11/9 - Continue IVF but decrease rate given improving intake.     Uncontrolled type 2 diabetes mellitus with hyperglycemia  Patient's FSGs are uncontrolled due to hyperglycemia on current medication regimen.  Last A1c reviewed-   Lab Results   Component Value Date    HGBA1C 10.6 (H) 08/28/2024     Most recent fingerstick glucose reviewed- No results for input(s): "POCTGLUCOSE" in the last 24 hours.  Current correctional scale  Medium  Increase anti-hyperglycemic dose as follows-   Antihyperglycemics (From admission, onward)      Start     Stop Route Frequency Ordered    11/02/24 1643  insulin aspart U-100 injection 0-10 Units         -- SubQ Before meals & nightly PRN 11/02/24 1546          Hold Oral hypoglycemics while patient is in the hospital.    11/8 - glucoses ok  11/9 - Glucoses improved.     Metabolic acidosis, NAG, bicarbonate losses    Bicarbonate loss from diarrhea but likely component of hyperchloremia and resolving ARF.     Vitamin D deficiency    Replacement and continue at home    Acute lactic acidosis    Continue with intravenous hydration  Checking blood cultures with possibility of septicemia  However metformin inpatients current situation of dehydration and GI upset could be associated with lactic acidosis    Dehydration    Although IV fluids on national shortage with patient's tachycardia and inability to take orally will leave on continue maintenance IV fluids for now.  Patient has had 2-1/2 L so far in the emergency room.        Fever  Check " stool studies  Blood culture have been drawn  Cover with levofloxacin for possible infectious diarrhea    11/04 resolved      History of right shoulder replacement    This back in August and was patient's last antibiotics not likely etiology but checking C diff    Acquired hypothyroidism  Continue Synthroid  Check T4 and TSH      Systolic hypertension  Patients blood pressure range in the last 24 hours was: BP  Min: 94/62  Max: 185/80.The patient's inpatient anti-hypertensive regimen is listed below:  Current Antihypertensives       Plan  - BP is uncontrolled, will adjust as follows:  Adjust meds as needed    11/9 - BP controlled.       Severe obesity (BMI 35.0-39.9) with comorbidity  Body mass index is 37.52 kg/m². Morbid obesity complicates all aspects of disease management from diagnostic modalities to treatment. Weight loss encouraged and health benefits explained to patient.         Rheumatoid arthritis involving multiple sites with positive rheumatoid factor    Hold patient's selective T-cell code stimulator modulator Orencia while having acute illness      VTE Risk Mitigation (From admission, onward)           Ordered     enoxaparin injection 30 mg  Daily         11/04/24 1740     IP VTE HIGH RISK PATIENT  Once         11/02/24 1546     Place sequential compression device  Until discontinued         11/02/24 1546                    Discharge Planning   NAEEM: 11/9/2024     Code Status: Full Code   Is the patient medically ready for discharge?:     Reason for patient still in hospital (select all that apply): Treatment  Discharge Plan A: Home with family                  Errol Posey Jr, MD  Department of Hospital Medicine   [facility]

## 2024-11-09 NOTE — ASSESSMENT & PLAN NOTE
GEOVANY is likely due to pre-renal azotemia due to dehydration. Baseline creatinine is  1.1 . Most recent creatinine and eGFR are listed below.  Recent Labs     11/07/24  0505 11/08/24  0344 11/09/24  0552   CREATININE 2.68* 2.18* 1.69*   EGFRNORACEVR 18* 24* 32*        Plan  - GEOVANY is worsening. Will hydrate  - Avoid nephrotoxins and renally dose meds for GFR listed above  - Monitor urine output, serial BMP, and adjust therapy as needed  - nephrology asked to see  11/8 - Improving, continue IVF.   11/9 - Continue IVF but decrease rate given improving intake.

## 2024-11-09 NOTE — ASSESSMENT & PLAN NOTE
Creatinine 1.0 in August 20, 2024.  Creatinine 1.39 on admission.    Renal function continues to improve.  Continue IV fluid.  Encourage p.o. fluids as tolerated

## 2024-11-09 NOTE — ASSESSMENT & PLAN NOTE
GEOVANY is likely due to pre-renal azotemia due to dehydration. Baseline creatinine is  1.1 . Most recent creatinine and eGFR are listed below.  Recent Labs     11/06/24  0716 11/07/24  0505 11/08/24  0344   CREATININE 4.12* 2.68* 2.18*   EGFRNORACEVR 11* 18* 24*        Plan  - GEOVANY is worsening. Will hydrate  - Avoid nephrotoxins and renally dose meds for GFR listed above  - Monitor urine output, serial BMP, and adjust therapy as needed  - nephrology asked to see  11/8 - Improving, continue IVF.

## 2024-11-10 VITALS
HEIGHT: 63 IN | WEIGHT: 199.31 LBS | HEART RATE: 69 BPM | SYSTOLIC BLOOD PRESSURE: 150 MMHG | DIASTOLIC BLOOD PRESSURE: 79 MMHG | OXYGEN SATURATION: 100 % | TEMPERATURE: 98 F | RESPIRATION RATE: 16 BRPM | BODY MASS INDEX: 35.32 KG/M2

## 2024-11-10 LAB
ANION GAP SERPL CALCULATED.3IONS-SCNC: 11 MMOL/L (ref 7–16)
BUN SERPL-MCNC: 11 MG/DL (ref 7–18)
BUN/CREAT SERPL: 8 (ref 6–20)
CALCIUM SERPL-MCNC: 7.7 MG/DL (ref 8.5–10.1)
CHLORIDE SERPL-SCNC: 114 MMOL/L (ref 98–107)
CO2 SERPL-SCNC: 17 MMOL/L (ref 21–32)
CREAT SERPL-MCNC: 1.45 MG/DL (ref 0.55–1.02)
EGFR (NO RACE VARIABLE) (RUSH/TITUS): 38 ML/MIN/1.73M2
GLUCOSE SERPL-MCNC: 117 MG/DL (ref 74–106)
GLUCOSE SERPL-MCNC: 123 MG/DL (ref 70–105)
POTASSIUM SERPL-SCNC: 3.8 MMOL/L (ref 3.5–5.1)
SODIUM SERPL-SCNC: 138 MMOL/L (ref 136–145)

## 2024-11-10 PROCEDURE — 99239 HOSP IP/OBS DSCHRG MGMT >30: CPT | Mod: ,,, | Performed by: FAMILY MEDICINE

## 2024-11-10 PROCEDURE — 82962 GLUCOSE BLOOD TEST: CPT

## 2024-11-10 PROCEDURE — 25000003 PHARM REV CODE 250: Performed by: FAMILY MEDICINE

## 2024-11-10 PROCEDURE — 36415 COLL VENOUS BLD VENIPUNCTURE: CPT | Performed by: INTERNAL MEDICINE

## 2024-11-10 PROCEDURE — 25000003 PHARM REV CODE 250: Performed by: INTERNAL MEDICINE

## 2024-11-10 PROCEDURE — 94761 N-INVAS EAR/PLS OXIMETRY MLT: CPT

## 2024-11-10 PROCEDURE — 80048 BASIC METABOLIC PNL TOTAL CA: CPT | Performed by: INTERNAL MEDICINE

## 2024-11-10 PROCEDURE — 25000003 PHARM REV CODE 250: Performed by: HOSPITALIST

## 2024-11-10 RX ORDER — LEVOFLOXACIN 500 MG/1
500 TABLET, FILM COATED ORAL DAILY
Qty: 3 TABLET | Refills: 0 | Status: SHIPPED | OUTPATIENT
Start: 2024-11-10

## 2024-11-10 RX ORDER — LEVOTHYROXINE SODIUM 112 UG/1
112 TABLET ORAL DAILY
Qty: 30 TABLET | Refills: 11 | Status: SHIPPED | OUTPATIENT
Start: 2024-11-11 | End: 2025-11-11

## 2024-11-10 RX ORDER — ALUMINUM HYDROXIDE, MAGNESIUM HYDROXIDE, AND SIMETHICONE 2400; 240; 2400 MG/30ML; MG/30ML; MG/30ML
30 SUSPENSION ORAL EVERY 6 HOURS PRN
Status: DISCONTINUED | OUTPATIENT
Start: 2024-11-10 | End: 2024-11-10 | Stop reason: HOSPADM

## 2024-11-10 RX ADMIN — Medication 1000 UNITS: at 08:11

## 2024-11-10 RX ADMIN — FAMOTIDINE 20 MG: 10 INJECTION, SOLUTION INTRAVENOUS at 08:11

## 2024-11-10 RX ADMIN — ALUMINUM HYDROXIDE, MAGNESIUM HYDROXIDE, AND DIMETHICONE 30 ML: 400; 400; 40 SUSPENSION ORAL at 01:11

## 2024-11-10 RX ADMIN — SODIUM BICARBONATE 650 MG TABLET 650 MG: at 08:11

## 2024-11-10 RX ADMIN — LEVOTHYROXINE SODIUM 112 MCG: 0.11 TABLET ORAL at 05:11

## 2024-11-10 NOTE — PLAN OF CARE
Problem: Adult Inpatient Plan of Care  Goal: Plan of Care Review  Outcome: Met  Goal: Patient-Specific Goal (Individualized)  Outcome: Met  Goal: Absence of Hospital-Acquired Illness or Injury  Outcome: Met  Goal: Optimal Comfort and Wellbeing  Outcome: Met  Goal: Readiness for Transition of Care  Outcome: Met     Problem: Diabetes Comorbidity  Goal: Blood Glucose Level Within Targeted Range  Outcome: Met     Problem: Wound  Goal: Optimal Coping  Outcome: Met  Goal: Optimal Functional Ability  Outcome: Met  Goal: Absence of Infection Signs and Symptoms  Outcome: Met  Goal: Improved Oral Intake  Outcome: Met  Goal: Optimal Pain Control and Function  Outcome: Met  Goal: Skin Health and Integrity  Outcome: Met  Goal: Optimal Wound Healing  Outcome: Met     Problem: Skin Injury Risk Increased  Goal: Skin Health and Integrity  Outcome: Met     Problem: Acute Kidney Injury/Impairment  Goal: Fluid and Electrolyte Balance  Outcome: Met  Goal: Improved Oral Intake  Outcome: Met  Goal: Effective Renal Function  Outcome: Met     Problem: Fall Injury Risk  Goal: Absence of Fall and Fall-Related Injury  Outcome: Met

## 2024-11-10 NOTE — ASSESSMENT & PLAN NOTE
Patients blood pressure range in the last 24 hours was: BP  Min: 94/62  Max: 185/80.The patient's inpatient anti-hypertensive regimen is listed below:  Current Antihypertensives       Plan  - BP is uncontrolled, will adjust as follows:  Adjust meds as needed    11/9 - BP controlled.   11/10 - Hold Lasix and hyzaar till Thursday.  Ideally try to see internist and have BMP before resuming. She will see if she can get in, office closed.

## 2024-11-10 NOTE — NURSING
Pt is c/o of heartburn & indigestion and is requesting Mylanta.  Notified Dr. Galaviz via secure chat and he put in order for Mylanta PRN Q6H.  Gave to patient and educated her about it and told her it was available again in Q6H if she is still having indigestion later on today and patient said ok thank you.

## 2024-11-10 NOTE — PROGRESS NOTES
[facility]  Nephrology  Progress Note    Patient Name: Meghna Dalal  MRN: [id number]  Admission Date: 11/2/2024  Hospital Length of Stay: 8 days  Attending Provider: Errol Posey Jr., MD   Primary Care Physician: Kwame Wyatt MD  Principal Problem:Salmonella enteritis    Subjective:     HPI: 74-year-old woman admitted with nausea vomiting and diarrhea.  Stool culture positive for salmonella.  She has developed acute renal failure since admission.  No history of prior renal insufficiency.    Interval History:  Diarrhea improved.  No SOB    Review of patient's allergies indicates:   Allergen Reactions    Codeine phosphate      Other reaction(s): facial swelling    Codeine sulfate      Other reaction(s): Unknown     Current Facility-Administered Medications   Medication Frequency    0.45% NaCl with KCl 20 mEq infusion Continuous    acetaminophen suppository 650 mg Q6H PRN    acetaminophen tablet 650 mg Q8H PRN    aluminum & magnesium hydroxide-simethicone 400-400-40 mg/5 mL suspension 30 mL Q6H PRN    dextrose 10% bolus 125 mL 125 mL PRN    dextrose 10% bolus 250 mL 250 mL PRN    enoxaparin injection 30 mg Daily    famotidine (PF) injection 20 mg Daily    glucagon (human recombinant) injection 1 mg PRN    glucose chewable tablet 16 g PRN    glucose chewable tablet 24 g PRN    insulin aspart U-100 injection 0-10 Units QID (AC + HS) PRN    levoFLOXacin 250 mg/50 mL IVPB 250 mg Q24H    levothyroxine tablet 112 mcg Daily    naloxone 0.4 mg/mL injection 0.02 mg PRN    ondansetron injection 4 mg Q6H PRN    promethazine injection 25 mg Q6H PRN    simethicone chewable tablet 80 mg TID PRN    sodium bicarbonate tablet 650 mg BID    sodium chloride 0.9% flush 10 mL Q12H PRN    vitamin D 1000 units tablet 1,000 Units Daily       Objective:     Vital Signs (Most Recent):  Temp: 98.2 °F (36.8 °C) (11/10/24 0709)  Pulse: 69 (11/10/24 0709)  Resp: 16 (11/10/24 0709)  BP: (!) 150/79 (11/10/24  0709)  SpO2: 100 % (11/10/24 0709) Vital Signs (24h Range):  Temp:  [98 °F (36.7 °C)-98.4 °F (36.9 °C)] 98.2 °F (36.8 °C)  Pulse:  [69-78] 69  Resp:  [16] 16  SpO2:  [98 %-100 %] 100 %  BP: (118-150)/(72-81) 150/79     Weight: 90.4 kg (199 lb 4.7 oz) (11/10/24 0535)  Body mass index is 35.3 kg/m².  Body surface area is 2 meters squared.    No intake/output data recorded.     Physical Exam  Constitutional:       General: She is not in acute distress.  HENT:      Head: Normocephalic and atraumatic.   Eyes:      Pupils: Pupils are equal, round, and reactive to light.   Cardiovascular:      Rate and Rhythm: Normal rate and regular rhythm.      Heart sounds: No murmur heard.     No friction rub. No gallop.   Pulmonary:      Effort: No respiratory distress.      Breath sounds: Normal breath sounds.   Abdominal:      General: Bowel sounds are normal.      Tenderness: There is no abdominal tenderness. There is no guarding.   Musculoskeletal:      Right lower leg: No edema.      Left lower leg: No edema.   Neurological:      Mental Status: She is alert and oriented to person, place, and time.   Psychiatric:         Behavior: Behavior normal.          Significant Labs:  BMP:   Recent Labs   Lab 11/10/24  0452   *      K 3.8   *   CO2 17*   BUN 11   CREATININE 1.45*   CALCIUM 7.7*        Significant Imaging:    Assessment/Plan:     Cardiac/Vascular  Systolic hypertension  Controlled    Renal/  ARF (acute renal failure)  Creatinine 1.0 in August 20, 2024.  Creatinine 1.39 on admission.    Creatinine improved to 1.45.      ID  * Salmonella enteritis  Continue Levaquin    Endocrine  Uncontrolled type 2 diabetes mellitus with hyperglycemia  Now controlled        Thank you for your consult.     Bernard Conde MD  Nephrology  [facility]

## 2024-11-10 NOTE — ASSESSMENT & PLAN NOTE
"Patient's FSGs are uncontrolled due to hyperglycemia on current medication regimen.  Last A1c reviewed-   Lab Results   Component Value Date    HGBA1C 10.6 (H) 08/28/2024     Most recent fingerstick glucose reviewed- No results for input(s): "POCTGLUCOSE" in the last 24 hours.  Current correctional scale  Medium  Increase anti-hyperglycemic dose as follows-   Antihyperglycemics (From admission, onward)      Start     Stop Route Frequency Ordered    11/02/24 1643  insulin aspart U-100 injection 0-10 Units         -- SubQ Before meals & nightly PRN 11/02/24 1546          Hold Oral hypoglycemics while patient is in the hospital.    11/8 - glucoses ok  11/9 - Glucoses improved.   11/10 - Glucoses currently good off meds. Home with diabetic diet. Consider resuming metformin if renal function allows.   "

## 2024-11-10 NOTE — DISCHARGE SUMMARY
[facility]  [facility] Medicine  Discharge Summary      Patient Name: Meghna Dalal  MRN: [id number]  YVETTE: [id number]  Patient Class: IP- Inpatient  Admission Date: 11/2/2024  Hospital Length of Stay: 8 days  Discharge Date and Time: No discharge date for patient encounter.  Attending Physician: Errol Posey Jr., MD   Discharging Provider: Errol Posey Jr, MD  Primary Care Provider: Kwame Wyatt MD    Primary Care Team: Networked reference to record PCT     HPI:     Chief complaint:  Weakness with recent GI upset    History of present illness:          Ms. Dalal is a 74-year-old presented to the emergency room after have onset prior day of nausea vomiting and diarrhea.  This was associated with fever and chills.  Patient denies similar problem in the past.  Denies any unusual exposure.  Has not been out of area her eaten any unusual food that others have not also shared.  Did have shoulder surgery by Dr. Pablo 08/27/24 but no antibiotics since.  History of rheumatoid arthritis and on Orencia.  When seen in the emergency room was noted to be tachycardic, febrile, appeared ill and have elevated lactic acid.  Patient does take metformin for diabetes.  Continued here in the emergency room with nausea vomiting and liquidy nonbloody diarrhea.  Hospitalist service was asked to see patient for evaluation of admission'    Review of systems:  See above.  No recent respiratory complaints.  Denies pain.  Generally relatively active and able to ambulate without issues.  Has been undergoing therapy following above-mentioned shoulder surgery.  No history of being significant snore or sleep disturbance but does take frequent naps in the daytime.  Review of systems otherwise negative.     Past medical history:  -hypertension proximally 10 years  -diabetes mellitus proximally 10 years  -rheumatoid arthritis on selective T-cell co stimulation modulators (Orencia).    -hypothyroidism    Past surgical history:  Recent shoulder surgery as above mentioned by Dr. Pablo on August 27  Lumbar back surgery  Knee replacement  Hysterectomy with 1 ovary removed    Allergy to codeine    Social history:  Lives with   No history of tobacco alcohol    Family history:  Son with myocardial infarction in his 50s    Health maintenance issues:  Primary care providers Dr. Wyatt  Has not had flu shot this year  Had prior Pneumovax  Daughter states patient has had COVID infection 2-3 times the last of which proximally 1 year earlier  Patient has had COVID vaccination x2 at least 1 booster  No history of recent mammogram  No recent Pap smear  Colonoscopies apparently about 5 years earlier told unremarkable          * No surgery found *      Hospital Course:   11/03 feels some better.  Still with nausea vomiting not tolerating liquids by mouth.  Some diarrhea.  Studies returned showing salmonella as etiology.   in room and states no one else sick.  Told him to throw out all open food at their house.  Continue with hydration and antibiotics.  Monitor  11/04 still with nausea vomiting diarrhea not able to hold down fluids well, with IV fluids out renal function slight worse.  Start IV fluids back.  Generally though patient states feeling some better.  Daughter in room  11/05 feels better but still NV and loose stools. Worse renal function. UOP but less. Asked Dr Conde to evaluate.  11/06 better but still not able to take much in orally. Continue hydration. Renal function better.  11/07 Still diarrhea and nausea. Still taking in poorly. Renal function better. Continue IVF for now.  11/10 - Summary.  Patient is a pleasant 74-year-old  female in mid with salmonella enteritis, dehydration, and subsequent acute renal failure.  Lab testing did show positive for salmonella.  The patient was treated with Levaquin and IV fluids.  Creatinine peaked at 5.93 on 11/05 but has rapidly  improved since then.  Day of discharge creatinine is 1.45.  Diarrhea has essentially stopped.  No nausea and vomiting and patient is eating reasonably well.  Glucoses have been below 200.  Renal function still marginal for resuming her metformin.  We will discharge home today with 3 additional days of Levaquin.  Patient to hold her Lasix, Hyzaar, and metformin until Thursday.  I have asked her to try to see her internist Dr. Wyatt this week and have labs rechecked before resuming.      Goals of Care Treatment Preferences:  Code Status: Full Code      SDOH Screening:  The patient was screened for utility difficulties, food insecurity, transport difficulties, housing insecurity, and interpersonal safety and there were no concerns identified this admission.     Consults:   Consults (From admission, onward)          Status Ordering Provider     Inpatient consult to Nephrology  Once        Provider:  Bernard Conde MD    Acknowledged JEAN CARLOS LUIS            Cardiac/Vascular  Systolic hypertension  Patients blood pressure range in the last 24 hours was: BP  Min: 94/62  Max: 185/80.The patient's inpatient anti-hypertensive regimen is listed below:  Current Antihypertensives       Plan  - BP is uncontrolled, will adjust as follows:  Adjust meds as needed    11/9 - BP controlled.   11/10 - Hold Lasix and hyzaar till Thursday.  Ideally try to see internist and have BMP before resuming. She will see if she can get in, office closed.       Renal/  ARF (acute renal failure)  GEOVANY is likely due to pre-renal azotemia due to dehydration. Baseline creatinine is  1.1 . Most recent creatinine and eGFR are listed below.  Recent Labs     11/08/24  0344 11/09/24  0552 11/10/24  0452   CREATININE 2.18* 1.69* 1.45*   EGFRNORACEVR 24* 32* 38*        Plan  - GEOVANY is worsening. Will hydrate  - Avoid nephrotoxins and renally dose meds for GFR listed above  - Monitor urine output, serial BMP, and adjust therapy as needed  - nephrology asked  "to see  11/8 - Improving, continue IVF.   11/9 - Continue IVF but decrease rate given improving intake.   11/10 - Normalizing. Still marginal for metformin. Hold until sees Dr. Wyatt.     ID  * Salmonella enteritis  >>ASSESSMENT AND PLAN FOR NAUSEA VOMITING AND DIARRHEA WRITTEN ON 11/3/2024  3:00 PM BY JEAN CARLOS LUIS MD      Continue hydration, GI upset better, checking stool studies  Liquid diet for now    11/03 these symptoms persist and continue with hydration and IV antibiotics    11/8 - Continue levaquin, advance diet as tolerated.   11/9 - Continue levaquin. Advancing diet  11/10 - Home with 3 additional days of levaquin.                         Endocrine  Uncontrolled type 2 diabetes mellitus with hyperglycemia  Patient's FSGs are uncontrolled due to hyperglycemia on current medication regimen.  Last A1c reviewed-   Lab Results   Component Value Date    HGBA1C 10.6 (H) 08/28/2024     Most recent fingerstick glucose reviewed- No results for input(s): "POCTGLUCOSE" in the last 24 hours.  Current correctional scale  Medium  Increase anti-hyperglycemic dose as follows-   Antihyperglycemics (From admission, onward)      Start     Stop Route Frequency Ordered    11/02/24 1643  insulin aspart U-100 injection 0-10 Units         -- SubQ Before meals & nightly PRN 11/02/24 1546          Hold Oral hypoglycemics while patient is in the hospital.    11/8 - glucoses ok  11/9 - Glucoses improved.   11/10 - Glucoses currently good off meds. Home with diabetic diet. Consider resuming metformin if renal function allows.       Final Active Diagnoses:    Diagnosis Date Noted POA    PRINCIPAL PROBLEM:  Salmonella enteritis [A02.0] 11/02/2024 Yes    ARF (acute renal failure) [N17.9] 11/05/2024 No    Uncontrolled type 2 diabetes mellitus with hyperglycemia [E11.65] 08/27/2024 Yes    Metabolic acidosis, NAG, bicarbonate losses [E87.20] 11/09/2024 No    Vitamin D deficiency [E55.9] 11/03/2024 Yes    Fever [R50.9] 11/02/2024 Yes    " Dehydration [E86.0] 11/02/2024 Yes    Acute lactic acidosis [E87.21] 11/02/2024 Yes    History of right shoulder replacement [Z96.611] 10/17/2024 Not Applicable    Acquired hypothyroidism [E03.9] 08/27/2024 Yes    Systolic hypertension [I10] 08/27/2024 Yes    Severe obesity (BMI 35.0-39.9) with comorbidity [E66.01] 08/16/2023 Yes    Rheumatoid arthritis involving multiple sites with positive rheumatoid factor [M05.79] 10/07/2021 Yes      Problems Resolved During this Admission:       Discharged Condition: good    Disposition: Home or Self Care    Follow Up:    Patient Instructions:   No discharge procedures on file.    Significant Diagnostic Studies: N/A    Pending Diagnostic Studies:       Procedure Component Value Units Date/Time    EKG 12-lead [[id number]]     Order Status: Sent Lab Status: No result     EXTRA TUBES [[id number]]     Order Status: Sent Lab Status: No result     Specimen: Blood, Venous     Narrative:      The following orders were created for panel order EXTRA TUBES.  Procedure                               Abnormality         Status                     ---------                               -----------         ------                     Gold Top Hold[[id number]]                                                                Please view results for these tests on the individual orders.    EXTRA TUBES [[id number]]     Order Status: Sent Lab Status: No result     Specimen: Blood, Venous     Narrative:      The following orders were created for panel order EXTRA TUBES.  Procedure                               Abnormality         Status                     ---------                               -----------         ------                     Lavender Top Hold[[id number]]                                                            Please view results for these tests on the individual orders.    EXTRA TUBES [[id number]] Collected: 11/06/24 0715    Order Status: Sent Lab Status: In process Updated:  11/06/24 0725    Specimen: Blood, Venous     Narrative:      The following orders were created for panel order EXTRA TUBES.  Procedure                               Abnormality         Status                     ---------                               -----------         ------                     Lavender Top Hold[[id number]]                               In process                   Please view results for these tests on the individual orders.    EXTRA TUBES [[id number]] Collected: 11/03/24 0949    Order Status: Sent Lab Status: In process Updated: 11/03/24 0949    Specimen: Blood, Venous     Narrative:      The following orders were created for panel order EXTRA TUBES.  Procedure                               Abnormality         Status                     ---------                               -----------         ------                     Light Green Top Hold[[id number]]                            In process                   Please view results for these tests on the individual orders.    Gold Top Hold [[id number]]     Order Status: Sent Lab Status: No result     Specimen: Blood, Venous     Lavender Top Hold [[id number]]     Order Status: Sent Lab Status: No result     Specimen: Blood, Venous     Urinalysis, Reflex to Urine Culture [[id number]]     Order Status: Sent Lab Status: No result     Specimen: Urine            Medications:  Reconciled Home Medications:      Medication List        START taking these medications      levoFLOXacin 500 MG tablet  Commonly known as: LEVAQUIN  Take 1 tablet (500 mg total) by mouth once daily.            CHANGE how you take these medications      levothyroxine 112 MCG tablet  Commonly known as: SYNTHROID  Take 1 tablet (112 mcg total) by mouth once daily.  Start taking on: November 11, 2024  What changed:   medication strength  how much to take  when to take this            CONTINUE taking these medications      aspirin 81 mg Cap  Take 81 mg by mouth once daily.      cyclobenzaprine 10 MG tablet  Commonly known as: FLEXERIL  Take 1 tablet (10 mg total) by mouth 3 (three) times daily as needed for Muscle spasms.     furosemide 20 MG tablet  Commonly known as: LASIX  Take 1 tablet (20 mg total) by mouth once daily.     gabapentin 100 MG capsule  Commonly known as: NEURONTIN  Take 1 capsule (100 mg total) by mouth 3 (three) times daily.     losartan-hydrochlorothiazide 100-12.5 mg 100-12.5 mg Tab  Commonly known as: HYZAAR  TAKE 1 TABLET BY MOUTH EVERY DAY     metFORMIN 500 MG tablet  Commonly known as: GLUCOPHAGE  TAKE 1 TABLET BY MOUTH THREE TIMES A DAY     omeprazole 40 MG capsule  Commonly known as: PRILOSEC  TAKE 1 CAPSULE BY MOUTH ONCE  DAILY     potassium chloride 8 mEq Cpsr  Commonly known as: MICRO-K  TAKE 1 CAPSULE BY MOUTH ONCE DAILY.     predniSONE 5 MG tablet  Commonly known as: DELTASONE  Take 5 mg by mouth once daily.              Indwelling Lines/Drains at time of discharge:   Lines/Drains/Airways       None                   Time spent on the discharge of patient: 45 minutes         Errol Posey Jr, MD  Department of Hospital Medicine  [facility]

## 2024-11-10 NOTE — ASSESSMENT & PLAN NOTE
>>ASSESSMENT AND PLAN FOR NAUSEA VOMITING AND DIARRHEA WRITTEN ON 11/3/2024  3:00 PM BY JEAN CARLOS LUIS MD      Continue hydration, GI upset better, checking stool studies  Liquid diet for now    11/03 these symptoms persist and continue with hydration and IV antibiotics    11/8 - Continue levaquin, advance diet as tolerated.   11/9 - Continue levaquin. Advancing diet  11/10 - Home with 3 additional days of levaquin.

## 2024-11-10 NOTE — ASSESSMENT & PLAN NOTE
GEOVANY is likely due to pre-renal azotemia due to dehydration. Baseline creatinine is  1.1 . Most recent creatinine and eGFR are listed below.  Recent Labs     11/08/24  0344 11/09/24  0552 11/10/24  0452   CREATININE 2.18* 1.69* 1.45*   EGFRNORACEVR 24* 32* 38*        Plan  - GEOVANY is worsening. Will hydrate  - Avoid nephrotoxins and renally dose meds for GFR listed above  - Monitor urine output, serial BMP, and adjust therapy as needed  - nephrology asked to see  11/8 - Improving, continue IVF.   11/9 - Continue IVF but decrease rate given improving intake.   11/10 - Normalizing. Still marginal for metformin. Hold until sees Dr. Wyatt.

## 2024-11-10 NOTE — ASSESSMENT & PLAN NOTE
Creatinine 1.0 in August 20, 2024.  Creatinine 1.39 on admission.    Creatinine improved to 1.45.

## 2024-11-10 NOTE — PROGRESS NOTES
[facility] Hospital Medicine  Progress Note    Patient Name: Meghna Dalal  MRN: [id number]  Patient Class: IP- Inpatient   Admission Date: 11/2/2024  Length of Stay: 8 days  Attending Physician: Errol Posey Jr., MD  Primary Care Provider: Kwame Wyatt MD        Subjective:     Principal Problem:Salmonella enteritis        HPI:    Chief complaint:  Weakness with recent GI upset    History of present illness:          Ms. Dalal is a 74-year-old presented to the emergency room after have onset prior day of nausea vomiting and diarrhea.  This was associated with fever and chills.  Patient denies similar problem in the past.  Denies any unusual exposure.  Has not been out of area her eaten any unusual food that others have not also shared.  Did have shoulder surgery by Dr. Pablo 08/27/24 but no antibiotics since.  History of rheumatoid arthritis and on Orencia.  When seen in the emergency room was noted to be tachycardic, febrile, appeared ill and have elevated lactic acid.  Patient does take metformin for diabetes.  Continued here in the emergency room with nausea vomiting and liquidy nonbloody diarrhea.  Hospitalist service was asked to see patient for evaluation of admission'    Review of systems:  See above.  No recent respiratory complaints.  Denies pain.  Generally relatively active and able to ambulate without issues.  Has been undergoing therapy following above-mentioned shoulder surgery.  No history of being significant snore or sleep disturbance but does take frequent naps in the daytime.  Review of systems otherwise negative.     Past medical history:  -hypertension proximally 10 years  -diabetes mellitus proximally 10 years  -rheumatoid arthritis on selective T-cell co stimulation modulators (Orencia).   -hypothyroidism    Past surgical history:  Recent shoulder surgery as above mentioned by Dr. Pablo on August 27  Lumbar back surgery  Knee  replacement  Hysterectomy with 1 ovary removed    Allergy to codeine    Social history:  Lives with   No history of tobacco alcohol    Family history:  Son with myocardial infarction in his 50s    Health maintenance issues:  Primary care providers Dr. Wyatt  Has not had flu shot this year  Had prior Pneumovax  Daughter states patient has had COVID infection 2-3 times the last of which proximally 1 year earlier  Patient has had COVID vaccination x2 at least 1 booster  No history of recent mammogram  No recent Pap smear  Colonoscopies apparently about 5 years earlier told unremarkable          Overview/Hospital Course:  11/03 feels some better.  Still with nausea vomiting not tolerating liquids by mouth.  Some diarrhea.  Studies returned showing salmonella as etiology.   in room and states no one else sick.  Told him to throw out all open food at their house.  Continue with hydration and antibiotics.  Monitor  11/04 still with nausea vomiting diarrhea not able to hold down fluids well, with IV fluids out renal function slight worse.  Start IV fluids back.  Generally though patient states feeling some better.  Daughter in room  11/05 feels better but still NV and loose stools. Worse renal function. UOP but less. Asked Dr Conde to evaluate.  11/06 better but still not able to take much in orally. Continue hydration. Renal function better.  11/07 Still diarrhea and nausea. Still taking in poorly. Renal function better. Continue IVF for now.  11/10 - Summary.  Patient is a pleasant 74-year-old  female in mid with salmonella enteritis, dehydration, and subsequent acute renal failure.  Lab testing did show positive for salmonella.  The patient was treated with Levaquin and IV fluids.  Creatinine peaked at 5.93 on 11/05 but has rapidly improved since then.  Day of discharge creatinine is 1.45.  Diarrhea has essentially stopped.  No nausea and vomiting and patient is eating reasonably well.   "Glucoses have been below 200.  Renal function still marginal for resuming her metformin.  We will discharge home today with 3 additional days of Levaquin.  Patient to hold her Lasix, Hyzaar, and metformin until Thursday.  I have asked her to try to see her internist Dr. Wyatt this week and have labs rechecked before resuming.     No new subjective & objective note has been filed under this hospital service since the last note was generated.      Assessment/Plan:      * Salmonella enteritis  >>ASSESSMENT AND PLAN FOR NAUSEA VOMITING AND DIARRHEA WRITTEN ON 11/3/2024  3:00 PM BY JEAN CARLOS LUIS MD      Continue hydration, GI upset better, checking stool studies  Liquid diet for now    11/03 these symptoms persist and continue with hydration and IV antibiotics    11/8 - Continue levaquin, advance diet as tolerated.   11/9 - Continue levaquin. Advancing diet  11/10 - Home with 3 additional days of levaquin.                         ARF (acute renal failure)  GEOVANY is likely due to pre-renal azotemia due to dehydration. Baseline creatinine is  1.1 . Most recent creatinine and eGFR are listed below.  Recent Labs     11/08/24  0344 11/09/24  0552 11/10/24  0452   CREATININE 2.18* 1.69* 1.45*   EGFRNORACEVR 24* 32* 38*        Plan  - GEOVANY is worsening. Will hydrate  - Avoid nephrotoxins and renally dose meds for GFR listed above  - Monitor urine output, serial BMP, and adjust therapy as needed  - nephrology asked to see  11/8 - Improving, continue IVF.   11/9 - Continue IVF but decrease rate given improving intake.   11/10 - Normalizing. Still marginal for metformin. Hold until sees Dr. Wyatt.     Uncontrolled type 2 diabetes mellitus with hyperglycemia  Patient's FSGs are uncontrolled due to hyperglycemia on current medication regimen.  Last A1c reviewed-   Lab Results   Component Value Date    HGBA1C 10.6 (H) 08/28/2024     Most recent fingerstick glucose reviewed- No results for input(s): "POCTGLUCOSE" in the last 24 " hours.  Current correctional scale  Medium  Increase anti-hyperglycemic dose as follows-   Antihyperglycemics (From admission, onward)      Start     Stop Route Frequency Ordered    11/02/24 1643  insulin aspart U-100 injection 0-10 Units         -- SubQ Before meals & nightly PRN 11/02/24 1546          Hold Oral hypoglycemics while patient is in the hospital.    11/8 - glucoses ok  11/9 - Glucoses improved.   11/10 - Glucoses currently good off meds. Home with diabetic diet. Consider resuming metformin if renal function allows.     Metabolic acidosis, NAG, bicarbonate losses    Bicarbonate loss from diarrhea but likely component of hyperchloremia and resolving ARF.     Vitamin D deficiency    Replacement and continue at home    Acute lactic acidosis    Continue with intravenous hydration  Checking blood cultures with possibility of septicemia  However metformin inpatients current situation of dehydration and GI upset could be associated with lactic acidosis    Dehydration    Although IV fluids on national shortage with patient's tachycardia and inability to take orally will leave on continue maintenance IV fluids for now.  Patient has had 2-1/2 L so far in the emergency room.        Fever  Check stool studies  Blood culture have been drawn  Cover with levofloxacin for possible infectious diarrhea    11/04 resolved      History of right shoulder replacement    This back in August and was patient's last antibiotics not likely etiology but checking C diff    Acquired hypothyroidism  Continue Synthroid  Check T4 and TSH      Systolic hypertension  Patients blood pressure range in the last 24 hours was: BP  Min: 94/62  Max: 185/80.The patient's inpatient anti-hypertensive regimen is listed below:  Current Antihypertensives       Plan  - BP is uncontrolled, will adjust as follows:  Adjust meds as needed    11/9 - BP controlled.       Severe obesity (BMI 35.0-39.9) with comorbidity  Body mass index is 37.52 kg/m². Morbid  obesity complicates all aspects of disease management from diagnostic modalities to treatment. Weight loss encouraged and health benefits explained to patient.         Rheumatoid arthritis involving multiple sites with positive rheumatoid factor    Hold patient's selective T-cell code stimulator modulator Orencia while having acute illness      VTE Risk Mitigation (From admission, onward)           Ordered     enoxaparin injection 30 mg  Daily         11/04/24 1740     IP VTE HIGH RISK PATIENT  Once         11/02/24 1546     Place sequential compression device  Until discontinued         11/02/24 1546                    Discharge Planning   NAEEM: 11/10/2024     Code Status: Full Code   Is the patient medically ready for discharge?:     Reason for patient still in hospital (select all that apply): Treatment  Discharge Plan A: Home with family                  Errol Posey Jr, MD  Department of Hospital Medicine   [facility]

## 2024-11-10 NOTE — SUBJECTIVE & OBJECTIVE
Interval History:  Diarrhea improved.  No SOB    Review of patient's allergies indicates:   Allergen Reactions    Codeine phosphate      Other reaction(s): facial swelling    Codeine sulfate      Other reaction(s): Unknown     Current Facility-Administered Medications   Medication Frequency    0.45% NaCl with KCl 20 mEq infusion Continuous    acetaminophen suppository 650 mg Q6H PRN    acetaminophen tablet 650 mg Q8H PRN    aluminum & magnesium hydroxide-simethicone 400-400-40 mg/5 mL suspension 30 mL Q6H PRN    dextrose 10% bolus 125 mL 125 mL PRN    dextrose 10% bolus 250 mL 250 mL PRN    enoxaparin injection 30 mg Daily    famotidine (PF) injection 20 mg Daily    glucagon (human recombinant) injection 1 mg PRN    glucose chewable tablet 16 g PRN    glucose chewable tablet 24 g PRN    insulin aspart U-100 injection 0-10 Units QID (AC + HS) PRN    levoFLOXacin 250 mg/50 mL IVPB 250 mg Q24H    levothyroxine tablet 112 mcg Daily    naloxone 0.4 mg/mL injection 0.02 mg PRN    ondansetron injection 4 mg Q6H PRN    promethazine injection 25 mg Q6H PRN    simethicone chewable tablet 80 mg TID PRN    sodium bicarbonate tablet 650 mg BID    sodium chloride 0.9% flush 10 mL Q12H PRN    vitamin D 1000 units tablet 1,000 Units Daily       Objective:     Vital Signs (Most Recent):  Temp: 98.2 °F (36.8 °C) (11/10/24 0709)  Pulse: 69 (11/10/24 0709)  Resp: 16 (11/10/24 0709)  BP: (!) 150/79 (11/10/24 0709)  SpO2: 100 % (11/10/24 0709) Vital Signs (24h Range):  Temp:  [98 °F (36.7 °C)-98.4 °F (36.9 °C)] 98.2 °F (36.8 °C)  Pulse:  [69-78] 69  Resp:  [16] 16  SpO2:  [98 %-100 %] 100 %  BP: (118-150)/(72-81) 150/79     Weight: 90.4 kg (199 lb 4.7 oz) (11/10/24 0535)  Body mass index is 35.3 kg/m².  Body surface area is 2 meters squared.    No intake/output data recorded.     Physical Exam  Constitutional:       General: She is not in acute distress.  HENT:      Head: Normocephalic and atraumatic.   Eyes:      Pupils: Pupils are  equal, round, and reactive to light.   Cardiovascular:      Rate and Rhythm: Normal rate and regular rhythm.      Heart sounds: No murmur heard.     No friction rub. No gallop.   Pulmonary:      Effort: No respiratory distress.      Breath sounds: Normal breath sounds.   Abdominal:      General: Bowel sounds are normal.      Tenderness: There is no abdominal tenderness. There is no guarding.   Musculoskeletal:      Right lower leg: No edema.      Left lower leg: No edema.   Neurological:      Mental Status: She is alert and oriented to person, place, and time.   Psychiatric:         Behavior: Behavior normal.          Significant Labs:  BMP:   Recent Labs   Lab 11/10/24  0452   *      K 3.8   *   CO2 17*   BUN 11   CREATININE 1.45*   CALCIUM 7.7*        Significant Imaging:

## 2024-11-10 NOTE — NURSING
IV in left wrist leaking and pt said it is hurting.  Stopped iv fluids.  IV removed and cath tip was slightly bent.  New IV- 22G placed in right forearm and flushed well and resumed iv fluids.

## 2024-11-11 ENCOUNTER — PATIENT OUTREACH (OUTPATIENT)
Dept: ADMINISTRATIVE | Facility: CLINIC | Age: 74
End: 2024-11-11

## 2024-11-11 NOTE — PLAN OF CARE
[facility]  Discharge Final Note    Primary Care Provider: Kwame Wyatt MD    Expected Discharge Date: 11/10/2024    Final Discharge Note (most recent)       Final Note - 11/11/24 0817          Final Note    Assessment Type Final Discharge Note     Anticipated Discharge Disposition Home or Self Care     What phone number can be called within the next 1-3 days to see how you are doing after discharge? [phone]        Post-Acute Status    Coverage [facility] Medicare     Discharge Delays None known at this time                     Important Message from Medicare  Important Message from Medicare regarding Discharge Appeal Rights: Given to patient/caregiver, Explained to patient/caregiver, Signed/date by patient/caregiver     Date IMM was signed: 11/08/24  Time IMM was signed: 1600    Pt to dc home with family. IM updated. No further needs noted

## 2024-11-11 NOTE — PROGRESS NOTES
C3 nurse spoke with Meghna Dalal  for a [facility] post hospital discharge follow up call. The patient has a scheduled HOSFU appointment with Kwame Wyatt MD  on 11/13/24 @ 215.

## 2024-11-13 ENCOUNTER — OFFICE VISIT (OUTPATIENT)
Dept: INTERNAL MEDICINE | Facility: CLINIC | Age: 74
End: 2024-11-13
Payer: MEDICARE

## 2024-11-13 VITALS
RESPIRATION RATE: 16 BRPM | SYSTOLIC BLOOD PRESSURE: 130 MMHG | OXYGEN SATURATION: 98 % | DIASTOLIC BLOOD PRESSURE: 68 MMHG | HEIGHT: 63 IN | BODY MASS INDEX: 35.08 KG/M2 | TEMPERATURE: 98 F | WEIGHT: 198 LBS | HEART RATE: 76 BPM

## 2024-11-13 DIAGNOSIS — E66.01 SEVERE OBESITY (BMI 35.0-39.9) WITH COMORBIDITY: ICD-10-CM

## 2024-11-13 DIAGNOSIS — E78.5 HYPERLIPIDEMIA LDL GOAL <100: ICD-10-CM

## 2024-11-13 DIAGNOSIS — M05.79 RHEUMATOID ARTHRITIS INVOLVING MULTIPLE SITES WITH POSITIVE RHEUMATOID FACTOR: ICD-10-CM

## 2024-11-13 DIAGNOSIS — D84.821 DRUG-INDUCED IMMUNODEFICIENCY: ICD-10-CM

## 2024-11-13 DIAGNOSIS — J30.2 SEASONAL ALLERGIES: ICD-10-CM

## 2024-11-13 DIAGNOSIS — K21.9 GERD WITHOUT ESOPHAGITIS: ICD-10-CM

## 2024-11-13 DIAGNOSIS — Z79.899 DRUG-INDUCED IMMUNODEFICIENCY: ICD-10-CM

## 2024-11-13 DIAGNOSIS — D64.9 ANEMIA, UNSPECIFIED TYPE: ICD-10-CM

## 2024-11-13 DIAGNOSIS — N17.9 ACUTE RENAL FAILURE, UNSPECIFIED ACUTE RENAL FAILURE TYPE: ICD-10-CM

## 2024-11-13 DIAGNOSIS — E11.42 TYPE 2 DIABETES MELLITUS WITH DIABETIC POLYNEUROPATHY, WITHOUT LONG-TERM CURRENT USE OF INSULIN: ICD-10-CM

## 2024-11-13 DIAGNOSIS — E03.9 HYPOTHYROIDISM, UNSPECIFIED TYPE: ICD-10-CM

## 2024-11-13 DIAGNOSIS — I10 ESSENTIAL HYPERTENSION: Primary | ICD-10-CM

## 2024-11-13 PROCEDURE — 1159F MED LIST DOCD IN RCRD: CPT | Mod: CPTII,,, | Performed by: INTERNAL MEDICINE

## 2024-11-13 PROCEDURE — 99999 PR PBB SHADOW E&M-EST. PATIENT-LVL V: CPT | Mod: PBBFAC,,, | Performed by: INTERNAL MEDICINE

## 2024-11-13 PROCEDURE — 99215 OFFICE O/P EST HI 40 MIN: CPT | Mod: PBBFAC | Performed by: INTERNAL MEDICINE

## 2024-11-13 PROCEDURE — 3288F FALL RISK ASSESSMENT DOCD: CPT | Mod: CPTII,,, | Performed by: INTERNAL MEDICINE

## 2024-11-13 PROCEDURE — 3078F DIAST BP <80 MM HG: CPT | Mod: CPTII,,, | Performed by: INTERNAL MEDICINE

## 2024-11-13 PROCEDURE — 99214 OFFICE O/P EST MOD 30 MIN: CPT | Mod: S$PBB,,, | Performed by: INTERNAL MEDICINE

## 2024-11-13 PROCEDURE — 3008F BODY MASS INDEX DOCD: CPT | Mod: CPTII,,, | Performed by: INTERNAL MEDICINE

## 2024-11-13 PROCEDURE — 1101F PT FALLS ASSESS-DOCD LE1/YR: CPT | Mod: CPTII,,, | Performed by: INTERNAL MEDICINE

## 2024-11-13 PROCEDURE — 1160F RVW MEDS BY RX/DR IN RCRD: CPT | Mod: CPTII,,, | Performed by: INTERNAL MEDICINE

## 2024-11-13 PROCEDURE — 1111F DSCHRG MED/CURRENT MED MERGE: CPT | Mod: CPTII,,, | Performed by: INTERNAL MEDICINE

## 2024-11-13 PROCEDURE — 3046F HEMOGLOBIN A1C LEVEL >9.0%: CPT | Mod: CPTII,,, | Performed by: INTERNAL MEDICINE

## 2024-11-13 PROCEDURE — 3075F SYST BP GE 130 - 139MM HG: CPT | Mod: CPTII,,, | Performed by: INTERNAL MEDICINE

## 2024-11-13 RX ORDER — PANTOPRAZOLE SODIUM 40 MG/1
40 TABLET, DELAYED RELEASE ORAL DAILY
Qty: 90 TABLET | Refills: 3 | Status: SHIPPED | OUTPATIENT
Start: 2024-11-13 | End: 2025-11-13

## 2024-11-13 NOTE — PROGRESS NOTES
Subjective:       Patient ID: Meghna Dalal is a 74 y.o. female.    Chief Complaint: Follow-up (Meds reconciled/Care gaps discussed) and Transitional Care    In hospital with salmonella poisoning and acute renal failure  stomack still hurting and cannot eat    Follow-up  Pertinent negatives include no abdominal pain, chest pain, fatigue, rash or weakness.   Review of Systems   Constitutional:  Negative for fatigue and unexpected weight change.   HENT:  Negative for ear pain and goiter.    Respiratory:  Negative for chest tightness and shortness of breath.    Cardiovascular:  Negative for chest pain, palpitations and leg swelling.   Gastrointestinal:  Negative for abdominal pain and reflux.   Integumentary:  Negative for rash and breast tenderness.   Neurological:  Negative for dizziness and weakness.   Hematological:  Negative for adenopathy.   Psychiatric/Behavioral:  Negative for behavioral problems.    Breast: Negative for tenderness      Objective:      Physical Exam  Constitutional:       Appearance: Normal appearance.   HENT:      Head: Normocephalic and atraumatic.      Right Ear: External ear normal.      Left Ear: External ear normal.      Mouth/Throat:      Pharynx: Oropharynx is clear.   Eyes:      Extraocular Movements: Extraocular movements intact.      Pupils: Pupils are equal, round, and reactive to light.   Cardiovascular:      Rate and Rhythm: Normal rate and regular rhythm.      Pulses: Normal pulses.      Heart sounds: Normal heart sounds.   Pulmonary:      Effort: Pulmonary effort is normal.      Breath sounds: Normal breath sounds.   Abdominal:      General: Abdomen is flat. Bowel sounds are normal.      Palpations: Abdomen is soft.   Musculoskeletal:         General: Normal range of motion.      Cervical back: Normal range of motion and neck supple.   Skin:     General: Skin is warm.      Capillary Refill: Capillary refill takes less than 2 seconds.   Neurological:      General: No  focal deficit present.      Mental Status: She is alert.   Psychiatric:         Mood and Affect: Mood normal.         Thought Content: Thought content normal.         Judgment: Judgment normal.       Assessment:       1. Essential hypertension    2. GERD without esophagitis    3. Rheumatoid arthritis involving multiple sites with positive rheumatoid factor    4. Hypothyroidism, unspecified type    5. Hyperlipidemia LDL goal <100    6. Seasonal allergies    7. Anemia, unspecified type    8. Severe obesity (BMI 35.0-39.9) with comorbidity    9. Drug-induced immunodeficiency    10. Acute renal failure, unspecified acute renal failure type    11. Type 2 diabetes mellitus with diabetic polyneuropathy, without long-term current use of insulin        Plan:         There are no Patient Instructions on file for this visit.      Problem List Items Addressed This Visit          Renal/    ARF (acute renal failure)    Relevant Orders    Basic Metabolic Panel       Immunology/Multi System    Rheumatoid arthritis involving multiple sites with positive rheumatoid factor    Drug-induced immunodeficiency       Endocrine    Type 2 diabetes mellitus with diabetic polyneuropathy, without long-term current use of insulin    Severe obesity (BMI 35.0-39.9) with comorbidity     Other Visit Diagnoses       Essential hypertension    -  Primary    GERD without esophagitis        Hypothyroidism, unspecified type        Hyperlipidemia LDL goal <100        Seasonal allergies        Anemia, unspecified type

## 2024-11-14 DIAGNOSIS — M25.511 ACUTE PAIN OF RIGHT SHOULDER: Primary | ICD-10-CM

## 2024-11-19 ENCOUNTER — TELEPHONE (OUTPATIENT)
Dept: ORTHOPEDICS | Facility: CLINIC | Age: 74
End: 2024-11-19
Payer: MEDICARE

## 2024-11-19 NOTE — TELEPHONE ENCOUNTER
----- Message from Minal sent at 11/19/2024  8:39 AM CST -----  Regarding: requesting a c/b regarding appt  Hello ,    I'm reaching out to you from The Referral Team upon assisting the Contact Center with calls . Pt requesting a c/b [phone]. Regarding appt tomorrow pt states that she has not been to Physical Therapy pt is just getting out of the hospital .      Thanks

## 2024-11-19 NOTE — TELEPHONE ENCOUNTER
Called patient and she stated that she has only had PT visit due to being hospitalized and wanted to know if she needed to come to her visit tomorrow or reschedule until after she has had some therapy.  I explained to her that Dr. Pablo is in surgery today and I will ask him and call her back.

## 2024-11-20 NOTE — TELEPHONE ENCOUNTER
Called patient and notified her that Dr. Pablo wanted her to do some PT before coming back into the office.  Rescheduled her for 12/16/24 @ 9:40a.m.

## 2024-11-21 ENCOUNTER — CLINICAL SUPPORT (OUTPATIENT)
Dept: REHABILITATION | Facility: HOSPITAL | Age: 74
End: 2024-11-21
Payer: MEDICARE

## 2024-11-21 DIAGNOSIS — M25.611 DECREASED ROM OF RIGHT SHOULDER: ICD-10-CM

## 2024-11-21 DIAGNOSIS — M25.511 ACUTE PAIN OF RIGHT SHOULDER: ICD-10-CM

## 2024-11-21 DIAGNOSIS — M79.601 CHRONIC PAIN OF RIGHT UPPER EXTREMITY: ICD-10-CM

## 2024-11-21 DIAGNOSIS — Z96.611 HISTORY OF RIGHT SHOULDER REPLACEMENT: Primary | ICD-10-CM

## 2024-11-21 DIAGNOSIS — G89.29 CHRONIC PAIN OF RIGHT UPPER EXTREMITY: ICD-10-CM

## 2024-11-21 PROCEDURE — 97530 THERAPEUTIC ACTIVITIES: CPT

## 2024-11-21 PROCEDURE — 97110 THERAPEUTIC EXERCISES: CPT

## 2024-11-21 NOTE — PROGRESS NOTES
[facility]   Physical Therapy Treatment Note      Name: Meghna CormierAlesia  Clinic Number: [id number]    Therapy Diagnosis:   Encounter Diagnoses   Name Primary?    History of right shoulder replacement Yes    Acute pain of right shoulder     Decreased ROM of right shoulder     Chronic pain of right upper extremity      Physician: Jose Antonio Pablo MD    Visit Date: 11/21/2024    Physician Orders: PT Eval and Treat   Medical Diagnosis from Referral: see above   Evaluation Date: 10/17/2024  Authorization Period Expiration: 1/9/2025  Plan of Care Expiration: 1/10/2025     Date of Surgery: 8/27/2024  Visit # / Visits authorized: 3/25    FOTO: 1/ 3 = 42    Precautions: Standard     PTA Visit #: 0/5     Time In: 10:49 am  Time Out: 11:21 am  Total Billable Time: 23 minutes    Subjective     Pt reports: She has been in the hospital with Salmonella  She was compliant with home exercise program.  Response to previous treatment: no complaints   Functional change: no change noted    Pain: 0/10  Location: right shoulder    Objective       135 degrees active flexion, 40 degrees external rotation and internal rotation to ischial tuberosity on arrival     Treatment     Meghna received the treatments listed below:      therapeutic exercises to develop strength, endurance, ROM, flexibility, posture, and core stabilization for 13 minutes including:  UBE x 5 minutes   Supine flexion with hands clasped 10sh x 10  Elbow flexion/extension x 20 - 2#    Supine cane flexion x 10  Counter shoulder flexion x 20  Ball roll flexion/extension x 20  Scapular retractions x 20    manual therapy techniques: Joint mobilizations, Manual traction, Myofacial release, Soft tissue Mobilization, and Friction Massage were applied to the: right shoulder for 0 minutes, including:  Gentle stretching into flexion (120 degrees without complaint), external rotation (50 degrees in scaption)    neuromuscular re-education  activities to improve: Balance, Coordination, Kinesthetic Sense, Proprioception, and Posture for 5 minutes. The following activities were included:  Scapular retraction/extension - green x 20     therapeutic activities to improve functional performance for 5 minutes, including:  Cane flexion up rail x 20      Patient Education and Home Exercises       Education provided:   - review of home exercise program and current Plan of Care/rationale of treatment.    Written Home Exercises Provided: Patient instructed to cont prior HEP. Exercises were reviewed and Meghna was able to demonstrate them prior to the end of the session.  Meghna demonstrated good understanding of the education provided. See EMR under Patient Instructions for exercises provided during therapy sessions    Assessment     At Evaluation:  Meghna is a 74 y.o. female referred to outpatient Physical Therapy with a medical diagnosis of right shoulder replacement. Patient presents with right shoulder pain at end range of motion, right upper extremity weakness, and decreased range of motion. States she is here to rehab a rTSA. She currently is in no pain and has no swelling. She has some pain when at end range of all shoulder movements. She is able to sleep in her bed. Says she still has to be careful with it in bed so she does not aggravate it. She has noticed she has a little trouble straightening her elbow out due to being in a sling for a lengthy time. She stated she has avoided using the arm due to fear of hurting it. She stated she has not attempted to cook or clean again yet. She was given a home exercise program and instructed on how to properly perform all exercises. She had no complaints with any exercises.    Current Assessment:  Meghna has not been to therapy since 10/31/2024 due to being in the hospital with Salmonella poisoning. She had improved active range of motion right shoulder starting with 135 degrees with some substitution.  Activities were kept light today due to her still being weak from her hospitalization. She was able to do cane flexion up the rail and scapular retraction/extension w/ green band. She was also able to do elbow flexion/extension with 2# DB. Will continue Plan of Care and progress as tolerated.    Meghna Is progressing towards her goals.   Pt prognosis is Good.     Pt will continue to benefit from skilled outpatient physical therapy to address the deficits listed in the problem list box on initial evaluation, provide pt/family education and to maximize pt's level of independence in the home and community environment.     Pt's spiritual, cultural and educational needs considered and pt agreeable to plan of care and goals.     Anticipated barriers to physical therapy: none    Goals:   Short Term Goals: 6 weeks  Patient will be independent with home exercise program to facilitate carryover between visits.  Patient will be independent with dressing and driving without modification due to right shoulder.  Patient will be able to sleep all night in bed without waking due to right shoulder pain.     Long Term Goals: 12 weeks  Patient will have active range of motion as follow - 110 degrees flexion, 40 degrees external rotation and functional internal rotation to L3 for reaching overhead, behind head and behind back for dressing, grooming and hygiene.  Patient will have 4+-5/5 strength of right shoulder/upper extremity to perform household chores and work related tasks.  Patient will place 2# dumbbell on head height shelf without hiking or pain right shoulder.  Patient will return to doing household duties such as cooking, cleaning, and sweeping with limitations due to her right shoulder.     Plan     Plan of care Certification: 10/17/2024 to 1/10/2024.     Outpatient Physical Therapy 2 times weekly for 12 weeks to include the following interventions: 18865 [therapeutic exercise], 94675 [neuromuscular re-education], 99587  [manual therapy], 06130 [therapeutic activities], 70035 [ultrasound], 21098 [unattended electrical stimulation], and 59702 [vasopneumatic device].     AYE ALEXANDER, PT   11/21/2024

## 2024-11-22 ENCOUNTER — TELEPHONE (OUTPATIENT)
Dept: INTERNAL MEDICINE | Facility: CLINIC | Age: 74
End: 2024-11-22
Payer: MEDICARE

## 2024-11-26 ENCOUNTER — CLINICAL SUPPORT (OUTPATIENT)
Dept: REHABILITATION | Facility: HOSPITAL | Age: 74
End: 2024-11-26
Payer: MEDICARE

## 2024-11-26 DIAGNOSIS — M79.601 CHRONIC PAIN OF RIGHT UPPER EXTREMITY: ICD-10-CM

## 2024-11-26 DIAGNOSIS — G89.29 CHRONIC PAIN OF RIGHT UPPER EXTREMITY: ICD-10-CM

## 2024-11-26 DIAGNOSIS — M25.511 ACUTE PAIN OF RIGHT SHOULDER: ICD-10-CM

## 2024-11-26 DIAGNOSIS — M25.611 DECREASED ROM OF RIGHT SHOULDER: ICD-10-CM

## 2024-11-26 DIAGNOSIS — Z96.611 HISTORY OF RIGHT SHOULDER REPLACEMENT: Primary | ICD-10-CM

## 2024-11-26 PROCEDURE — 97140 MANUAL THERAPY 1/> REGIONS: CPT

## 2024-11-26 PROCEDURE — 97112 NEUROMUSCULAR REEDUCATION: CPT

## 2024-11-26 PROCEDURE — 97110 THERAPEUTIC EXERCISES: CPT

## 2024-11-26 NOTE — PROGRESS NOTES
[facility]   Physical Therapy Treatment Note      Name: Meghna CormierAlesia  Clinic Number: [id number]    Therapy Diagnosis:   Encounter Diagnoses   Name Primary?    History of right shoulder replacement Yes    Acute pain of right shoulder     Decreased ROM of right shoulder     Chronic pain of right upper extremity      Physician: Jose Antonio Pablo MD    Visit Date: 11/26/2024    Physician Orders: PT Eval and Treat   Medical Diagnosis from Referral: see above   Evaluation Date: 10/17/2024  Authorization Period Expiration: 1/9/2025  Plan of Care Expiration: 1/10/2025     Date of Surgery: 8/27/2024  Visit # / Visits authorized: 4/25    FOTO: 1/ 3 = 42    Precautions: Standard     PTA Visit #: 0/5     Time In: 10:56 am  Time Out: 11:40 am  Total Billable Time: 44 minutes    Subjective     Pt reports: She has no complaints this morning  She was compliant with home exercise program.  Response to previous treatment: no complaints   Functional change: no change noted    Pain: 0/10  Location: right shoulder    Objective       135 degrees active flexion, 40 degrees external rotation and internal rotation to ischial tuberosity on arrival     Treatment     Meghna received the treatments listed below:      therapeutic exercises to develop strength, endurance, ROM, flexibility, posture, and core stabilization for 17 minutes including:  UBE x 5 minutes   Supine flexion with hands clasped 10sh x 10  Elbow flexion/extension x 30 - 2#  Ball roll flexion/extension x 20    (Not today)  Supine cane flexion x 10  Counter shoulder flexion x 20  Scapular retractions x 20    manual therapy techniques: Joint mobilizations, Manual traction, Myofacial release, Soft tissue Mobilization, and Friction Massage were applied to the: right shoulder for 14 minutes, including:  Gentle stretching into flexion, external rotation, and internal rotation   MFR to upper arm musculature    neuromuscular re-education  activities to improve: Balance, Coordination, Kinesthetic Sense, Proprioception, and Posture for 8 minutes. The following activities were included:  Scapular retraction/extension - green x 30   Rows 2 plates x 30    therapeutic activities to improve functional performance for 5 minutes, including:  Cane flexion up rail x 30      Patient Education and Home Exercises       Education provided:   - review of home exercise program and current Plan of Care/rationale of treatment.    Written Home Exercises Provided: Patient instructed to cont prior HEP. Exercises were reviewed and Meghna was able to demonstrate them prior to the end of the session.  Meghna demonstrated good understanding of the education provided. See EMR under Patient Instructions for exercises provided during therapy sessions    Assessment     At Evaluation:  Meghna is a 74 y.o. female referred to outpatient Physical Therapy with a medical diagnosis of right shoulder replacement. Patient presents with right shoulder pain at end range of motion, right upper extremity weakness, and decreased range of motion. States she is here to rehab a rTSA. She currently is in no pain and has no swelling. She has some pain when at end range of all shoulder movements. She is able to sleep in her bed. Says she still has to be careful with it in bed so she does not aggravate it. She has noticed she has a little trouble straightening her elbow out due to being in a sling for a lengthy time. She stated she has avoided using the arm due to fear of hurting it. She stated she has not attempted to cook or clean again yet. She was given a home exercise program and instructed on how to properly perform all exercises. She had no complaints with any exercises.    Current Assessment:  Meghna arrived stating she had no complaints from previous treatment. She was able to increase some reps today and added back ball flexion rolls and Cybex rows without complaint. She states her  appetite is not quite back to normal but she is feeling much better. She says she has been doing her home exercise program. Will continue Plan of Care and progress as tolerated.    Meghna Is progressing towards her goals.   Pt prognosis is Good.     Pt will continue to benefit from skilled outpatient physical therapy to address the deficits listed in the problem list box on initial evaluation, provide pt/family education and to maximize pt's level of independence in the home and community environment.     Pt's spiritual, cultural and educational needs considered and pt agreeable to plan of care and goals.     Anticipated barriers to physical therapy: none    Goals:   Short Term Goals: 6 weeks  Patient will be independent with home exercise program to facilitate carryover between visits.  Patient will be independent with dressing and driving without modification due to right shoulder.  Patient will be able to sleep all night in bed without waking due to right shoulder pain.     Long Term Goals: 12 weeks  Patient will have active range of motion as follow - 110 degrees flexion, 40 degrees external rotation and functional internal rotation to L3 for reaching overhead, behind head and behind back for dressing, grooming and hygiene.  Patient will have 4+-5/5 strength of right shoulder/upper extremity to perform household chores and work related tasks.  Patient will place 2# dumbbell on head height shelf without hiking or pain right shoulder.  Patient will return to doing household duties such as cooking, cleaning, and sweeping with limitations due to her right shoulder.     Plan     Plan of care Certification: 10/17/2024 to 1/10/2024.     Outpatient Physical Therapy 2 times weekly for 12 weeks to include the following interventions: 86111 [therapeutic exercise], 07121 [neuromuscular re-education], 64434 [manual therapy], 61282 [therapeutic activities], 09442 [ultrasound], 92450 [unattended electrical stimulation], and  44405 [vasopneumatic device].     AYE ALEXANDER, PT   11/26/2024

## 2024-11-30 ENCOUNTER — EXTERNAL CHRONIC CARE MANAGEMENT (OUTPATIENT)
Dept: INTERNAL MEDICINE | Facility: CLINIC | Age: 74
End: 2024-11-30
Payer: MEDICARE

## 2024-11-30 PROCEDURE — 99490 CHRNC CARE MGMT STAFF 1ST 20: CPT | Mod: PBBFAC | Performed by: INTERNAL MEDICINE

## 2024-11-30 PROCEDURE — 99490 CHRNC CARE MGMT STAFF 1ST 20: CPT | Mod: S$PBB,,, | Performed by: INTERNAL MEDICINE

## 2024-11-30 PROCEDURE — 99439 CHRNC CARE MGMT STAF EA ADDL: CPT | Mod: PBBFAC | Performed by: INTERNAL MEDICINE

## 2024-11-30 PROCEDURE — 99439 CHRNC CARE MGMT STAF EA ADDL: CPT | Mod: S$PBB,,, | Performed by: INTERNAL MEDICINE

## 2024-12-02 ENCOUNTER — CLINICAL SUPPORT (OUTPATIENT)
Dept: REHABILITATION | Facility: HOSPITAL | Age: 74
End: 2024-12-02
Payer: MEDICARE

## 2024-12-02 DIAGNOSIS — M79.601 CHRONIC PAIN OF RIGHT UPPER EXTREMITY: ICD-10-CM

## 2024-12-02 DIAGNOSIS — Z96.611 HISTORY OF RIGHT SHOULDER REPLACEMENT: Primary | ICD-10-CM

## 2024-12-02 DIAGNOSIS — M25.511 ACUTE PAIN OF RIGHT SHOULDER: ICD-10-CM

## 2024-12-02 DIAGNOSIS — G89.29 CHRONIC PAIN OF RIGHT UPPER EXTREMITY: ICD-10-CM

## 2024-12-02 DIAGNOSIS — M25.611 DECREASED ROM OF RIGHT SHOULDER: ICD-10-CM

## 2024-12-02 PROCEDURE — 97110 THERAPEUTIC EXERCISES: CPT

## 2024-12-02 PROCEDURE — 97112 NEUROMUSCULAR REEDUCATION: CPT

## 2024-12-02 NOTE — PROGRESS NOTES
[facility]   Physical Therapy Treatment Note      Name: Meghna CormierAlesia  Clinic Number: [id number]    Therapy Diagnosis:   Encounter Diagnoses   Name Primary?    History of right shoulder replacement Yes    Acute pain of right shoulder     Decreased ROM of right shoulder     Chronic pain of right upper extremity      Physician: Jose Antonio Pablo MD    Visit Date: 12/2/2024    Physician Orders: PT Eval and Treat   Medical Diagnosis from Referral: see above   Evaluation Date: 10/17/2024  Authorization Period Expiration: 1/9/2025  Plan of Care Expiration: 1/10/2025     Date of Surgery: 8/27/2024  Visit # / Visits authorized: 5/25    FOTO: 1/ 3 = 42    Precautions: Standard     PTA Visit #: 0/5     Time In: 11:34 am  Time Out: 12:04 pm  Total Billable Time: 30 minutes    Subjective     Pt reports: She has no complaints this morning  She was compliant with home exercise program.  Response to previous treatment: no complaints   Functional change: no change noted    Pain: 0/10  Location: right shoulder    Objective       135 degrees active flexion, 40 degrees external rotation and internal rotation to ischial tuberosity on arrival     Treatment     Meghna received the treatments listed below:      therapeutic exercises to develop strength, endurance, ROM, flexibility, posture, and core stabilization for 15 minutes including:  UBE - not today  Supine flexion with hands clasped 10sh x 10  Elbow flexion/extension x 30 - 3#  Supine cane chest press into flexion 2# x 15    manual therapy techniques: Joint mobilizations, Manual traction, Myofacial release, Soft tissue Mobilization, and Friction Massage were applied to the: right shoulder for 0 minutes, including:  Gentle stretching into flexion, external rotation, and internal rotation   MFR to upper arm musculature    neuromuscular re-education activities to improve: Balance, Coordination, Kinesthetic Sense, Proprioception, and Posture  for 10 minutes. The following activities were included:  Scapular retraction/extension - blue x 30   Rows 2 plates x 15 each - high and low    therapeutic activities to improve functional performance for 5 minutes, including:  Cane flexion up wall x 15    Cane flexion up rail -not today        Patient Education and Home Exercises       Education provided:   - review of home exercise program and current Plan of Care/rationale of treatment.    Written Home Exercises Provided: Patient instructed to cont prior HEP. Exercises were reviewed and Meghna was able to demonstrate them prior to the end of the session.  Meghna demonstrated good understanding of the education provided. See EMR under Patient Instructions for exercises provided during therapy sessions    Assessment     At Evaluation:  Meghna is a 74 y.o. female referred to outpatient Physical Therapy with a medical diagnosis of right shoulder replacement. Patient presents with right shoulder pain at end range of motion, right upper extremity weakness, and decreased range of motion. States she is here to rehab a rTSA. She currently is in no pain and has no swelling. She has some pain when at end range of all shoulder movements. She is able to sleep in her bed. Says she still has to be careful with it in bed so she does not aggravate it. She has noticed she has a little trouble straightening her elbow out due to being in a sling for a lengthy time. She stated she has avoided using the arm due to fear of hurting it. She stated she has not attempted to cook or clean again yet. She was given a home exercise program and instructed on how to properly perform all exercises. She had no complaints with any exercises.    Current Assessment:  Meghna arrived stating she had no complaints from previous treatment. She was able to do supine cane flexion with 2#. Had her progress to cane flexion up wall instead of at rail. Added high  to rows without difficulty. After about  30 minutes she stated she was wiped out so session was ended. She says she has been doing her home exercise program. Will continue Plan of Care and progress as tolerated.    Meghna Is progressing towards her goals.   Pt prognosis is Good.     Pt will continue to benefit from skilled outpatient physical therapy to address the deficits listed in the problem list box on initial evaluation, provide pt/family education and to maximize pt's level of independence in the home and community environment.     Pt's spiritual, cultural and educational needs considered and pt agreeable to plan of care and goals.     Anticipated barriers to physical therapy: none    Goals:   Short Term Goals: 6 weeks  Patient will be independent with home exercise program to facilitate carryover between visits.  Patient will be independent with dressing and driving without modification due to right shoulder.  Patient will be able to sleep all night in bed without waking due to right shoulder pain.     Long Term Goals: 12 weeks  Patient will have active range of motion as follow - 110 degrees flexion, 40 degrees external rotation and functional internal rotation to L3 for reaching overhead, behind head and behind back for dressing, grooming and hygiene.  Patient will have 4+-5/5 strength of right shoulder/upper extremity to perform household chores and work related tasks.  Patient will place 2# dumbbell on head height shelf without hiking or pain right shoulder.  Patient will return to doing household duties such as cooking, cleaning, and sweeping with limitations due to her right shoulder.     Plan     Plan of care Certification: 10/17/2024 to 1/10/2024.     Outpatient Physical Therapy 2 times weekly for 12 weeks to include the following interventions: 56682 [therapeutic exercise], 90214 [neuromuscular re-education], 16895 [manual therapy], 99378 [therapeutic activities], 13304 [ultrasound], 49251 [unattended electrical stimulation], and 46931  [vasopneumatic device].     AYE ALEXANDER, PT   12/02/2024

## 2024-12-04 RX ORDER — OMEPRAZOLE 40 MG/1
40 CAPSULE, DELAYED RELEASE ORAL
Qty: 90 CAPSULE | Refills: 3 | Status: SHIPPED | OUTPATIENT
Start: 2024-12-04

## 2024-12-05 ENCOUNTER — CLINICAL SUPPORT (OUTPATIENT)
Dept: REHABILITATION | Facility: HOSPITAL | Age: 74
End: 2024-12-05
Payer: MEDICARE

## 2024-12-05 DIAGNOSIS — Z96.611 HISTORY OF RIGHT SHOULDER REPLACEMENT: Primary | ICD-10-CM

## 2024-12-05 DIAGNOSIS — M25.611 DECREASED ROM OF RIGHT SHOULDER: ICD-10-CM

## 2024-12-05 DIAGNOSIS — M79.601 CHRONIC PAIN OF RIGHT UPPER EXTREMITY: ICD-10-CM

## 2024-12-05 DIAGNOSIS — G89.29 CHRONIC PAIN OF RIGHT UPPER EXTREMITY: ICD-10-CM

## 2024-12-05 DIAGNOSIS — M25.511 ACUTE PAIN OF RIGHT SHOULDER: ICD-10-CM

## 2024-12-05 PROCEDURE — 97112 NEUROMUSCULAR REEDUCATION: CPT | Mod: CQ

## 2024-12-05 PROCEDURE — 97110 THERAPEUTIC EXERCISES: CPT | Mod: CQ

## 2024-12-05 NOTE — PROGRESS NOTES
[facility]   Physical Therapy Treatment Note      Name: Meghna CormierAlesia  Clinic Number: [id number]    Therapy Diagnosis:   Encounter Diagnoses   Name Primary?    History of right shoulder replacement Yes    Acute pain of right shoulder     Decreased ROM of right shoulder     Chronic pain of right upper extremity      Physician: Jose Antonio Pablo MD    Visit Date: 12/5/2024    Physician Orders: PT Eval and Treat   Medical Diagnosis from Referral: see above   Evaluation Date: 10/17/2024  Authorization Period Expiration: 1/9/2025  Plan of Care Expiration: 1/10/2025     Date of Surgery: 8/27/2024  Visit # / Visits authorized: 6/25    FOTO: 1/ 3 = 42    Precautions: Standard     PTA Visit #: 1/5     Time In: 10:45 am  Time Out: 11:20 pm  Total Billable Time: 32 minutes    Subjective     Pt reports: She has no complaints this morning  She was compliant with home exercise program.  Response to previous treatment: no complaints   Functional change: no change noted    Pain: 0/10  Location: right shoulder    Objective       135 degrees active flexion, 40 degrees external rotation and internal rotation to ischial tuberosity on arrival     Treatment     Meghna received the treatments listed below:      therapeutic exercises to develop strength, endurance, ROM, flexibility, posture, and core stabilization for 15 minutes including:  UBE - not today  Supine flexion with hands clasped 10sh x 10  Elbow flexion/extension x 30 - 3#  Supine cane chest press into flexion 2# x 20    manual therapy techniques: Joint mobilizations, Manual traction, Myofacial release, Soft tissue Mobilization, and Friction Massage were applied to the: right shoulder for 0 minutes, including:  Gentle stretching into flexion, external rotation, and internal rotation   MFR to upper arm musculature    neuromuscular re-education activities to improve: Balance, Coordination, Kinesthetic Sense, Proprioception, and Posture  for 10 minutes. The following activities were included:  Scapular retraction/extension - blue x 20   Rows 2 plates x 20 each - high and low    therapeutic activities to improve functional performance for 7 minutes, including:  Cane flexion up wall x 20        Patient Education and Home Exercises       Education provided:   - review of home exercise program and current Plan of Care/rationale of treatment.    Written Home Exercises Provided: Patient instructed to cont prior HEP. Exercises were reviewed and Meghna was able to demonstrate them prior to the end of the session.  Meghna demonstrated good understanding of the education provided. See EMR under Patient Instructions for exercises provided during therapy sessions    Assessment     At Evaluation:  Meghna is a 74 y.o. female referred to outpatient Physical Therapy with a medical diagnosis of right shoulder replacement. Patient presents with right shoulder pain at end range of motion, right upper extremity weakness, and decreased range of motion. States she is here to rehab a rTSA. She currently is in no pain and has no swelling. She has some pain when at end range of all shoulder movements. She is able to sleep in her bed. Says she still has to be careful with it in bed so she does not aggravate it. She has noticed she has a little trouble straightening her elbow out due to being in a sling for a lengthy time. She stated she has avoided using the arm due to fear of hurting it. She stated she has not attempted to cook or clean again yet. She was given a home exercise program and instructed on how to properly perform all exercises. She had no complaints with any exercises.    Current Assessment:  Meghna arrived stating she had no complaints from previous treatment. She put forth good effort throughout treatment but continues to fatigue after about 30 minutes so session was ended. She says she has been doing her home exercise program. Will continue Plan of Care  and progress as tolerated.    Meghna Is progressing towards her goals.   Pt prognosis is Good.     Pt will continue to benefit from skilled outpatient physical therapy to address the deficits listed in the problem list box on initial evaluation, provide pt/family education and to maximize pt's level of independence in the home and community environment.     Pt's spiritual, cultural and educational needs considered and pt agreeable to plan of care and goals.     Anticipated barriers to physical therapy: none    Goals:   Short Term Goals: 6 weeks  Patient will be independent with home exercise program to facilitate carryover between visits.  Patient will be independent with dressing and driving without modification due to right shoulder.  Patient will be able to sleep all night in bed without waking due to right shoulder pain.     Long Term Goals: 12 weeks  Patient will have active range of motion as follow - 110 degrees flexion, 40 degrees external rotation and functional internal rotation to L3 for reaching overhead, behind head and behind back for dressing, grooming and hygiene.  Patient will have 4+-5/5 strength of right shoulder/upper extremity to perform household chores and work related tasks.  Patient will place 2# dumbbell on head height shelf without hiking or pain right shoulder.  Patient will return to doing household duties such as cooking, cleaning, and sweeping with limitations due to her right shoulder.     Plan     Plan of care Certification: 10/17/2024 to 1/10/2025.     Outpatient Physical Therapy 2 times weekly for 12 weeks to include the following interventions: 15190 [therapeutic exercise], 36232 [neuromuscular re-education], 16440 [manual therapy], 19924 [therapeutic activities], 43527 [ultrasound], 73755 [unattended electrical stimulation], and 84859 [vasopneumatic device].     Sara Orona, LAURA   12/05/2024

## 2024-12-06 ENCOUNTER — OFFICE VISIT (OUTPATIENT)
Dept: INTERNAL MEDICINE | Facility: CLINIC | Age: 74
End: 2024-12-06
Payer: MEDICARE

## 2024-12-06 VITALS
WEIGHT: 194 LBS | OXYGEN SATURATION: 98 % | HEIGHT: 63 IN | HEART RATE: 75 BPM | RESPIRATION RATE: 16 BRPM | DIASTOLIC BLOOD PRESSURE: 82 MMHG | TEMPERATURE: 98 F | BODY MASS INDEX: 34.38 KG/M2 | SYSTOLIC BLOOD PRESSURE: 160 MMHG

## 2024-12-06 DIAGNOSIS — N17.9 ACUTE RENAL FAILURE, UNSPECIFIED ACUTE RENAL FAILURE TYPE: ICD-10-CM

## 2024-12-06 DIAGNOSIS — E78.5 HYPERLIPIDEMIA LDL GOAL <100: ICD-10-CM

## 2024-12-06 DIAGNOSIS — K21.9 GERD WITHOUT ESOPHAGITIS: ICD-10-CM

## 2024-12-06 DIAGNOSIS — D64.9 ANEMIA, UNSPECIFIED TYPE: ICD-10-CM

## 2024-12-06 DIAGNOSIS — D84.821 DRUG-INDUCED IMMUNODEFICIENCY: ICD-10-CM

## 2024-12-06 DIAGNOSIS — E11.42 TYPE 2 DIABETES MELLITUS WITH DIABETIC POLYNEUROPATHY, WITHOUT LONG-TERM CURRENT USE OF INSULIN: ICD-10-CM

## 2024-12-06 DIAGNOSIS — Z79.899 DRUG-INDUCED IMMUNODEFICIENCY: ICD-10-CM

## 2024-12-06 DIAGNOSIS — M05.79 RHEUMATOID ARTHRITIS INVOLVING MULTIPLE SITES WITH POSITIVE RHEUMATOID FACTOR: ICD-10-CM

## 2024-12-06 DIAGNOSIS — J30.2 SEASONAL ALLERGIES: ICD-10-CM

## 2024-12-06 DIAGNOSIS — E03.9 HYPOTHYROIDISM, UNSPECIFIED TYPE: ICD-10-CM

## 2024-12-06 DIAGNOSIS — I10 ESSENTIAL HYPERTENSION: Primary | ICD-10-CM

## 2024-12-06 PROCEDURE — 99214 OFFICE O/P EST MOD 30 MIN: CPT | Mod: S$PBB,,, | Performed by: INTERNAL MEDICINE

## 2024-12-06 PROCEDURE — 1159F MED LIST DOCD IN RCRD: CPT | Mod: CPTII,,, | Performed by: INTERNAL MEDICINE

## 2024-12-06 PROCEDURE — 1160F RVW MEDS BY RX/DR IN RCRD: CPT | Mod: CPTII,,, | Performed by: INTERNAL MEDICINE

## 2024-12-06 PROCEDURE — 3288F FALL RISK ASSESSMENT DOCD: CPT | Mod: CPTII,,, | Performed by: INTERNAL MEDICINE

## 2024-12-06 PROCEDURE — 3046F HEMOGLOBIN A1C LEVEL >9.0%: CPT | Mod: CPTII,,, | Performed by: INTERNAL MEDICINE

## 2024-12-06 PROCEDURE — 1101F PT FALLS ASSESS-DOCD LE1/YR: CPT | Mod: CPTII,,, | Performed by: INTERNAL MEDICINE

## 2024-12-06 PROCEDURE — 3008F BODY MASS INDEX DOCD: CPT | Mod: CPTII,,, | Performed by: INTERNAL MEDICINE

## 2024-12-06 PROCEDURE — 99215 OFFICE O/P EST HI 40 MIN: CPT | Mod: PBBFAC | Performed by: INTERNAL MEDICINE

## 2024-12-06 PROCEDURE — 3079F DIAST BP 80-89 MM HG: CPT | Mod: CPTII,,, | Performed by: INTERNAL MEDICINE

## 2024-12-06 PROCEDURE — 3077F SYST BP >= 140 MM HG: CPT | Mod: CPTII,,, | Performed by: INTERNAL MEDICINE

## 2024-12-06 PROCEDURE — 99999 PR PBB SHADOW E&M-EST. PATIENT-LVL V: CPT | Mod: PBBFAC,,, | Performed by: INTERNAL MEDICINE

## 2024-12-06 PROCEDURE — 1111F DSCHRG MED/CURRENT MED MERGE: CPT | Mod: CPTII,,, | Performed by: INTERNAL MEDICINE

## 2024-12-06 RX ORDER — DAPAGLIFLOZIN 10 MG/1
10 TABLET, FILM COATED ORAL DAILY
Qty: 90 TABLET | Refills: 3 | Status: SHIPPED | OUTPATIENT
Start: 2024-12-06

## 2024-12-06 NOTE — PROGRESS NOTES
Subjective:       Patient ID: Meghna Dalal is a 74 y.o. female.    Chief Complaint: Follow-up (Bp and bs elevated-/For 3days pt has had some slurred speech and difficulty communicating-)    Bp up since off med  and legs swelling some  and bs up to 177 off metformin  will start farsiga  and resume hyzaar  and prn lasix  and delay rheum infusion    Follow-up  Pertinent negatives include no abdominal pain, chest pain, fatigue, rash or weakness.   Review of Systems   Constitutional:  Negative for fatigue and unexpected weight change.   HENT:  Negative for ear pain and goiter.    Respiratory:  Negative for chest tightness and shortness of breath.    Cardiovascular:  Negative for chest pain, palpitations and leg swelling.   Gastrointestinal:  Negative for abdominal pain and reflux.   Integumentary:  Negative for rash and breast tenderness.   Neurological:  Negative for dizziness and weakness.   Hematological:  Negative for adenopathy.   Psychiatric/Behavioral:  Negative for behavioral problems.    Breast: Negative for tenderness      Objective:      Physical Exam  Constitutional:       Appearance: Normal appearance.   HENT:      Head: Normocephalic and atraumatic.      Right Ear: External ear normal.      Left Ear: External ear normal.      Mouth/Throat:      Pharynx: Oropharynx is clear.   Eyes:      Extraocular Movements: Extraocular movements intact.      Pupils: Pupils are equal, round, and reactive to light.   Cardiovascular:      Rate and Rhythm: Normal rate and regular rhythm.      Pulses: Normal pulses.      Heart sounds: Normal heart sounds.   Pulmonary:      Effort: Pulmonary effort is normal.      Breath sounds: Normal breath sounds.   Abdominal:      General: Abdomen is flat. Bowel sounds are normal.      Palpations: Abdomen is soft.   Musculoskeletal:         General: Normal range of motion.      Cervical back: Normal range of motion and neck supple.   Skin:     General: Skin is warm.       Capillary Refill: Capillary refill takes less than 2 seconds.   Neurological:      General: No focal deficit present.      Mental Status: She is alert.   Psychiatric:         Mood and Affect: Mood normal.         Thought Content: Thought content normal.         Judgment: Judgment normal.       Assessment:       1. Essential hypertension    2. GERD without esophagitis    3. Rheumatoid arthritis involving multiple sites with positive rheumatoid factor    4. Hypothyroidism, unspecified type    5. Hyperlipidemia LDL goal <100    6. Seasonal allergies    7. Anemia, unspecified type    8. Drug-induced immunodeficiency    9. Acute renal failure, unspecified acute renal failure type    10. Type 2 diabetes mellitus with diabetic polyneuropathy, without long-term current use of insulin        Plan:         There are no Patient Instructions on file for this visit.      Problem List Items Addressed This Visit          Renal/    ARF (acute renal failure)       Immunology/Multi System    Rheumatoid arthritis involving multiple sites with positive rheumatoid factor    Drug-induced immunodeficiency       Endocrine    Type 2 diabetes mellitus with diabetic polyneuropathy, without long-term current use of insulin     Other Visit Diagnoses       Essential hypertension    -  Primary    GERD without esophagitis        Hypothyroidism, unspecified type        Hyperlipidemia LDL goal <100        Seasonal allergies        Anemia, unspecified type

## 2024-12-09 ENCOUNTER — CLINICAL SUPPORT (OUTPATIENT)
Dept: REHABILITATION | Facility: HOSPITAL | Age: 74
End: 2024-12-09
Payer: MEDICARE

## 2024-12-09 DIAGNOSIS — Z96.611 HISTORY OF RIGHT SHOULDER REPLACEMENT: Primary | ICD-10-CM

## 2024-12-09 DIAGNOSIS — M25.611 DECREASED ROM OF RIGHT SHOULDER: ICD-10-CM

## 2024-12-09 DIAGNOSIS — M25.511 ACUTE PAIN OF RIGHT SHOULDER: ICD-10-CM

## 2024-12-09 DIAGNOSIS — M79.601 CHRONIC PAIN OF RIGHT UPPER EXTREMITY: ICD-10-CM

## 2024-12-09 DIAGNOSIS — G89.29 CHRONIC PAIN OF RIGHT UPPER EXTREMITY: ICD-10-CM

## 2024-12-09 PROCEDURE — 97112 NEUROMUSCULAR REEDUCATION: CPT

## 2024-12-09 PROCEDURE — 97110 THERAPEUTIC EXERCISES: CPT

## 2024-12-09 PROCEDURE — 97530 THERAPEUTIC ACTIVITIES: CPT

## 2024-12-09 NOTE — PROGRESS NOTES
[facility]   Physical Therapy Treatment Note      Name: Meghna CormierAlesia  Clinic Number: [id number]    Therapy Diagnosis:   Encounter Diagnoses   Name Primary?    History of right shoulder replacement Yes    Acute pain of right shoulder     Decreased ROM of right shoulder     Chronic pain of right upper extremity      Physician: Jose Antonio Pablo MD    Visit Date: 12/9/2024    Physician Orders: PT Eval and Treat   Medical Diagnosis from Referral: see above   Evaluation Date: 10/17/2024  Authorization Period Expiration: 1/9/2025  Plan of Care Expiration: 1/10/2025     Date of Surgery: 8/27/2024  Visit # / Visits authorized: 7/25    FOTO: 1/3 = 42   2/3 = 52    Precautions: Standard     PTA Visit #: 0/5     Time In: 10:48 am  Time Out: 11:30 am  Total Billable Time: 40 minutes    Subjective     Pt reports: She has no complaints this morning  She was compliant with home exercise program.  Response to previous treatment: no complaints   Functional change: no change noted    Pain: 0/10  Location: right shoulder    Objective       135 degrees active flexion, 40 degrees external rotation and internal rotation to ischial tuberosity on arrival     Treatment     Meghna received the treatments listed below:      therapeutic exercises to develop strength, endurance, ROM, flexibility, posture, and core stabilization for 20 minutes including:  UBE x 5 minutes  Supine flexion with hands clasped 10sh x 15  Elbow flexion/extension x 30 - 3#  Supine cane chest press into flexion 3# x 20    manual therapy techniques: Joint mobilizations, Manual traction, Myofacial release, Soft tissue Mobilization, and Friction Massage were applied to the: right shoulder for 0 minutes, including:  Gentle stretching into flexion, external rotation, and internal rotation   MFR to upper arm musculature    neuromuscular re-education activities to improve: Balance, Coordination, Kinesthetic Sense, Proprioception, and  Posture for 10 minutes. The following activities were included:  Scapular retraction/extension - blue x 30   Rows 3 plates x 20 - low    therapeutic activities to improve functional performance for 10 minutes, including:  Cane flexion up wall x 20  Shoulder press 1 plate x 10      Patient Education and Home Exercises       Education provided:   - review of home exercise program and current Plan of Care/rationale of treatment.    Written Home Exercises Provided: Patient instructed to cont prior HEP. Exercises were reviewed and Meghna was able to demonstrate them prior to the end of the session.  Meghna demonstrated good understanding of the education provided. See EMR under Patient Instructions for exercises provided during therapy sessions    Assessment     At Evaluation:  Meghna is a 74 y.o. female referred to outpatient Physical Therapy with a medical diagnosis of right shoulder replacement. Patient presents with right shoulder pain at end range of motion, right upper extremity weakness, and decreased range of motion. States she is here to rehab a rTSA. She currently is in no pain and has no swelling. She has some pain when at end range of all shoulder movements. She is able to sleep in her bed. Says she still has to be careful with it in bed so she does not aggravate it. She has noticed she has a little trouble straightening her elbow out due to being in a sling for a lengthy time. She stated she has avoided using the arm due to fear of hurting it. She stated she has not attempted to cook or clean again yet. She was given a home exercise program and instructed on how to properly perform all exercises. She had no complaints with any exercises.    Current Assessment:  Meghna arrived stating she had no complaints from previous treatment. She continues to have decreased strength in right upper extremity. Added shoulder press which was very challenging. She was able to increase weight with rows. She sees MD next  Monday. She would benefit from continue physical therapy to improve active range of motion and functional use of right upper extremity.  Will continue Plan of Care and progress as tolerated.    Meghna Is progressing towards her goals.   Pt prognosis is Good.     Pt will continue to benefit from skilled outpatient physical therapy to address the deficits listed in the problem list box on initial evaluation, provide pt/family education and to maximize pt's level of independence in the home and community environment.     Pt's spiritual, cultural and educational needs considered and pt agreeable to plan of care and goals.     Anticipated barriers to physical therapy: none    Goals:   Short Term Goals: 6 weeks  Patient will be independent with home exercise program to facilitate carryover between visits.  Patient will be independent with dressing and driving without modification due to right shoulder.  Patient will be able to sleep all night in bed without waking due to right shoulder pain.     Long Term Goals: 12 weeks  Patient will have active range of motion as follow - 110 degrees flexion, 40 degrees external rotation and functional internal rotation to L3 for reaching overhead, behind head and behind back for dressing, grooming and hygiene.  Patient will have 4+-5/5 strength of right shoulder/upper extremity to perform household chores and work related tasks.  Patient will place 2# dumbbell on head height shelf without hiking or pain right shoulder.  Patient will return to doing household duties such as cooking, cleaning, and sweeping with limitations due to her right shoulder.     Plan     Plan of care Certification: 10/17/2024 to 1/10/2025.     Outpatient Physical Therapy 2 times weekly for 12 weeks to include the following interventions: 61685 [therapeutic exercise], 68543 [neuromuscular re-education], 44983 [manual therapy], 45933 [therapeutic activities], 43789 [ultrasound], 50625 [unattended electrical  stimulation], and 20302 [vasopneumatic device].     AYE ALEXANDER, PT   12/09/2024

## 2024-12-10 ENCOUNTER — INFUSION (OUTPATIENT)
Dept: INFUSION THERAPY | Facility: HOSPITAL | Age: 74
End: 2024-12-10
Attending: INTERNAL MEDICINE
Payer: MEDICARE

## 2024-12-10 VITALS
OXYGEN SATURATION: 98 % | HEART RATE: 74 BPM | DIASTOLIC BLOOD PRESSURE: 75 MMHG | SYSTOLIC BLOOD PRESSURE: 158 MMHG | RESPIRATION RATE: 17 BRPM

## 2024-12-10 DIAGNOSIS — M05.79 RHEUMATOID ARTHRITIS INVOLVING MULTIPLE SITES WITH POSITIVE RHEUMATOID FACTOR: Primary | ICD-10-CM

## 2024-12-10 PROCEDURE — 25000003 PHARM REV CODE 250

## 2024-12-10 PROCEDURE — 96375 TX/PRO/DX INJ NEW DRUG ADDON: CPT

## 2024-12-10 PROCEDURE — 96365 THER/PROPH/DIAG IV INF INIT: CPT

## 2024-12-10 PROCEDURE — 63600175 PHARM REV CODE 636 W HCPCS: Mod: JZ,JA,JG

## 2024-12-10 RX ORDER — ACETAMINOPHEN 500 MG
1000 TABLET ORAL ONCE AS NEEDED
Status: COMPLETED | OUTPATIENT
Start: 2024-12-10 | End: 2024-12-10

## 2024-12-10 RX ORDER — DIPHENHYDRAMINE HYDROCHLORIDE 12.5 MG/5ML
25 LIQUID ORAL ONCE AS NEEDED
Start: 2025-01-06

## 2024-12-10 RX ORDER — SODIUM CHLORIDE 0.9 % (FLUSH) 0.9 %
10 SYRINGE (ML) INJECTION
OUTPATIENT
Start: 2025-01-06

## 2024-12-10 RX ORDER — METHYLPREDNISOLONE SOD SUCC 125 MG
62.5 VIAL (EA) INJECTION ONCE AS NEEDED
Status: COMPLETED | OUTPATIENT
Start: 2024-12-10 | End: 2024-12-10

## 2024-12-10 RX ORDER — ACETAMINOPHEN 500 MG
1000 TABLET ORAL ONCE AS NEEDED
Start: 2025-01-06

## 2024-12-10 RX ORDER — DIPHENHYDRAMINE HYDROCHLORIDE 12.5 MG/5ML
25 LIQUID ORAL ONCE AS NEEDED
Status: COMPLETED | OUTPATIENT
Start: 2024-12-10 | End: 2024-12-10

## 2024-12-10 RX ADMIN — DIPHENHYDRAMINE HYDROCHLORIDE 25 MG: 12.5 SOLUTION ORAL at 12:12

## 2024-12-10 RX ADMIN — METHYLPREDNISOLONE SODIUM SUCCINATE 62.5 MG: 125 INJECTION, POWDER, FOR SOLUTION INTRAMUSCULAR; INTRAVENOUS at 12:12

## 2024-12-10 RX ADMIN — ACETAMINOPHEN 1000 MG: 500 TABLET ORAL at 12:12

## 2024-12-10 RX ADMIN — SODIUM CHLORIDE 750 MG: 9 INJECTION, SOLUTION INTRAVENOUS at 12:12

## 2024-12-10 NOTE — PROGRESS NOTES
1145 Pt here for Orencia infusion, resting in recliner, TP released and pharmacy notified    Pre meds given and tolerated  1217 Orencia infusion started to infuse over 1 hr with filter applied  1315 Orencia infusion complete, tolerated well, will continue to monitor  1335 pt discharged ambulatory with no adverse reaction noted, pt notified to go to ER with any adverse reactions, AVS given along with medication information  Follow up appt made 4 weeks

## 2024-12-12 ENCOUNTER — CLINICAL SUPPORT (OUTPATIENT)
Dept: REHABILITATION | Facility: HOSPITAL | Age: 74
End: 2024-12-12
Payer: MEDICARE

## 2024-12-12 DIAGNOSIS — G89.29 CHRONIC PAIN OF RIGHT UPPER EXTREMITY: ICD-10-CM

## 2024-12-12 DIAGNOSIS — M79.601 CHRONIC PAIN OF RIGHT UPPER EXTREMITY: ICD-10-CM

## 2024-12-12 DIAGNOSIS — M25.511 ACUTE PAIN OF RIGHT SHOULDER: ICD-10-CM

## 2024-12-12 DIAGNOSIS — Z96.611 HISTORY OF RIGHT SHOULDER REPLACEMENT: Primary | ICD-10-CM

## 2024-12-12 DIAGNOSIS — M25.611 DECREASED ROM OF RIGHT SHOULDER: ICD-10-CM

## 2024-12-12 PROCEDURE — 97112 NEUROMUSCULAR REEDUCATION: CPT | Mod: KX,CQ

## 2024-12-12 PROCEDURE — 97110 THERAPEUTIC EXERCISES: CPT | Mod: KX,CQ

## 2024-12-12 PROCEDURE — 97530 THERAPEUTIC ACTIVITIES: CPT | Mod: KX,CQ

## 2024-12-12 NOTE — PROGRESS NOTES
[facility]   Physical Therapy Treatment Note      Name: Meghna CormierAlesia  Clinic Number: [id number]    Therapy Diagnosis:   Encounter Diagnoses   Name Primary?    History of right shoulder replacement Yes    Acute pain of right shoulder     Decreased ROM of right shoulder     Chronic pain of right upper extremity      Physician: Jose Antonio Pablo MD    Visit Date: 12/12/2024    Physician Orders: PT Eval and Treat   Medical Diagnosis from Referral: see above   Evaluation Date: 10/17/2024  Authorization Period Expiration: 1/9/2025  Plan of Care Expiration: 1/10/2025     Date of Surgery: 8/27/2024  Visit # / Visits authorized: 8/25    FOTO: 1/3 = 42   2/3 = 52    Precautions: Standard     PTA Visit #: 1/5     Time In: 8:35 am  Time Out: 9:15 am  Total Billable Time: 40 minutes    Subjective     Pt reports: She has no complaints this morning  She was compliant with home exercise program.  Response to previous treatment: no complaints   Functional change: no change noted    Pain: 0/10  Location: right shoulder    Objective       135 degrees active flexion, 40 degrees external rotation and internal rotation to ischial tuberosity on arrival     Treatment     Meghna received the treatments listed below:      therapeutic exercises to develop strength, endurance, ROM, flexibility, posture, and core stabilization for 20 minutes including:  UBE x 5 minutes  Supine flexion with hands clasped 10sh x 20  Elbow flexion/extension x 30 - 3#  Supine cane chest press into flexion 3# x 20    manual therapy techniques: Joint mobilizations, Manual traction, Myofacial release, Soft tissue Mobilization, and Friction Massage were applied to the: right shoulder for 0 minutes, including:  Gentle stretching into flexion, external rotation, and internal rotation   MFR to upper arm musculature    neuromuscular re-education activities to improve: Balance, Coordination, Kinesthetic Sense, Proprioception, and  Posture for 10 minutes. The following activities were included:  Scapular retraction/extension - blue x 20   Rows 3 plates x 20 - low    therapeutic activities to improve functional performance for 10 minutes, including:  Cane flexion up wall x 20 (performed on rail due to soreness on 12/12)  Shoulder press 1 plate x 10      Patient Education and Home Exercises       Education provided:   - review of home exercise program and current Plan of Care/rationale of treatment.    Written Home Exercises Provided: Patient instructed to cont prior HEP. Exercises were reviewed and Meghna was able to demonstrate them prior to the end of the session.  Meghna demonstrated good understanding of the education provided. See EMR under Patient Instructions for exercises provided during therapy sessions    Assessment     At Evaluation:  Meghna is a 74 y.o. female referred to outpatient Physical Therapy with a medical diagnosis of right shoulder replacement. Patient presents with right shoulder pain at end range of motion, right upper extremity weakness, and decreased range of motion. States she is here to rehab a rTSA. She currently is in no pain and has no swelling. She has some pain when at end range of all shoulder movements. She is able to sleep in her bed. Says she still has to be careful with it in bed so she does not aggravate it. She has noticed she has a little trouble straightening her elbow out due to being in a sling for a lengthy time. She stated she has avoided using the arm due to fear of hurting it. She stated she has not attempted to cook or clean again yet. She was given a home exercise program and instructed on how to properly perform all exercises. She had no complaints with any exercises.    Current Assessment:  Meghna arrived stating she had no complaints from previous treatment but she thinks she slept wrong because her arm is pretty sore this morning. She continues to have decreased strength in right upper  extremity. Shoulder press remains very challenging so did not increase repetitions today. She performed rows without complaint. She sees MD next Monday. She would benefit from continue physical therapy to improve active range of motion and functional use of right upper extremity.  Will continue Plan of Care and progress as tolerated.    Meghna Is progressing towards her goals.   Pt prognosis is Good.     Pt will continue to benefit from skilled outpatient physical therapy to address the deficits listed in the problem list box on initial evaluation, provide pt/family education and to maximize pt's level of independence in the home and community environment.     Pt's spiritual, cultural and educational needs considered and pt agreeable to plan of care and goals.     Anticipated barriers to physical therapy: none    Goals:   Short Term Goals: 6 weeks  Patient will be independent with home exercise program to facilitate carryover between visits.  Patient will be independent with dressing and driving without modification due to right shoulder.  Patient will be able to sleep all night in bed without waking due to right shoulder pain.     Long Term Goals: 12 weeks  Patient will have active range of motion as follow - 110 degrees flexion, 40 degrees external rotation and functional internal rotation to L3 for reaching overhead, behind head and behind back for dressing, grooming and hygiene.  Patient will have 4+-5/5 strength of right shoulder/upper extremity to perform household chores and work related tasks.  Patient will place 2# dumbbell on head height shelf without hiking or pain right shoulder.  Patient will return to doing household duties such as cooking, cleaning, and sweeping with limitations due to her right shoulder.     Plan     Plan of care Certification: 10/17/2024 to 1/10/2025.     Outpatient Physical Therapy 2 times weekly for 12 weeks to include the following interventions: 07126 [therapeutic exercise],  14279 [neuromuscular re-education], 05830 [manual therapy], 30187 [therapeutic activities], 25175 [ultrasound], 12438 [unattended electrical stimulation], and 91621 [vasopneumatic device].     Sara Orona, PTA   12/12/2024

## 2024-12-31 ENCOUNTER — EXTERNAL CHRONIC CARE MANAGEMENT (OUTPATIENT)
Dept: INTERNAL MEDICINE | Facility: CLINIC | Age: 74
End: 2024-12-31
Payer: MEDICARE

## 2024-12-31 PROCEDURE — 99439 CHRNC CARE MGMT STAF EA ADDL: CPT | Mod: S$PBB,,, | Performed by: INTERNAL MEDICINE

## 2024-12-31 PROCEDURE — 99439 CHRNC CARE MGMT STAF EA ADDL: CPT | Mod: PBBFAC | Performed by: INTERNAL MEDICINE

## 2024-12-31 PROCEDURE — 99490 CHRNC CARE MGMT STAFF 1ST 20: CPT | Mod: S$PBB,,, | Performed by: INTERNAL MEDICINE

## 2024-12-31 PROCEDURE — 99490 CHRNC CARE MGMT STAFF 1ST 20: CPT | Mod: PBBFAC | Performed by: INTERNAL MEDICINE

## 2025-01-06 RX ORDER — PREDNISONE 5 MG/1
1 TABLET ORAL
COMMUNITY
Start: 2024-12-12

## 2025-01-07 ENCOUNTER — OFFICE VISIT (OUTPATIENT)
Dept: INTERNAL MEDICINE | Facility: CLINIC | Age: 75
End: 2025-01-07
Payer: MEDICARE

## 2025-01-07 VITALS
RESPIRATION RATE: 16 BRPM | BODY MASS INDEX: 32.96 KG/M2 | HEART RATE: 86 BPM | WEIGHT: 186 LBS | TEMPERATURE: 98 F | HEIGHT: 63 IN | SYSTOLIC BLOOD PRESSURE: 120 MMHG | OXYGEN SATURATION: 99 % | DIASTOLIC BLOOD PRESSURE: 80 MMHG

## 2025-01-07 DIAGNOSIS — D84.821 DRUG-INDUCED IMMUNODEFICIENCY: ICD-10-CM

## 2025-01-07 DIAGNOSIS — Z79.899 DRUG-INDUCED IMMUNODEFICIENCY: ICD-10-CM

## 2025-01-07 DIAGNOSIS — I10 ESSENTIAL HYPERTENSION: Primary | ICD-10-CM

## 2025-01-07 DIAGNOSIS — K21.9 GERD WITHOUT ESOPHAGITIS: ICD-10-CM

## 2025-01-07 DIAGNOSIS — J30.2 SEASONAL ALLERGIES: ICD-10-CM

## 2025-01-07 DIAGNOSIS — E03.9 HYPOTHYROIDISM, UNSPECIFIED TYPE: ICD-10-CM

## 2025-01-07 DIAGNOSIS — M15.9 OSTEOARTHRITIS OF MULTIPLE JOINTS, UNSPECIFIED OSTEOARTHRITIS TYPE: ICD-10-CM

## 2025-01-07 DIAGNOSIS — I26.99 PULMONARY EMBOLISM, UNSPECIFIED CHRONICITY, UNSPECIFIED PULMONARY EMBOLISM TYPE, UNSPECIFIED WHETHER ACUTE COR PULMONALE PRESENT: ICD-10-CM

## 2025-01-07 DIAGNOSIS — E66.01 SEVERE OBESITY (BMI 35.0-39.9) WITH COMORBIDITY: ICD-10-CM

## 2025-01-07 DIAGNOSIS — E78.5 HYPERLIPIDEMIA LDL GOAL <100: ICD-10-CM

## 2025-01-07 DIAGNOSIS — M05.79 RHEUMATOID ARTHRITIS INVOLVING MULTIPLE SITES WITH POSITIVE RHEUMATOID FACTOR: ICD-10-CM

## 2025-01-07 DIAGNOSIS — E11.42 TYPE 2 DIABETES MELLITUS WITH DIABETIC POLYNEUROPATHY, WITHOUT LONG-TERM CURRENT USE OF INSULIN: ICD-10-CM

## 2025-01-07 DIAGNOSIS — D64.9 ANEMIA, UNSPECIFIED TYPE: ICD-10-CM

## 2025-01-07 PROCEDURE — 99215 OFFICE O/P EST HI 40 MIN: CPT | Mod: PBBFAC | Performed by: INTERNAL MEDICINE

## 2025-01-07 PROCEDURE — 1160F RVW MEDS BY RX/DR IN RCRD: CPT | Mod: CPTII,,, | Performed by: INTERNAL MEDICINE

## 2025-01-07 PROCEDURE — 1159F MED LIST DOCD IN RCRD: CPT | Mod: CPTII,,, | Performed by: INTERNAL MEDICINE

## 2025-01-07 PROCEDURE — 3079F DIAST BP 80-89 MM HG: CPT | Mod: CPTII,,, | Performed by: INTERNAL MEDICINE

## 2025-01-07 PROCEDURE — 3288F FALL RISK ASSESSMENT DOCD: CPT | Mod: CPTII,,, | Performed by: INTERNAL MEDICINE

## 2025-01-07 PROCEDURE — 1101F PT FALLS ASSESS-DOCD LE1/YR: CPT | Mod: CPTII,,, | Performed by: INTERNAL MEDICINE

## 2025-01-07 PROCEDURE — 99214 OFFICE O/P EST MOD 30 MIN: CPT | Mod: S$PBB,,, | Performed by: INTERNAL MEDICINE

## 2025-01-07 PROCEDURE — 3008F BODY MASS INDEX DOCD: CPT | Mod: CPTII,,, | Performed by: INTERNAL MEDICINE

## 2025-01-07 PROCEDURE — 99999 PR PBB SHADOW E&M-EST. PATIENT-LVL V: CPT | Mod: PBBFAC,,, | Performed by: INTERNAL MEDICINE

## 2025-01-07 PROCEDURE — 3074F SYST BP LT 130 MM HG: CPT | Mod: CPTII,,, | Performed by: INTERNAL MEDICINE

## 2025-01-07 RX ORDER — FLUCONAZOLE 100 MG/1
100 TABLET ORAL DAILY
Qty: 30 TABLET | Refills: 0 | Status: SHIPPED | OUTPATIENT
Start: 2025-01-07 | End: 2025-02-06

## 2025-01-07 RX ORDER — APIXABAN 5 MG (74)
KIT ORAL
COMMUNITY
Start: 2024-12-16 | End: 2025-01-07 | Stop reason: SDUPTHER

## 2025-01-07 RX ORDER — APIXABAN 5 MG (74)
KIT ORAL
Qty: 180 TABLET | Refills: 3 | Status: SHIPPED | OUTPATIENT
Start: 2025-01-07

## 2025-01-07 NOTE — PROGRESS NOTES
Subjective:       Patient ID: Meghna Dalal is a 74 y.o. female.    Chief Complaint: Follow-up (Recent PE-/Cannot afford farxiga)    Pt with chest pains and sob and cough went to [facility] er and told with pulmonsary embolus source unknown  on eliquis now qne cannot affoes farsiga  with good renal function now will resume metformin and stop farsiiga    Follow-up  Pertinent negatives include no abdominal pain, chest pain, fatigue, rash or weakness.   Review of Systems   Constitutional:  Negative for fatigue and unexpected weight change.   HENT:  Negative for ear pain and goiter.    Respiratory:  Negative for chest tightness and shortness of breath.    Cardiovascular:  Negative for chest pain, palpitations and leg swelling.   Gastrointestinal:  Negative for abdominal pain and reflux.   Integumentary:  Negative for rash and breast tenderness.   Neurological:  Negative for dizziness and weakness.   Hematological:  Negative for adenopathy.   Psychiatric/Behavioral:  Negative for behavioral problems.    Breast: Negative for tenderness      Objective:      Physical Exam  Constitutional:       Appearance: Normal appearance.   HENT:      Head: Normocephalic and atraumatic.      Right Ear: External ear normal.      Left Ear: External ear normal.      Mouth/Throat:      Pharynx: Oropharynx is clear.   Eyes:      Extraocular Movements: Extraocular movements intact.      Pupils: Pupils are equal, round, and reactive to light.   Cardiovascular:      Rate and Rhythm: Normal rate and regular rhythm.      Pulses: Normal pulses.      Heart sounds: Normal heart sounds.   Pulmonary:      Effort: Pulmonary effort is normal.      Breath sounds: Normal breath sounds.   Abdominal:      General: Abdomen is flat. Bowel sounds are normal.      Palpations: Abdomen is soft.   Musculoskeletal:         General: Normal range of motion.      Cervical back: Normal range of motion and neck supple.   Skin:     General: Skin is warm.      Capillary  Refill: Capillary refill takes less than 2 seconds.   Neurological:      General: No focal deficit present.      Mental Status: She is alert.   Psychiatric:         Mood and Affect: Mood normal.         Thought Content: Thought content normal.         Judgment: Judgment normal.       Assessment:       1. Essential hypertension    2. GERD without esophagitis    3. Rheumatoid arthritis involving multiple sites with positive rheumatoid factor    4. Hypothyroidism, unspecified type    5. Hyperlipidemia LDL goal <100    6. Seasonal allergies    7. Anemia, unspecified type    8. Drug-induced immunodeficiency    9. Type 2 diabetes mellitus with diabetic polyneuropathy, without long-term current use of insulin    10. Severe obesity (BMI 35.0-39.9) with comorbidity    11. Osteoarthritis of multiple joints, unspecified osteoarthritis type    12. Pulmonary embolism, unspecified chronicity, unspecified pulmonary embolism type, unspecified whether acute cor pulmonale present        Plan:         There are no Patient Instructions on file for this visit.      Problem List Items Addressed This Visit          Immunology/Multi System    Rheumatoid arthritis involving multiple sites with positive rheumatoid factor    Drug-induced immunodeficiency       Endocrine    Type 2 diabetes mellitus with diabetic polyneuropathy, without long-term current use of insulin    Severe obesity (BMI 35.0-39.9) with comorbidity     Other Visit Diagnoses       Essential hypertension    -  Primary    GERD without esophagitis        Hypothyroidism, unspecified type        Hyperlipidemia LDL goal <100        Seasonal allergies        Anemia, unspecified type        Osteoarthritis of multiple joints, unspecified osteoarthritis type        Pulmonary embolism, unspecified chronicity, unspecified pulmonary embolism type, unspecified whether acute cor pulmonale present        Relevant Medications    ELIQUIS DVT-PE TREAT 30D START 5 mg (74 tabs) DsPk    Other  Relevant Orders    Ambulatory referral/consult to Cardiology

## 2025-01-08 ENCOUNTER — TELEPHONE (OUTPATIENT)
Dept: INTERNAL MEDICINE | Facility: CLINIC | Age: 75
End: 2025-01-08
Payer: MEDICARE

## 2025-01-08 RX ORDER — AZITHROMYCIN 250 MG/1
TABLET, FILM COATED ORAL
Qty: 6 TABLET | Refills: 1 | Status: SHIPPED | OUTPATIENT
Start: 2025-01-08

## 2025-01-10 ENCOUNTER — PATIENT OUTREACH (OUTPATIENT)
Facility: HOSPITAL | Age: 75
End: 2025-01-10
Payer: MEDICARE

## 2025-01-10 ENCOUNTER — OFFICE VISIT (OUTPATIENT)
Dept: CARDIOLOGY | Facility: CLINIC | Age: 75
End: 2025-01-10
Payer: MEDICARE

## 2025-01-10 ENCOUNTER — INFUSION (OUTPATIENT)
Dept: INFUSION THERAPY | Facility: HOSPITAL | Age: 75
End: 2025-01-10
Attending: INTERNAL MEDICINE
Payer: MEDICARE

## 2025-01-10 ENCOUNTER — TELEPHONE (OUTPATIENT)
Dept: PHARMACY | Facility: CLINIC | Age: 75
End: 2025-01-10
Payer: MEDICARE

## 2025-01-10 VITALS
HEART RATE: 77 BPM | SYSTOLIC BLOOD PRESSURE: 130 MMHG | OXYGEN SATURATION: 98 % | DIASTOLIC BLOOD PRESSURE: 70 MMHG | BODY MASS INDEX: 32.78 KG/M2 | HEIGHT: 63 IN | WEIGHT: 185 LBS

## 2025-01-10 VITALS
OXYGEN SATURATION: 99 % | HEART RATE: 75 BPM | SYSTOLIC BLOOD PRESSURE: 120 MMHG | DIASTOLIC BLOOD PRESSURE: 70 MMHG | BODY MASS INDEX: 32.95 KG/M2 | RESPIRATION RATE: 17 BRPM | WEIGHT: 186 LBS

## 2025-01-10 DIAGNOSIS — I26.99 PULMONARY EMBOLISM, UNSPECIFIED CHRONICITY, UNSPECIFIED PULMONARY EMBOLISM TYPE, UNSPECIFIED WHETHER ACUTE COR PULMONALE PRESENT: ICD-10-CM

## 2025-01-10 DIAGNOSIS — I26.99 ACUTE PULMONARY EMBOLISM, UNSPECIFIED PULMONARY EMBOLISM TYPE, UNSPECIFIED WHETHER ACUTE COR PULMONALE PRESENT: Primary | ICD-10-CM

## 2025-01-10 DIAGNOSIS — I10 SYSTOLIC HYPERTENSION: ICD-10-CM

## 2025-01-10 DIAGNOSIS — M05.79 RHEUMATOID ARTHRITIS INVOLVING MULTIPLE SITES WITH POSITIVE RHEUMATOID FACTOR: Primary | ICD-10-CM

## 2025-01-10 PROCEDURE — 99215 OFFICE O/P EST HI 40 MIN: CPT | Mod: PBBFAC | Performed by: STUDENT IN AN ORGANIZED HEALTH CARE EDUCATION/TRAINING PROGRAM

## 2025-01-10 PROCEDURE — 1101F PT FALLS ASSESS-DOCD LE1/YR: CPT | Mod: CPTII,,, | Performed by: STUDENT IN AN ORGANIZED HEALTH CARE EDUCATION/TRAINING PROGRAM

## 2025-01-10 PROCEDURE — 1126F AMNT PAIN NOTED NONE PRSNT: CPT | Mod: CPTII,,, | Performed by: STUDENT IN AN ORGANIZED HEALTH CARE EDUCATION/TRAINING PROGRAM

## 2025-01-10 PROCEDURE — 1159F MED LIST DOCD IN RCRD: CPT | Mod: CPTII,,, | Performed by: STUDENT IN AN ORGANIZED HEALTH CARE EDUCATION/TRAINING PROGRAM

## 2025-01-10 PROCEDURE — 96365 THER/PROPH/DIAG IV INF INIT: CPT

## 2025-01-10 PROCEDURE — 3008F BODY MASS INDEX DOCD: CPT | Mod: CPTII,,, | Performed by: STUDENT IN AN ORGANIZED HEALTH CARE EDUCATION/TRAINING PROGRAM

## 2025-01-10 PROCEDURE — 25000003 PHARM REV CODE 250

## 2025-01-10 PROCEDURE — 99999 PR PBB SHADOW E&M-EST. PATIENT-LVL V: CPT | Mod: PBBFAC,,, | Performed by: STUDENT IN AN ORGANIZED HEALTH CARE EDUCATION/TRAINING PROGRAM

## 2025-01-10 PROCEDURE — 3078F DIAST BP <80 MM HG: CPT | Mod: CPTII,,, | Performed by: STUDENT IN AN ORGANIZED HEALTH CARE EDUCATION/TRAINING PROGRAM

## 2025-01-10 PROCEDURE — 96375 TX/PRO/DX INJ NEW DRUG ADDON: CPT

## 2025-01-10 PROCEDURE — 3288F FALL RISK ASSESSMENT DOCD: CPT | Mod: CPTII,,, | Performed by: STUDENT IN AN ORGANIZED HEALTH CARE EDUCATION/TRAINING PROGRAM

## 2025-01-10 PROCEDURE — 99204 OFFICE O/P NEW MOD 45 MIN: CPT | Mod: S$PBB,,, | Performed by: STUDENT IN AN ORGANIZED HEALTH CARE EDUCATION/TRAINING PROGRAM

## 2025-01-10 PROCEDURE — 3075F SYST BP GE 130 - 139MM HG: CPT | Mod: CPTII,,, | Performed by: STUDENT IN AN ORGANIZED HEALTH CARE EDUCATION/TRAINING PROGRAM

## 2025-01-10 PROCEDURE — 63600175 PHARM REV CODE 636 W HCPCS: Mod: JZ,JA,TB

## 2025-01-10 RX ORDER — DIPHENHYDRAMINE HYDROCHLORIDE 12.5 MG/5ML
25 LIQUID ORAL ONCE AS NEEDED
Start: 2025-02-03

## 2025-01-10 RX ORDER — SODIUM CHLORIDE 0.9 % (FLUSH) 0.9 %
10 SYRINGE (ML) INJECTION
OUTPATIENT
Start: 2025-02-03

## 2025-01-10 RX ORDER — ACETAMINOPHEN 500 MG
1000 TABLET ORAL ONCE AS NEEDED
Start: 2025-02-03

## 2025-01-10 RX ORDER — ACETAMINOPHEN 500 MG
1000 TABLET ORAL ONCE AS NEEDED
Status: COMPLETED | OUTPATIENT
Start: 2025-01-10 | End: 2025-01-10

## 2025-01-10 RX ORDER — DIPHENHYDRAMINE HYDROCHLORIDE 12.5 MG/5ML
25 LIQUID ORAL ONCE AS NEEDED
Status: COMPLETED | OUTPATIENT
Start: 2025-01-10 | End: 2025-01-10

## 2025-01-10 RX ADMIN — SODIUM CHLORIDE 750 MG: 9 INJECTION, SOLUTION INTRAVENOUS at 10:01

## 2025-01-10 RX ADMIN — ACETAMINOPHEN 1000 MG: 500 TABLET ORAL at 09:01

## 2025-01-10 RX ADMIN — DIPHENHYDRAMINE HYDROCHLORIDE 25 MG: 12.5 SOLUTION ORAL at 09:01

## 2025-01-10 RX ADMIN — METHYLPREDNISOLONE SODIUM SUCCINATE 40 MG: 40 INJECTION, POWDER, FOR SOLUTION INTRAMUSCULAR; INTRAVENOUS at 09:01

## 2025-01-10 NOTE — PROGRESS NOTES
01/10/2025   --Chart accessed for: Care Gaps/ mmg report  --Care Gaps addressed: All topics  Outreach made to patient via GLO regarding mmg . (Success) (Left Message) (Unavailable)   Care Everywhere updates requested and reviewed.  [facility] and [facility] reviewed.  Next appointment 4/8/2025 . Appointment notes updated to include: due for diabetic eye exam, colonoscopy, A1c, diabetes urine, CMP, foot exam, and mammogram  Health Maintenance Due   Topic Date Due    Diabetes Urine Screening  Never done    COVID-19 Vaccine (1) Never done    Foot Exam  Never done    TETANUS VACCINE  Never done    Mammogram  Never done    Shingles Vaccine (1 of 2) Never done    Pneumococcal Vaccines (Age 50+) (1 of 2 - PCV) Never done    Low Dose Statin  Never done    DEXA Scan  Never done    RSV Vaccine (Age 60+ and Pregnant patients) (1 - Risk 60-74 years 1-dose series) Never done    Eye Exam  12/08/2023    Colorectal Cancer Screening  08/19/2024    Influenza Vaccine (1) 09/01/2024    Hemoglobin A1c  11/28/2024

## 2025-01-10 NOTE — TELEPHONE ENCOUNTER
[facility] Patient Assistance Program guidelines states patient must demonstrate she is ineligible for Medicare's Low Income Subsidy(LIS)/ Extra Help Program. Patient can apply by calling [phone] or online @ https://secure.[employer].gov/i1020/start.  Please provide a copy of  the determination letter to assigned advocate Bri ADAMSON for application submission     [facility] Patient Assistance Program guidelines state, patient must demonstrate she has spent a minimum 3% of her household income on prescriptions for the current year.        Patient may be eligible for a Medicare Prescription Payment Plan. Patient has been advised to contact her insurance company for enrollment details.           Sincerely   Bri Bowman  Pharmacy Patient Assistance Team

## 2025-01-10 NOTE — PROGRESS NOTES
0940 Pt here for Orencia infusion, resting in recliner, TP released and pharmacy notified    Pre meds given and tolerated  1005 Orencia infusion started to infuse over 1 hr with filter applied  1100 Orencia infusion complete, tolerated well, will continue to monitor  1125 pt discharged ambulatory with no adverse reaction noted, pt notified to go to ER with any adverse reactions, AVS given along with medication information  Follow up appt made 4 weeks

## 2025-01-10 NOTE — LETTER
January 10, 2025    Meghna Mulugeta  [address]  [place] [address]           Dear Ms. Dalal        My name is Bri Bowman.  I am reaching out on behalf of [facility]s Pharmacy Patient Assistance Team after receiving a referral from your Provider inquiring about assistance with your Eliquis. Unfortunately, The Pharmacy Patient Assistance Team is unable to submit your application at this time due to the following reasons       [facility]  Patient Assistance Program guidelines state, patient must demonstrate she is ineligible for Medicare's Low Income Subsidy(LIS)/ Extra Help Program. Apply by calling 1-847.561.5150 or online @ https://secure.[employer].gov/i1020/start.  Please provide a copy of  the determination letter to assigned advocate Bri Bowman ASAP for application submission     [facility]  Patient Assistance Program guidelines state patient must demonstrate she has spent a minimum 3% of her household income on prescriptions for the current year.        Patient may be eligible for a Medicare Prescription Payment Plan. Please contact your insurance company for enrollment details.        Sincerely  Bri AGARWAL @[phone]  Pharmacy Patient Assistance  [address] [place] [address] [id number]  [place] [address]  Fax: [phone]  [email or web address]

## 2025-01-10 NOTE — PROGRESS NOTES
PCP: Kwame Wyatt MD    Referring Provider: Kwame Wyatt MD  [address] [facility] [place] [address]      Diagnosis   I26.99 (ICD-10-CM) - Pulmonary embolism, unspecified chronicity, unspecified pulmonary embolism type, unspecified whether acute cor pulmonale present       Subjective:   Meghna Dalal is a 74 y.o. female with hx of RA, HTN and DM, who presents for a new patient visit,     Referred due to recently diagnosed PE.  She notes that she was visiting her daughter in [place] in December (she does not remember the exact dates) when she had sudden onset of chest pain, shortness of breath, cough and fatigue.  Family took her to [facility] the following day where she was found to have bilateral pulmonary emboli per report. She was started on Eliquis 10 mg b.i.d. x1 week and then 5 mg b.i.d. records are not available at this time but they will be requested.  Today, patient does not have any complaints.  She denies any shortness of breath or chest pain.    Fhx:  Family history of congestive heart failure (father and brother)  Shx:  Never smoker    EKG 01/10/2025: Normal sinus rhythm    ECHO No results found for this or any previous visit.     CATH: No results found for this or any previous visit.     Lab Results   Component Value Date     12/06/2024    K 4.0 12/06/2024     (H) 12/06/2024    CO2 17 (L) 12/06/2024    BUN 5 (L) 12/06/2024    CREATININE 0.86 12/06/2024    CALCIUM 8.2 (L) 12/06/2024    ANIONGAP 15 12/06/2024    ESTGFRAFRICA 73 12/13/2021       Lab Results   Component Value Date    CHOL 221 (H) 02/20/2024    CHOL 234 (H) 11/02/2022    CHOL 204 (H) 07/09/2021     Lab Results   Component Value Date    HDL 49 02/20/2024    HDL 46 11/02/2022    HDL 54 07/09/2021     Lab Results   Component Value Date    LDLCALC 131 02/20/2024    LDLCALC 151 11/02/2022    LDLCALC 108 07/09/2021     Lab Results   Component Value Date    TRIG 206 (H) 02/20/2024    TRIG 183 (H)  11/02/2022    TRIG 210 (H) 07/09/2021     Lab Results   Component Value Date    CHOLHDL 4.5 02/20/2024    CHOLHDL 5.1 11/02/2022    CHOLHDL 3.8 07/09/2021       Lab Results   Component Value Date    WBC 7.93 11/05/2024    HGB 11.5 (L) 11/05/2024    HCT 36.3 (L) 11/05/2024    MCV 77.6 (L) 11/05/2024     11/05/2024           Current Outpatient Medications:     cyclobenzaprine (FLEXERIL) 10 MG tablet, Take 1 tablet (10 mg total) by mouth 3 (three) times daily as needed for Muscle spasms., Disp: 90 tablet, Rfl: 11    ELIQUIS DVT-PE TREAT 30D START 5 mg (74 tabs) DsPk, For the first 7 days take two 5 mg tablets twice daily.  After 7 days take one 5 mg tablet twice daily., Disp: 180 tablet, Rfl: 3    fluconazole (DIFLUCAN) 100 MG tablet, Take 1 tablet (100 mg total) by mouth once daily., Disp: 30 tablet, Rfl: 0    furosemide (LASIX) 20 MG tablet, Take 1 tablet (20 mg total) by mouth once daily., Disp: 30 tablet, Rfl: 11    levothyroxine (SYNTHROID) 112 MCG tablet, Take 1 tablet (112 mcg total) by mouth once daily., Disp: 30 tablet, Rfl: 11    losartan-hydrochlorothiazide 100-12.5 mg (HYZAAR) 100-12.5 mg Tab, TAKE 1 TABLET BY MOUTH EVERY DAY, Disp: 90 tablet, Rfl: 3    metFORMIN (GLUCOPHAGE) 500 MG tablet, TAKE 1 TABLET BY MOUTH THREE TIMES A DAY, Disp: 270 tablet, Rfl: 3    omeprazole (PRILOSEC) 40 MG capsule, TAKE 1 CAPSULE BY MOUTH ONCE  DAILY, Disp: 90 capsule, Rfl: 3    pantoprazole (PROTONIX) 40 MG tablet, Take 1 tablet (40 mg total) by mouth once daily., Disp: 90 tablet, Rfl: 3    aspirin 81 mg Cap, Take 81 mg by mouth once daily. (Patient not taking: Reported on 12/6/2024), Disp: , Rfl:     azithromycin (Z-JOSE) 250 MG tablet, 2 tabs on day one followed by one a day for four days (Patient not taking: Reported on 1/10/2025), Disp: 6 tablet, Rfl: 1    dapagliflozin propanediol (FARXIGA) 10 mg tablet, Take 1 tablet (10 mg total) by mouth once daily. (Patient not taking: Reported on 1/10/2025), Disp: 90 tablet,  "Rfl: 3    gabapentin (NEURONTIN) 100 MG capsule, Take 1 capsule (100 mg total) by mouth 3 (three) times daily. (Patient not taking: Reported on 11/11/2024), Disp: 90 capsule, Rfl: 11    levoFLOXacin (LEVAQUIN) 500 MG tablet, Take 1 tablet (500 mg total) by mouth once daily. (Patient not taking: Reported on 1/10/2025), Disp: 3 tablet, Rfl: 0    potassium chloride (MICRO-K) 8 mEq CpSR, TAKE 1 CAPSULE BY MOUTH ONCE DAILY. (Patient not taking: Reported on 1/10/2025), Disp: 90 capsule, Rfl: 3    predniSONE (DELTASONE) 5 MG tablet, Take 5 mg by mouth once daily. (Patient not taking: Reported on 1/10/2025), Disp: , Rfl:     predniSONE (DELTASONE) 5 MG tablet, Take 1 tablet by mouth as needed. (Patient not taking: Reported on 1/10/2025), Disp: , Rfl:     Current Facility-Administered Medications:     EPINEPHrine (EPIPEN) 0.3 mg/0.3 mL pen injection 0.3 mg, 0.3 mg, Intramuscular, PRN, Kwame Wyatt MD    ondansetron disintegrating tablet 4 mg, 4 mg, Oral, Once PRN, Kwame Wyatt MD    sodium chloride 0.9% 500 mL flush bag, , Intravenous, PRN, Kwame Wyatt MD    Facility-Administered Medications Ordered in Other Visits:     0.9% NaCl 100 mL flush bag, , Intravenous, PRN, Order, Paper    abatacept (with maltose) (ORENCIA) 750 mg in 0.9% NaCl 100 mL IVPB, 750 mg, Intravenous, 1 time in Clinic/HOD, Order, Paper, Last Rate: 100 mL/hr at 01/10/25 1005, 750 mg at 01/10/25 1005    Review of Systems   Constitutional:  Negative for chills, diaphoresis, fever and malaise/fatigue.   Respiratory:  Negative for cough and shortness of breath.    Cardiovascular:  Negative for chest pain, palpitations, orthopnea, claudication, leg swelling and PND.   Gastrointestinal:  Negative for abdominal pain, heartburn, nausea and vomiting.   Neurological:  Negative for dizziness.       Objective:   /70 (BP Location: Left arm, Patient Position: Sitting)   Pulse 77   Ht 5' 3" (1.6 m)   Wt 83.9 kg (185 lb)   SpO2 98%   BMI 32.77 kg/m² "     Physical Exam  Constitutional:       General: She is not in acute distress.     Appearance: Normal appearance.   Cardiovascular:      Rate and Rhythm: Normal rate and regular rhythm.      Pulses: Normal pulses.      Heart sounds: Normal heart sounds. No murmur heard.     No friction rub. No gallop.   Pulmonary:      Effort: Pulmonary effort is normal.      Breath sounds: Normal breath sounds. No wheezing or rales.   Musculoskeletal:      Right lower leg: No edema.      Left lower leg: No edema.   Skin:     General: Skin is warm and dry.   Neurological:      Mental Status: She is alert.           Assessment:     1. Acute pulmonary embolism, unspecified pulmonary embolism type, unspecified whether acute cor pulmonale present  Ambulatory referral/consult to Cardiology    Echo    Ambulatory referral/consult to Pharmacy Assistance      2. Systolic hypertension  EKG 12-lead            Plan:   No problem-specific Assessment & Plan notes found for this encounter.    Acute pulmonary embolism, unspecified whether acute cor pulmonale present  - request records from [facility]  - schedule echo to evaluate RV size and function and PA pressures  - Eliquis has been refilled by Dr. Wyatt. Continue eliquis 5 mg BID.   - pharmacy assistance consult for Eliquis placed as per patient request.  Eliquis samples for 1 month provided    Follow up in 3 months

## 2025-01-10 NOTE — PATIENT INSTRUCTIONS
Obtain records from [facility]  Echo on -> January 29, 2025 at 1:00 pm  Continue eliquis 5 mg two times a day   Pharmacy assistance consult placed for eliquis  Follow-up in 3 months

## 2025-01-16 ENCOUNTER — TELEPHONE (OUTPATIENT)
Dept: CARDIOLOGY | Facility: CLINIC | Age: 75
End: 2025-01-16
Payer: MEDICARE

## 2025-01-16 NOTE — TELEPHONE ENCOUNTER
Spoke with pt and informed her I can reach out to someone with the Pharmacy Assistance and see what's going on. Pt v/u.

## 2025-01-16 NOTE — TELEPHONE ENCOUNTER
----- Message from Summer sent at 1/15/2025 11:25 AM CST -----  Regarding: Rx Financial assistance  Who Called: Meghna Casa-Deeton    Caller is requesting assistance/information from provider's office.        Preferred Method of Contact: Phone Call  Patient's Preferred Phone Number on File: [phone]     Additional Information: Pt. Stated she did not receive an update/ call from Financial assistance for Rx. Pt. Wants to speak to nurse about further assistance on this matter.

## 2025-01-29 ENCOUNTER — HOSPITAL ENCOUNTER (OUTPATIENT)
Dept: CARDIOLOGY | Facility: HOSPITAL | Age: 75
Discharge: HOME | End: 2025-01-29
Attending: STUDENT IN AN ORGANIZED HEALTH CARE EDUCATION/TRAINING PROGRAM
Payer: MEDICARE

## 2025-01-29 DIAGNOSIS — I26.99 ACUTE PULMONARY EMBOLISM, UNSPECIFIED PULMONARY EMBOLISM TYPE, UNSPECIFIED WHETHER ACUTE COR PULMONALE PRESENT: ICD-10-CM

## 2025-01-29 LAB
AORTIC ROOT ANNULUS: 3.03 CM
AORTIC VALVE CUSP SEPERATION: 1.84 CM
AV INDEX (PROSTH): 0.99
AV MEAN GRADIENT: 5 MMHG
AV PEAK GRADIENT: 9 MMHG
AV VALVE AREA BY VELOCITY RATIO: 2.3 CM²
AV VALVE AREA: 2.8 CM²
AV VELOCITY RATIO: 0.8
CV ECHO LV RWT: 0.39 CM
DOP CALC AO PEAK VEL: 1.5 M/S
DOP CALC AO VTI: 31.7 CM
DOP CALC LVOT AREA: 2.8 CM2
DOP CALC LVOT DIAMETER: 1.9 CM
DOP CALC LVOT PEAK VEL: 1.2 M/S
DOP CALC LVOT STROKE VOLUME: 88.7 CM3
DOP CALCLVOT PEAK VEL VTI: 31.3 CM
E WAVE DECELERATION TIME: 402 MSEC
E/A RATIO: 0.57
E/E' RATIO: 5 M/S
ECHO LV POSTERIOR WALL: 0.7 CM (ref 0.6–1.1)
FRACTIONAL SHORTENING: 33.3 % (ref 28–44)
INTERVENTRICULAR SEPTUM: 1.2 CM (ref 0.6–1.1)
IVC DIAMETER: 1.6 CM
LEFT ATRIUM AREA SYSTOLIC (APICAL 2 CHAMBER): 19.36 CM2
LEFT ATRIUM AREA SYSTOLIC (APICAL 4 CHAMBER): 20.13 CM2
LEFT ATRIUM VOLUME MOD: 55 ML
LEFT INTERNAL DIMENSION IN SYSTOLE: 2.4 CM (ref 2.1–4)
LEFT VENTRICLE DIASTOLIC VOLUME: 54.03 ML
LEFT VENTRICLE END SYSTOLIC VOLUME APICAL 2 CHAMBER: 51.18 ML
LEFT VENTRICLE END SYSTOLIC VOLUME APICAL 4 CHAMBER: 57.02 ML
LEFT VENTRICLE SYSTOLIC VOLUME: 21.05 ML
LEFT VENTRICULAR INTERNAL DIMENSION IN DIASTOLE: 3.6 CM (ref 3.5–6)
LEFT VENTRICULAR MASS: 100.2 G
LV LATERAL E/E' RATIO: 4.6 M/S
LV SEPTAL E/E' RATIO: 5.5 M/S
LVED V (TEICH): 54.03 ML
LVES V (TEICH): 21.05 ML
LVOT MG: 2.33 MMHG
LVOT MV: 0.67 CM/S
MV PEAK A VEL: 0.96 M/S
MV PEAK E VEL: 0.55 M/S
MV STENOSIS PRESSURE HALF TIME: 116.63 MS
MV VALVE AREA P 1/2 METHOD: 1.89 CM2
OHS CV RV/LV RATIO: 1.03 CM
OHS LV EJECTION FRACTION SIMPSONS BIPLANE MOD: 51 %
PISA MRMAX VEL: 2.82 M/S
PISA TR MAX VEL: 1.5 M/S
PV PEAK GRADIENT: 5 MMHG
PV PEAK VELOCITY: 1.09 M/S
RA PRESSURE ESTIMATED: 3 MMHG
RA VOL SYS: 53.32 ML
RIGHT ATRIAL AREA: 18.2 CM2
RIGHT ATRIUM VOLUME AREA LENGTH APICAL 4 CHAMBER: 48.45 ML
RIGHT VENTRICLE DIASTOLIC BASEL DIMENSION: 3.7 CM
RIGHT VENTRICLE DIASTOLIC LENGTH: 6.3 CM
RIGHT VENTRICLE DIASTOLIC MID DIMENSION: 1.7 CM
RIGHT VENTRICULAR LENGTH IN DIASTOLE (APICAL 4-CHAMBER VIEW): 6.3 CM
RV MID DIAMA: 1.74 CM
RV TB RVSP: 5 MMHG
TDI LATERAL: 0.12 M/S
TDI SEPTAL: 0.1 M/S
TDI: 0.11 M/S
TR MAX PG: 9 MMHG
TRICUSPID ANNULAR PLANE SYSTOLIC EXCURSION: 1.78 CM
TV REST PULMONARY ARTERY PRESSURE: 12 MMHG

## 2025-01-29 PROCEDURE — 93306 TTE W/DOPPLER COMPLETE: CPT | Mod: 26,,, | Performed by: STUDENT IN AN ORGANIZED HEALTH CARE EDUCATION/TRAINING PROGRAM

## 2025-01-29 PROCEDURE — 93306 TTE W/DOPPLER COMPLETE: CPT

## 2025-01-31 ENCOUNTER — EXTERNAL CHRONIC CARE MANAGEMENT (OUTPATIENT)
Dept: INTERNAL MEDICINE | Facility: CLINIC | Age: 75
End: 2025-01-31
Payer: MEDICARE

## 2025-01-31 DIAGNOSIS — I26.99 PULMONARY EMBOLISM, UNSPECIFIED CHRONICITY, UNSPECIFIED PULMONARY EMBOLISM TYPE, UNSPECIFIED WHETHER ACUTE COR PULMONALE PRESENT: Primary | ICD-10-CM

## 2025-01-31 PROCEDURE — 99490 CHRNC CARE MGMT STAFF 1ST 20: CPT | Mod: PBBFAC | Performed by: INTERNAL MEDICINE

## 2025-01-31 PROCEDURE — 99490 CHRNC CARE MGMT STAFF 1ST 20: CPT | Mod: S$PBB,,, | Performed by: INTERNAL MEDICINE

## 2025-01-31 PROCEDURE — 99439 CHRNC CARE MGMT STAF EA ADDL: CPT | Mod: S$PBB,,, | Performed by: INTERNAL MEDICINE

## 2025-01-31 PROCEDURE — 99439 CHRNC CARE MGMT STAF EA ADDL: CPT | Mod: PBBFAC | Performed by: INTERNAL MEDICINE

## 2025-02-05 ENCOUNTER — TELEPHONE (OUTPATIENT)
Dept: INTERNAL MEDICINE | Facility: CLINIC | Age: 75
End: 2025-02-05
Payer: MEDICARE

## 2025-02-07 ENCOUNTER — INFUSION (OUTPATIENT)
Dept: INFUSION THERAPY | Facility: HOSPITAL | Age: 75
End: 2025-02-07
Attending: INTERNAL MEDICINE
Payer: MEDICARE

## 2025-02-07 VITALS
DIASTOLIC BLOOD PRESSURE: 76 MMHG | SYSTOLIC BLOOD PRESSURE: 174 MMHG | OXYGEN SATURATION: 99 % | RESPIRATION RATE: 17 BRPM | HEART RATE: 75 BPM

## 2025-02-07 DIAGNOSIS — M05.79 RHEUMATOID ARTHRITIS INVOLVING MULTIPLE SITES WITH POSITIVE RHEUMATOID FACTOR: Primary | ICD-10-CM

## 2025-02-07 PROCEDURE — 25000003 PHARM REV CODE 250

## 2025-02-07 PROCEDURE — 96375 TX/PRO/DX INJ NEW DRUG ADDON: CPT

## 2025-02-07 PROCEDURE — 63600175 PHARM REV CODE 636 W HCPCS: Mod: JZ,JA,TB

## 2025-02-07 PROCEDURE — 96365 THER/PROPH/DIAG IV INF INIT: CPT

## 2025-02-07 RX ORDER — METHYLPREDNISOLONE SOD SUCC 125 MG
62.5 VIAL (EA) INJECTION ONCE AS NEEDED
Status: COMPLETED | OUTPATIENT
Start: 2025-02-07 | End: 2025-02-07

## 2025-02-07 RX ORDER — DIPHENHYDRAMINE HYDROCHLORIDE 12.5 MG/5ML
25 LIQUID ORAL ONCE AS NEEDED
Status: COMPLETED | OUTPATIENT
Start: 2025-02-07 | End: 2025-02-07

## 2025-02-07 RX ORDER — SODIUM CHLORIDE 0.9 % (FLUSH) 0.9 %
10 SYRINGE (ML) INJECTION
OUTPATIENT
Start: 2025-03-03

## 2025-02-07 RX ORDER — ACETAMINOPHEN 500 MG
1000 TABLET ORAL ONCE AS NEEDED
Start: 2025-03-03

## 2025-02-07 RX ORDER — ACETAMINOPHEN 500 MG
1000 TABLET ORAL ONCE AS NEEDED
Status: COMPLETED | OUTPATIENT
Start: 2025-02-07 | End: 2025-02-07

## 2025-02-07 RX ORDER — DIPHENHYDRAMINE HYDROCHLORIDE 12.5 MG/5ML
25 LIQUID ORAL ONCE AS NEEDED
Start: 2025-03-03

## 2025-02-07 RX ADMIN — METHYLPREDNISOLONE SODIUM SUCCINATE 62.5 MG: 125 INJECTION, POWDER, FOR SOLUTION INTRAMUSCULAR; INTRAVENOUS at 10:02

## 2025-02-07 RX ADMIN — DIPHENHYDRAMINE HYDROCHLORIDE 25 MG: 12.5 SOLUTION ORAL at 10:02

## 2025-02-07 RX ADMIN — ACETAMINOPHEN 1000 MG: 500 TABLET ORAL at 10:02

## 2025-02-07 RX ADMIN — SODIUM CHLORIDE 750 MG: 9 INJECTION, SOLUTION INTRAVENOUS at 10:02

## 2025-02-07 NOTE — PROGRESS NOTES
0950 Pt here for Orencia infusion, resting in recliner, TP released and pharmacy notified    Pre meds given and tolerated  1027 Orencia infusion started to infuse over 1 hr with filter applied  1128 Orencia infusion complete, tolerated well, will continue to monitor  1145 pt discharged ambulatory with no adverse reaction noted, pt notified to go to ER with any adverse reactions, AVS given along with medication information  Follow up appt made 4 weeks

## 2025-02-12 ENCOUNTER — OFFICE VISIT (OUTPATIENT)
Dept: ORTHOPEDICS | Facility: CLINIC | Age: 75
End: 2025-02-12
Payer: MEDICARE

## 2025-02-12 ENCOUNTER — TELEPHONE (OUTPATIENT)
Dept: INTERNAL MEDICINE | Facility: CLINIC | Age: 75
End: 2025-02-12
Payer: MEDICARE

## 2025-02-12 ENCOUNTER — HOSPITAL ENCOUNTER (OUTPATIENT)
Dept: RADIOLOGY | Facility: HOSPITAL | Age: 75
Discharge: HOME | End: 2025-02-12
Attending: ORTHOPAEDIC SURGERY
Payer: MEDICARE

## 2025-02-12 VITALS
TEMPERATURE: 98 F | BODY MASS INDEX: 33.34 KG/M2 | RESPIRATION RATE: 18 BRPM | SYSTOLIC BLOOD PRESSURE: 147 MMHG | OXYGEN SATURATION: 97 % | HEART RATE: 82 BPM | WEIGHT: 188.19 LBS | HEIGHT: 63 IN | DIASTOLIC BLOOD PRESSURE: 50 MMHG

## 2025-02-12 DIAGNOSIS — Z47.89 ENCOUNTER FOR ORTHOPEDIC FOLLOW-UP CARE: Primary | ICD-10-CM

## 2025-02-12 DIAGNOSIS — Z47.89 ENCOUNTER FOR ORTHOPEDIC FOLLOW-UP CARE: ICD-10-CM

## 2025-02-12 PROCEDURE — 99213 OFFICE O/P EST LOW 20 MIN: CPT | Mod: S$PBB,,, | Performed by: ORTHOPAEDIC SURGERY

## 2025-02-12 PROCEDURE — 73030 X-RAY EXAM OF SHOULDER: CPT | Mod: TC,RT

## 2025-02-12 PROCEDURE — 3078F DIAST BP <80 MM HG: CPT | Mod: CPTII,,, | Performed by: ORTHOPAEDIC SURGERY

## 2025-02-12 PROCEDURE — 1159F MED LIST DOCD IN RCRD: CPT | Mod: CPTII,,, | Performed by: ORTHOPAEDIC SURGERY

## 2025-02-12 PROCEDURE — 3074F SYST BP LT 130 MM HG: CPT | Mod: CPTII,,, | Performed by: ORTHOPAEDIC SURGERY

## 2025-02-12 PROCEDURE — 99215 OFFICE O/P EST HI 40 MIN: CPT | Mod: PBBFAC,25 | Performed by: ORTHOPAEDIC SURGERY

## 2025-02-12 PROCEDURE — 3008F BODY MASS INDEX DOCD: CPT | Mod: CPTII,,, | Performed by: ORTHOPAEDIC SURGERY

## 2025-02-12 PROCEDURE — 73030 X-RAY EXAM OF SHOULDER: CPT | Mod: 26,RT,, | Performed by: ORTHOPAEDIC SURGERY

## 2025-02-12 PROCEDURE — 99999 PR PBB SHADOW E&M-EST. PATIENT-LVL V: CPT | Mod: PBBFAC,,, | Performed by: ORTHOPAEDIC SURGERY

## 2025-02-12 NOTE — PROGRESS NOTES
Patient is here for follow-up of her right shoulder reverse total shoulder arthroplasty.  She is doing well.  Good motion of the shoulder.  No instability.  X-rays show no loosening.  Let her use her arm as tolerates.  I will follow back up on a yearly basis.

## 2025-02-12 NOTE — PROGRESS NOTES
Radiology Interpretation        Patient Name: Meghna Dalal  Date: 2/12/2025  YOB: 1950  MRN# [id number]        ORDERING DIAGNOSIS:    Encounter Diagnosis   Name Primary?    Encounter for orthopedic follow-up care Yes           Two views right shoulder skeletally mature individual there is a reverse total shoulder arthroplasty in place no loosening fractures or subluxations no bony lesions impression reverse total shoulder arthroplasty in place right shoulder          Jose Antonio Pablo MD

## 2025-02-14 DIAGNOSIS — I26.99 PULMONARY EMBOLISM, UNSPECIFIED CHRONICITY, UNSPECIFIED PULMONARY EMBOLISM TYPE, UNSPECIFIED WHETHER ACUTE COR PULMONALE PRESENT: ICD-10-CM

## 2025-02-21 ENCOUNTER — OFFICE VISIT (OUTPATIENT)
Dept: PULMONOLOGY | Facility: CLINIC | Age: 75
End: 2025-02-21
Payer: MEDICARE

## 2025-02-21 VITALS
DIASTOLIC BLOOD PRESSURE: 60 MMHG | OXYGEN SATURATION: 98 % | HEART RATE: 87 BPM | HEIGHT: 63 IN | RESPIRATION RATE: 18 BRPM | SYSTOLIC BLOOD PRESSURE: 130 MMHG | WEIGHT: 187.81 LBS | BODY MASS INDEX: 33.28 KG/M2

## 2025-02-21 DIAGNOSIS — R07.9 CHEST PAIN, UNSPECIFIED TYPE: Primary | ICD-10-CM

## 2025-02-21 DIAGNOSIS — R06.02 SOB (SHORTNESS OF BREATH): ICD-10-CM

## 2025-02-21 DIAGNOSIS — I26.99 PULMONARY EMBOLISM, UNSPECIFIED CHRONICITY, UNSPECIFIED PULMONARY EMBOLISM TYPE, UNSPECIFIED WHETHER ACUTE COR PULMONALE PRESENT: ICD-10-CM

## 2025-02-21 PROCEDURE — 99215 OFFICE O/P EST HI 40 MIN: CPT | Mod: PBBFAC | Performed by: INTERNAL MEDICINE

## 2025-02-21 RX ORDER — NYSTATIN 100000 U/G
OINTMENT TOPICAL 2 TIMES DAILY
Qty: 1 EACH | Refills: 3 | Status: SHIPPED | OUTPATIENT
Start: 2025-02-21

## 2025-02-21 RX ORDER — FLUTICASONE PROPIONATE AND SALMETEROL 100; 50 UG/1; UG/1
1 POWDER RESPIRATORY (INHALATION) 2 TIMES DAILY
Qty: 60 EACH | Refills: 5 | Status: SHIPPED | OUTPATIENT
Start: 2025-02-21 | End: 2026-02-21

## 2025-02-21 NOTE — ASSESSMENT & PLAN NOTE
Patient has some shortness of breath that really more than that congestion and cough she has a history rheumatoid arthritis were going to add PFTs to her regimen and treat her with a inhaled steroids and bronchodilators reflux maybe playing a small role

## 2025-02-21 NOTE — PROGRESS NOTES
Subjective:       Patient ID: Meghna Dalal is a 74 y.o. female.    Chief Complaint: pulmonary embolism (Hx of Pulmonary Embolism. Last year she had shoulder surgery, salmonella, then chronic cough. Was told she had blood clots in her lungs from [facility] December 2024. Just really needs to get her lungs checked now. Also she has some congestion, coughing up clear mucus. Coughs more in the morning. No inhalers. No images done recently. No PFT done recently. )    Patient presents today with a history of pulmonary embolus and shortness breath with chest congestion      Past Medical History:   Diagnosis Date    Arthritis     Diabetes mellitus, type 2     GERD (gastroesophageal reflux disease)     Hypertension     Iron deficiency anemia     Obesity     Personal history of colonic polyps 08/19/2019    Thyroid disease      Past Surgical History:   Procedure Laterality Date    ARTHROSCOPIC DEBRIDEMENT OF SHOULDER Left 12/21/2021    Procedure: DEBRIDEMENT, SHOULDER, ARTHROSCOPIC;  Surgeon: Jose Antonio Pablo MD;  Location: [facility];  Service: Orthopedics;  Laterality: Left;  SHOULDER JOINT    ARTHROSCOPIC REMOVAL OF LOOSE BODY FROM JOINT Left 12/21/2021    Procedure: REMOVAL, LOOSE BODY, JOINT, ARTHROSCOPIC;  Surgeon: Jose Antonio Pablo MD;  Location: [facility];  Service: Orthopedics;  Laterality: Left;    COLONOSCOPY W/ POLYPECTOMY  08/19/2019    DECOMPRESSION OF SUBACROMIAL SPACE Left 12/21/2021    Procedure: DECOMPRESSION, SUBACROMIAL SPACE;  Surgeon: Jose Antonio Pablo MD;  Location: [facility];  Service: Orthopedics;  Laterality: Left;    DISTAL CLAVICLE EXCISION Left 12/21/2021    Procedure: EXCISION, CLAVICLE, DISTAL;  Surgeon: Jose Antonio Pablo MD;  Location: [facility];  Service: Orthopedics;  Laterality: Left;    left total knee replacement      PARTIAL HYSTERECTOMY      REVERSE TOTAL SHOULDER ARTHROPLASTY Right 8/27/2024    Procedure: ARTHROPLASTY, SHOULDER, TOTAL, REVERSE;  Surgeon:  Jose Antonio Pablo MD;  Location: [facility] OR;  Service: Orthopedics;  Laterality: Right;    ROTATOR CUFF REPAIR Left 12/21/2021    Procedure: REPAIR, ROTATOR CUFF;  Surgeon: Jose Antonio Pablo MD;  Location: [facility] OR;  Service: Orthopedics;  Laterality: Left;    SHOULDER ARTHROSCOPY Left 12/21/2021    Procedure: ARTHROSCOPY, SHOULDER;  Surgeon: Jose Antonio Pablo MD;  Location: [facility] OR;  Service: Orthopedics;  Laterality: Left;    SHOULDER SURGERY Left 12/21/2021    THYROID SURGERY      total thyroidectomy    TONSILLECTOMY AND ADENOIDECTOMY       Family History   Problem Relation Name Age of Onset    Alzheimer's disease Mother      Heart failure Father      Heart failure Brother       Review of patient's allergies indicates:   Allergen Reactions    Codeine phosphate      Other reaction(s): facial swelling    Codeine sulfate      Other reaction(s): Unknown      Social History[1]   Review of Systems   Constitutional:  Negative for chills, activity change and night sweats.   HENT:  Negative for congestion and ear pain.    Eyes:  Negative for redness and itching.   Cardiovascular:  Negative for chest pain and palpitations.   Musculoskeletal:  Negative for arthralgias and back pain.   Skin:  Negative for rash.   Gastrointestinal:  Negative for abdominal pain and abdominal distention.   Neurological:  Negative for dizziness and headaches.   Hematological:  Negative for adenopathy. Does not bruise/bleed easily.   Psychiatric/Behavioral:  Negative for confusion. The patient is not nervous/anxious.        Objective:      Physical Exam   Constitutional: She is oriented to person, place, and time. She appears well-developed and well-nourished.   HENT:   Head: Normocephalic.   Nose: Nose normal.   Mouth/Throat: Oropharynx is clear and moist.   Neck: No JVD present. No thyromegaly present.   Cardiovascular: Normal rate, regular rhythm, normal heart sounds and intact distal pulses.   Pulmonary/Chest: Normal  "expansion, hyperinflation, symmetric chest wall expansion, effort normal and breath sounds normal.   Abdominal: Soft. Bowel sounds are normal.   Musculoskeletal:         General: Normal range of motion.      Cervical back: Normal range of motion and neck supple.   Lymphadenopathy: No supraclavicular adenopathy is present.     She has no cervical adenopathy.   Neurological: She is alert and oriented to person, place, and time. She has normal reflexes.   Skin: Skin is warm and dry.   Psychiatric: She has a normal mood and affect. Her behavior is normal.     Personal Diagnostic Review  none pertinent        2/21/2025     8:57 AM 2/12/2025     1:52 PM 2/7/2025    11:38 AM 2/7/2025    10:45 AM 2/7/2025    10:30 AM 1/10/2025    11:15 AM 1/10/2025    10:30 AM   Pulmonary Function Tests   SpO2 98 % 97 % 99 % 98 % 98 % 99 % 99 %   Height 5' 3" (1.6 m) 5' 3" (1.6 m)        Weight 85.2 kg (187 lb 12.8 oz) 85.4 kg (188 lb 3.2 oz)        BMI (Calculated) 33.3 33.3              Assessment:       1. Chest pain, unspecified type    2. Pulmonary embolism, unspecified chronicity, unspecified pulmonary embolism type, unspecified whether acute cor pulmonale present    3. SOB (shortness of breath)        Encounter Medications[2]  Orders Placed This Encounter   Procedures    CTA Chest Non-Coronary (PE Studies)     Standing Status:   Future     Expected Date:   2/21/2025     Expiration Date:   2/21/2026     Is the patient allergic to iodine contrast?:   No     Does this patient have impaired renal function?:   No     May the Radiologist modify the order per protocol to meet the clinical needs of the patient?:   Yes    US Lower Extremity Veins Bilateral     Standing Status:   Future     Expected Date:   2/21/2025     Expiration Date:   2/21/2026     May the Radiologist modify the order per protocol to meet the clinical needs of the patient?:   Yes     Release to patient:   Immediate    Creatinine, serum     Standing Status:   Future     " Expected Date:   2/21/2025     Expiration Date:   5/22/2026     Send normal result to authorizing provider's In Basket if patient is active on MyChart::   Yes    Pulmonary function test     Standing Status:   Future     Expiration Date:   2/21/2026     Release to patient:   Immediate       Plan:       Problem List Items Addressed This Visit          Pulmonary    SOB (shortness of breath)    Patient has some shortness of breath that really more than that congestion and cough she has a history rheumatoid arthritis were going to add PFTs to her regimen and treat her with a inhaled steroids and bronchodilators reflux maybe playing a small role            Hematology    Pulmonary embolus    Patient with a pulmonary embolus were going to proceed with CT chest PE g in [place] make sure there is no residual disease she recently had an echo that showed normal right-sided disease so very well we maybe able to stop her Eliquis at 3 months after we see those studies         Relevant Medications    fluticasone-salmeterol diskus inhaler 100-50 mcg    Other Relevant Orders    CTA Chest Non-Coronary (PE Studies)    US Lower Extremity Veins Bilateral    Creatinine, serum    Pulmonary function test     Other Visit Diagnoses         Chest pain, unspecified type    -  Primary    Relevant Orders    CTA Chest Non-Coronary (PE Studies)                           [1]   Social History  Tobacco Use    Smoking status: Never    Smokeless tobacco: Never   Substance Use Topics    Alcohol use: Never    Drug use: Never   [2]   Outpatient Encounter Medications as of 2/21/2025   Medication Sig Dispense Refill    apixaban (ELIQUIS) 5 mg Tab Take 1 tablet (5 mg total) by mouth 2 (two) times daily. 180 tablet 3    cyclobenzaprine (FLEXERIL) 10 MG tablet Take 1 tablet (10 mg total) by mouth 3 (three) times daily as needed for Muscle spasms. 90 tablet 11    furosemide (LASIX) 20 MG tablet Take 1 tablet (20 mg total) by mouth once daily. 30 tablet 11     losartan-hydrochlorothiazide 100-12.5 mg (HYZAAR) 100-12.5 mg Tab TAKE 1 TABLET BY MOUTH EVERY DAY 90 tablet 3    metFORMIN (GLUCOPHAGE) 500 MG tablet TAKE 1 TABLET BY MOUTH THREE TIMES A  tablet 3    omeprazole (PRILOSEC) 40 MG capsule TAKE 1 CAPSULE BY MOUTH ONCE  DAILY 90 capsule 3    pantoprazole (PROTONIX) 40 MG tablet Take 1 tablet (40 mg total) by mouth once daily. 90 tablet 3    predniSONE (DELTASONE) 5 MG tablet Take 1 tablet by mouth as needed.      aspirin 81 mg Cap Take 81 mg by mouth once daily. (Patient not taking: Reported on 12/6/2024)      azithromycin (Z-JOSE) 250 MG tablet 2 tabs on day one followed by one a day for four days (Patient not taking: Reported on 1/10/2025) 6 tablet 1    dapagliflozin propanediol (FARXIGA) 10 mg tablet Take 1 tablet (10 mg total) by mouth once daily. (Patient not taking: Reported on 2/21/2025) 90 tablet 3    fluticasone-salmeterol diskus inhaler 100-50 mcg Inhale 1 puff into the lungs 2 (two) times daily. Controller 60 each 5    gabapentin (NEURONTIN) 100 MG capsule Take 1 capsule (100 mg total) by mouth 3 (three) times daily. (Patient not taking: Reported on 2/21/2025) 90 capsule 11    levoFLOXacin (LEVAQUIN) 500 MG tablet Take 1 tablet (500 mg total) by mouth once daily. (Patient not taking: Reported on 2/21/2025) 3 tablet 0    levothyroxine (SYNTHROID) 112 MCG tablet Take 1 tablet (112 mcg total) by mouth once daily. (Patient not taking: Reported on 2/21/2025) 30 tablet 11    potassium chloride (MICRO-K) 8 mEq CpSR TAKE 1 CAPSULE BY MOUTH ONCE DAILY. (Patient not taking: Reported on 2/21/2025) 90 capsule 3    predniSONE (DELTASONE) 5 MG tablet Take 5 mg by mouth once daily. (Patient not taking: Reported on 2/21/2025)      [DISCONTINUED] apixaban (ELIQUIS) 5 mg Tab Take 1 tablet (5 mg total) by mouth 2 (two) times daily. 180 tablet 3    [DISCONTINUED] losartan (COZAAR) 50 MG tablet TAKE 1 TABLET BY MOUTH EVERY DAY 90 tablet 3     Facility-Administered Encounter  Medications as of 2/21/2025   Medication Dose Route Frequency Provider Last Rate Last Admin    EPINEPHrine (EPIPEN) 0.3 mg/0.3 mL pen injection 0.3 mg  0.3 mg Intramuscular PRN Kwame Wyatt MD        ondansetron disintegrating tablet 4 mg  4 mg Oral Once PRN Kwame Wyatt MD        sodium chloride 0.9% 500 mL flush bag   Intravenous PRN Kwame Wyatt MD

## 2025-02-21 NOTE — ASSESSMENT & PLAN NOTE
Patient with a pulmonary embolus were going to proceed with CT chest PE g in [place] make sure there is no residual disease she recently had an echo that showed normal right-sided disease so very well we maybe able to stop her Eliquis at 3 months after we see those studies

## 2025-02-24 ENCOUNTER — TELEPHONE (OUTPATIENT)
Dept: PULMONOLOGY | Facility: CLINIC | Age: 75
End: 2025-02-24
Payer: MEDICARE

## 2025-02-24 DIAGNOSIS — R07.9 CHEST PAIN, UNSPECIFIED TYPE: Primary | ICD-10-CM

## 2025-02-24 NOTE — TELEPHONE ENCOUNTER
Spoke to patient regarding NM appt. Made aware for her to get CXR before her lung scan. Pt aware and no further questions at this time.

## 2025-02-24 NOTE — TELEPHONE ENCOUNTER
----- Message from Med Assistant Gonsales sent at 2/24/2025  1:05 PM CST -----  Regarding: FW: Paperwork issue    ----- Message -----  From: Lorena Orona  Sent: 2/24/2025   1:04 PM CST  To: Florian CHOWDHURY Staff  Subject: Paperwork issue                                  Who Called: Meghna Syed is requesting assistance/information from provider's office.Preferred Method of Contact: Phone CallPatient's Preferred Phone Number on File: [phone] Best Call Back Number, if different:Additional Information:

## 2025-02-27 ENCOUNTER — HOSPITAL ENCOUNTER (OUTPATIENT)
Dept: RADIOLOGY | Facility: HOSPITAL | Age: 75
Discharge: HOME | End: 2025-02-27
Attending: INTERNAL MEDICINE
Payer: MEDICARE

## 2025-02-27 DIAGNOSIS — R07.9 CHEST PAIN, UNSPECIFIED TYPE: ICD-10-CM

## 2025-02-27 PROCEDURE — 71046 X-RAY EXAM CHEST 2 VIEWS: CPT | Mod: TC

## 2025-02-27 PROCEDURE — 78582 LUNG VENTILAT&PERFUS IMAGING: CPT | Mod: TC

## 2025-02-27 PROCEDURE — 71046 X-RAY EXAM CHEST 2 VIEWS: CPT | Mod: 26,,, | Performed by: RADIOLOGY

## 2025-02-27 PROCEDURE — A9567 TECHNETIUM TC-99M AEROSOL: HCPCS | Performed by: INTERNAL MEDICINE

## 2025-02-27 PROCEDURE — A9540 TC99M MAA: HCPCS | Performed by: INTERNAL MEDICINE

## 2025-02-27 RX ADMIN — KIT FOR THE PREPARATION OF TECHNETIUM TC 99M ALBUMIN AGGREGATED 4 MILLICURIE: 2.5 INJECTION, POWDER, FOR SOLUTION INTRAVENOUS at 11:02

## 2025-02-27 RX ADMIN — KIT FOR THE PREPARATION OF TECHNETIUM TC 99M PENTETATE 40 MILLICURIE: 20 INJECTION, POWDER, LYOPHILIZED, FOR SOLUTION INTRAVENOUS; RESPIRATORY (INHALATION) at 10:02

## 2025-02-28 ENCOUNTER — EXTERNAL CHRONIC CARE MANAGEMENT (OUTPATIENT)
Dept: INTERNAL MEDICINE | Facility: CLINIC | Age: 75
End: 2025-02-28
Payer: MEDICARE

## 2025-02-28 PROCEDURE — 99489 CPLX CHRNC CARE EA ADDL 30: CPT | Mod: S$PBB,,, | Performed by: INTERNAL MEDICINE

## 2025-02-28 PROCEDURE — 99487 CPLX CHRNC CARE 1ST 60 MIN: CPT | Mod: S$PBB,,, | Performed by: INTERNAL MEDICINE

## 2025-02-28 PROCEDURE — 99489 CPLX CHRNC CARE EA ADDL 30: CPT | Mod: PBBFAC | Performed by: INTERNAL MEDICINE

## 2025-02-28 PROCEDURE — 99487 CPLX CHRNC CARE 1ST 60 MIN: CPT | Mod: PBBFAC | Performed by: INTERNAL MEDICINE

## 2025-03-03 ENCOUNTER — TELEPHONE (OUTPATIENT)
Dept: PULMONOLOGY | Facility: CLINIC | Age: 75
End: 2025-03-03
Payer: MEDICARE

## 2025-03-03 NOTE — TELEPHONE ENCOUNTER
Spoke to patient regarding instructions to continue blood thinner medications per Dr. Umana. Patient aware and acknowledged.

## 2025-03-03 NOTE — TELEPHONE ENCOUNTER
----- Message from Karen sent at 3/3/2025  1:10 PM CST -----  Who Called: Meghna Syed is requesting assistance/information from provider's office.PT states she is currently one ELIQUIS and she is wanting to know if she can take tylenol Preferred Method of Contact: Phone CallPatient's Preferred Phone Number on File: [phone] Best Call Back Number, if different:Additional Information:

## 2025-03-03 NOTE — TELEPHONE ENCOUNTER
----- Message from Nancy Umana MD sent at 3/2/2025  3:40 PM CST -----  Bernardo Du,I received a call from Radiology about this V/Q scan. This patient has known PE and you were getting repeat imaging to guide DOAC discontinuation. Her V/Q scan is high probability of PE. Team, could you please call the patient and let them know that they still need to take their blood thinner based on her recent test. She remains with evidence of a blood clot. Also if she doesn't have follow up with Dr. Du please schedule one. Thanks.

## 2025-03-07 ENCOUNTER — INFUSION (OUTPATIENT)
Dept: INFUSION THERAPY | Facility: HOSPITAL | Age: 75
End: 2025-03-07
Attending: INTERNAL MEDICINE
Payer: MEDICARE

## 2025-03-07 VITALS
HEART RATE: 74 BPM | SYSTOLIC BLOOD PRESSURE: 127 MMHG | RESPIRATION RATE: 17 BRPM | DIASTOLIC BLOOD PRESSURE: 62 MMHG | OXYGEN SATURATION: 98 %

## 2025-03-07 DIAGNOSIS — M05.79 RHEUMATOID ARTHRITIS INVOLVING MULTIPLE SITES WITH POSITIVE RHEUMATOID FACTOR: Primary | ICD-10-CM

## 2025-03-07 PROCEDURE — 63600175 PHARM REV CODE 636 W HCPCS: Mod: JZ,TB

## 2025-03-07 PROCEDURE — 96365 THER/PROPH/DIAG IV INF INIT: CPT

## 2025-03-07 PROCEDURE — 25000003 PHARM REV CODE 250

## 2025-03-07 PROCEDURE — 96375 TX/PRO/DX INJ NEW DRUG ADDON: CPT

## 2025-03-07 RX ORDER — DIPHENHYDRAMINE HYDROCHLORIDE 12.5 MG/5ML
25 LIQUID ORAL ONCE AS NEEDED
Start: 2025-04-07

## 2025-03-07 RX ORDER — ACETAMINOPHEN 500 MG
1000 TABLET ORAL ONCE AS NEEDED
Start: 2025-04-07

## 2025-03-07 RX ORDER — SODIUM CHLORIDE 0.9 % (FLUSH) 0.9 %
10 SYRINGE (ML) INJECTION
OUTPATIENT
Start: 2025-04-07

## 2025-03-07 RX ORDER — ACETAMINOPHEN 500 MG
1000 TABLET ORAL ONCE AS NEEDED
Status: COMPLETED | OUTPATIENT
Start: 2025-03-07 | End: 2025-03-07

## 2025-03-07 RX ORDER — DIPHENHYDRAMINE HYDROCHLORIDE 12.5 MG/5ML
25 LIQUID ORAL ONCE AS NEEDED
Status: COMPLETED | OUTPATIENT
Start: 2025-03-07 | End: 2025-03-07

## 2025-03-07 RX ADMIN — SODIUM CHLORIDE 750 MG: 9 INJECTION, SOLUTION INTRAVENOUS at 12:03

## 2025-03-07 RX ADMIN — ACETAMINOPHEN 1000 MG: 500 TABLET ORAL at 11:03

## 2025-03-07 RX ADMIN — METHYLPREDNISOLONE SODIUM SUCCINATE 40 MG: 40 INJECTION, POWDER, FOR SOLUTION INTRAMUSCULAR; INTRAVENOUS at 11:03

## 2025-03-07 RX ADMIN — DIPHENHYDRAMINE HYDROCHLORIDE 25 MG: 12.5 SOLUTION ORAL at 11:03

## 2025-03-07 NOTE — PROGRESS NOTES
1130 Pt here for Orencia infusion, resting in recliner, TP released and pharmacy notified    Pre meds given and tolerated   1205 Orencia infusion started to infuse over 1 hr with filter applied  New IV started, Infusion paused.   1306 Orencia infusion complete, tolerated well, will continue to monitor  1320 pt discharged ambulatory with no adverse reaction noted, pt notified to go to ER with any adverse reactions, AVS given along with medication information  Follow up appt made 4 weeks

## 2025-03-10 ENCOUNTER — HOSPITAL ENCOUNTER (OUTPATIENT)
Dept: RADIOLOGY | Facility: HOSPITAL | Age: 75
Discharge: HOME | End: 2025-03-10
Attending: INTERNAL MEDICINE
Payer: MEDICARE

## 2025-03-10 DIAGNOSIS — I26.99 PULMONARY EMBOLISM, UNSPECIFIED CHRONICITY, UNSPECIFIED PULMONARY EMBOLISM TYPE, UNSPECIFIED WHETHER ACUTE COR PULMONALE PRESENT: ICD-10-CM

## 2025-03-10 PROCEDURE — 93970 EXTREMITY STUDY: CPT | Mod: 26,,, | Performed by: RADIOLOGY

## 2025-03-10 PROCEDURE — 93970 EXTREMITY STUDY: CPT | Mod: TC

## 2025-03-17 RX ORDER — METFORMIN HYDROCHLORIDE 500 MG/1
500 TABLET ORAL 3 TIMES DAILY
Qty: 270 TABLET | Refills: 3 | Status: SHIPPED | OUTPATIENT
Start: 2025-03-17

## 2025-03-31 ENCOUNTER — EXTERNAL CHRONIC CARE MANAGEMENT (OUTPATIENT)
Dept: INTERNAL MEDICINE | Facility: CLINIC | Age: 75
End: 2025-03-31
Payer: MEDICARE

## 2025-03-31 PROCEDURE — 99490 CHRNC CARE MGMT STAFF 1ST 20: CPT | Mod: S$PBB,,, | Performed by: INTERNAL MEDICINE

## 2025-03-31 PROCEDURE — 99490 CHRNC CARE MGMT STAFF 1ST 20: CPT | Mod: PBBFAC | Performed by: INTERNAL MEDICINE

## 2025-04-08 ENCOUNTER — OFFICE VISIT (OUTPATIENT)
Dept: INTERNAL MEDICINE | Facility: CLINIC | Age: 75
End: 2025-04-08
Payer: MEDICARE

## 2025-04-08 ENCOUNTER — CLINICAL SUPPORT (OUTPATIENT)
Dept: PULMONOLOGY | Facility: HOSPITAL | Age: 75
End: 2025-04-08
Attending: STUDENT IN AN ORGANIZED HEALTH CARE EDUCATION/TRAINING PROGRAM
Payer: MEDICARE

## 2025-04-08 ENCOUNTER — INFUSION (OUTPATIENT)
Dept: INFUSION THERAPY | Facility: HOSPITAL | Age: 75
End: 2025-04-08
Attending: STUDENT IN AN ORGANIZED HEALTH CARE EDUCATION/TRAINING PROGRAM
Payer: MEDICARE

## 2025-04-08 VITALS
HEART RATE: 74 BPM | DIASTOLIC BLOOD PRESSURE: 58 MMHG | RESPIRATION RATE: 17 BRPM | OXYGEN SATURATION: 98 % | SYSTOLIC BLOOD PRESSURE: 126 MMHG

## 2025-04-08 VITALS
HEART RATE: 69 BPM | SYSTOLIC BLOOD PRESSURE: 122 MMHG | TEMPERATURE: 98 F | BODY MASS INDEX: 33.31 KG/M2 | RESPIRATION RATE: 16 BRPM | HEIGHT: 63 IN | OXYGEN SATURATION: 99 % | DIASTOLIC BLOOD PRESSURE: 80 MMHG | WEIGHT: 188 LBS

## 2025-04-08 VITALS — OXYGEN SATURATION: 99 %

## 2025-04-08 DIAGNOSIS — E03.9 HYPOTHYROIDISM, UNSPECIFIED TYPE: ICD-10-CM

## 2025-04-08 DIAGNOSIS — E66.01 SEVERE OBESITY (BMI 35.0-39.9) WITH COMORBIDITY: ICD-10-CM

## 2025-04-08 DIAGNOSIS — I26.99 PULMONARY EMBOLISM, UNSPECIFIED CHRONICITY, UNSPECIFIED PULMONARY EMBOLISM TYPE, UNSPECIFIED WHETHER ACUTE COR PULMONALE PRESENT: ICD-10-CM

## 2025-04-08 DIAGNOSIS — M05.79 RHEUMATOID ARTHRITIS INVOLVING MULTIPLE SITES WITH POSITIVE RHEUMATOID FACTOR: ICD-10-CM

## 2025-04-08 DIAGNOSIS — E11.42 TYPE 2 DIABETES MELLITUS WITH DIABETIC POLYNEUROPATHY, WITHOUT LONG-TERM CURRENT USE OF INSULIN: ICD-10-CM

## 2025-04-08 DIAGNOSIS — J30.2 SEASONAL ALLERGIES: ICD-10-CM

## 2025-04-08 DIAGNOSIS — K21.9 GERD WITHOUT ESOPHAGITIS: ICD-10-CM

## 2025-04-08 DIAGNOSIS — D64.9 ANEMIA, UNSPECIFIED TYPE: ICD-10-CM

## 2025-04-08 DIAGNOSIS — E78.5 HYPERLIPIDEMIA LDL GOAL <100: ICD-10-CM

## 2025-04-08 DIAGNOSIS — I10 ESSENTIAL HYPERTENSION: Primary | ICD-10-CM

## 2025-04-08 DIAGNOSIS — M05.79 RHEUMATOID ARTHRITIS INVOLVING MULTIPLE SITES WITH POSITIVE RHEUMATOID FACTOR: Primary | ICD-10-CM

## 2025-04-08 PROCEDURE — 99214 OFFICE O/P EST MOD 30 MIN: CPT | Mod: S$PBB,,, | Performed by: INTERNAL MEDICINE

## 2025-04-08 PROCEDURE — 1101F PT FALLS ASSESS-DOCD LE1/YR: CPT | Mod: CPTII,,, | Performed by: INTERNAL MEDICINE

## 2025-04-08 PROCEDURE — 99215 OFFICE O/P EST HI 40 MIN: CPT | Mod: PBBFAC | Performed by: INTERNAL MEDICINE

## 2025-04-08 PROCEDURE — 25000003 PHARM REV CODE 250

## 2025-04-08 PROCEDURE — 94729 DIFFUSING CAPACITY: CPT

## 2025-04-08 PROCEDURE — 3288F FALL RISK ASSESSMENT DOCD: CPT | Mod: CPTII,,, | Performed by: INTERNAL MEDICINE

## 2025-04-08 PROCEDURE — 96365 THER/PROPH/DIAG IV INF INIT: CPT

## 2025-04-08 PROCEDURE — 96375 TX/PRO/DX INJ NEW DRUG ADDON: CPT

## 2025-04-08 PROCEDURE — 94060 EVALUATION OF WHEEZING: CPT

## 2025-04-08 PROCEDURE — 3008F BODY MASS INDEX DOCD: CPT | Mod: CPTII,,, | Performed by: INTERNAL MEDICINE

## 2025-04-08 PROCEDURE — 3079F DIAST BP 80-89 MM HG: CPT | Mod: CPTII,,, | Performed by: INTERNAL MEDICINE

## 2025-04-08 PROCEDURE — 1159F MED LIST DOCD IN RCRD: CPT | Mod: CPTII,,, | Performed by: INTERNAL MEDICINE

## 2025-04-08 PROCEDURE — 27100098 HC SPACER

## 2025-04-08 PROCEDURE — 3074F SYST BP LT 130 MM HG: CPT | Mod: CPTII,,, | Performed by: INTERNAL MEDICINE

## 2025-04-08 PROCEDURE — 99999 PR PBB SHADOW E&M-EST. PATIENT-LVL V: CPT | Mod: PBBFAC,,, | Performed by: INTERNAL MEDICINE

## 2025-04-08 PROCEDURE — 1160F RVW MEDS BY RX/DR IN RCRD: CPT | Mod: CPTII,,, | Performed by: INTERNAL MEDICINE

## 2025-04-08 PROCEDURE — 63600175 PHARM REV CODE 636 W HCPCS: Mod: JZ,JA,TB

## 2025-04-08 PROCEDURE — 94726 PLETHYSMOGRAPHY LUNG VOLUMES: CPT

## 2025-04-08 RX ORDER — LEVOTHYROXINE SODIUM 125 UG/1
125 TABLET ORAL
COMMUNITY

## 2025-04-08 RX ORDER — DIPHENHYDRAMINE HYDROCHLORIDE 12.5 MG/5ML
25 LIQUID ORAL ONCE AS NEEDED
Start: 2025-05-05

## 2025-04-08 RX ORDER — SODIUM CHLORIDE 0.9 % (FLUSH) 0.9 %
10 SYRINGE (ML) INJECTION
OUTPATIENT
Start: 2025-05-05

## 2025-04-08 RX ORDER — DIPHENHYDRAMINE HYDROCHLORIDE 12.5 MG/5ML
25 LIQUID ORAL ONCE AS NEEDED
Status: COMPLETED | OUTPATIENT
Start: 2025-04-08 | End: 2025-04-08

## 2025-04-08 RX ORDER — ACETAMINOPHEN 500 MG
1000 TABLET ORAL ONCE AS NEEDED
Status: COMPLETED | OUTPATIENT
Start: 2025-04-08 | End: 2025-04-08

## 2025-04-08 RX ORDER — ACETAMINOPHEN 500 MG
1000 TABLET ORAL ONCE AS NEEDED
Start: 2025-05-05

## 2025-04-08 RX ADMIN — ACETAMINOPHEN 1000 MG: 500 TABLET ORAL at 11:04

## 2025-04-08 RX ADMIN — SODIUM CHLORIDE 750 MG: 9 INJECTION, SOLUTION INTRAVENOUS at 11:04

## 2025-04-08 RX ADMIN — METHYLPREDNISOLONE SODIUM SUCCINATE 62.5 MG: 125 INJECTION, POWDER, FOR SOLUTION INTRAMUSCULAR; INTRAVENOUS at 11:04

## 2025-04-08 RX ADMIN — DIPHENHYDRAMINE HYDROCHLORIDE 25 MG: 12.5 SOLUTION ORAL at 11:04

## 2025-04-08 NOTE — PROGRESS NOTES
1050 Pt here for Orencia infusion, resting in recliner, TP released and pharmacy notified   Pre meds given and tolerated (Benadryl Elixir 25mg, Tylenol 1000mg PO, 62.5mg Solumedrol IV)  1125 Orencia infusion started to go in over 1 hr with filter applied  1221 Orencia infusion complete, tolerated well, will continue to monitor  1240 pt discharged ambulatory with no adverse reaction noted, pt notified to go to ER with any adverse reactions, AVS given along with medication information  Follow up appt made 4 weeks

## 2025-04-08 NOTE — PROGRESS NOTES
Subjective:       Patient ID: Meghna Dalal is a 74 y.o. female.    Chief Complaint: No chief complaint on file.    Needs mammograms   bs 98 today  in pulmonary w/u  currently  with h/o recient pe  Review of Systems   Constitutional:  Negative for fatigue and unexpected weight change.   HENT:  Negative for ear pain and goiter.    Respiratory:  Negative for chest tightness and shortness of breath.    Cardiovascular:  Negative for chest pain, palpitations and leg swelling.   Gastrointestinal:  Negative for abdominal pain and reflux.   Integumentary:  Negative for rash and breast tenderness.   Neurological:  Negative for dizziness and weakness.   Hematological:  Negative for adenopathy.   Psychiatric/Behavioral:  Negative for behavioral problems.    Breast: Negative for tenderness      Objective:      Physical Exam  Constitutional:       Appearance: Normal appearance.   HENT:      Head: Normocephalic and atraumatic.      Right Ear: External ear normal.      Left Ear: External ear normal.      Mouth/Throat:      Pharynx: Oropharynx is clear.   Eyes:      Extraocular Movements: Extraocular movements intact.      Pupils: Pupils are equal, round, and reactive to light.   Cardiovascular:      Rate and Rhythm: Normal rate and regular rhythm.      Pulses: Normal pulses.      Heart sounds: Normal heart sounds.   Pulmonary:      Effort: Pulmonary effort is normal.      Breath sounds: Normal breath sounds.   Abdominal:      General: Abdomen is flat. Bowel sounds are normal.      Palpations: Abdomen is soft.   Musculoskeletal:         General: Normal range of motion.      Cervical back: Normal range of motion and neck supple.   Skin:     General: Skin is warm.      Capillary Refill: Capillary refill takes less than 2 seconds.   Neurological:      General: No focal deficit present.      Mental Status: She is alert.   Psychiatric:         Mood and Affect: Mood normal.         Thought Content: Thought content normal.          Judgment: Judgment normal.       Assessment:       1. Essential hypertension    2. GERD without esophagitis    3. Rheumatoid arthritis involving multiple sites with positive rheumatoid factor    4. Hypothyroidism, unspecified type    5. Hyperlipidemia LDL goal <100    6. Pulmonary embolism, unspecified chronicity, unspecified pulmonary embolism type, unspecified whether acute cor pulmonale present    7. Seasonal allergies    8. Anemia, unspecified type    9. Type 2 diabetes mellitus with diabetic polyneuropathy, without long-term current use of insulin    10. Severe obesity (BMI 35.0-39.9) with comorbidity        Plan:         There are no Patient Instructions on file for this visit.      Problem List Items Addressed This Visit          Immunology/Multi System    Rheumatoid arthritis involving multiple sites with positive rheumatoid factor       Hematology    Pulmonary embolus       Endocrine    Type 2 diabetes mellitus with diabetic polyneuropathy, without long-term current use of insulin    Severe obesity (BMI 35.0-39.9) with comorbidity     Other Visit Diagnoses         Essential hypertension    -  Primary      GERD without esophagitis          Hypothyroidism, unspecified type          Hyperlipidemia LDL goal <100          Seasonal allergies          Anemia, unspecified type

## 2025-04-09 NOTE — PROCEDURES
Pulmonary function test  Forced vital capacity 2.87 L 108% predicted   FEV1 2.29 L or 112% predicted   FEV1 ratio 80%   Normal small airways  Normal lung volumes   Mild decrease in DLCO   Could suggest extrathoracic variable obstruction by flow volume loop  Normal pulmonary function tests

## 2025-04-22 ENCOUNTER — OFFICE VISIT (OUTPATIENT)
Dept: CARDIOLOGY | Facility: CLINIC | Age: 75
End: 2025-04-22
Payer: MEDICARE

## 2025-04-22 VITALS
SYSTOLIC BLOOD PRESSURE: 134 MMHG | DIASTOLIC BLOOD PRESSURE: 62 MMHG | HEART RATE: 63 BPM | HEIGHT: 63 IN | OXYGEN SATURATION: 98 % | WEIGHT: 195 LBS | BODY MASS INDEX: 34.55 KG/M2

## 2025-04-22 DIAGNOSIS — R07.9 CHEST PAIN, UNSPECIFIED TYPE: Primary | ICD-10-CM

## 2025-04-22 DIAGNOSIS — I26.99 PULMONARY EMBOLISM, UNSPECIFIED CHRONICITY, UNSPECIFIED PULMONARY EMBOLISM TYPE, UNSPECIFIED WHETHER ACUTE COR PULMONALE PRESENT: ICD-10-CM

## 2025-04-22 PROCEDURE — 99999 PR PBB SHADOW E&M-EST. PATIENT-LVL V: CPT | Mod: PBBFAC,,, | Performed by: STUDENT IN AN ORGANIZED HEALTH CARE EDUCATION/TRAINING PROGRAM

## 2025-04-22 PROCEDURE — 3078F DIAST BP <80 MM HG: CPT | Mod: CPTII,,, | Performed by: STUDENT IN AN ORGANIZED HEALTH CARE EDUCATION/TRAINING PROGRAM

## 2025-04-22 PROCEDURE — 1101F PT FALLS ASSESS-DOCD LE1/YR: CPT | Mod: CPTII,,, | Performed by: STUDENT IN AN ORGANIZED HEALTH CARE EDUCATION/TRAINING PROGRAM

## 2025-04-22 PROCEDURE — 3288F FALL RISK ASSESSMENT DOCD: CPT | Mod: CPTII,,, | Performed by: STUDENT IN AN ORGANIZED HEALTH CARE EDUCATION/TRAINING PROGRAM

## 2025-04-22 PROCEDURE — 1126F AMNT PAIN NOTED NONE PRSNT: CPT | Mod: CPTII,,, | Performed by: STUDENT IN AN ORGANIZED HEALTH CARE EDUCATION/TRAINING PROGRAM

## 2025-04-22 PROCEDURE — 3075F SYST BP GE 130 - 139MM HG: CPT | Mod: CPTII,,, | Performed by: STUDENT IN AN ORGANIZED HEALTH CARE EDUCATION/TRAINING PROGRAM

## 2025-04-22 PROCEDURE — 3008F BODY MASS INDEX DOCD: CPT | Mod: CPTII,,, | Performed by: STUDENT IN AN ORGANIZED HEALTH CARE EDUCATION/TRAINING PROGRAM

## 2025-04-22 PROCEDURE — 99214 OFFICE O/P EST MOD 30 MIN: CPT | Mod: S$PBB,,, | Performed by: STUDENT IN AN ORGANIZED HEALTH CARE EDUCATION/TRAINING PROGRAM

## 2025-04-22 PROCEDURE — 99215 OFFICE O/P EST HI 40 MIN: CPT | Mod: PBBFAC | Performed by: STUDENT IN AN ORGANIZED HEALTH CARE EDUCATION/TRAINING PROGRAM

## 2025-04-22 NOTE — PATIENT INSTRUCTIONS
EKG today   Stress test on-> May 15, 2025 at 8:00 am  Follow-up in 6 months or sooner if test results are abnormal

## 2025-04-22 NOTE — PROGRESS NOTES
PCP: Kwame Wyatt MD    Referring Provider:     Subjective:   Meghna Dalal is a 75 y.o. female with hx of RA, HTN and DM, who presents for follow-up.     4/22/25: Complaints of intermittent chest pains over the last 2 weeks.  Described as a soreness in her chest.  She also endorses shortness of breath with exertion.  Of note she had a V/Q scan in 05/2025 with high probability for pulmonary thromboembolic disease.  She remains on Eliquis 5 mg b.i.d..    1/10/25: Referred due to recently diagnosed PE.  She notes that she was visiting her daughter in [place] in December (she does not remember the exact dates) when she had sudden onset of chest pain, shortness of breath, cough and fatigue.  Family took her to [facility] the following day where she was found to have bilateral pulmonary emboli per report. She was started on Eliquis 10 mg b.i.d. x1 week and then 5 mg b.i.d. records are not available at this time but they will be requested.  Today, patient does not have any complaints.  She denies any shortness of breath or chest pain.    Fhx:  Family history of congestive heart failure (father and brother)  Shx:  Never smoker    EKG 4/22/2025:  Normal sinus rhythm, nonspecific T-wave abnormality  01/10/2025: Normal sinus rhythm    ECHO Results for orders placed during the hospital encounter of 01/29/25    Echo    Interpretation Summary    Left Ventricle: The left ventricle is normal in size. Normal wall thickness. There is low normal systolic function with a visually estimated ejection fraction of 50 - 55%. Quantitated ejection fraction is 51%. There is normal diastolic function.    Right Ventricle: Normal right ventricular cavity size. Systolic function is normal.    Mitral Valve: There is mild regurgitation.    IVC/SVC: Normal venous pressure at 3 mmHg.    Technically difficult study.       CATH: No results found for this or any previous visit.     Lab Results   Component Value Date     12/06/2024    K  4.0 12/06/2024     (H) 12/06/2024    CO2 17 (L) 12/06/2024    BUN 5 (L) 12/06/2024    CREATININE 1.00 02/21/2025    CALCIUM 8.2 (L) 12/06/2024    ANIONGAP 15 12/06/2024    ESTGFRAFRICA 73 12/13/2021       Lab Results   Component Value Date    CHOL 221 (H) 02/20/2024    CHOL 234 (H) 11/02/2022    CHOL 204 (H) 07/09/2021     Lab Results   Component Value Date    HDL 49 02/20/2024    HDL 46 11/02/2022    HDL 54 07/09/2021     Lab Results   Component Value Date    LDLCALC 131 02/20/2024    LDLCALC 151 11/02/2022    LDLCALC 108 07/09/2021     Lab Results   Component Value Date    TRIG 206 (H) 02/20/2024    TRIG 183 (H) 11/02/2022    TRIG 210 (H) 07/09/2021     Lab Results   Component Value Date    CHOLHDL 4.5 02/20/2024    CHOLHDL 5.1 11/02/2022    CHOLHDL 3.8 07/09/2021       Lab Results   Component Value Date    WBC 7.93 11/05/2024    HGB 11.5 (L) 11/05/2024    HCT 36.3 (L) 11/05/2024    MCV 77.6 (L) 11/05/2024     11/05/2024           Current Outpatient Medications:     apixaban (ELIQUIS) 5 mg Tab, Take 1 tablet (5 mg total) by mouth 2 (two) times daily., Disp: 180 tablet, Rfl: 3    cyclobenzaprine (FLEXERIL) 10 MG tablet, Take 1 tablet (10 mg total) by mouth 3 (three) times daily as needed for Muscle spasms., Disp: 90 tablet, Rfl: 11    fluticasone-salmeterol diskus inhaler 100-50 mcg, Inhale 1 puff into the lungs 2 (two) times daily. Controller, Disp: 60 each, Rfl: 5    furosemide (LASIX) 20 MG tablet, Take 1 tablet (20 mg total) by mouth once daily., Disp: 30 tablet, Rfl: 11    levothyroxine (SYNTHROID) 125 MCG tablet, Take 125 mcg by mouth before breakfast., Disp: , Rfl:     losartan-hydrochlorothiazide 100-12.5 mg (HYZAAR) 100-12.5 mg Tab, TAKE 1 TABLET BY MOUTH EVERY DAY, Disp: 90 tablet, Rfl: 3    metFORMIN (GLUCOPHAGE) 500 MG tablet, TAKE 1 TABLET BY MOUTH THREE TIMES A DAY, Disp: 270 tablet, Rfl: 3    nystatin (MYCOSTATIN) ointment, Apply topically 2 (two) times daily., Disp: 1 each, Rfl: 3     omeprazole (PRILOSEC) 40 MG capsule, TAKE 1 CAPSULE BY MOUTH ONCE  DAILY, Disp: 90 capsule, Rfl: 3    predniSONE (DELTASONE) 5 MG tablet, Take 1 tablet by mouth as needed., Disp: , Rfl:     aspirin 81 mg Cap, Take 81 mg by mouth once daily. (Patient not taking: Reported on 5/9/2025), Disp: , Rfl:     dapagliflozin propanediol (FARXIGA) 10 mg tablet, Take 1 tablet (10 mg total) by mouth once daily. (Patient not taking: Reported on 5/9/2025), Disp: 90 tablet, Rfl: 3    gabapentin (NEURONTIN) 100 MG capsule, Take 1 capsule (100 mg total) by mouth 3 (three) times daily. (Patient not taking: Reported on 5/9/2025), Disp: 90 capsule, Rfl: 11    levothyroxine (SYNTHROID) 112 MCG tablet, Take 1 tablet (112 mcg total) by mouth once daily., Disp: 30 tablet, Rfl: 11    pantoprazole (PROTONIX) 40 MG tablet, Take 1 tablet (40 mg total) by mouth once daily. (Patient not taking: Reported on 4/8/2025), Disp: 90 tablet, Rfl: 3    potassium chloride (MICRO-K) 8 mEq CpSR, TAKE 1 CAPSULE BY MOUTH ONCE DAILY. (Patient not taking: Reported on 11/13/2024), Disp: 90 capsule, Rfl: 3    predniSONE (DELTASONE) 5 MG tablet, Take 5 mg by mouth once daily. (Patient not taking: Reported on 5/9/2025), Disp: , Rfl:     Current Facility-Administered Medications:     EPINEPHrine (EPIPEN) 0.3 mg/0.3 mL pen injection 0.3 mg, 0.3 mg, Intramuscular, PRN, Kwame Wyatt MD    ondansetron disintegrating tablet 4 mg, 4 mg, Oral, Once PRN, Kwame Wyatt MD    sodium chloride 0.9% 500 mL flush bag, , Intravenous, PRN, Kwame Wyatt MD    Review of Systems   Constitutional:  Negative for chills, diaphoresis, fever and malaise/fatigue.   Respiratory:  Negative for cough and shortness of breath.    Cardiovascular:  Negative for chest pain, palpitations, orthopnea, claudication, leg swelling and PND.   Gastrointestinal:  Negative for abdominal pain, heartburn, nausea and vomiting.   Neurological:  Negative for dizziness.       Objective:   /62 (BP  "Location: Left arm, Patient Position: Sitting)   Pulse 63   Ht 5' 3" (1.6 m)   Wt 88.5 kg (195 lb)   SpO2 98%   BMI 34.54 kg/m²     Physical Exam  Constitutional:       General: She is not in acute distress.     Appearance: Normal appearance.   Cardiovascular:      Rate and Rhythm: Normal rate and regular rhythm.      Pulses: Normal pulses.      Heart sounds: Normal heart sounds. No murmur heard.     No friction rub. No gallop.   Pulmonary:      Effort: Pulmonary effort is normal.      Breath sounds: Normal breath sounds. No wheezing or rales.   Musculoskeletal:      Right lower leg: No edema.      Left lower leg: No edema.   Skin:     General: Skin is warm and dry.   Neurological:      Mental Status: She is alert.           Assessment:     1. Chest pain, unspecified type  NM Myocardial Perfusion Spect Multi Pharmacologic    Nuclear Stress Test    EKG 12-lead    EKG 12-lead      2. Pulmonary embolism, unspecified chronicity, unspecified pulmonary embolism type, unspecified whether acute cor pulmonale present                Plan:   No problem-specific Assessment & Plan notes found for this encounter.    History of PE  - given unprovoked PE, patient will need lifelong anticoagulation.  Continue Eliquis 5 mg b.i.d.    Chest pain  Atypical, not associated with exertion  Risk factors: HTN, DM  Intermediate pre-test probability of CAD  - Schedule pharmacologic stress test with nuclear imaging      Follow-up in 6 months or sooner if test results are abnormal        "

## 2025-04-30 ENCOUNTER — EXTERNAL CHRONIC CARE MANAGEMENT (OUTPATIENT)
Dept: INTERNAL MEDICINE | Facility: CLINIC | Age: 75
End: 2025-04-30
Payer: MEDICARE

## 2025-04-30 PROCEDURE — 99490 CHRNC CARE MGMT STAFF 1ST 20: CPT | Mod: PBBFAC | Performed by: INTERNAL MEDICINE

## 2025-04-30 PROCEDURE — 99490 CHRNC CARE MGMT STAFF 1ST 20: CPT | Mod: S$PBB,,, | Performed by: INTERNAL MEDICINE

## 2025-04-30 PROCEDURE — 99439 CHRNC CARE MGMT STAF EA ADDL: CPT | Mod: S$PBB,,, | Performed by: INTERNAL MEDICINE

## 2025-04-30 PROCEDURE — 99439 CHRNC CARE MGMT STAF EA ADDL: CPT | Mod: PBBFAC | Performed by: INTERNAL MEDICINE

## 2025-05-06 ENCOUNTER — INFUSION (OUTPATIENT)
Dept: INFUSION THERAPY | Facility: HOSPITAL | Age: 75
End: 2025-05-06
Attending: NURSE PRACTITIONER
Payer: MEDICARE

## 2025-05-06 VITALS
DIASTOLIC BLOOD PRESSURE: 68 MMHG | RESPIRATION RATE: 17 BRPM | HEART RATE: 74 BPM | SYSTOLIC BLOOD PRESSURE: 136 MMHG | OXYGEN SATURATION: 98 %

## 2025-05-06 DIAGNOSIS — M05.79 RHEUMATOID ARTHRITIS INVOLVING MULTIPLE SITES WITH POSITIVE RHEUMATOID FACTOR: Primary | ICD-10-CM

## 2025-05-06 PROCEDURE — 96365 THER/PROPH/DIAG IV INF INIT: CPT

## 2025-05-06 PROCEDURE — 96375 TX/PRO/DX INJ NEW DRUG ADDON: CPT

## 2025-05-06 PROCEDURE — 63600175 PHARM REV CODE 636 W HCPCS: Mod: JZ,TB

## 2025-05-06 PROCEDURE — 25000003 PHARM REV CODE 250

## 2025-05-06 RX ORDER — DIPHENHYDRAMINE HYDROCHLORIDE 12.5 MG/5ML
25 LIQUID ORAL ONCE AS NEEDED
Start: 2025-06-02

## 2025-05-06 RX ORDER — ACETAMINOPHEN 500 MG
1000 TABLET ORAL ONCE AS NEEDED
Start: 2025-06-02

## 2025-05-06 RX ORDER — DIPHENHYDRAMINE HYDROCHLORIDE 12.5 MG/5ML
25 LIQUID ORAL ONCE AS NEEDED
Status: COMPLETED | OUTPATIENT
Start: 2025-05-06 | End: 2025-05-06

## 2025-05-06 RX ORDER — METHYLPREDNISOLONE SOD SUCC 125 MG
62.5 VIAL (EA) INJECTION ONCE AS NEEDED
Status: COMPLETED | OUTPATIENT
Start: 2025-05-06 | End: 2025-05-06

## 2025-05-06 RX ORDER — ACETAMINOPHEN 500 MG
1000 TABLET ORAL ONCE AS NEEDED
Status: COMPLETED | OUTPATIENT
Start: 2025-05-06 | End: 2025-05-06

## 2025-05-06 RX ORDER — SODIUM CHLORIDE 0.9 % (FLUSH) 0.9 %
10 SYRINGE (ML) INJECTION
OUTPATIENT
Start: 2025-06-02

## 2025-05-06 RX ADMIN — ACETAMINOPHEN 1000 MG: 500 TABLET ORAL at 10:05

## 2025-05-06 RX ADMIN — SODIUM CHLORIDE 750 MG: 9 INJECTION, SOLUTION INTRAVENOUS at 10:05

## 2025-05-06 RX ADMIN — DIPHENHYDRAMINE HYDROCHLORIDE 25 MG: 12.5 SOLUTION ORAL at 10:05

## 2025-05-06 RX ADMIN — METHYLPREDNISOLONE SODIUM SUCCINATE 62.5 MG: 125 INJECTION, POWDER, FOR SOLUTION INTRAMUSCULAR; INTRAVENOUS at 10:05

## 2025-05-06 NOTE — PROGRESS NOTES
1020 Pt here for Orencia infusion, resting in recliner, TP released and pharmacy notified    Pre meds given and tolerated  (Benadryl Elixir 25mg, Tylenol 1000mg PO, 62.5mg Solumedrol IV)   1050 Orencia infusion started to infuse over 1 hr with filter applied  1146 Orencia infusion complete, tolerated well, will continue to monitor  1210 pt discharged with no adverse reactions noted, pt notified to go to ER with any adverse reactions, AVS given along with medication information  Follow up appt made 4 weeks

## 2025-05-09 ENCOUNTER — OFFICE VISIT (OUTPATIENT)
Dept: PULMONOLOGY | Facility: CLINIC | Age: 75
End: 2025-05-09
Payer: MEDICARE

## 2025-05-09 VITALS
BODY MASS INDEX: 34.38 KG/M2 | HEIGHT: 63 IN | RESPIRATION RATE: 16 BRPM | OXYGEN SATURATION: 99 % | HEART RATE: 70 BPM | SYSTOLIC BLOOD PRESSURE: 140 MMHG | DIASTOLIC BLOOD PRESSURE: 80 MMHG | WEIGHT: 194 LBS

## 2025-05-09 DIAGNOSIS — R06.02 SOB (SHORTNESS OF BREATH): Primary | ICD-10-CM

## 2025-05-09 DIAGNOSIS — I26.99 PULMONARY EMBOLISM, UNSPECIFIED CHRONICITY, UNSPECIFIED PULMONARY EMBOLISM TYPE, UNSPECIFIED WHETHER ACUTE COR PULMONALE PRESENT: ICD-10-CM

## 2025-05-09 PROCEDURE — 1101F PT FALLS ASSESS-DOCD LE1/YR: CPT | Mod: CPTII,,, | Performed by: INTERNAL MEDICINE

## 2025-05-09 PROCEDURE — 1126F AMNT PAIN NOTED NONE PRSNT: CPT | Mod: CPTII,,, | Performed by: INTERNAL MEDICINE

## 2025-05-09 PROCEDURE — 99999 PR PBB SHADOW E&M-EST. PATIENT-LVL V: CPT | Mod: PBBFAC,,, | Performed by: INTERNAL MEDICINE

## 2025-05-09 PROCEDURE — 3008F BODY MASS INDEX DOCD: CPT | Mod: CPTII,,, | Performed by: INTERNAL MEDICINE

## 2025-05-09 PROCEDURE — 3077F SYST BP >= 140 MM HG: CPT | Mod: CPTII,,, | Performed by: INTERNAL MEDICINE

## 2025-05-09 PROCEDURE — 99215 OFFICE O/P EST HI 40 MIN: CPT | Mod: PBBFAC | Performed by: INTERNAL MEDICINE

## 2025-05-09 PROCEDURE — 99213 OFFICE O/P EST LOW 20 MIN: CPT | Mod: S$PBB,,, | Performed by: INTERNAL MEDICINE

## 2025-05-09 PROCEDURE — 1159F MED LIST DOCD IN RCRD: CPT | Mod: CPTII,,, | Performed by: INTERNAL MEDICINE

## 2025-05-09 PROCEDURE — 3079F DIAST BP 80-89 MM HG: CPT | Mod: CPTII,,, | Performed by: INTERNAL MEDICINE

## 2025-05-09 PROCEDURE — 3288F FALL RISK ASSESSMENT DOCD: CPT | Mod: CPTII,,, | Performed by: INTERNAL MEDICINE

## 2025-05-09 NOTE — PROGRESS NOTES
Subjective:       Patient ID: Meghna Dalal is a 75 y.o. female.    Chief Complaint: Follow-up (2mo follow up.)    History of Present Illness    CHIEF COMPLAINT:  Ms. Dalal presents today for follow up of blood clots and breathing issues    PULMONARY EMBOLISM:  She had blood clots in December. CT imaging shows persistent old clots in the lungs. She reports improvement in breathing with use of inhalers and resolution of previous coughing symptoms.    CURRENT SYMPTOMS:  She experiences severe morning sinus drainage with associated coughing. She reports a mild headache and anterior chest pain.    RHEUMATOID ARTHRITIS:  She has a history of rheumatoid arthritis managed with prednisone.      ROS:  General: -fever, -chills, -fatigue, -weight gain, -weight loss  Eyes: -vision changes, -redness, -discharge  ENT: -ear pain, +nasal congestion, -sore throat, +post nasal drip  Cardiovascular: +chest pain, -palpitations, -lower extremity edema  Respiratory: -cough, -shortness of breath  Gastrointestinal: -abdominal pain, -nausea, -vomiting, -diarrhea, -constipation, -blood in stool  Genitourinary: -dysuria, -hematuria, -frequency  Musculoskeletal: -joint pain, -muscle pain  Skin: -rash, -lesion  Neurological: +headache, -dizziness, -numbness, -tingling  Psychiatric: +anxiety, -depression, -sleep difficulty  Head: +head pain          Objective:      Physical Exam   Constitutional: She is oriented to person, place, and time. She appears well-developed and well-nourished.   HENT:   Head: Normocephalic.   Nose: Nose normal.   Mouth/Throat: Oropharynx is clear and moist.   Neck: No JVD present. No thyromegaly present.   Cardiovascular: Normal rate, regular rhythm, normal heart sounds and intact distal pulses.   Pulmonary/Chest: Normal expansion, hyperinflation, symmetric chest wall expansion, effort normal and breath sounds normal.   Abdominal: Soft. Bowel sounds are normal.   Musculoskeletal:         General:  "Normal range of motion.      Cervical back: Normal range of motion and neck supple.   Lymphadenopathy: No supraclavicular adenopathy is present.     She has no cervical adenopathy.   Neurological: She is alert and oriented to person, place, and time. She has normal reflexes.   Skin: Skin is warm and dry.   Psychiatric: She has a normal mood and affect. Her behavior is normal.     Personal Diagnostic Review  none pertinent        5/9/2025     9:56 AM 5/6/2025    12:00 PM 5/6/2025    11:06 AM 5/6/2025    10:45 AM 4/22/2025    11:39 AM 4/8/2025    12:45 PM 4/8/2025    12:00 PM   Pulmonary Function Tests   SpO2 99 % 98 % 98 % 98 % 98 % 98 % 98 %   Height 5' 3" (1.6 m)    5' 3" (1.6 m)     Weight 88 kg (194 lb 0.1 oz)    88.5 kg (195 lb)     BMI (Calculated) 34.4    34.6             Current Medications[1]   Assessment:       1. SOB (shortness of breath)    2. Pulmonary embolism, unspecified chronicity, unspecified pulmonary embolism type, unspecified whether acute cor pulmonale present        Encounter Medications[2]  No orders of the defined types were placed in this encounter.      Plan:       Problem List Items Addressed This Visit          Pulmonary    SOB (shortness of breath) - Primary       Hematology    Pulmonary embolus         Assessment & Plan    I26.99 Pulmonary embolism, unspecified chronicity, unspecified pulmonary embolism type, unspecified whether acute cor pulmonale present  R06.02 SOB (shortness of breath)    IMPRESSION:  - Assessed breathing, noting improvement with prescribed inhalers.  - Blood clot history from December requires continued anticoagulation therapy for a year.  - Pulmonary function test results appeared normal.  - Chest pain reported by patient; stress test ordered by another provider to rule out cardiac etiology.  - Absence of rheumatoid arthritis involvement in lungs.  Previous pulmonary function tests were normal.  Continue inhaled steroids bronchodilators will see in 6 " months    PULMONARY EMBOLISM, UNSPECIFIED CHRONICITY, UNSPECIFIED PULMONARY EMBOLISM TYPE, UNSPECIFIED WHETHER ACUTE COR PULMONALE PRESENT:  - Continue anticoagulant for treatment of blood clots.  Patient V/Q lung scan showed unmatched defects consistent with ongoing blood clots will need to be treated for 1 year  - Follow up in 6 months.    LIFESTYLE CHANGES:  - Explained that complications from stress test are very rare (approximately 1 in 1000).            This note was generated with the assistance of ambient listening technology. Verbal consent was obtained by the patient and accompanying visitor(s) for the recording of patient appointment to facilitate this note. I attest to having reviewed and edited the generated note for accuracy, though some syntax or spelling errors may persist. Please contact the author of this note for any clarification.                  [1]   Current Outpatient Medications:     apixaban (ELIQUIS) 5 mg Tab, Take 1 tablet (5 mg total) by mouth 2 (two) times daily., Disp: 180 tablet, Rfl: 3    cyclobenzaprine (FLEXERIL) 10 MG tablet, Take 1 tablet (10 mg total) by mouth 3 (three) times daily as needed for Muscle spasms., Disp: 90 tablet, Rfl: 11    fluticasone-salmeterol diskus inhaler 100-50 mcg, Inhale 1 puff into the lungs 2 (two) times daily. Controller, Disp: 60 each, Rfl: 5    furosemide (LASIX) 20 MG tablet, Take 1 tablet (20 mg total) by mouth once daily., Disp: 30 tablet, Rfl: 11    levothyroxine (SYNTHROID) 112 MCG tablet, Take 1 tablet (112 mcg total) by mouth once daily., Disp: 30 tablet, Rfl: 11    levothyroxine (SYNTHROID) 125 MCG tablet, Take 125 mcg by mouth before breakfast., Disp: , Rfl:     losartan-hydrochlorothiazide 100-12.5 mg (HYZAAR) 100-12.5 mg Tab, TAKE 1 TABLET BY MOUTH EVERY DAY, Disp: 90 tablet, Rfl: 3    metFORMIN (GLUCOPHAGE) 500 MG tablet, TAKE 1 TABLET BY MOUTH THREE TIMES A DAY, Disp: 270 tablet, Rfl: 3    nystatin (MYCOSTATIN) ointment, Apply topically  2 (two) times daily., Disp: 1 each, Rfl: 3    omeprazole (PRILOSEC) 40 MG capsule, TAKE 1 CAPSULE BY MOUTH ONCE  DAILY, Disp: 90 capsule, Rfl: 3    predniSONE (DELTASONE) 5 MG tablet, Take 1 tablet by mouth as needed., Disp: , Rfl:     aspirin 81 mg Cap, Take 81 mg by mouth once daily. (Patient not taking: Reported on 5/9/2025), Disp: , Rfl:     dapagliflozin propanediol (FARXIGA) 10 mg tablet, Take 1 tablet (10 mg total) by mouth once daily. (Patient not taking: Reported on 5/9/2025), Disp: 90 tablet, Rfl: 3    gabapentin (NEURONTIN) 100 MG capsule, Take 1 capsule (100 mg total) by mouth 3 (three) times daily. (Patient not taking: Reported on 5/9/2025), Disp: 90 capsule, Rfl: 11    pantoprazole (PROTONIX) 40 MG tablet, Take 1 tablet (40 mg total) by mouth once daily. (Patient not taking: Reported on 4/8/2025), Disp: 90 tablet, Rfl: 3    potassium chloride (MICRO-K) 8 mEq CpSR, TAKE 1 CAPSULE BY MOUTH ONCE DAILY. (Patient not taking: Reported on 11/13/2024), Disp: 90 capsule, Rfl: 3    predniSONE (DELTASONE) 5 MG tablet, Take 5 mg by mouth once daily. (Patient not taking: Reported on 5/9/2025), Disp: , Rfl:     Current Facility-Administered Medications:     EPINEPHrine (EPIPEN) 0.3 mg/0.3 mL pen injection 0.3 mg, 0.3 mg, Intramuscular, PRN, Kwame Wyatt MD    ondansetron disintegrating tablet 4 mg, 4 mg, Oral, Once PRN, Kwame Wyatt MD    sodium chloride 0.9% 500 mL flush bag, , Intravenous, PRN, Kwame Wyatt MD  [2]   Outpatient Encounter Medications as of 5/9/2025   Medication Sig Dispense Refill    apixaban (ELIQUIS) 5 mg Tab Take 1 tablet (5 mg total) by mouth 2 (two) times daily. 180 tablet 3    cyclobenzaprine (FLEXERIL) 10 MG tablet Take 1 tablet (10 mg total) by mouth 3 (three) times daily as needed for Muscle spasms. 90 tablet 11    fluticasone-salmeterol diskus inhaler 100-50 mcg Inhale 1 puff into the lungs 2 (two) times daily. Controller 60 each 5    furosemide (LASIX) 20 MG tablet Take 1  tablet (20 mg total) by mouth once daily. 30 tablet 11    levothyroxine (SYNTHROID) 112 MCG tablet Take 1 tablet (112 mcg total) by mouth once daily. 30 tablet 11    levothyroxine (SYNTHROID) 125 MCG tablet Take 125 mcg by mouth before breakfast.      losartan-hydrochlorothiazide 100-12.5 mg (HYZAAR) 100-12.5 mg Tab TAKE 1 TABLET BY MOUTH EVERY DAY 90 tablet 3    metFORMIN (GLUCOPHAGE) 500 MG tablet TAKE 1 TABLET BY MOUTH THREE TIMES A  tablet 3    nystatin (MYCOSTATIN) ointment Apply topically 2 (two) times daily. 1 each 3    omeprazole (PRILOSEC) 40 MG capsule TAKE 1 CAPSULE BY MOUTH ONCE  DAILY 90 capsule 3    predniSONE (DELTASONE) 5 MG tablet Take 1 tablet by mouth as needed.      aspirin 81 mg Cap Take 81 mg by mouth once daily. (Patient not taking: Reported on 5/9/2025)      dapagliflozin propanediol (FARXIGA) 10 mg tablet Take 1 tablet (10 mg total) by mouth once daily. (Patient not taking: Reported on 5/9/2025) 90 tablet 3    gabapentin (NEURONTIN) 100 MG capsule Take 1 capsule (100 mg total) by mouth 3 (three) times daily. (Patient not taking: Reported on 5/9/2025) 90 capsule 11    pantoprazole (PROTONIX) 40 MG tablet Take 1 tablet (40 mg total) by mouth once daily. (Patient not taking: Reported on 4/8/2025) 90 tablet 3    potassium chloride (MICRO-K) 8 mEq CpSR TAKE 1 CAPSULE BY MOUTH ONCE DAILY. (Patient not taking: Reported on 11/13/2024) 90 capsule 3    predniSONE (DELTASONE) 5 MG tablet Take 5 mg by mouth once daily. (Patient not taking: Reported on 5/9/2025)      [DISCONTINUED] losartan (COZAAR) 50 MG tablet TAKE 1 TABLET BY MOUTH EVERY DAY 90 tablet 3     Facility-Administered Encounter Medications as of 5/9/2025   Medication Dose Route Frequency Provider Last Rate Last Admin    EPINEPHrine (EPIPEN) 0.3 mg/0.3 mL pen injection 0.3 mg  0.3 mg Intramuscular PRN Kwame Wyatt MD        ondansetron disintegrating tablet 4 mg  4 mg Oral Once PRN Kwame Wyatt MD        sodium chloride 0.9%  500 mL flush bag   Intravenous PRN Kwame Wyatt MD

## 2025-05-14 ENCOUNTER — TELEPHONE (OUTPATIENT)
Dept: CARDIOLOGY | Facility: HOSPITAL | Age: 75
End: 2025-05-14
Payer: MEDICARE

## 2025-05-15 ENCOUNTER — HOSPITAL ENCOUNTER (OUTPATIENT)
Dept: RADIOLOGY | Facility: HOSPITAL | Age: 75
Discharge: HOME | End: 2025-05-15
Attending: STUDENT IN AN ORGANIZED HEALTH CARE EDUCATION/TRAINING PROGRAM
Payer: MEDICARE

## 2025-05-15 ENCOUNTER — HOSPITAL ENCOUNTER (OUTPATIENT)
Dept: CARDIOLOGY | Facility: HOSPITAL | Age: 75
Discharge: HOME | End: 2025-05-15
Attending: STUDENT IN AN ORGANIZED HEALTH CARE EDUCATION/TRAINING PROGRAM
Payer: MEDICARE

## 2025-05-15 DIAGNOSIS — R07.9 CHEST PAIN, UNSPECIFIED TYPE: ICD-10-CM

## 2025-05-15 LAB
CV PHARM DOSE: 0.4 MG
CV STRESS BASE HR: 61 BPM
DIASTOLIC BLOOD PRESSURE: 61 MMHG
OHS CV CPX 85 PERCENT MAX PREDICTED HEART RATE MALE: 125
OHS CV CPX MAX PREDICTED HEART RATE: 147
OHS CV CPX PATIENT IS FEMALE: 1
OHS CV CPX PATIENT IS MALE: 0
OHS CV CPX PEAK DIASTOLIC BLOOD PRESSURE: 70 MMHG
OHS CV CPX PEAK HEAR RATE: 87 BPM
OHS CV CPX PEAK RATE PRESSURE PRODUCT: NORMAL
OHS CV CPX PEAK SYSTOLIC BLOOD PRESSURE: 146 MMHG
OHS CV CPX PERCENT MAX PREDICTED HEART RATE ACHIEVED: 61
OHS CV CPX RATE PRESSURE PRODUCT PRESENTING: 8479
OHS CV PHARM TIME: 10 MIN
SYSTOLIC BLOOD PRESSURE: 139 MMHG

## 2025-05-15 PROCEDURE — A9500 TC99M SESTAMIBI: HCPCS | Performed by: STUDENT IN AN ORGANIZED HEALTH CARE EDUCATION/TRAINING PROGRAM

## 2025-05-15 PROCEDURE — 93017 CV STRESS TEST TRACING ONLY: CPT

## 2025-05-15 PROCEDURE — 78452 HT MUSCLE IMAGE SPECT MULT: CPT | Mod: TC

## 2025-05-15 PROCEDURE — 78452 HT MUSCLE IMAGE SPECT MULT: CPT | Mod: 26,,, | Performed by: STUDENT IN AN ORGANIZED HEALTH CARE EDUCATION/TRAINING PROGRAM

## 2025-05-15 PROCEDURE — 63600175 PHARM REV CODE 636 W HCPCS: Performed by: STUDENT IN AN ORGANIZED HEALTH CARE EDUCATION/TRAINING PROGRAM

## 2025-05-15 RX ORDER — REGADENOSON 0.08 MG/ML
0.4 INJECTION, SOLUTION INTRAVENOUS ONCE
Status: COMPLETED | OUTPATIENT
Start: 2025-05-15 | End: 2025-05-15

## 2025-05-15 RX ORDER — TETRAKIS(2-METHOXYISOBUTYLISOCYANIDE)COPPER(I) TETRAFLUOROBORATE 1 MG/ML
33.1 INJECTION, POWDER, LYOPHILIZED, FOR SOLUTION INTRAVENOUS
Status: COMPLETED | OUTPATIENT
Start: 2025-05-15 | End: 2025-05-15

## 2025-05-15 RX ORDER — TETRAKIS(2-METHOXYISOBUTYLISOCYANIDE)COPPER(I) TETRAFLUOROBORATE 1 MG/ML
10 INJECTION, POWDER, LYOPHILIZED, FOR SOLUTION INTRAVENOUS
Status: COMPLETED | OUTPATIENT
Start: 2025-05-15 | End: 2025-05-15

## 2025-05-15 RX ADMIN — KIT FOR THE PREPARATION OF TECHNETIUM TC99M SESTAMIBI 10 MILLICURIE: 1 INJECTION, POWDER, LYOPHILIZED, FOR SOLUTION PARENTERAL at 08:05

## 2025-05-15 RX ADMIN — KIT FOR THE PREPARATION OF TECHNETIUM TC99M SESTAMIBI 33.1 MILLICURIE: 1 INJECTION, POWDER, LYOPHILIZED, FOR SOLUTION PARENTERAL at 09:05

## 2025-05-15 RX ADMIN — REGADENOSON 0.4 MG: 0.08 INJECTION, SOLUTION INTRAVENOUS at 09:05

## 2025-05-20 ENCOUNTER — RESULTS FOLLOW-UP (OUTPATIENT)
Dept: CARDIOLOGY | Facility: CLINIC | Age: 75
End: 2025-05-20

## 2025-05-31 ENCOUNTER — EXTERNAL CHRONIC CARE MANAGEMENT (OUTPATIENT)
Dept: INTERNAL MEDICINE | Facility: CLINIC | Age: 75
End: 2025-05-31
Payer: MEDICARE

## 2025-05-31 PROCEDURE — 99490 CHRNC CARE MGMT STAFF 1ST 20: CPT | Mod: PBBFAC | Performed by: INTERNAL MEDICINE

## 2025-05-31 PROCEDURE — 99490 CHRNC CARE MGMT STAFF 1ST 20: CPT | Mod: S$PBB,,, | Performed by: INTERNAL MEDICINE

## 2025-06-11 ENCOUNTER — INFUSION (OUTPATIENT)
Dept: INFUSION THERAPY | Facility: HOSPITAL | Age: 75
End: 2025-06-11
Attending: NURSE PRACTITIONER
Payer: MEDICARE

## 2025-06-11 VITALS
OXYGEN SATURATION: 99 % | HEART RATE: 58 BPM | DIASTOLIC BLOOD PRESSURE: 67 MMHG | TEMPERATURE: 98 F | RESPIRATION RATE: 16 BRPM | SYSTOLIC BLOOD PRESSURE: 132 MMHG

## 2025-06-11 DIAGNOSIS — M05.79 RHEUMATOID ARTHRITIS INVOLVING MULTIPLE SITES WITH POSITIVE RHEUMATOID FACTOR: Primary | ICD-10-CM

## 2025-06-11 PROCEDURE — 96365 THER/PROPH/DIAG IV INF INIT: CPT

## 2025-06-11 PROCEDURE — 25000003 PHARM REV CODE 250

## 2025-06-11 PROCEDURE — 96375 TX/PRO/DX INJ NEW DRUG ADDON: CPT

## 2025-06-11 PROCEDURE — 63600175 PHARM REV CODE 636 W HCPCS: Mod: JZ,TB

## 2025-06-11 RX ORDER — SODIUM CHLORIDE 0.9 % (FLUSH) 0.9 %
10 SYRINGE (ML) INJECTION
OUTPATIENT
Start: 2025-07-07

## 2025-06-11 RX ORDER — ACETAMINOPHEN 500 MG
1000 TABLET ORAL ONCE AS NEEDED
Status: COMPLETED | OUTPATIENT
Start: 2025-06-11 | End: 2025-06-11

## 2025-06-11 RX ORDER — METHYLPREDNISOLONE SOD SUCC 125 MG
62.5 VIAL (EA) INJECTION ONCE AS NEEDED
Status: COMPLETED | OUTPATIENT
Start: 2025-06-11 | End: 2025-06-11

## 2025-06-11 RX ORDER — ACETAMINOPHEN 500 MG
1000 TABLET ORAL ONCE AS NEEDED
Start: 2025-07-07

## 2025-06-11 RX ORDER — DIPHENHYDRAMINE HYDROCHLORIDE 12.5 MG/5ML
25 LIQUID ORAL ONCE AS NEEDED
Start: 2025-07-07

## 2025-06-11 RX ORDER — DIPHENHYDRAMINE HYDROCHLORIDE 12.5 MG/5ML
25 LIQUID ORAL ONCE AS NEEDED
Status: COMPLETED | OUTPATIENT
Start: 2025-06-11 | End: 2025-06-11

## 2025-06-11 RX ADMIN — DIPHENHYDRAMINE HYDROCHLORIDE 25 MG: 12.5 SOLUTION ORAL at 10:06

## 2025-06-11 RX ADMIN — METHYLPREDNISOLONE SODIUM SUCCINATE 62.5 MG: 125 INJECTION, POWDER, FOR SOLUTION INTRAMUSCULAR; INTRAVENOUS at 10:06

## 2025-06-11 RX ADMIN — ACETAMINOPHEN 1000 MG: 500 TABLET ORAL at 10:06

## 2025-06-11 RX ADMIN — SODIUM CHLORIDE 750 MG: 9 INJECTION, SOLUTION INTRAVENOUS at 10:06

## 2025-06-11 NOTE — PROGRESS NOTES
1001 Pt here for Orencia infusion. Resting in recliner. Therapy plan released and pharmacy notified.   1021 Pre meds given, SoluMedrol 62.5 mg IV, Tylenol 1000 mg po, Benadryl elixir 25mg po.  1029 Orencia infusion started to infuse over 1 hour, filter in place. Will continue to monitor.   1128 Orencia infusion complete, will monitor.  1150 Pt discharged, ambulatory, no adverse reaction noted. Notified to go to the ER with any adverse reactions. AVS given, pt verbalized understanding.   Follow up appt made for four weeks.

## 2025-06-30 ENCOUNTER — EXTERNAL CHRONIC CARE MANAGEMENT (OUTPATIENT)
Dept: INTERNAL MEDICINE | Facility: CLINIC | Age: 75
End: 2025-06-30
Payer: MEDICARE

## 2025-06-30 PROCEDURE — 99490 CHRNC CARE MGMT STAFF 1ST 20: CPT | Mod: PBBFAC | Performed by: INTERNAL MEDICINE

## 2025-06-30 PROCEDURE — 99490 CHRNC CARE MGMT STAFF 1ST 20: CPT | Mod: S$PBB,,, | Performed by: INTERNAL MEDICINE

## 2025-07-03 RX ORDER — AZELASTINE 1 MG/ML
SPRAY, METERED NASAL
COMMUNITY

## 2025-07-07 NOTE — ASSESSMENT & PLAN NOTE
Body mass index is 36.67 kg/m². Morbid obesity complicates all aspects of disease management from diagnostic modalities to treatment. Weight loss encouraged and health benefits explained to patient.        The patient's goals for the shift include      The clinical goals for the shift include Patient will have decreased shortness of breath      Problem: Discharge Planning  Goal: Discharge to home or other facility with appropriate resources  Outcome: Progressing     Problem: Chronic Conditions and Co-morbidities  Goal: Patient's chronic conditions and co-morbidity symptoms are monitored and maintained or improved  Outcome: Progressing     Problem: Nutrition  Goal: Nutrient intake appropriate for maintaining nutritional needs  Outcome: Progressing     Problem: Pain  Goal: Takes deep breaths with improved pain control throughout the shift  Outcome: Progressing  Goal: Turns in bed with improved pain control throughout the shift  Outcome: Progressing  Goal: Walks with improved pain control throughout the shift  Outcome: Progressing  Goal: Performs ADL's with improved pain control throughout shift  Outcome: Progressing  Goal: Free from opioid side effects throughout the shift  Outcome: Progressing  Goal: Free from acute confusion related to pain meds throughout the shift  Outcome: Progressing     Problem: Fall/Injury  Goal: Not fall by end of shift  Outcome: Progressing  Goal: Be free from injury by end of the shift  Outcome: Progressing  Goal: Verbalize understanding of personal risk factors for fall in the hospital  Outcome: Progressing  Goal: Verbalize understanding of risk factor reduction measures to prevent injury from fall in the home  Outcome: Progressing  Goal: Use assistive devices by end of the shift  Outcome: Progressing  Goal: Pace activities to prevent fatigue by end of the shift  Outcome: Progressing     Problem: Diabetes  Goal: Increase stability of blood glucose readings by end of shift  Outcome: Progressing  Goal: Maintain electrolyte levels within acceptable range throughout shift  Outcome: Progressing  Goal: Maintain glucose levels >70mg/dl to <250mg/dl throughout shift  Outcome: Progressing  Goal: No  changes in neurological exam by end of shift  Outcome: Progressing  Goal: Learn about and adhere to nutrition recommendations by end of shift  Outcome: Progressing  Goal: Vital signs within normal range for age by end of shift  Outcome: Progressing  Goal: Increase self care and/or family involovement by end of shift  Outcome: Progressing  Goal: Achieve decreasing blood glucose levels by end of shift  Outcome: Progressing  Goal: Decrease in ketones present in urine by end of shift  Outcome: Progressing  Goal: Receive DSME education by end of shift  Outcome: Progressing     Problem: Heart Failure  Goal: Improved gas exchange this shift  Outcome: Progressing  Goal: Improved urinary output this shift  Outcome: Progressing  Goal: Reduction in peripheral edema within 24 hours  Outcome: Progressing  Goal: Report improvement of dyspnea/breathlessness this shift  Outcome: Progressing  Goal: Weight from fluid excess reduced over 2-3 days, then stabilize  Outcome: Progressing  Goal: Increase self care and/or family involvement in 24 hours  Outcome: Progressing     Problem: Arrythmia/Dysrhythmia  Goal: Lab values return to normal range  Outcome: Progressing  Goal: Serial ECG will return to baseline  Outcome: Progressing  Goal: Vital signs return to baseline  Outcome: Progressing     Problem: Respiratory  Goal: Minimal/no exertional discomfort or dyspnea this shift  Outcome: Progressing  Goal: No signs of respiratory distress (eg. Use of accessory muscles. Peds grunting)  Outcome: Progressing  Goal: Patent airway maintained this shift  Outcome: Progressing  Goal: Tolerate mechanical ventilation evidenced by VS/agitation level this shift  Outcome: Progressing  Goal: Tolerate pulmonary toileting this shift  Outcome: Progressing  Goal: Verbalize decreased shortness of breath this shift  Outcome: Progressing  Goal: Wean oxygen to maintain O2 saturation per order/standard this shift  Outcome: Progressing  Goal: Increase self care  and/or family involvement in next 24 hours  Outcome: Progressing

## 2025-07-08 ENCOUNTER — OFFICE VISIT (OUTPATIENT)
Dept: INTERNAL MEDICINE | Facility: CLINIC | Age: 75
End: 2025-07-08
Payer: MEDICARE

## 2025-07-08 ENCOUNTER — INFUSION (OUTPATIENT)
Dept: INFUSION THERAPY | Facility: HOSPITAL | Age: 75
End: 2025-07-08
Attending: NURSE PRACTITIONER
Payer: MEDICARE

## 2025-07-08 VITALS
DIASTOLIC BLOOD PRESSURE: 66 MMHG | OXYGEN SATURATION: 99 % | WEIGHT: 199.38 LBS | HEART RATE: 72 BPM | SYSTOLIC BLOOD PRESSURE: 124 MMHG | BODY MASS INDEX: 35.33 KG/M2 | HEIGHT: 63 IN | RESPIRATION RATE: 18 BRPM

## 2025-07-08 VITALS
SYSTOLIC BLOOD PRESSURE: 142 MMHG | DIASTOLIC BLOOD PRESSURE: 65 MMHG | OXYGEN SATURATION: 99 % | HEART RATE: 75 BPM | RESPIRATION RATE: 17 BRPM

## 2025-07-08 DIAGNOSIS — E11.42 TYPE 2 DIABETES MELLITUS WITH DIABETIC POLYNEUROPATHY, WITHOUT LONG-TERM CURRENT USE OF INSULIN: ICD-10-CM

## 2025-07-08 DIAGNOSIS — I26.99 PULMONARY EMBOLISM, UNSPECIFIED CHRONICITY, UNSPECIFIED PULMONARY EMBOLISM TYPE, UNSPECIFIED WHETHER ACUTE COR PULMONALE PRESENT: ICD-10-CM

## 2025-07-08 DIAGNOSIS — D64.9 ANEMIA, UNSPECIFIED TYPE: ICD-10-CM

## 2025-07-08 DIAGNOSIS — I10 ESSENTIAL HYPERTENSION: Primary | ICD-10-CM

## 2025-07-08 DIAGNOSIS — M05.79 RHEUMATOID ARTHRITIS INVOLVING MULTIPLE SITES WITH POSITIVE RHEUMATOID FACTOR: ICD-10-CM

## 2025-07-08 DIAGNOSIS — J30.2 SEASONAL ALLERGIES: ICD-10-CM

## 2025-07-08 DIAGNOSIS — K21.9 GERD WITHOUT ESOPHAGITIS: ICD-10-CM

## 2025-07-08 DIAGNOSIS — E78.5 HYPERLIPIDEMIA LDL GOAL <100: ICD-10-CM

## 2025-07-08 DIAGNOSIS — E03.9 HYPOTHYROIDISM, UNSPECIFIED TYPE: ICD-10-CM

## 2025-07-08 DIAGNOSIS — M05.79 RHEUMATOID ARTHRITIS INVOLVING MULTIPLE SITES WITH POSITIVE RHEUMATOID FACTOR: Primary | ICD-10-CM

## 2025-07-08 PROCEDURE — 1101F PT FALLS ASSESS-DOCD LE1/YR: CPT | Mod: CPTII,,, | Performed by: INTERNAL MEDICINE

## 2025-07-08 PROCEDURE — 63600175 PHARM REV CODE 636 W HCPCS: Mod: JZ,JA,TB

## 2025-07-08 PROCEDURE — 99214 OFFICE O/P EST MOD 30 MIN: CPT | Mod: S$PBB,,, | Performed by: INTERNAL MEDICINE

## 2025-07-08 PROCEDURE — 3078F DIAST BP <80 MM HG: CPT | Mod: CPTII,,, | Performed by: INTERNAL MEDICINE

## 2025-07-08 PROCEDURE — 99999 PR PBB SHADOW E&M-EST. PATIENT-LVL V: CPT | Mod: PBBFAC,,, | Performed by: INTERNAL MEDICINE

## 2025-07-08 PROCEDURE — 1126F AMNT PAIN NOTED NONE PRSNT: CPT | Mod: CPTII,,, | Performed by: INTERNAL MEDICINE

## 2025-07-08 PROCEDURE — 3008F BODY MASS INDEX DOCD: CPT | Mod: CPTII,,, | Performed by: INTERNAL MEDICINE

## 2025-07-08 PROCEDURE — 25000003 PHARM REV CODE 250

## 2025-07-08 PROCEDURE — 1160F RVW MEDS BY RX/DR IN RCRD: CPT | Mod: CPTII,,, | Performed by: INTERNAL MEDICINE

## 2025-07-08 PROCEDURE — 96375 TX/PRO/DX INJ NEW DRUG ADDON: CPT

## 2025-07-08 PROCEDURE — 3288F FALL RISK ASSESSMENT DOCD: CPT | Mod: CPTII,,, | Performed by: INTERNAL MEDICINE

## 2025-07-08 PROCEDURE — 1159F MED LIST DOCD IN RCRD: CPT | Mod: CPTII,,, | Performed by: INTERNAL MEDICINE

## 2025-07-08 PROCEDURE — 3074F SYST BP LT 130 MM HG: CPT | Mod: CPTII,,, | Performed by: INTERNAL MEDICINE

## 2025-07-08 PROCEDURE — 99215 OFFICE O/P EST HI 40 MIN: CPT | Mod: PBBFAC | Performed by: INTERNAL MEDICINE

## 2025-07-08 PROCEDURE — 96365 THER/PROPH/DIAG IV INF INIT: CPT

## 2025-07-08 RX ORDER — DIPHENHYDRAMINE HYDROCHLORIDE 12.5 MG/5ML
25 LIQUID ORAL ONCE AS NEEDED
Status: COMPLETED | OUTPATIENT
Start: 2025-07-08 | End: 2025-07-08

## 2025-07-08 RX ORDER — DIPHENHYDRAMINE HYDROCHLORIDE 12.5 MG/5ML
25 LIQUID ORAL ONCE AS NEEDED
Start: 2025-08-04

## 2025-07-08 RX ORDER — ACETAMINOPHEN 500 MG
1000 TABLET ORAL ONCE AS NEEDED
Status: COMPLETED | OUTPATIENT
Start: 2025-07-08 | End: 2025-07-08

## 2025-07-08 RX ORDER — METHYLPREDNISOLONE SOD SUCC 125 MG
62.5 VIAL (EA) INJECTION ONCE AS NEEDED
Status: COMPLETED | OUTPATIENT
Start: 2025-07-08 | End: 2025-07-08

## 2025-07-08 RX ORDER — SODIUM CHLORIDE 0.9 % (FLUSH) 0.9 %
10 SYRINGE (ML) INJECTION
OUTPATIENT
Start: 2025-08-04

## 2025-07-08 RX ORDER — ACETAMINOPHEN 500 MG
1000 TABLET ORAL ONCE AS NEEDED
Start: 2025-08-04

## 2025-07-08 RX ADMIN — DIPHENHYDRAMINE HYDROCHLORIDE 25 MG: 12.5 SOLUTION ORAL at 11:07

## 2025-07-08 RX ADMIN — SODIUM CHLORIDE 750 MG: 9 INJECTION, SOLUTION INTRAVENOUS at 12:07

## 2025-07-08 RX ADMIN — ACETAMINOPHEN 1000 MG: 500 TABLET ORAL at 11:07

## 2025-07-08 RX ADMIN — METHYLPREDNISOLONE SODIUM SUCCINATE 62.5 MG: 125 INJECTION, POWDER, FOR SOLUTION INTRAMUSCULAR; INTRAVENOUS at 11:07

## 2025-07-08 NOTE — PROGRESS NOTES
1130 Pt here for Orencia infusion, resting in recliner, TP released and pharmacy notified   Pre meds given and tolerated (tylenol 1000mg PO, Benadryl 25mg PO elixer, and solumedrol 62.5mg IV push over 2 min)  Pt slightly nauseated after steriod given, Crackers given and cold rag  Pt states feeling better and 1202 Orencia infusion started to infuse over 1 hr with filter applied  1301 Orencia infusion complete, tolerated well, will continue to monitor  1315 pt discharged ambulatory with no adverse reaction noted, pt notified to go to ER with any adverse reactions, AVS given along with medication information  Follow up appt made 4 weeks

## 2025-07-08 NOTE — PROGRESS NOTES
Subjective:       Patient ID: Meghna Dalal is a 75 y.o. female.    Chief Complaint: Follow-up    Doing well fbs 150    Follow-up  Pertinent negatives include no abdominal pain, chest pain, fatigue, rash or weakness.   Review of Systems   Constitutional:  Negative for fatigue and unexpected weight change.   HENT:  Negative for ear pain and goiter.    Respiratory:  Negative for chest tightness and shortness of breath.    Cardiovascular:  Negative for chest pain, palpitations and leg swelling.   Gastrointestinal:  Negative for abdominal pain and reflux.   Integumentary:  Negative for rash and breast tenderness.   Neurological:  Negative for dizziness and weakness.   Hematological:  Negative for adenopathy.   Psychiatric/Behavioral:  Negative for behavioral problems.    Breast: Negative for tenderness      Objective:      Physical Exam  Constitutional:       Appearance: Normal appearance.   HENT:      Head: Normocephalic and atraumatic.      Right Ear: External ear normal.      Left Ear: External ear normal.      Mouth/Throat:      Pharynx: Oropharynx is clear.   Eyes:      Extraocular Movements: Extraocular movements intact.      Pupils: Pupils are equal, round, and reactive to light.   Cardiovascular:      Rate and Rhythm: Normal rate and regular rhythm.      Pulses: Normal pulses.      Heart sounds: Normal heart sounds.   Pulmonary:      Effort: Pulmonary effort is normal.      Breath sounds: Normal breath sounds.   Abdominal:      General: Abdomen is flat. Bowel sounds are normal.      Palpations: Abdomen is soft.   Musculoskeletal:         General: Normal range of motion.      Cervical back: Normal range of motion and neck supple.   Skin:     General: Skin is warm.      Capillary Refill: Capillary refill takes less than 2 seconds.   Neurological:      General: No focal deficit present.      Mental Status: She is alert.   Psychiatric:         Mood and Affect: Mood normal.         Thought Content: Thought  content normal.         Judgment: Judgment normal.       Assessment:       1. Essential hypertension    2. GERD without esophagitis    3. Rheumatoid arthritis involving multiple sites with positive rheumatoid factor    4. Hypothyroidism, unspecified type    5. Hyperlipidemia LDL goal <100    6. Pulmonary embolism, unspecified chronicity, unspecified pulmonary embolism type, unspecified whether acute cor pulmonale present    7. Seasonal allergies    8. Anemia, unspecified type    9. Type 2 diabetes mellitus with diabetic polyneuropathy, without long-term current use of insulin        Plan:         There are no Patient Instructions on file for this visit.      Problem List Items Addressed This Visit          Immunology/Multi System    Rheumatoid arthritis involving multiple sites with positive rheumatoid factor       Hematology    Pulmonary embolus       Endocrine    Type 2 diabetes mellitus with diabetic polyneuropathy, without long-term current use of insulin    Relevant Orders    Hemoglobin A1C    Hemoglobin A1C    Microalbumin/Creatinine Ratio, Urine     Other Visit Diagnoses         Essential hypertension    -  Primary    Relevant Orders    CBC Auto Differential    Comprehensive Metabolic Panel      GERD without esophagitis          Hypothyroidism, unspecified type        Relevant Orders    TSH      Hyperlipidemia LDL goal <100        Relevant Orders    Lipid Panel      Seasonal allergies          Anemia, unspecified type

## 2025-07-25 RX ORDER — LOSARTAN POTASSIUM AND HYDROCHLOROTHIAZIDE 12.5; 1 MG/1; MG/1
1 TABLET ORAL
Qty: 90 TABLET | Refills: 3 | Status: SHIPPED | OUTPATIENT
Start: 2025-07-25

## 2025-07-31 ENCOUNTER — EXTERNAL CHRONIC CARE MANAGEMENT (OUTPATIENT)
Dept: INTERNAL MEDICINE | Facility: CLINIC | Age: 75
End: 2025-07-31
Payer: MEDICARE

## 2025-07-31 PROCEDURE — 99490 CHRNC CARE MGMT STAFF 1ST 20: CPT | Mod: PBBFAC | Performed by: INTERNAL MEDICINE

## 2025-07-31 PROCEDURE — 99490 CHRNC CARE MGMT STAFF 1ST 20: CPT | Mod: S$PBB,,, | Performed by: INTERNAL MEDICINE

## 2025-08-04 RX ORDER — LEVOTHYROXINE SODIUM 125 UG/1
125 TABLET ORAL
Qty: 90 TABLET | Refills: 3 | Status: SHIPPED | OUTPATIENT
Start: 2025-08-04

## 2025-08-11 ENCOUNTER — INFUSION (OUTPATIENT)
Dept: INFUSION THERAPY | Facility: HOSPITAL | Age: 75
End: 2025-08-11
Attending: NURSE PRACTITIONER
Payer: MEDICARE

## 2025-08-11 VITALS
OXYGEN SATURATION: 99 % | SYSTOLIC BLOOD PRESSURE: 167 MMHG | RESPIRATION RATE: 17 BRPM | DIASTOLIC BLOOD PRESSURE: 71 MMHG | HEART RATE: 65 BPM

## 2025-08-11 DIAGNOSIS — M05.79 RHEUMATOID ARTHRITIS INVOLVING MULTIPLE SITES WITH POSITIVE RHEUMATOID FACTOR: Primary | ICD-10-CM

## 2025-08-11 PROCEDURE — 63600175 PHARM REV CODE 636 W HCPCS: Mod: JW,TB | Performed by: INTERNAL MEDICINE

## 2025-08-11 PROCEDURE — 96365 THER/PROPH/DIAG IV INF INIT: CPT

## 2025-08-11 PROCEDURE — 63600175 PHARM REV CODE 636 W HCPCS: Mod: TB | Performed by: NURSE PRACTITIONER

## 2025-08-11 PROCEDURE — 96375 TX/PRO/DX INJ NEW DRUG ADDON: CPT

## 2025-08-11 PROCEDURE — 25000003 PHARM REV CODE 250: Performed by: INTERNAL MEDICINE

## 2025-08-11 PROCEDURE — 25000003 PHARM REV CODE 250: Performed by: NURSE PRACTITIONER

## 2025-08-11 RX ORDER — DIPHENHYDRAMINE HYDROCHLORIDE 12.5 MG/5ML
25 LIQUID ORAL ONCE AS NEEDED
Status: COMPLETED | OUTPATIENT
Start: 2025-08-11 | End: 2025-08-11

## 2025-08-11 RX ORDER — ACETAMINOPHEN 500 MG
1000 TABLET ORAL ONCE AS NEEDED
Start: 2025-09-01

## 2025-08-11 RX ORDER — SODIUM CHLORIDE 0.9 % (FLUSH) 0.9 %
10 SYRINGE (ML) INJECTION
Status: DISCONTINUED | OUTPATIENT
Start: 2025-08-11 | End: 2025-08-11 | Stop reason: HOSPADM

## 2025-08-11 RX ORDER — DIPHENHYDRAMINE HYDROCHLORIDE 12.5 MG/5ML
25 LIQUID ORAL ONCE AS NEEDED
Start: 2025-09-01

## 2025-08-11 RX ORDER — ACETAMINOPHEN 500 MG
1000 TABLET ORAL ONCE AS NEEDED
Status: COMPLETED | OUTPATIENT
Start: 2025-08-11 | End: 2025-08-11

## 2025-08-11 RX ORDER — SODIUM CHLORIDE 0.9 % (FLUSH) 0.9 %
10 SYRINGE (ML) INJECTION
OUTPATIENT
Start: 2025-09-01

## 2025-08-11 RX ADMIN — SODIUM CHLORIDE 750 MG: 9 INJECTION, SOLUTION INTRAVENOUS at 10:08

## 2025-08-11 RX ADMIN — ACETAMINOPHEN 1000 MG: 500 TABLET ORAL at 10:08

## 2025-08-11 RX ADMIN — DIPHENHYDRAMINE HYDROCHLORIDE 25 MG: 12.5 SOLUTION ORAL at 10:08

## 2025-08-11 RX ADMIN — METHYLPREDNISOLONE SODIUM SUCCINATE 62.5 MG: 125 INJECTION, POWDER, FOR SOLUTION INTRAMUSCULAR; INTRAVENOUS at 10:08

## 2025-08-12 ENCOUNTER — TELEPHONE (OUTPATIENT)
Dept: INTERNAL MEDICINE | Facility: CLINIC | Age: 75
End: 2025-08-12
Payer: MEDICARE

## 2025-08-12 RX ORDER — PANTOPRAZOLE SODIUM 40 MG/1
40 TABLET, DELAYED RELEASE ORAL DAILY
Qty: 90 TABLET | Refills: 3 | Status: SHIPPED | OUTPATIENT
Start: 2025-08-12 | End: 2026-08-12

## 2025-08-12 RX ORDER — LEVOTHYROXINE SODIUM 125 UG/1
125 TABLET ORAL
Qty: 90 TABLET | Refills: 3 | Status: SHIPPED | OUTPATIENT
Start: 2025-08-12

## 2025-08-12 RX ORDER — OMEPRAZOLE 40 MG/1
40 CAPSULE, DELAYED RELEASE ORAL EVERY MORNING
Qty: 90 CAPSULE | Refills: 3 | Status: SHIPPED | OUTPATIENT
Start: 2025-08-12

## 2025-08-22 DIAGNOSIS — Z12.11 ENCOUNTER FOR COLORECTAL CANCER SCREENING: Primary | ICD-10-CM

## 2025-08-22 DIAGNOSIS — Z12.12 ENCOUNTER FOR COLORECTAL CANCER SCREENING: Primary | ICD-10-CM

## (undated) DEVICE — SPONGE LAP MASTER 12X12IN

## (undated) DEVICE — PACK ECLIPSE BASIC III SURG

## (undated) DEVICE — SUT 2-0 VICRYL / CT-1

## (undated) DEVICE — WRAP ARM STERILE DISPOSABLE

## (undated) DEVICE — APPLICATOR CHLORAPREP HI-LITE TINTED ORANGE 26ML

## (undated) DEVICE — SUT FIBERWIRE

## (undated) DEVICE — GLOVE SENSICARE PI SURG 8.5

## (undated) DEVICE — NITINOL PILOT WIRE
Type: IMPLANTABLE DEVICE | Site: SHOULDER | Status: NON-FUNCTIONAL
Brand: REUNION
Removed: 2024-08-27

## (undated) DEVICE — GLOVE SURGICAL PROTEXIS PI CLASSIC SIZE 8.5

## (undated) DEVICE — BLADE CARBON SURG SS SZ 15 STERILE

## (undated) DEVICE — TUBING ARTHRO STRYKER

## (undated) DEVICE — SUTURE ETHILON 3-0 18 BLK PS2

## (undated) DEVICE — SOL NACL IRR 1000ML BTL

## (undated) DEVICE — KIT IRR SUCTION HND PIECE

## (undated) DEVICE — DRESSING HYDROFIBER 4X5IN

## (undated) DEVICE — PENCIL HANDSWITCHING ROCKER SW

## (undated) DEVICE — SYRINGE 30CC LL

## (undated) DEVICE — SOL IRRIGATION SALINE 0.9% 1000ML BOTTLE

## (undated) DEVICE — SOLIDIFIER BTL W/TREAT 1500CC

## (undated) DEVICE — GLOVE SENSICARE PI SURG 7.5

## (undated) DEVICE — GOWN TOGA SYS PEELWY ZIP 2 XL

## (undated) DEVICE — GLOVE SENSICARE PI GRN 7.5

## (undated) DEVICE — PAD ABDOMINAL 8X7.5 STERILE

## (undated) DEVICE — GLOVE SENSICARE PI SURG 7

## (undated) DEVICE — GLOVE SENSICARE PI GRN 8.5

## (undated) DEVICE — SOL NACL IRR 3000ML

## (undated) DEVICE — GOWN SURGICAL STERILE LEVEL 3 / XX-LARGE

## (undated) DEVICE — SPONGE COTTON TRAY 4X4IN

## (undated) DEVICE — SUTURE FIBERWIRE #2 BRAIDED BX/12

## (undated) DEVICE — SUTURE VICRYL 2-0 CT-1 27"

## (undated) DEVICE — DRESSING MEPILEX HEEL 22X23CM

## (undated) DEVICE — DRESSING MEPILEX SACR 22X25CM

## (undated) DEVICE — GLOVE SURGICAL PROTEXIS PI CLASSIC SIZE 6.5

## (undated) DEVICE — HANDLE YANKAUER SUCTION K80 LATEX FREE

## (undated) DEVICE — Device

## (undated) DEVICE — WATER BOOM FLOOR SUCTION STRIP

## (undated) DEVICE — SUT VICRYL CTD 1 27IN CP

## (undated) DEVICE — GLOVE SURGICAL PROTEXIS PI CLASSIC SIZE 8.0

## (undated) DEVICE — OVERLAY MATTRESS WAFFLE

## (undated) DEVICE — SLING ARM ULTRA III LG

## (undated) DEVICE — BUR AUGER ULMT 5X125MM

## (undated) DEVICE — FREE NEEDLE

## (undated) DEVICE — GLOVE SURGICAL PROTEXIS PI CLASSIC SIZE 7.0

## (undated) DEVICE — PIN 3.2MM 4 KANT SQUARE
Type: IMPLANTABLE DEVICE | Site: SHOULDER | Status: NON-FUNCTIONAL
Removed: 2024-08-27

## (undated) DEVICE — SYRINGE ASEPTO IRRIGATION BULB 60CC LF DISP

## (undated) DEVICE — BIT DRILL 3.1MM

## (undated) DEVICE — CUTTER TOMCAT 4X125MM

## (undated) DEVICE — GLOVE SENSICARE PI GRN 7

## (undated) DEVICE — WRAP COBAN SELF ADHERENT 4IN X 5YD STERILE

## (undated) DEVICE — NEEDLE SCORPION SUREFIRE

## (undated) DEVICE — SLING ARM STP PAD BLUE XL 9X22

## (undated) DEVICE — COVER LIGHT HANDLE FLEX PK/2

## (undated) DEVICE — SUT 2-0 12-18IN SILK

## (undated) DEVICE — GLOVE SURGICAL PROTEXIS PI BLUE SIZE 7.0

## (undated) DEVICE — SUT BLU BR 2 TAPERD NDL 1/2

## (undated) DEVICE — SOL IRRI STRL WATER 1000ML

## (undated) DEVICE — APPLICATOR CHLORAPREP ORN 26ML

## (undated) DEVICE — SOL IRRIGATION SALINE 3000ML BAG

## (undated) DEVICE — STAPLER SKIN PROXIMATE WIDE

## (undated) DEVICE — BLADE SAW SGTTL 18.6X.8X73.8MM

## (undated) DEVICE — GLOVE SENSICARE PI GRN 6.5

## (undated) DEVICE — NEEDLE MAYO 1/2C TAPER 7

## (undated) DEVICE — WRAP SHOULDER POSTOP W/COLD PK